# Patient Record
Sex: MALE | Race: OTHER | NOT HISPANIC OR LATINO | Employment: OTHER | URBAN - METROPOLITAN AREA
[De-identification: names, ages, dates, MRNs, and addresses within clinical notes are randomized per-mention and may not be internally consistent; named-entity substitution may affect disease eponyms.]

---

## 2017-07-10 ENCOUNTER — ALLSCRIPTS OFFICE VISIT (OUTPATIENT)
Dept: OTHER | Facility: OTHER | Age: 77
End: 2017-07-10

## 2017-07-10 DIAGNOSIS — Z12.5 ENCOUNTER FOR SCREENING FOR MALIGNANT NEOPLASM OF PROSTATE: ICD-10-CM

## 2017-07-10 DIAGNOSIS — I25.10 ATHEROSCLEROTIC HEART DISEASE OF NATIVE CORONARY ARTERY WITHOUT ANGINA PECTORIS: ICD-10-CM

## 2017-11-06 ENCOUNTER — APPOINTMENT (OUTPATIENT)
Dept: LAB | Facility: CLINIC | Age: 77
End: 2017-11-06
Payer: COMMERCIAL

## 2017-11-06 ENCOUNTER — TRANSCRIBE ORDERS (OUTPATIENT)
Dept: LAB | Facility: CLINIC | Age: 77
End: 2017-11-06

## 2017-11-06 ENCOUNTER — ALLSCRIPTS OFFICE VISIT (OUTPATIENT)
Dept: OTHER | Facility: OTHER | Age: 77
End: 2017-11-06

## 2017-11-06 DIAGNOSIS — I25.10 ATHEROSCLEROTIC HEART DISEASE OF NATIVE CORONARY ARTERY WITHOUT ANGINA PECTORIS: ICD-10-CM

## 2017-11-06 DIAGNOSIS — Z12.5 ENCOUNTER FOR SCREENING FOR MALIGNANT NEOPLASM OF PROSTATE: ICD-10-CM

## 2017-11-06 LAB
BASOPHILS # BLD AUTO: 0.03 THOUSANDS/ΜL (ref 0–0.1)
BASOPHILS NFR BLD AUTO: 0 % (ref 0–1)
EOSINOPHIL # BLD AUTO: 0.13 THOUSAND/ΜL (ref 0–0.61)
EOSINOPHIL NFR BLD AUTO: 1 % (ref 0–6)
ERYTHROCYTE [DISTWIDTH] IN BLOOD BY AUTOMATED COUNT: 13.4 % (ref 11.6–15.1)
HCT VFR BLD AUTO: 42.8 % (ref 36.5–49.3)
HGB BLD-MCNC: 14.8 G/DL (ref 12–17)
LYMPHOCYTES # BLD AUTO: 1.79 THOUSANDS/ΜL (ref 0.6–4.47)
LYMPHOCYTES NFR BLD AUTO: 20 % (ref 14–44)
MCH RBC QN AUTO: 33 PG (ref 26.8–34.3)
MCHC RBC AUTO-ENTMCNC: 34.6 G/DL (ref 31.4–37.4)
MCV RBC AUTO: 96 FL (ref 82–98)
MONOCYTES # BLD AUTO: 0.88 THOUSAND/ΜL (ref 0.17–1.22)
MONOCYTES NFR BLD AUTO: 10 % (ref 4–12)
NEUTROPHILS # BLD AUTO: 6.19 THOUSANDS/ΜL (ref 1.85–7.62)
NEUTS SEG NFR BLD AUTO: 69 % (ref 43–75)
NRBC BLD AUTO-RTO: 0 /100 WBCS
PLATELET # BLD AUTO: 187 THOUSANDS/UL (ref 149–390)
PMV BLD AUTO: 11.3 FL (ref 8.9–12.7)
PSA SERPL-MCNC: 1.3 NG/ML (ref 0–4)
RBC # BLD AUTO: 4.48 MILLION/UL (ref 3.88–5.62)
WBC # BLD AUTO: 9.07 THOUSAND/UL (ref 4.31–10.16)

## 2017-11-06 PROCEDURE — 80053 COMPREHEN METABOLIC PANEL: CPT

## 2017-11-06 PROCEDURE — 85025 COMPLETE CBC W/AUTO DIFF WBC: CPT

## 2017-11-06 PROCEDURE — 80061 LIPID PANEL: CPT

## 2017-11-06 PROCEDURE — G0103 PSA SCREENING: HCPCS

## 2017-11-06 PROCEDURE — 84443 ASSAY THYROID STIM HORMONE: CPT

## 2017-11-06 PROCEDURE — 36415 COLL VENOUS BLD VENIPUNCTURE: CPT

## 2017-11-06 PROCEDURE — 82570 ASSAY OF URINE CREATININE: CPT

## 2017-11-06 PROCEDURE — 82043 UR ALBUMIN QUANTITATIVE: CPT

## 2017-11-07 ENCOUNTER — GENERIC CONVERSION - ENCOUNTER (OUTPATIENT)
Dept: OTHER | Facility: OTHER | Age: 77
End: 2017-11-07

## 2017-11-07 LAB
ALBUMIN SERPL BCP-MCNC: 3.7 G/DL (ref 3.5–5)
ALP SERPL-CCNC: 65 U/L (ref 46–116)
ALT SERPL W P-5'-P-CCNC: 16 U/L (ref 12–78)
ANION GAP SERPL CALCULATED.3IONS-SCNC: 6 MMOL/L (ref 4–13)
AST SERPL W P-5'-P-CCNC: 11 U/L (ref 5–45)
BILIRUB SERPL-MCNC: 0.84 MG/DL (ref 0.2–1)
BUN SERPL-MCNC: 13 MG/DL (ref 5–25)
CALCIUM SERPL-MCNC: 8.5 MG/DL (ref 8.3–10.1)
CHLORIDE SERPL-SCNC: 106 MMOL/L (ref 100–108)
CHOLEST SERPL-MCNC: 139 MG/DL (ref 50–200)
CO2 SERPL-SCNC: 28 MMOL/L (ref 21–32)
CREAT SERPL-MCNC: 0.9 MG/DL (ref 0.6–1.3)
CREAT UR-MCNC: 195 MG/DL
GFR SERPL CREATININE-BSD FRML MDRD: 83 ML/MIN/1.73SQ M
GLUCOSE P FAST SERPL-MCNC: 93 MG/DL (ref 65–99)
HDLC SERPL-MCNC: 48 MG/DL (ref 40–60)
LDLC SERPL CALC-MCNC: 66 MG/DL (ref 0–100)
MICROALBUMIN UR-MCNC: 1400 MG/L (ref 0–20)
MICROALBUMIN/CREAT 24H UR: 718 MG/G CREATININE (ref 0–30)
POTASSIUM SERPL-SCNC: 3.6 MMOL/L (ref 3.5–5.3)
PROT SERPL-MCNC: 7.2 G/DL (ref 6.4–8.2)
SODIUM SERPL-SCNC: 140 MMOL/L (ref 136–145)
TRIGL SERPL-MCNC: 124 MG/DL
TSH SERPL DL<=0.05 MIU/L-ACNC: 0.76 UIU/ML (ref 0.36–3.74)

## 2017-11-07 NOTE — PROGRESS NOTES
Assessment  1  Hypertension (401 9) (I10)   2  Coronary artery disease (414 00) (I25 10)   3  Hyperlipidemia (272 4) (E78 5)   4  Hypothyroidism (244 9) (E03 9)   5  Tobacco use disorder (305 1) (F17 200)   6  Palpitations (785 1) (R00 2)    Plan  Colon cancer screening    · (1) OCCULT BLOOD, FECAL IMMUNOCHEMICAL TEST; Status:Active; Requested IRX:[id number];   Palpitations    · EKG/ECG- POC; Status:Complete;   Done: 44ZDQ3073 12:20PM    Discussion/Summary  Discussion Summary:   EKG with PVC's  Discussed the findings and told pt we need to get his labs back to see if any other issues, eg thyroid is causing an issues  Recommended a cardioevent monitor, but refuses  Feel increasing his beta blocker or adding a CCB may increase his fatigue and has borderline HR at times  Pt doesn't want further w/u  Refusing vaccines and colonoscopy  Will give him FIT  Continue current treatment  Wean tobacco    Smoking Cessation: Impression: tobacco smoking  Currently, the patient is not interested in quitting  There are no changes in medication management  Patient discussion: discussed with the patient, 3 minute visit, greater than half of the time was spent on counseling  Medication SE Review and Pt Understands Tx: Possible side effects of new medications were reviewed with the patient/guardian today  The treatment plan was reviewed with the patient/guardian  The patient/guardian understands and agrees with the treatment plan      Chief Complaint  Chief Complaint Free Text Note Form: Patient is being seen today for a rtn visit  Patient has no complaints or questions  Chief Complaint Chronic Condition [facility]: Patient is here today for follow up of chronic conditions described in HPI  History of Present Illness  HPI: WM RTC for f/u CAD, Htn, etc  Doing ok and no c/o's  Did not get his labs yet  ROS positive for what sounds like intermittent palpitations  Last several minutes  No CP or SOB   H/o CAD, but no Afib in his old records and pt a poor historian  Refusing colonoscopy and vaccines  Still smoking  Coronary Artery Disease (Follow-Up): The patient states he has been stable with his coronary artery disease symptoms since the last visit  Comorbid Illnesses: hypertension  Complications: MI  He has no significant interval events  Symptoms: The patient is currently asymptomatic  Associated symptoms include palpitations, but-- no syncope,-- no PND,-- no orthopnea-- and-- no edema  His symptoms do not limit his activities  He denies nitroglycerin use since the last visit  Medications: the patient is adherent with his medication regimen  -- He denies medication side effects  Medication(s): aspirin,-- a beta blocker,-- a statin-- and-- an ACE inhibitor  Hypothyroidism (Follow-Up): The patient is being seen for follow-up of hypothyroidism of undetermined etiology  The patient reports doing well  He has had no significant interval events  The patient is currently asymptomatic  Associated symptoms: palpitations  Medications include levothyroxine  Medications:  the patient is adherent to his medication regimen, but-- he denies medication side effects  Disease management:  the patient is doing well with his goals  Due for: thyroid stimulating hormone and lipid profile  Hyperlipidemia (Follow-Up): The patient states his hyperlipidemia has been under good control since the last visit  Comorbid Illnesses: coronary artery disease,-- tobacco use-- and-- hypertension  He has no significant interval events  Symptoms: The patient is currently asymptomatic  Associated symptoms include no focal neurologic deficits-- and-- no memory loss  Medications: the patient is adherent with his medication regimen  -- He denies medication side effects  Medication(s): a statin  The patient is doing well with his hyperlipidemia goals  The patient is due for a lipid panel-- and-- liver function tests  Hypertension (Follow-Up):  The patient presents for follow-up of essential hypertension  The patient states he has been doing well with his blood pressure control since the last visit  Comorbid Illnesses: coronary artery disease  He has no significant interval events  Symptoms: The patient is currently asymptomatic  Associated symptoms include no headache-- and-- no focal neurologic deficits  Home monitoring: The patient is not checking blood pressure at home  Blood pressure control has been good  Medications: the patient is adherent with his medication regimen  -- He denies medication side effects  Medication(s): a beta blocking agent,-- a calcium channel blocker-- and-- an ACE inhibitor  Disease Management: the patient is doing well with his blood pressure goals  The patient is due for a lipid panel,-- a serum creatinine-- and-- a urine microalbumin  Review of Systems  Complete-Male:   Constitutional: no fever,-- not feeling poorly,-- no chills-- and-- not feeling tired  Cardiovascular: palpitations, but-- as noted in HPI,-- no chest pain,-- no intermittent leg claudication-- and-- no extremity edema  Respiratory: no shortness of breath,-- no cough,-- no orthopnea,-- no wheezing,-- no shortness of breath during exertion-- and-- no PND  Gastrointestinal: no abdominal pain,-- no nausea,-- no constipation-- and-- no diarrhea  Genitourinary: no dysuria  Musculoskeletal: No complaints of arthralgia, no myalgias, no joint swelling or stiffness, no limb pain or swelling  Integumentary: No complaints of skin rash or skin lesions, no itching, no skin wound, no dry skin  Neurological: no headache,-- no confusion-- and-- no convulsions  Psychiatric: no anxiety-- and-- no depression  Endocrine: No complaints of proptosis, no hot flashes, no muscle weakness, no erectile dysfunction, no deepening of the voice, no feelings of weakness     Hematologic/Lymphatic: No complaints of swollen glands, no swollen glands in the neck, does not bleed easily, no easy bruising  Active Problems  1  Allergic rhinitis (477 9) (J30 9)   2  Coronary artery disease (414 00) (I25 10)   3  Depression screening (V79 0) (Z13 89)   4  Hard of hearing (389 9) (H91 90)   5  Hyperlipidemia (272 4) (E78 5)   6  Hypertension (401 9) (I10)   7  Hypothyroidism (244 9) (E03 9)   8  Need for pneumococcal vaccine (V03 82) (Z23)   9  Screening for genitourinary condition (V81 6) (Z13 89)   10  Screening for neurological condition (V80 09) (Z13 89)   11  Special screening, prostate cancer (V76 44) (Z12 5)   12  Tobacco use disorder (305 1) (F17 200)    Past Medical History  1  History of hemorrhoids (V13 89) (Z87 19)  Active Problems And Past Medical History Reviewed: The active problems and past medical history were reviewed and updated today  Surgical History  1  History of CABG   2  History of Complete Colonoscopy   3  History of Dental Surgery   4  History of Knee Surgery Left   5  History of Tonsillectomy With Adenoidectomy  Surgical History Reviewed: The surgical history was reviewed and updated today  Family History  Mother    1  Family history of Alzheimer's disease (V17 2) (Z82 0)   2  Denied: Family history of mental disorder   3  Denied: Family history of substance abuse  Father    4  Family history of cardiac disorder (V17 49) (Z82 49)   5  Denied: Family history of mental disorder   6  Denied: Family history of substance abuse  Brother    7  Family history of cardiac disorder (V17 49) (Z82 49)  Family History Reviewed: The family history was reviewed and updated today  Social History   · Current every day smoker (305 1) (F17 200)   · Denies alcohol consumption (V49 89) (Z78 9)   · Has no children   · Lives alone without help available (V60 4) (Z60 2)   · Previous  service   · Retired  Social History Reviewed: The social history was reviewed and updated today  The social history was reviewed and is unchanged  Current Meds   1  Levothyroxine Sodium 50 MCG Oral Tablet; TAKE 1 TABLET DAILY AS DIRECTED  Requested for:   01Kvb5824; Last Rx:22Fyj4678 Ordered   2  Lisinopril 20 MG Oral Tablet; TAKE 1 TABLET DAILY  Requested for: 91PQY5834; Last Rx:35Itn8880   Ordered   3  Metoprolol Succinate ER 50 MG Oral Tablet Extended Release 24 Hour; TAKE 1 TABLET DAILY    Requested for: 00WQD3137; Last Rx:56Nkw1479 Ordered   4  NIFEdipine ER 60 MG Oral Tablet Extended Release 24 Hour; TAKE 1 TABLET DAILY  Requested for:   11YII8864; Last Rx:60Hqk6582 Ordered   5  Simvastatin 20 MG Oral Tablet; TAKE 1 TABLET DAILY AS DIRECTED  Requested for: 51JUJ0107; Last   Rx:07Uim5768 Ordered  Medication List Reviewed: The medication list was reviewed and updated today  Allergies  1  No Known Drug Allergies    Vitals  Vital Signs    Recorded: 56XKP2141 11:50AM   Temperature 97 9 F, Tympanic   Heart Rate 58   Respiration 18   Systolic 298, Sitting   Diastolic 82, Sitting   Height 5 ft 10 in   Weight 158 lb 8 0 oz   BMI Calculated 22 74   BSA Calculated 1 89   O2 Saturation 98     Physical Exam    Constitutional   General appearance: No acute distress, well appearing and well nourished  Eyes   Conjunctiva and lids: No swelling, erythema, or discharge  Pupils and irises: Equal, round and reactive to light  Ears, Nose, Mouth, and Throat   Oropharynx: Normal with no erythema, edema, exudate or lesions  Pulmonary   Respiratory effort: No increased work of breathing or signs of respiratory distress  Auscultation of lungs: Clear to auscultation, equal breath sounds bilaterally, no wheezes, no rales, no rhonci  Cardiovascular   Auscultation of heart: Abnormal  -- Regular with ectopy vs  Irregular irregular with SVR  Examination of extremities for edema and/or varicosities: Normal     Carotid pulses: Normal     Abdomen   Abdomen: Non-tender, no masses  Lymphatic   Palpation of lymph nodes in neck: No lymphadenopathy      Musculoskeletal   Gait and station: Normal     Psychiatric   Orientation to person, place and time: Normal     Mood and affect: Normal          Results/Data  EKG/ECG- POC 00SWW6429 12:20PM Jelani Aldrich     Test Name Result Flag Reference   EKG/ECG      See printout for my interpetation  Future Appointments    Date/Time Provider Specialty Site   03/06/2018 01:15 PM SOILA Camilo   [id number]     Signatures   Electronically signed by : SOILA Rai ; Nov 6 2017 12:44PM EST                       (Author)

## 2017-11-10 ENCOUNTER — TRANSCRIBE ORDERS (OUTPATIENT)
Dept: LAB | Facility: CLINIC | Age: 77
End: 2017-11-10

## 2017-11-10 ENCOUNTER — APPOINTMENT (OUTPATIENT)
Dept: LAB | Facility: CLINIC | Age: 77
End: 2017-11-10
Payer: COMMERCIAL

## 2017-11-10 DIAGNOSIS — Z12.11 SPECIAL SCREENING FOR MALIGNANT NEOPLASMS, COLON: ICD-10-CM

## 2017-11-10 DIAGNOSIS — Z12.11 SPECIAL SCREENING FOR MALIGNANT NEOPLASMS, COLON: Primary | ICD-10-CM

## 2017-11-10 LAB — HEMOCCULT STL QL IA: POSITIVE

## 2017-11-10 PROCEDURE — G0328 FECAL BLOOD SCRN IMMUNOASSAY: HCPCS

## 2017-11-11 ENCOUNTER — GENERIC CONVERSION - ENCOUNTER (OUTPATIENT)
Dept: OTHER | Facility: OTHER | Age: 77
End: 2017-11-11

## 2018-01-13 VITALS
RESPIRATION RATE: 18 BRPM | OXYGEN SATURATION: 98 % | HEART RATE: 58 BPM | WEIGHT: 158.5 LBS | BODY MASS INDEX: 22.69 KG/M2 | DIASTOLIC BLOOD PRESSURE: 82 MMHG | HEIGHT: 70 IN | SYSTOLIC BLOOD PRESSURE: 132 MMHG | TEMPERATURE: 97.9 F

## 2018-01-14 VITALS
HEART RATE: 60 BPM | TEMPERATURE: 99.3 F | OXYGEN SATURATION: 98 % | WEIGHT: 158 LBS | DIASTOLIC BLOOD PRESSURE: 66 MMHG | BODY MASS INDEX: 22.62 KG/M2 | RESPIRATION RATE: 18 BRPM | HEIGHT: 70 IN | SYSTOLIC BLOOD PRESSURE: 112 MMHG

## 2018-01-14 NOTE — PROGRESS NOTES
Assessment    1  Medicare annual wellness visit, initial (V70 0) (Z00 00)   2  Hypertension (401 9) (I10)   3  Coronary artery disease (414 00) (I25 10)   4  Hyperlipidemia (272 4) (E78 5)   5  Hypothyroidism (244 9) (E03 9)   6  Tobacco use disorder (305 1) (F17 200)   7  Palpitations (785 1) (R00 2)    Plan  Colon cancer screening    · (1) OCCULT BLOOD, FECAL IMMUNOCHEMICAL TEST; Status:Active; Requested  BIP:[id number];   Palpitations    · EKG/ECG- POC; Status:Complete;   Done: 62CMT5250 12:20PM    Discussion/Summary  Impression: Initial Annual Wellness Visit  Cardiovascular screening and counseling: due for a lipid panel  Diabetes screening and counseling: due for blood glucose  Colorectal cancer screening and counseling: screening not indicated  Prostate cancer screening and counseling: due for PSA  Osteoporosis screening and counseling: screening not indicated  Abdominal aortic aneurysm screening and counseling: screening not indicated  Glaucoma screening and counseling: screening not indicated  HIV screening and counseling: screening not indicated  Immunizations: the patient declines the influenza vaccination, pneumococcal vaccination status is unknown and hepatitis B vaccination status is unknown  Advance Directive Planning: complete and up to date, Need copy for our charts  Advice and education were given regarding fall risk reduction, nutrition (non-diabetic) and seat belt use  He was referred to podiatry and Dr Catie Bucio  Medical Equipment/Suppliers: none  Patient Discussion: plan discussed with the patient, follow-up visit needed in one year, follow-up as needed  History of Present Illness  WM presents for Medicare Wellness  The patient is being seen for the initial annual wellness visit  Medicare Screening and Risk Factors   Hospitalizations: no previous hospitalizations    Medicare Screening Tests Risk Questions   Abdominal aortic aneurysm risk assessment: tobacco use and over 72years of age  Osteoporosis risk assessment: tobacco use  HIV risk assessment: none indicated  Drug and Alcohol Use: The patient is a current cigarette smoker  He is not ready to quit using tobacco  The patient reports occasional alcohol use  Alcohol concern:   The patient has no concerns about alcohol abuse  He has never used illicit drugs  Diet and Physical Activity: Current diet includes well balanced meals, 2 servings of fruit per day, 2 servings of vegetables per day and 2 cups of coffee per day  The patient does not exercise  Mood Disorder and Cognitive Impairment Screening: He denies feeling down, depressed, or hopeless over the past two weeks  He denies feeling little interest or pleasure in doing things over the past two weeks  Cognitive impairment screening: difficulty learning/retaining new information, difficulty handling complex tasks, denies difficulty with reasoning, denies difficulty with spatial ability and orientation, denies difficulty with language and denies difficulty with behavior  Functional Ability/Level of Safety: Hearing is slightly decreased  He reports hearing difficulties  He uses a hearing aid  The patient is currently able to do activities of daily living with limitations  Activities of daily living details: needs help shopping, needs help doing housework and needs help doing laundry, but does not need help using the phone, no transportation help needed, no meal preparation help needed, does not need help managing medications and does not need help managing money  Home safety risk factors:  no unfamiliar surroundings, no loose rugs, no poor household lighting, no uneven floors, no household clutter, grab bars in the bathroom and handrails on the stairs  Advance Directives: Advance directives: no living will and no durable power of  for health care directives     Co-Managers and Medical Equipment/Suppliers: See Patient Care Team   Falls Risk: The patient fell 1 times in the past 12 months  The patient currently has no urinary incontinence symptoms  Patient Care Team    Care Team Member Role Specialty Office Number   Akshat Mistry [place]        Active Problems    1  Allergic rhinitis (477 9) (J30 9)   2  Coronary artery disease (414 00) (I25 10)   3  Depression screening (V79 0) (Z13 89)   4  Hard of hearing (389 9) (H91 90)   5  Hyperlipidemia (272 4) (E78 5)   6  Hypertension (401 9) (I10)   7  Hypothyroidism (244 9) (E03 9)   8  Need for pneumococcal vaccine (V03 82) (Z23)   9  Screening for genitourinary condition (V81 6) (Z13 89)   10  Screening for neurological condition (V80 09) (Z13 89)   11  Special screening, prostate cancer (V76 44) (Z12 5)   12  Tobacco use disorder (305 1) (F17 200)    Past Medical History    1  History of hemorrhoids (V13 89) (Z87 19)    Surgical History    1  History of CABG   2  History of Complete Colonoscopy   3  History of Dental Surgery   4  History of Knee Surgery Left   5  History of Tonsillectomy With Adenoidectomy    Family History  Mother    1  Family history of Alzheimer's disease (V17 2) (Z82 0)   2  Denied: Family history of mental disorder   3  Denied: Family history of substance abuse  Father    4  Family history of cardiac disorder (V17 49) (Z82 49)   5  Denied: Family history of mental disorder   6  Denied: Family history of substance abuse  Brother    7  Family history of cardiac disorder (V17 49) (Z82 49)    Social History    · Current every day smoker (305 1) (F17 200)   · Denies alcohol consumption (V49 89) (Z78 9)   · Has no children   · Lives alone without help available (V60 4) (Z60 2)   · Previous  service   · Retired    Current Meds   1  Levothyroxine Sodium 50 MCG Oral Tablet; TAKE 1 TABLET DAILY AS DIRECTED    Requested for: 02Blh5317; Last Rx:69Tig7944 Ordered   2  Lisinopril 20 MG Oral Tablet; TAKE 1 TABLET DAILY  Requested for: 17RMV0178; Last   Rx:09Zys0673 Ordered   3  Metoprolol Succinate ER 50 MG Oral Tablet Extended Release 24 Hour; TAKE 1 TABLET   DAILY  Requested for: 28HFG0484; Last Rx:27Xdz2261 Ordered   4  NIFEdipine ER 60 MG Oral Tablet Extended Release 24 Hour; TAKE 1 TABLET DAILY    Requested for: 62LLG8869; Last Rx:88Bnb8822 Ordered   5  Simvastatin 20 MG Oral Tablet; TAKE 1 TABLET DAILY AS DIRECTED  Requested for:   70KLI3462; Last Rx:13Hpd7084 Ordered    Allergies    1  No Known Drug Allergies    Vitals  Signs   Recorded: 06XDP2815 11:50AM   Temperature: 97 9 F, Tympanic  Heart Rate: 58  Respiration: 18  Systolic: 474, Sitting  Diastolic: 82, Sitting  Height: 5 ft 10 in  Weight: 158 lb 8 0 oz  BMI Calculated: 22 74  BSA Calculated: 1 89  O2 Saturation: 98    Results/Data  EKG/ECG- POC 55DJU6426 12:20PM Balaji Sitter     Test Name Result Flag Reference   EKG/ECG      See printout for my interpetation         Signatures   Electronically signed by : SOILA Austin ; Nov 6 2017 12:40PM EST                       (Author)

## 2018-01-15 NOTE — RESULT NOTES
Verified Results  (1) CBC/PLT/DIFF 65HZT2917 11:22AM Iram Fuller Order Number: [id number]     Test Name Result Flag Reference   WBC COUNT 9 07 Thousand/uL  4 31-10 16   RBC COUNT 4 48 Million/uL  3 88-5 62   HEMOGLOBIN 14 8 g/dL  12 0-17 0   HEMATOCRIT 42 8 %  36 5-49 3   MCV 96 fL  82-98   MCH 33 0 pg  26 8-34 3   MCHC 34 6 g/dL  31 4-37 4   RDW 13 4 %  11 6-15 1   MPV 11 3 fL  8 9-12 7   PLATELET COUNT 199 Thousands/uL  149-390   nRBC AUTOMATED 0 /100 WBCs     NEUTROPHILS RELATIVE PERCENT 69 %  43-75   LYMPHOCYTES RELATIVE PERCENT 20 %  14-44   MONOCYTES RELATIVE PERCENT 10 %  4-12   EOSINOPHILS RELATIVE PERCENT 1 %  0-6   BASOPHILS RELATIVE PERCENT 0 %  0-1   NEUTROPHILS ABSOLUTE COUNT 6 19 Thousands/? ??L  1 85-7 62   LYMPHOCYTES ABSOLUTE COUNT 1 79 Thousands/? ??L  0 60-4 47   MONOCYTES ABSOLUTE COUNT 0 88 Thousand/? ??L  0 17-1 22   EOSINOPHILS ABSOLUTE COUNT 0 13 Thousand/? ??L  0 00-0 61   BASOPHILS ABSOLUTE COUNT 0 03 Thousands/? ??L  0 00-0 10     (1) COMPREHENSIVE METABOLIC PANEL 01NBC7980 96:08IR Iram Fuller Order Number: [id number]     Test Name Result Flag Reference   SODIUM 140 mmol/L  136-145   POTASSIUM 3 6 mmol/L  3 5-5 3   CHLORIDE 106 mmol/L  100-108   CARBON DIOXIDE 28 mmol/L  21-32   ANION GAP (CALC) 6 mmol/L  4-13   BLOOD UREA NITROGEN 13 mg/dL  5-25   CREATININE 0 90 mg/dL  0 60-1 30   Standardized to IDMS reference method   CALCIUM 8 5 mg/dL  8 3-10 1   BILI, TOTAL 0 84 mg/dL  0 20-1 00   ALK PHOSPHATAS 65 U/L     ALT (SGPT) 16 U/L  12-78   Specimen collection should occur prior to Sulfasalazine and/or Sulfapyridine administration due to the potential for falsely depressed results  AST(SGOT) 11 U/L  5-45   Specimen collection should occur prior to Sulfasalazine administration due to the potential for falsely depressed results     ALBUMIN 3 7 g/dL  3 5-5 0   TOTAL PROTEIN 7 2 g/dL  6 4-8 2   eGFR 83 ml/min/1 73sq m     National Kidney Disease Education Program recommendations are as follows:  GFR calculation is accurate only with a steady state creatinine  Chronic Kidney disease less than 60 ml/min/1 73 sq  meters  Kidney failure less than 15 ml/min/1 73 sq  meters  GLUCOSE FASTING 93 mg/dL  65-99   Specimen collection should occur prior to Sulfasalazine administration due to the potential for falsely depressed results  Specimen collection should occur prior to Sulfapyridine administration due to the potential for falsely elevated results  (1) LIPID PANEL FASTING W DIRECT LDL REFLEX [id number] 11:22AM [facility] Order Number: [id number]     Test Name Result Flag Reference   CHOLESTEROL 139 mg/dL     LDL CHOLESTEROL CALCULATED 66 mg/dL  0-100   Triglyceride:        Normal <150 mg/dl   Borderline High 150-199 mg/dl   High 200-499 mg/dl   Very High >499 mg/dl      Cholesterol:       Desirable <200 mg/dl    Borderline High 200-239 mg/dl    High >239 mg/dl      HDL Cholesterol:       High>59 mg/dL    Low <41 mg/dL      HDL Cholesterol:       High>59 mg/dL    Low <41 mg/dL      This screening LDL is a calculated result  It does not have the accuracy of the Direct Measured LDL in the monitoring of patients with hyperlipidemia and/or statin therapy  Direct Measure LDL (YSR630) must be ordered separately in these patients  TRIGLYCERIDES 124 mg/dL  <=150   Specimen collection should occur prior to N-Acetylcysteine or Metamizole administration due to the potential for falsely depressed results  HDL,DIRECT 48 mg/dL  40-60   Specimen collection should occur prior to Metamizole administration due to the potential for falsley depressed results       (1) MICROALBUMIN CREATININE RATIO, RANDOM URINE [id number] 11:22AM [facility] Order Number: [id number]     Test Name Result Flag Reference   MICROALBUMIN/ CREAT R 718 mg/g creatinine H 0-30   MICROALBUMIN,URINE 1400 0 mg/L H 0 0-20 0   CREATININE URINE 195 0 mg/dL       (1) TSH 56GBR7381 11:22AM Chirag Olvera Order Number: [id number]     Test Name Result Flag Reference   TSH 0 760 uIU/mL  0 358-3 740   Patients undergoing fluorescein dye angiography may retain small amounts of fluorescein in the body for 48-72 hours post procedure  Samples containing fluorescein can produce falsely depressed TSH values  If the patient had this procedure,a specimen should be resubmitted post fluorescein clearance  (1) PSA (SCREEN) (Dx V76 44 Screen for Prostate Cancer) 74MTR1542 11:22AM Chirag Olvera Order Number: [id number]     Test Name Result Flag Reference   PROSTATE SPECIFIC ANTIGEN 1 3 ng/mL  0 0-4 0   American Urological Association Guidelines define biochemical recurrence of prostate cancer as a detectable or rising PSA value post-radical prostatectomy that is greater than or equal to 0 2 ng/mL with a second confirmatory level of greater than or equal to 0 2 ng/mL

## 2018-01-16 NOTE — RESULT NOTES
Verified Results  (1) OCCULT BLOOD, FECAL IMMUNOCHEMICAL TEST [id number] 12:53PM Nneka Pablo     Test Name Result Flag Reference   OCCULT BLD, FECAL IMMUNOLOGICAL Positive A Negative   Performed by Fecal Immunochemical Test

## 2018-02-08 DIAGNOSIS — E03.9 HYPOTHYROIDISM, UNSPECIFIED TYPE: Primary | ICD-10-CM

## 2018-02-08 RX ORDER — LEVOTHYROXINE SODIUM 0.05 MG/1
1 TABLET ORAL DAILY
COMMUNITY
End: 2018-02-08 | Stop reason: SDUPTHER

## 2018-02-08 RX ORDER — LEVOTHYROXINE SODIUM 0.05 MG/1
50 TABLET ORAL DAILY
Qty: 30 TABLET | Refills: 5 | Status: SHIPPED | OUTPATIENT
Start: 2018-02-08 | End: 2018-09-07 | Stop reason: SDUPTHER

## 2018-02-12 DIAGNOSIS — I10 ESSENTIAL HYPERTENSION: Primary | ICD-10-CM

## 2018-02-12 RX ORDER — NIFEDIPINE 60 MG/1
TABLET, EXTENDED RELEASE ORAL
Qty: 30 TABLET | Refills: 0 | Status: SHIPPED | OUTPATIENT
Start: 2018-02-12 | End: 2018-09-24 | Stop reason: SDUPTHER

## 2018-03-05 PROBLEM — R00.2 PALPITATIONS: Status: ACTIVE | Noted: 2017-11-06

## 2018-03-05 PROBLEM — F17.200 TOBACCO USE DISORDER: Status: ACTIVE | Noted: 2017-07-10

## 2018-03-05 PROBLEM — I10 HYPERTENSION: Status: ACTIVE | Noted: 2017-07-10

## 2018-03-05 PROBLEM — I25.10 CORONARY ARTERY DISEASE: Status: ACTIVE | Noted: 2017-07-10

## 2018-03-05 PROBLEM — J30.9 ALLERGIC RHINITIS: Status: ACTIVE | Noted: 2017-07-10

## 2018-03-05 PROBLEM — H91.90 HARD OF HEARING: Status: ACTIVE | Noted: 2017-07-10

## 2018-03-05 PROBLEM — E03.9 HYPOTHYROIDISM: Status: ACTIVE | Noted: 2017-07-10

## 2018-03-05 PROBLEM — E78.5 HYPERLIPIDEMIA: Status: ACTIVE | Noted: 2017-07-10

## 2018-03-05 RX ORDER — SIMVASTATIN 20 MG
1 TABLET ORAL DAILY
COMMUNITY
End: 2019-01-11 | Stop reason: SDUPTHER

## 2018-03-05 RX ORDER — LISINOPRIL 40 MG/1
1 TABLET ORAL DAILY
COMMUNITY
End: 2018-03-22 | Stop reason: SDUPTHER

## 2018-03-05 RX ORDER — METOPROLOL SUCCINATE 50 MG/1
1 TABLET, EXTENDED RELEASE ORAL DAILY
COMMUNITY
End: 2018-11-09

## 2018-03-06 ENCOUNTER — OFFICE VISIT (OUTPATIENT)
Dept: INTERNAL MEDICINE CLINIC | Facility: CLINIC | Age: 78
End: 2018-03-06
Payer: COMMERCIAL

## 2018-03-06 VITALS
BODY MASS INDEX: 23.39 KG/M2 | SYSTOLIC BLOOD PRESSURE: 120 MMHG | OXYGEN SATURATION: 97 % | TEMPERATURE: 98.3 F | DIASTOLIC BLOOD PRESSURE: 62 MMHG | RESPIRATION RATE: 18 BRPM | HEIGHT: 70 IN | HEART RATE: 67 BPM | WEIGHT: 163.4 LBS

## 2018-03-06 DIAGNOSIS — I10 ESSENTIAL HYPERTENSION: Primary | ICD-10-CM

## 2018-03-06 DIAGNOSIS — E03.8 HYPOTHYROIDISM DUE TO HASHIMOTO'S THYROIDITIS: ICD-10-CM

## 2018-03-06 DIAGNOSIS — F17.200 TOBACCO USE DISORDER: ICD-10-CM

## 2018-03-06 DIAGNOSIS — E06.3 HYPOTHYROIDISM DUE TO HASHIMOTO'S THYROIDITIS: ICD-10-CM

## 2018-03-06 DIAGNOSIS — I25.10 CORONARY ARTERY DISEASE INVOLVING NATIVE HEART WITHOUT ANGINA PECTORIS, UNSPECIFIED VESSEL OR LESION TYPE: ICD-10-CM

## 2018-03-06 DIAGNOSIS — E78.2 MIXED HYPERLIPIDEMIA: ICD-10-CM

## 2018-03-06 PROCEDURE — 99214 OFFICE O/P EST MOD 30 MIN: CPT | Performed by: INTERNAL MEDICINE

## 2018-03-06 NOTE — PROGRESS NOTES
Assessment/Plan:    No problem-specific Assessment & Plan notes found for this encounter  Diagnoses and all orders for this visit:    Essential hypertension    Hypothyroidism due to Hashimoto's thyroiditis    Coronary artery disease involving native heart without angina pectoris, unspecified vessel or lesion type    Mixed hyperlipidemia    Tobacco use disorder    Other orders  -     simvastatin (ZOCOR) 20 mg tablet; Take 1 tablet by mouth daily  -     metoprolol succinate (TOPROL-XL) 50 mg 24 hr tablet; Take 1 tablet by mouth daily  -     lisinopril (ZESTRIL) 40 mg tablet; Take 1 tablet by mouth daily      A/P: Doing well and labs are up to date  Will continue current treatment  Wean tobacco  RTC four months for routine with labs  Subjective:      Patient ID: Zilphia Krabbe is a 69 y o  male  WM RTC for f/u htn, CAD, etc  Doing well and no new c/o's  Remains active and reports going to the gym several times a week  No falls  Labs up to date  Refusing flu vaccine  Sinus Problem   Pertinent negatives include no chills, coughing, diaphoresis, headaches or shortness of breath  The following portions of the patient's history were reviewed and updated as appropriate:   He  has a past medical history of Coronary artery disease; Disease of thyroid gland; Hemorrhoids; and Hypertension  He   Patient Active Problem List    Diagnosis Date Noted    Palpitations 11/06/2017    Allergic rhinitis 07/10/2017    Coronary artery disease 07/10/2017    Hyperlipidemia 07/10/2017    Hard of hearing 07/10/2017    Hypertension 07/10/2017    Hypothyroidism 07/10/2017    Tobacco use disorder 07/10/2017     He  has a past surgical history that includes Coronary artery bypass graft; Colonoscopy; Dental surgery; Knee surgery (Left); Tonsillectomy and adenoidectomy; and Tooth extraction  His family history includes Alzheimer's disease in his mother; Heart disease in his brother and father    He  reports that he has been smoking  He has been smoking about 0 50 packs per day  He has never used smokeless tobacco  He reports that he drinks about 0 6 oz of alcohol per week   He reports that he does not use drugs  Current Outpatient Prescriptions   Medication Sig Dispense Refill    levothyroxine 50 mcg tablet Take 1 tablet (50 mcg total) by mouth daily 30 tablet 5    lisinopril (ZESTRIL) 40 mg tablet Take 1 tablet by mouth daily      metoprolol succinate (TOPROL-XL) 50 mg 24 hr tablet Take 1 tablet by mouth daily      NIFEdipine (PROCARDIA XL) 60 mg 24 hr tablet TAKE ONE TABLET DAILY 30 tablet 0    simvastatin (ZOCOR) 20 mg tablet Take 1 tablet by mouth daily       No current facility-administered medications for this visit  Current Outpatient Prescriptions on File Prior to Visit   Medication Sig    levothyroxine 50 mcg tablet Take 1 tablet (50 mcg total) by mouth daily    NIFEdipine (PROCARDIA XL) 60 mg 24 hr tablet TAKE ONE TABLET DAILY     No current facility-administered medications on file prior to visit  He has No Known Allergies       Review of Systems   Constitutional: Negative for activity change, chills, diaphoresis, fatigue and fever  Respiratory: Negative for cough, chest tightness, shortness of breath and wheezing  Cardiovascular: Negative for chest pain, palpitations and leg swelling  Gastrointestinal: Negative for abdominal pain, constipation, diarrhea, nausea and vomiting  Genitourinary: Negative for difficulty urinating, dysuria and frequency  Musculoskeletal: Negative for arthralgias, gait problem and myalgias  Neurological: Negative for light-headedness and headaches  Psychiatric/Behavioral: Negative for confusion, dysphoric mood and sleep disturbance  The patient is not nervous/anxious            Objective:      /62 (BP Location: Left arm, Patient Position: Sitting, Cuff Size: Adult)   Pulse 67   Temp 98 3 °F (36 8 °C) (Tympanic)   Resp 18   Ht 5' 10" (1 778 m)   Wt 74 1 kg (163 lb 6 4 oz)   SpO2 97%   BMI 23 45 kg/m²          Physical Exam   Constitutional: He is oriented to person, place, and time  He appears well-developed and well-nourished  No distress  HENT:   Head: Normocephalic and atraumatic  Mouth/Throat: Oropharynx is clear and moist    Eyes: Conjunctivae and EOM are normal  Pupils are equal, round, and reactive to light  Neck: No JVD present  Cardiovascular: Normal rate, regular rhythm and normal heart sounds  Pulmonary/Chest: Effort normal and breath sounds normal  No respiratory distress  He has no wheezes  Abdominal: Soft  Bowel sounds are normal  There is no tenderness  Musculoskeletal: He exhibits no edema  Neurological: He is alert and oriented to person, place, and time  Psychiatric: He has a normal mood and affect  His behavior is normal  Judgment and thought content normal    Nursing note and vitals reviewed

## 2018-03-06 NOTE — PATIENT INSTRUCTIONS

## 2018-03-22 DIAGNOSIS — I10 HYPERTENSION, UNSPECIFIED TYPE: Primary | ICD-10-CM

## 2018-03-22 RX ORDER — LISINOPRIL 40 MG/1
40 TABLET ORAL DAILY
Qty: 90 TABLET | Refills: 1 | Status: SHIPPED | OUTPATIENT
Start: 2018-03-22 | End: 2018-11-27 | Stop reason: SDUPTHER

## 2018-07-10 ENCOUNTER — OFFICE VISIT (OUTPATIENT)
Dept: INTERNAL MEDICINE CLINIC | Facility: CLINIC | Age: 78
End: 2018-07-10
Payer: COMMERCIAL

## 2018-07-10 VITALS
WEIGHT: 165.5 LBS | BODY MASS INDEX: 23.69 KG/M2 | SYSTOLIC BLOOD PRESSURE: 130 MMHG | DIASTOLIC BLOOD PRESSURE: 60 MMHG | OXYGEN SATURATION: 97 % | HEART RATE: 63 BPM | TEMPERATURE: 97.9 F | HEIGHT: 70 IN

## 2018-07-10 DIAGNOSIS — E03.8 HYPOTHYROIDISM DUE TO HASHIMOTO'S THYROIDITIS: ICD-10-CM

## 2018-07-10 DIAGNOSIS — I25.10 CORONARY ARTERY DISEASE INVOLVING NATIVE HEART WITHOUT ANGINA PECTORIS, UNSPECIFIED VESSEL OR LESION TYPE: ICD-10-CM

## 2018-07-10 DIAGNOSIS — F17.200 TOBACCO USE DISORDER: ICD-10-CM

## 2018-07-10 DIAGNOSIS — I10 ESSENTIAL HYPERTENSION: Primary | ICD-10-CM

## 2018-07-10 DIAGNOSIS — E78.2 MIXED HYPERLIPIDEMIA: ICD-10-CM

## 2018-07-10 DIAGNOSIS — E06.3 HYPOTHYROIDISM DUE TO HASHIMOTO'S THYROIDITIS: ICD-10-CM

## 2018-07-10 PROCEDURE — 3008F BODY MASS INDEX DOCD: CPT | Performed by: INTERNAL MEDICINE

## 2018-07-10 PROCEDURE — 3075F SYST BP GE 130 - 139MM HG: CPT | Performed by: INTERNAL MEDICINE

## 2018-07-10 PROCEDURE — 3078F DIAST BP <80 MM HG: CPT | Performed by: INTERNAL MEDICINE

## 2018-07-10 PROCEDURE — 3725F SCREEN DEPRESSION PERFORMED: CPT | Performed by: INTERNAL MEDICINE

## 2018-07-10 PROCEDURE — 99214 OFFICE O/P EST MOD 30 MIN: CPT | Performed by: INTERNAL MEDICINE

## 2018-07-10 NOTE — PATIENT INSTRUCTIONS

## 2018-07-10 NOTE — PROGRESS NOTES
Assessment/Plan:    No problem-specific Assessment & Plan notes found for this encounter  Diagnoses and all orders for this visit:    Essential hypertension  -     Comprehensive metabolic panel; Future    Hypothyroidism due to Hashimoto's thyroiditis    Mixed hyperlipidemia  -     LDL cholesterol, direct; Future  -     Triglycerides; Future  -     TSH, 3rd generation; Future    Tobacco use disorder    Coronary artery disease involving native heart without angina pectoris, unspecified vessel or lesion type     A/P: Doing well and will check labs  Declines all colon cancer screenings and vaccines  Wean tobacco  Continue current treatment and keep f/u with cards  RTC four months for routine  Subjective:      Patient ID: Kenisha Gunderson is a 69 y o  male   RTC for f/u htn, CAD, etc  Doing well and no c/o's  Remains active w/o difficulty and no falls  Still smoking  No CP, SOB, palpitations, orthopnea, or PND  Still hard of hearing  Due for labs and vaccines  Hypertension   Pertinent negatives include no chest pain, headaches, palpitations or shortness of breath  Coronary Artery Disease   Pertinent negatives include no chest pain, chest tightness, dizziness, leg swelling, palpitations or shortness of breath  Nicotine Dependence   Symptoms are negative for fatigue  His urge triggers include company of smokers  The following portions of the patient's history were reviewed and updated as appropriate:   He  has a past medical history of Coronary artery disease; Disease of thyroid gland; Hemorrhoids; and Hypertension    He   Patient Active Problem List    Diagnosis Date Noted    Palpitations 11/06/2017    Allergic rhinitis 07/10/2017    Coronary artery disease 07/10/2017    Hyperlipidemia 07/10/2017    Hard of hearing 07/10/2017    Hypertension 07/10/2017    Hypothyroidism 07/10/2017    Tobacco use disorder 07/10/2017     He  has a past surgical history that includes Coronary artery bypass graft; Colonoscopy; Dental surgery; Knee surgery (Left); Tonsillectomy and adenoidectomy; and Tooth extraction  His family history includes Alzheimer's disease in his mother; Heart disease in his brother and father  He  reports that he has been smoking  He has been smoking about 0 50 packs per day  He has never used smokeless tobacco  He reports that he drinks about 0 6 oz of alcohol per week   He reports that he does not use drugs  Current Outpatient Prescriptions   Medication Sig Dispense Refill    levothyroxine 50 mcg tablet Take 1 tablet (50 mcg total) by mouth daily 30 tablet 5    lisinopril (ZESTRIL) 40 mg tablet Take 1 tablet (40 mg total) by mouth daily 90 tablet 1    metoprolol succinate (TOPROL-XL) 50 mg 24 hr tablet Take 1 tablet by mouth daily      NIFEdipine (PROCARDIA XL) 60 mg 24 hr tablet TAKE ONE TABLET DAILY 30 tablet 0    simvastatin (ZOCOR) 20 mg tablet Take 1 tablet by mouth daily       No current facility-administered medications for this visit  Current Outpatient Prescriptions on File Prior to Visit   Medication Sig    levothyroxine 50 mcg tablet Take 1 tablet (50 mcg total) by mouth daily    lisinopril (ZESTRIL) 40 mg tablet Take 1 tablet (40 mg total) by mouth daily    metoprolol succinate (TOPROL-XL) 50 mg 24 hr tablet Take 1 tablet by mouth daily    NIFEdipine (PROCARDIA XL) 60 mg 24 hr tablet TAKE ONE TABLET DAILY    simvastatin (ZOCOR) 20 mg tablet Take 1 tablet by mouth daily     No current facility-administered medications on file prior to visit  He has No Known Allergies       Review of Systems   Constitutional: Negative for activity change, chills, diaphoresis, fatigue and fever  HENT: Positive for hearing loss  Eyes: Negative for visual disturbance  Respiratory: Negative for cough, chest tightness, shortness of breath and wheezing  Cardiovascular: Negative for chest pain, palpitations and leg swelling     Gastrointestinal: Negative for abdominal pain, constipation, diarrhea, nausea and vomiting  Endocrine: Negative for cold intolerance and heat intolerance  Genitourinary: Negative for difficulty urinating, dysuria and frequency  Musculoskeletal: Negative for arthralgias, gait problem and myalgias  Neurological: Negative for dizziness, tremors, syncope, weakness, light-headedness, numbness and headaches  Psychiatric/Behavioral: Negative for confusion, dysphoric mood and sleep disturbance  The patient is not nervous/anxious  Objective:      /60   Pulse 63   Temp 97 9 °F (36 6 °C)   Ht 5' 10" (1 778 m)   Wt 75 1 kg (165 lb 8 oz)   SpO2 97%   BMI 23 75 kg/m²          Physical Exam   Constitutional: He is oriented to person, place, and time  He appears well-developed and well-nourished  No distress  HENT:   Head: Normocephalic and atraumatic  Mouth/Throat: Oropharynx is clear and moist    Eyes: Conjunctivae and EOM are normal  Pupils are equal, round, and reactive to light  Neck: Normal range of motion  Neck supple  No JVD present  Cardiovascular: Normal rate, regular rhythm and normal heart sounds  No murmur heard  Pulmonary/Chest: Effort normal  No respiratory distress  He has no wheezes  Abdominal: Soft  Bowel sounds are normal  He exhibits no distension  There is no tenderness  Musculoskeletal: He exhibits no edema  Neurological: He is alert and oriented to person, place, and time  Psychiatric: He has a normal mood and affect  His behavior is normal  Judgment and thought content normal    Nursing note and vitals reviewed

## 2018-07-11 ENCOUNTER — APPOINTMENT (OUTPATIENT)
Dept: LAB | Facility: CLINIC | Age: 78
End: 2018-07-11
Payer: COMMERCIAL

## 2018-07-11 DIAGNOSIS — I10 ESSENTIAL HYPERTENSION: ICD-10-CM

## 2018-07-11 DIAGNOSIS — E78.2 MIXED HYPERLIPIDEMIA: ICD-10-CM

## 2018-07-11 LAB
ALBUMIN SERPL BCP-MCNC: 3.8 G/DL (ref 3.5–5)
ALP SERPL-CCNC: 59 U/L (ref 46–116)
ALT SERPL W P-5'-P-CCNC: 20 U/L (ref 12–78)
ANION GAP SERPL CALCULATED.3IONS-SCNC: 8 MMOL/L (ref 4–13)
AST SERPL W P-5'-P-CCNC: 12 U/L (ref 5–45)
BILIRUB SERPL-MCNC: 0.89 MG/DL (ref 0.2–1)
BUN SERPL-MCNC: 15 MG/DL (ref 5–25)
CALCIUM SERPL-MCNC: 8.7 MG/DL (ref 8.3–10.1)
CHLORIDE SERPL-SCNC: 107 MMOL/L (ref 100–108)
CO2 SERPL-SCNC: 27 MMOL/L (ref 21–32)
CREAT SERPL-MCNC: 1.15 MG/DL (ref 0.6–1.3)
GFR SERPL CREATININE-BSD FRML MDRD: 61 ML/MIN/1.73SQ M
GLUCOSE P FAST SERPL-MCNC: 86 MG/DL (ref 65–99)
LDLC SERPL DIRECT ASSAY-MCNC: 66 MG/DL (ref 0–100)
POTASSIUM SERPL-SCNC: 3.9 MMOL/L (ref 3.5–5.3)
PROT SERPL-MCNC: 7.1 G/DL (ref 6.4–8.2)
SODIUM SERPL-SCNC: 142 MMOL/L (ref 136–145)
TRIGL SERPL-MCNC: 120 MG/DL
TSH SERPL DL<=0.05 MIU/L-ACNC: 1.35 UIU/ML (ref 0.36–3.74)

## 2018-07-11 PROCEDURE — 84478 ASSAY OF TRIGLYCERIDES: CPT

## 2018-07-11 PROCEDURE — 84443 ASSAY THYROID STIM HORMONE: CPT

## 2018-07-11 PROCEDURE — 80053 COMPREHEN METABOLIC PANEL: CPT

## 2018-07-11 PROCEDURE — 36415 COLL VENOUS BLD VENIPUNCTURE: CPT

## 2018-07-11 PROCEDURE — 83721 ASSAY OF BLOOD LIPOPROTEIN: CPT

## 2018-09-07 ENCOUNTER — TELEPHONE (OUTPATIENT)
Dept: INTERNAL MEDICINE CLINIC | Facility: CLINIC | Age: 78
End: 2018-09-07

## 2018-09-07 DIAGNOSIS — E03.9 HYPOTHYROIDISM, UNSPECIFIED TYPE: ICD-10-CM

## 2018-09-08 RX ORDER — LEVOTHYROXINE SODIUM 0.05 MG/1
50 TABLET ORAL DAILY
Qty: 30 TABLET | Refills: 5 | Status: SHIPPED | OUTPATIENT
Start: 2018-09-08 | End: 2019-03-04 | Stop reason: SDUPTHER

## 2018-09-24 DIAGNOSIS — I10 ESSENTIAL HYPERTENSION: ICD-10-CM

## 2018-09-25 RX ORDER — NIFEDIPINE 60 MG/1
TABLET, EXTENDED RELEASE ORAL
Qty: 30 TABLET | Refills: 5 | Status: SHIPPED | OUTPATIENT
Start: 2018-09-25 | End: 2019-03-18 | Stop reason: SDUPTHER

## 2018-11-09 DIAGNOSIS — I25.10 CORONARY ARTERY DISEASE INVOLVING NATIVE CORONARY ARTERY OF NATIVE HEART WITHOUT ANGINA PECTORIS: Primary | ICD-10-CM

## 2018-11-09 RX ORDER — METOPROLOL TARTRATE 50 MG/1
50 TABLET, FILM COATED ORAL EVERY 12 HOURS SCHEDULED
Qty: 60 TABLET | Refills: 5 | Status: SHIPPED | OUTPATIENT
Start: 2018-11-09 | End: 2019-03-18 | Stop reason: SDUPTHER

## 2018-11-14 ENCOUNTER — OFFICE VISIT (OUTPATIENT)
Dept: INTERNAL MEDICINE CLINIC | Facility: CLINIC | Age: 78
End: 2018-11-14
Payer: COMMERCIAL

## 2018-11-14 VITALS
SYSTOLIC BLOOD PRESSURE: 142 MMHG | RESPIRATION RATE: 18 BRPM | DIASTOLIC BLOOD PRESSURE: 78 MMHG | WEIGHT: 160 LBS | OXYGEN SATURATION: 97 % | TEMPERATURE: 98.8 F | HEIGHT: 70 IN | BODY MASS INDEX: 22.9 KG/M2 | HEART RATE: 62 BPM

## 2018-11-14 DIAGNOSIS — E03.8 HYPOTHYROIDISM DUE TO HASHIMOTO'S THYROIDITIS: ICD-10-CM

## 2018-11-14 DIAGNOSIS — I25.10 CORONARY ARTERY DISEASE INVOLVING NATIVE HEART WITHOUT ANGINA PECTORIS, UNSPECIFIED VESSEL OR LESION TYPE: ICD-10-CM

## 2018-11-14 DIAGNOSIS — Z12.5 SCREENING FOR PROSTATE CANCER: ICD-10-CM

## 2018-11-14 DIAGNOSIS — Z13.1 SCREENING FOR DIABETES MELLITUS (DM): ICD-10-CM

## 2018-11-14 DIAGNOSIS — E06.3 HYPOTHYROIDISM DUE TO HASHIMOTO'S THYROIDITIS: ICD-10-CM

## 2018-11-14 DIAGNOSIS — E78.2 MIXED HYPERLIPIDEMIA: ICD-10-CM

## 2018-11-14 DIAGNOSIS — F17.200 TOBACCO USE DISORDER: ICD-10-CM

## 2018-11-14 DIAGNOSIS — I10 ESSENTIAL HYPERTENSION: Primary | ICD-10-CM

## 2018-11-14 PROCEDURE — 1160F RVW MEDS BY RX/DR IN RCRD: CPT | Performed by: INTERNAL MEDICINE

## 2018-11-14 PROCEDURE — 1101F PT FALLS ASSESS-DOCD LE1/YR: CPT | Performed by: INTERNAL MEDICINE

## 2018-11-14 PROCEDURE — 99214 OFFICE O/P EST MOD 30 MIN: CPT | Performed by: INTERNAL MEDICINE

## 2018-11-14 PROCEDURE — 3008F BODY MASS INDEX DOCD: CPT | Performed by: INTERNAL MEDICINE

## 2018-11-14 NOTE — PATIENT INSTRUCTIONS

## 2018-11-14 NOTE — PROGRESS NOTES
Assessment/Plan:    No problem-specific Assessment & Plan notes found for this encounter  Diagnoses and all orders for this visit:    Essential hypertension  -     CBC and differential; Future  -     Comprehensive metabolic panel; Future  -     Microalbumin / creatinine urine ratio    Hypothyroidism due to Hashimoto's thyroiditis  -     TSH, 3rd generation with Free T4 reflex; Future    Coronary artery disease involving native heart without angina pectoris, unspecified vessel or lesion type    Mixed hyperlipidemia  -     Lipid Panel with Direct LDL reflex; Future    Tobacco use disorder    Screening for prostate cancer  -     PSA, Total Screen; Future    Screening for diabetes mellitus (DM)  -     Hemoglobin A1C; Future      A/P: Doing well and will check labs  Wean tobacco  Refusing all vaccines  Continue current treatment and RTC four months for routine  Subjective:      Patient ID: Natacha Arguelles is a 69 y o  male  WM RTC for f/u htn, hyperlipidemia, etc  Doing well and no new issues  Remains active w/o difficulty and no falls  Denies angina, edema, SOB, palpitations, orthopnea, or PND  Still smoking  Hearing no worse  Due for labs and vaccines  The following portions of the patient's history were reviewed and updated as appropriate:   He  has a past medical history of Coronary artery disease; Disease of thyroid gland; Hemorrhoids; and Hypertension  He   Patient Active Problem List    Diagnosis Date Noted    Palpitations 11/06/2017    Allergic rhinitis 07/10/2017    Coronary artery disease 07/10/2017    Hyperlipidemia 07/10/2017    Hard of hearing 07/10/2017    Hypertension 07/10/2017    Hypothyroidism 07/10/2017    Tobacco use disorder 07/10/2017     He  has a past surgical history that includes Coronary artery bypass graft; Colonoscopy; Dental surgery; Knee surgery (Left); Tonsillectomy and adenoidectomy; and Tooth extraction    His family history includes Alzheimer's disease in his mother; Heart disease in his brother and father  He  reports that he has been smoking  He has been smoking about 0 50 packs per day  He has never used smokeless tobacco  He reports that he drinks about 0 6 oz of alcohol per week   He reports that he does not use drugs  Current Outpatient Prescriptions   Medication Sig Dispense Refill    levothyroxine 50 mcg tablet Take 1 tablet (50 mcg total) by mouth daily 30 tablet 5    lisinopril (ZESTRIL) 40 mg tablet Take 1 tablet (40 mg total) by mouth daily 90 tablet 1    metoprolol tartrate (LOPRESSOR) 50 mg tablet Take 1 tablet (50 mg total) by mouth every 12 (twelve) hours 60 tablet 5    NIFEdipine (PROCARDIA XL) 60 mg 24 hr tablet TAKE ONE (1) TABLET BY MOUTH ONCE DAILY 30 tablet 5    simvastatin (ZOCOR) 20 mg tablet Take 1 tablet by mouth daily       No current facility-administered medications for this visit  Current Outpatient Prescriptions on File Prior to Visit   Medication Sig    levothyroxine 50 mcg tablet Take 1 tablet (50 mcg total) by mouth daily    lisinopril (ZESTRIL) 40 mg tablet Take 1 tablet (40 mg total) by mouth daily    metoprolol tartrate (LOPRESSOR) 50 mg tablet Take 1 tablet (50 mg total) by mouth every 12 (twelve) hours    NIFEdipine (PROCARDIA XL) 60 mg 24 hr tablet TAKE ONE (1) TABLET BY MOUTH ONCE DAILY    simvastatin (ZOCOR) 20 mg tablet Take 1 tablet by mouth daily     No current facility-administered medications on file prior to visit  He has No Known Allergies       Review of Systems   Constitutional: Negative for activity change, chills, diaphoresis, fatigue and fever  HENT: Negative  Eyes: Negative for visual disturbance  Respiratory: Negative for cough, chest tightness, shortness of breath and wheezing  Cardiovascular: Negative for chest pain, palpitations and leg swelling  Gastrointestinal: Negative for abdominal pain, constipation, diarrhea, nausea and vomiting     Endocrine: Negative for cold intolerance and heat intolerance  Genitourinary: Negative for difficulty urinating, dysuria and frequency  Musculoskeletal: Negative for arthralgias, gait problem and myalgias  Neurological: Negative for dizziness, tremors, seizures, syncope, weakness, light-headedness and headaches  Psychiatric/Behavioral: Negative for confusion and dysphoric mood  The patient is not nervous/anxious  Objective:      /78 (BP Location: Left arm, Patient Position: Sitting, Cuff Size: Adult)   Pulse 62   Temp 98 8 °F (37 1 °C) (Tympanic)   Resp 18   Ht 5' 10" (1 778 m)   Wt 72 6 kg (160 lb)   SpO2 97%   BMI 22 96 kg/m²          Physical Exam   Constitutional: He is oriented to person, place, and time  He appears well-developed and well-nourished  No distress  HENT:   Head: Normocephalic and atraumatic  Mouth/Throat: Oropharynx is clear and moist    Eyes: Pupils are equal, round, and reactive to light  Conjunctivae and EOM are normal    Neck: Neck supple  No JVD present  Cardiovascular: Normal rate, regular rhythm and normal heart sounds  No murmur heard  Pulmonary/Chest: Effort normal and breath sounds normal  No respiratory distress  He has no wheezes  Abdominal: Soft  Bowel sounds are normal  He exhibits no distension  There is no tenderness  Musculoskeletal: He exhibits no edema  Neurological: He is alert and oriented to person, place, and time  Psychiatric: He has a normal mood and affect  His behavior is normal  Judgment and thought content normal    Nursing note and vitals reviewed

## 2018-11-27 ENCOUNTER — TELEPHONE (OUTPATIENT)
Dept: INTERNAL MEDICINE CLINIC | Facility: CLINIC | Age: 78
End: 2018-11-27

## 2018-11-27 DIAGNOSIS — I10 HYPERTENSION, UNSPECIFIED TYPE: ICD-10-CM

## 2018-11-27 RX ORDER — LISINOPRIL 40 MG/1
40 TABLET ORAL DAILY
Qty: 90 TABLET | Refills: 1 | Status: SHIPPED | OUTPATIENT
Start: 2018-11-27 | End: 2019-03-18 | Stop reason: SDUPTHER

## 2019-01-11 DIAGNOSIS — E78.5 DYSLIPIDEMIA: Primary | ICD-10-CM

## 2019-01-11 DIAGNOSIS — E78.2 MIXED HYPERLIPIDEMIA: Primary | ICD-10-CM

## 2019-01-11 RX ORDER — SIMVASTATIN 20 MG
20 TABLET ORAL DAILY
Qty: 30 TABLET | Refills: 5 | Status: SHIPPED | OUTPATIENT
Start: 2019-01-11 | End: 2019-03-18 | Stop reason: SDUPTHER

## 2019-01-11 RX ORDER — SIMVASTATIN 20 MG
TABLET ORAL
Qty: 30 TABLET | Refills: 5 | Status: SHIPPED | OUTPATIENT
Start: 2019-01-11 | End: 2019-01-11

## 2019-02-13 ENCOUNTER — APPOINTMENT (OUTPATIENT)
Dept: LAB | Facility: CLINIC | Age: 79
End: 2019-02-13
Payer: COMMERCIAL

## 2019-02-13 ENCOUNTER — CONSULT (OUTPATIENT)
Dept: INTERNAL MEDICINE CLINIC | Facility: CLINIC | Age: 79
End: 2019-02-13
Payer: COMMERCIAL

## 2019-02-13 VITALS
DIASTOLIC BLOOD PRESSURE: 78 MMHG | SYSTOLIC BLOOD PRESSURE: 128 MMHG | OXYGEN SATURATION: 98 % | BODY MASS INDEX: 22.22 KG/M2 | HEART RATE: 68 BPM | HEIGHT: 70 IN | RESPIRATION RATE: 18 BRPM | TEMPERATURE: 98.4 F | WEIGHT: 155.2 LBS

## 2019-02-13 DIAGNOSIS — E03.8 HYPOTHYROIDISM DUE TO HASHIMOTO'S THYROIDITIS: ICD-10-CM

## 2019-02-13 DIAGNOSIS — I10 ESSENTIAL HYPERTENSION: ICD-10-CM

## 2019-02-13 DIAGNOSIS — Z13.1 SCREENING FOR DIABETES MELLITUS (DM): ICD-10-CM

## 2019-02-13 DIAGNOSIS — Z01.818 VISIT FOR PRE-OPERATIVE EXAMINATION: Primary | ICD-10-CM

## 2019-02-13 DIAGNOSIS — I25.10 CORONARY ARTERY DISEASE INVOLVING NATIVE HEART WITHOUT ANGINA PECTORIS, UNSPECIFIED VESSEL OR LESION TYPE: ICD-10-CM

## 2019-02-13 DIAGNOSIS — Z12.5 SCREENING FOR PROSTATE CANCER: ICD-10-CM

## 2019-02-13 DIAGNOSIS — E78.2 MIXED HYPERLIPIDEMIA: ICD-10-CM

## 2019-02-13 DIAGNOSIS — E06.3 HYPOTHYROIDISM DUE TO HASHIMOTO'S THYROIDITIS: ICD-10-CM

## 2019-02-13 LAB
ALBUMIN SERPL BCP-MCNC: 3.8 G/DL (ref 3.5–5)
ALP SERPL-CCNC: 59 U/L (ref 46–116)
ALT SERPL W P-5'-P-CCNC: 16 U/L (ref 12–78)
ANION GAP SERPL CALCULATED.3IONS-SCNC: 6 MMOL/L (ref 4–13)
AST SERPL W P-5'-P-CCNC: 14 U/L (ref 5–45)
BASOPHILS # BLD AUTO: 0.05 THOUSANDS/ΜL (ref 0–0.1)
BASOPHILS NFR BLD AUTO: 1 % (ref 0–1)
BILIRUB SERPL-MCNC: 0.74 MG/DL (ref 0.2–1)
BUN SERPL-MCNC: 13 MG/DL (ref 5–25)
CALCIUM SERPL-MCNC: 8.7 MG/DL (ref 8.3–10.1)
CHLORIDE SERPL-SCNC: 105 MMOL/L (ref 100–108)
CHOLEST SERPL-MCNC: 136 MG/DL (ref 50–200)
CO2 SERPL-SCNC: 28 MMOL/L (ref 21–32)
CREAT SERPL-MCNC: 1 MG/DL (ref 0.6–1.3)
CREAT UR-MCNC: 139 MG/DL
EOSINOPHIL # BLD AUTO: 0.08 THOUSAND/ΜL (ref 0–0.61)
EOSINOPHIL NFR BLD AUTO: 1 % (ref 0–6)
ERYTHROCYTE [DISTWIDTH] IN BLOOD BY AUTOMATED COUNT: 13.6 % (ref 11.6–15.1)
EST. AVERAGE GLUCOSE BLD GHB EST-MCNC: 111 MG/DL
GFR SERPL CREATININE-BSD FRML MDRD: 72 ML/MIN/1.73SQ M
GLUCOSE P FAST SERPL-MCNC: 95 MG/DL (ref 65–99)
HBA1C MFR BLD: 5.5 % (ref 4.2–6.3)
HCT VFR BLD AUTO: 41.9 % (ref 36.5–49.3)
HDLC SERPL-MCNC: 50 MG/DL (ref 40–60)
HGB BLD-MCNC: 14 G/DL (ref 12–17)
IMM GRANULOCYTES # BLD AUTO: 0.04 THOUSAND/UL (ref 0–0.2)
IMM GRANULOCYTES NFR BLD AUTO: 1 % (ref 0–2)
LDLC SERPL CALC-MCNC: 63 MG/DL (ref 0–100)
LYMPHOCYTES # BLD AUTO: 1.56 THOUSANDS/ΜL (ref 0.6–4.47)
LYMPHOCYTES NFR BLD AUTO: 19 % (ref 14–44)
MCH RBC QN AUTO: 33.1 PG (ref 26.8–34.3)
MCHC RBC AUTO-ENTMCNC: 33.4 G/DL (ref 31.4–37.4)
MCV RBC AUTO: 99 FL (ref 82–98)
MICROALBUMIN UR-MCNC: 2800 MG/L (ref 0–20)
MICROALBUMIN/CREAT 24H UR: 2014 MG/G CREATININE (ref 0–30)
MONOCYTES # BLD AUTO: 0.7 THOUSAND/ΜL (ref 0.17–1.22)
MONOCYTES NFR BLD AUTO: 9 % (ref 4–12)
NEUTROPHILS # BLD AUTO: 5.65 THOUSANDS/ΜL (ref 1.85–7.62)
NEUTS SEG NFR BLD AUTO: 69 % (ref 43–75)
NRBC BLD AUTO-RTO: 0 /100 WBCS
PLATELET # BLD AUTO: 159 THOUSANDS/UL (ref 149–390)
PMV BLD AUTO: 11.3 FL (ref 8.9–12.7)
POTASSIUM SERPL-SCNC: 3.7 MMOL/L (ref 3.5–5.3)
PROT SERPL-MCNC: 7 G/DL (ref 6.4–8.2)
PSA SERPL-MCNC: 1.4 NG/ML (ref 0–4)
RBC # BLD AUTO: 4.23 MILLION/UL (ref 3.88–5.62)
SODIUM SERPL-SCNC: 139 MMOL/L (ref 136–145)
TRIGL SERPL-MCNC: 116 MG/DL
TSH SERPL DL<=0.05 MIU/L-ACNC: 1.32 UIU/ML (ref 0.36–3.74)
WBC # BLD AUTO: 8.08 THOUSAND/UL (ref 4.31–10.16)

## 2019-02-13 PROCEDURE — 83036 HEMOGLOBIN GLYCOSYLATED A1C: CPT

## 2019-02-13 PROCEDURE — 84443 ASSAY THYROID STIM HORMONE: CPT

## 2019-02-13 PROCEDURE — 80053 COMPREHEN METABOLIC PANEL: CPT

## 2019-02-13 PROCEDURE — 80061 LIPID PANEL: CPT

## 2019-02-13 PROCEDURE — G0103 PSA SCREENING: HCPCS

## 2019-02-13 PROCEDURE — 82570 ASSAY OF URINE CREATININE: CPT | Performed by: INTERNAL MEDICINE

## 2019-02-13 PROCEDURE — 85025 COMPLETE CBC W/AUTO DIFF WBC: CPT

## 2019-02-13 PROCEDURE — 82043 UR ALBUMIN QUANTITATIVE: CPT | Performed by: INTERNAL MEDICINE

## 2019-02-13 PROCEDURE — 99214 OFFICE O/P EST MOD 30 MIN: CPT | Performed by: INTERNAL MEDICINE

## 2019-02-13 PROCEDURE — 36415 COLL VENOUS BLD VENIPUNCTURE: CPT

## 2019-02-13 NOTE — PROGRESS NOTES
Assessment/Plan:    No problem-specific Assessment & Plan notes found for this encounter  Diagnoses and all orders for this visit:    Visit for pre-operative examination    Coronary artery disease involving native heart without angina pectoris, unspecified vessel or lesion type    Essential hypertension      A/P: Pt and co-morbidities are stable  No PAT tests  Pt is Grover's CLass II, mainly due to age and has a cardiac risk of 1-7%, but given the type of sx and anesthesia, more likely 1%  Recommend he stop his ASA, and any NSAID, omega 3 and MVT one week prior to the sx  On call to the OR, recommend pt take his lisinopril, metoprolol, norvasc, and levothyroxine with a sip of water  May take the rest of his meds after sx if eating  No other recs at this time  Thanks and good luck  Subjective:      Patient ID: Rolland Mcburney is a 69 y o  male  WM presents at the request of Dr Meng Gleason for bilat cataract extraction  Appears pt was to have OS sx TODAY and OD on 3/7/19, but just showed up here today  Since last visit, doing well and no illnesses  No c/o's  Remains active w/o difficulty and no falls  No fever, chills, or sweats  No unexplained wt loss  No travel  NO depression  Denies CP, SOB, or palpitations  No sz or syncope  No orthopnea or PND  No changes in bowel or bladder habits  PMH of htn, CAD, hypothyroidism, and [place]  Multiple PSH including dental sx, T&A, CABG, colonoscopy, and knee sx  No problems with the procedures or anesthesia  NKDA  Denies ETOH or drug use, but still smokes  Takes an ASA Q day, but no NSAID or other blood thinners  No h/o DVT or PE  No clotting or bleeding issues  Permenant dental work and no plates  No C spine issues  No objections to receiving blood products if deemed necessary  No PAT testing done         The following portions of the patient's history were reviewed and updated as appropriate:   He  has a past medical history of Coronary artery disease, Disease of thyroid gland, Hemorrhoids, HL (hearing loss), and Hypertension  He   Patient Active Problem List    Diagnosis Date Noted    Palpitations 11/06/2017    Allergic rhinitis 07/10/2017    Coronary artery disease 07/10/2017    Hyperlipidemia 07/10/2017    Hard of hearing 07/10/2017    Hypertension 07/10/2017    Hypothyroidism 07/10/2017    Tobacco use disorder 07/10/2017     He  has a past surgical history that includes Coronary artery bypass graft; Colonoscopy; Dental surgery; Knee surgery (Left); Tonsillectomy and adenoidectomy; and Tooth extraction  His family history includes Alzheimer's disease in his mother; Heart disease in his brother and father  He  reports that he has been smoking  He has been smoking about 0 50 packs per day  He has never used smokeless tobacco  He reports that he drinks about 0 6 oz of alcohol per week  He reports that he does not use drugs  Current Outpatient Medications   Medication Sig Dispense Refill    levothyroxine 50 mcg tablet Take 1 tablet (50 mcg total) by mouth daily 30 tablet 5    lisinopril (ZESTRIL) 40 mg tablet Take 1 tablet (40 mg total) by mouth daily 90 tablet 1    metoprolol tartrate (LOPRESSOR) 50 mg tablet Take 1 tablet (50 mg total) by mouth every 12 (twelve) hours 60 tablet 5    NIFEdipine (PROCARDIA XL) 60 mg 24 hr tablet TAKE ONE (1) TABLET BY MOUTH ONCE DAILY 30 tablet 5    simvastatin (ZOCOR) 20 mg tablet Take 1 tablet (20 mg total) by mouth daily 30 tablet 5     No current facility-administered medications for this visit        Current Outpatient Medications on File Prior to Visit   Medication Sig    levothyroxine 50 mcg tablet Take 1 tablet (50 mcg total) by mouth daily    lisinopril (ZESTRIL) 40 mg tablet Take 1 tablet (40 mg total) by mouth daily    metoprolol tartrate (LOPRESSOR) 50 mg tablet Take 1 tablet (50 mg total) by mouth every 12 (twelve) hours    NIFEdipine (PROCARDIA XL) 60 mg 24 hr tablet TAKE ONE (1) TABLET BY MOUTH ONCE DAILY  simvastatin (ZOCOR) 20 mg tablet Take 1 tablet (20 mg total) by mouth daily     No current facility-administered medications on file prior to visit  He has No Known Allergies       Review of Systems   Constitutional: Negative for activity change, chills, diaphoresis, fatigue and fever  HENT: Negative  Eyes: Positive for visual disturbance  Respiratory: Negative for cough, chest tightness, shortness of breath and wheezing  Cardiovascular: Negative for chest pain, palpitations and leg swelling  Gastrointestinal: Negative for abdominal pain, constipation, diarrhea, nausea and vomiting  Endocrine: Negative for cold intolerance and heat intolerance  Genitourinary: Negative for difficulty urinating, dysuria and frequency  Musculoskeletal: Negative for arthralgias, gait problem and myalgias  Allergic/Immunologic: Negative for immunocompromised state  Neurological: Negative for dizziness, tremors, seizures, syncope, weakness, light-headedness and headaches  Psychiatric/Behavioral: Negative for confusion, dysphoric mood and sleep disturbance  The patient is not nervous/anxious  Objective:      /78 (BP Location: Left arm, Patient Position: Sitting, Cuff Size: Adult)   Pulse 68   Temp 98 4 °F (36 9 °C) (Tympanic)   Resp 18   Ht 5' 10" (1 778 m)   Wt 70 4 kg (155 lb 3 2 oz)   SpO2 98%   BMI 22 27 kg/m²          Physical Exam   Constitutional: He is oriented to person, place, and time  He appears well-developed and well-nourished  No distress  HENT:   Head: Normocephalic and atraumatic  Mouth/Throat: Oropharynx is clear and moist  No oropharyngeal exudate  No oropharyngeal obstructions  Eyes: Pupils are equal, round, and reactive to light  Conjunctivae and EOM are normal    Neck: Normal range of motion  Neck supple  No JVD present  No tracheal deviation present  No thyromegaly present  Cspine ROM wnl      Cardiovascular: Normal rate, regular rhythm and normal heart sounds  Pulmonary/Chest: Effort normal and breath sounds normal  No respiratory distress  He has no wheezes  He has no rales  Abdominal: Soft  Bowel sounds are normal  He exhibits no distension  There is no tenderness  Musculoskeletal: Normal range of motion  He exhibits no edema, tenderness or deformity  Lymphadenopathy:     He has no cervical adenopathy  Neurological: He is alert and oriented to person, place, and time  He displays normal reflexes  No cranial nerve deficit  He exhibits normal muscle tone  Coordination normal    Psychiatric: He has a normal mood and affect  His behavior is normal  Judgment and thought content normal    Nursing note and vitals reviewed

## 2019-02-14 DIAGNOSIS — R79.9 ABNORMAL BLOOD CHEMISTRY: Primary | ICD-10-CM

## 2019-02-19 ENCOUNTER — APPOINTMENT (OUTPATIENT)
Dept: LAB | Facility: CLINIC | Age: 79
End: 2019-02-19
Payer: COMMERCIAL

## 2019-02-19 DIAGNOSIS — R79.9 ABNORMAL BLOOD CHEMISTRY: ICD-10-CM

## 2019-02-19 LAB
FOLATE SERPL-MCNC: 16.8 NG/ML (ref 3.1–17.5)
VIT B12 SERPL-MCNC: 331 PG/ML (ref 100–900)

## 2019-02-19 PROCEDURE — 82607 VITAMIN B-12: CPT

## 2019-02-19 PROCEDURE — 82746 ASSAY OF FOLIC ACID SERUM: CPT

## 2019-02-19 PROCEDURE — 36415 COLL VENOUS BLD VENIPUNCTURE: CPT

## 2019-03-04 DIAGNOSIS — E03.9 HYPOTHYROIDISM, UNSPECIFIED TYPE: ICD-10-CM

## 2019-03-04 RX ORDER — LEVOTHYROXINE SODIUM 0.05 MG/1
50 TABLET ORAL DAILY
Qty: 30 TABLET | Refills: 5 | Status: SHIPPED | OUTPATIENT
Start: 2019-03-04 | End: 2019-10-04 | Stop reason: SDUPTHER

## 2019-03-18 DIAGNOSIS — I25.10 CORONARY ARTERY DISEASE INVOLVING NATIVE CORONARY ARTERY OF NATIVE HEART WITHOUT ANGINA PECTORIS: ICD-10-CM

## 2019-03-18 DIAGNOSIS — I10 ESSENTIAL HYPERTENSION: ICD-10-CM

## 2019-03-18 DIAGNOSIS — I10 HYPERTENSION, UNSPECIFIED TYPE: ICD-10-CM

## 2019-03-18 DIAGNOSIS — E78.2 MIXED HYPERLIPIDEMIA: ICD-10-CM

## 2019-03-18 RX ORDER — METOPROLOL TARTRATE 50 MG/1
50 TABLET, FILM COATED ORAL EVERY 12 HOURS SCHEDULED
Qty: 180 TABLET | Refills: 3 | Status: SHIPPED | OUTPATIENT
Start: 2019-03-18 | End: 2019-07-09 | Stop reason: SDUPTHER

## 2019-03-18 RX ORDER — LISINOPRIL 40 MG/1
40 TABLET ORAL DAILY
Qty: 90 TABLET | Refills: 3 | Status: SHIPPED | OUTPATIENT
Start: 2019-03-18 | End: 2019-06-06 | Stop reason: SDUPTHER

## 2019-03-18 RX ORDER — NIFEDIPINE 60 MG/1
60 TABLET, EXTENDED RELEASE ORAL DAILY
Qty: 90 TABLET | Refills: 3 | Status: SHIPPED | OUTPATIENT
Start: 2019-03-18 | End: 2019-05-17 | Stop reason: SDUPTHER

## 2019-03-18 RX ORDER — SIMVASTATIN 20 MG
20 TABLET ORAL DAILY
Qty: 90 TABLET | Refills: 3 | Status: SHIPPED | OUTPATIENT
Start: 2019-03-18 | End: 2019-06-06 | Stop reason: DRUGHIGH

## 2019-03-26 ENCOUNTER — OFFICE VISIT (OUTPATIENT)
Dept: INTERNAL MEDICINE CLINIC | Facility: CLINIC | Age: 79
End: 2019-03-26
Payer: COMMERCIAL

## 2019-03-26 VITALS
SYSTOLIC BLOOD PRESSURE: 124 MMHG | BODY MASS INDEX: 21.76 KG/M2 | HEIGHT: 70 IN | DIASTOLIC BLOOD PRESSURE: 76 MMHG | OXYGEN SATURATION: 97 % | HEART RATE: 60 BPM | WEIGHT: 152 LBS | TEMPERATURE: 98.7 F

## 2019-03-26 DIAGNOSIS — I25.10 CORONARY ARTERY DISEASE INVOLVING NATIVE HEART WITHOUT ANGINA PECTORIS, UNSPECIFIED VESSEL OR LESION TYPE: ICD-10-CM

## 2019-03-26 DIAGNOSIS — I10 ESSENTIAL HYPERTENSION: ICD-10-CM

## 2019-03-26 DIAGNOSIS — Z01.818 VISIT FOR PRE-OPERATIVE EXAMINATION: Primary | ICD-10-CM

## 2019-03-26 DIAGNOSIS — H25.9 AGE-RELATED CATARACT OF BOTH EYES, UNSPECIFIED AGE-RELATED CATARACT TYPE: ICD-10-CM

## 2019-03-26 PROCEDURE — 99213 OFFICE O/P EST LOW 20 MIN: CPT | Performed by: INTERNAL MEDICINE

## 2019-03-26 PROCEDURE — 1160F RVW MEDS BY RX/DR IN RCRD: CPT | Performed by: INTERNAL MEDICINE

## 2019-03-26 PROCEDURE — 3078F DIAST BP <80 MM HG: CPT | Performed by: INTERNAL MEDICINE

## 2019-03-26 PROCEDURE — 3074F SYST BP LT 130 MM HG: CPT | Performed by: INTERNAL MEDICINE

## 2019-03-26 NOTE — PROGRESS NOTES
Assessment/Plan:    No problem-specific Assessment & Plan notes found for this encounter  Diagnoses and all orders for this visit:    Visit for pre-operative examination    Age-related cataract of both eyes, unspecified age-related cataract type    Coronary artery disease involving native heart without angina pectoris, unspecified vessel or lesion type    Essential hypertension        A/P: Pt and co-morbidities are stable  No PAT tests  Pt is Grover's CLass II, mainly due to age and has a cardiac risk of 1-7%, but given the type of sx and anesthesia, more likely 1%  Recommend he stop his ASA, and any NSAID, omega 3 and MVT one week prior to the sx  On call to the OR, recommend pt take his lisinopril, metoprolol, norvasc, and levothyroxine with a sip of water  May take the rest of his meds after sx if eating  No other recs at this time  Thanks and good luck             Subjective:      Patient ID: Kiara Sanders is a 69 y o  male  WM presents at the request of Dr Riki Valdovinos for left  cataract extraction scheduled for 3/27 per pt  Had successful sx on the OD several weeks ago and appears to have tolerated the procedure and perioperative period well    Since last visit, doing well and no illnesses  No c/o's  Remains active w/o difficulty and no falls  No fever, chills, or sweats  No unexplained wt loss  No travel  NO depression  Denies CP, SOB, or palpitations  No sz or syncope  No orthopnea or PND  No changes in bowel or bladder habits  PMH of htn, CAD, hypothyroidism, and [place]  Multiple PSH including dental sx, T&A, CABG, colonoscopy, and knee sx  No problems with the procedures or anesthesia  NKDA  Denies ETOH or drug use, but still smokes  Takes an ASA Q day, but no NSAID or other blood thinners  No h/o DVT or PE  No clotting or bleeding issues  Permenant dental work and no plates  No C spine issues  No objections to receiving blood products if deemed necessary   No PAT testing done               The following portions of the patient's history were reviewed and updated as appropriate:   He  has a past medical history of Coronary artery disease, Disease of thyroid gland, Hemorrhoids, HL (hearing loss), and Hypertension  He   Patient Active Problem List    Diagnosis Date Noted    Palpitations 11/06/2017    Allergic rhinitis 07/10/2017    Coronary artery disease 07/10/2017    Hyperlipidemia 07/10/2017    Hard of hearing 07/10/2017    Hypertension 07/10/2017    Hypothyroidism 07/10/2017    Tobacco use disorder 07/10/2017     He  has a past surgical history that includes Coronary artery bypass graft; Colonoscopy; Dental surgery; Knee surgery (Left); Tonsillectomy and adenoidectomy; and Tooth extraction  His family history includes Alzheimer's disease in his mother; Heart disease in his brother and father  He  reports that he has been smoking  He has been smoking about 0 50 packs per day  He has never used smokeless tobacco  He reports that he drinks about 0 6 oz of alcohol per week  He reports that he does not use drugs  Current Outpatient Medications   Medication Sig Dispense Refill    levothyroxine 50 mcg tablet TAKE 1 TABLET (50 MCG TOTAL) BY MOUTH DAILY 30 tablet 5    lisinopril (ZESTRIL) 40 mg tablet Take 1 tablet (40 mg total) by mouth daily 90 tablet 3    metoprolol tartrate (LOPRESSOR) 50 mg tablet Take 1 tablet (50 mg total) by mouth every 12 (twelve) hours 180 tablet 3    NIFEdipine (PROCARDIA XL) 60 mg 24 hr tablet Take 1 tablet (60 mg total) by mouth daily 90 tablet 3    simvastatin (ZOCOR) 20 mg tablet Take 1 tablet (20 mg total) by mouth daily 90 tablet 3     No current facility-administered medications for this visit        Current Outpatient Medications on File Prior to Visit   Medication Sig    levothyroxine 50 mcg tablet TAKE 1 TABLET (50 MCG TOTAL) BY MOUTH DAILY    lisinopril (ZESTRIL) 40 mg tablet Take 1 tablet (40 mg total) by mouth daily    metoprolol tartrate (LOPRESSOR) 50 mg tablet Take 1 tablet (50 mg total) by mouth every 12 (twelve) hours    NIFEdipine (PROCARDIA XL) 60 mg 24 hr tablet Take 1 tablet (60 mg total) by mouth daily    simvastatin (ZOCOR) 20 mg tablet Take 1 tablet (20 mg total) by mouth daily     No current facility-administered medications on file prior to visit  He has No Known Allergies       Review of Systems   Constitutional: Negative for activity change, chills, diaphoresis, fatigue and fever  HENT: Negative  Eyes: Negative for visual disturbance  Respiratory: Negative for cough, chest tightness, shortness of breath and wheezing  Cardiovascular: Negative for chest pain, palpitations and leg swelling  Gastrointestinal: Negative for abdominal pain, constipation, diarrhea, nausea and vomiting  Endocrine: Negative for cold intolerance and heat intolerance  Genitourinary: Negative for difficulty urinating, dysuria and frequency  Musculoskeletal: Negative for arthralgias, gait problem and myalgias  Allergic/Immunologic: Negative for immunocompromised state  Neurological: Negative for dizziness, seizures, syncope, weakness, light-headedness and headaches  Psychiatric/Behavioral: Negative for confusion, dysphoric mood and sleep disturbance  The patient is not nervous/anxious  Objective:      /76   Pulse 60   Temp 98 7 °F (37 1 °C)   Ht 5' 10" (1 778 m)   Wt 68 9 kg (152 lb)   SpO2 97%   BMI 21 81 kg/m²          Physical Exam   Constitutional: He is oriented to person, place, and time  He appears well-developed and well-nourished  No distress  HENT:   Head: Normocephalic and atraumatic  Mouth/Throat: Oropharynx is clear and moist    No oropharyngeal obstruction  Eyes: Pupils are equal, round, and reactive to light  Conjunctivae and EOM are normal    Neck: Normal range of motion  Neck supple  No JVD present  No tracheal deviation present  No thyromegaly present  C spine with normal ROM      Cardiovascular: Normal rate, regular rhythm and normal heart sounds  Pulmonary/Chest: Effort normal and breath sounds normal  No respiratory distress  He has no wheezes  He has no rales  Abdominal: Soft  Bowel sounds are normal  He exhibits no distension  There is no tenderness  Musculoskeletal: He exhibits no edema  Lymphadenopathy:     He has no cervical adenopathy  Neurological: He is alert and oriented to person, place, and time  He displays normal reflexes  No cranial nerve deficit  He exhibits normal muscle tone  Coordination normal    Psychiatric: He has a normal mood and affect  His behavior is normal  Judgment and thought content normal    Nursing note and vitals reviewed

## 2019-04-05 DIAGNOSIS — I10 HYPERTENSION, UNSPECIFIED TYPE: ICD-10-CM

## 2019-04-05 RX ORDER — LISINOPRIL 40 MG/1
40 TABLET ORAL DAILY
Qty: 90 TABLET | Refills: 1 | Status: SHIPPED | OUTPATIENT
Start: 2019-04-05 | End: 2019-10-01 | Stop reason: SDUPTHER

## 2019-05-01 DIAGNOSIS — I73.9 PVD (PERIPHERAL VASCULAR DISEASE) (HCC): Primary | ICD-10-CM

## 2019-05-17 DIAGNOSIS — I10 ESSENTIAL HYPERTENSION: ICD-10-CM

## 2019-05-17 RX ORDER — NIFEDIPINE 60 MG/1
60 TABLET, EXTENDED RELEASE ORAL DAILY
Qty: 30 TABLET | Refills: 5 | Status: ON HOLD | OUTPATIENT
Start: 2019-05-17 | End: 2019-12-10 | Stop reason: CLARIF

## 2019-06-04 ENCOUNTER — HOSPITAL ENCOUNTER (OUTPATIENT)
Dept: NON INVASIVE DIAGNOSTICS | Facility: CLINIC | Age: 79
Discharge: HOME | End: 2019-06-04
Payer: COMMERCIAL

## 2019-06-04 DIAGNOSIS — I73.9 PVD (PERIPHERAL VASCULAR DISEASE) (HCC): ICD-10-CM

## 2019-06-04 PROCEDURE — 93925 LOWER EXTREMITY STUDY: CPT

## 2019-06-04 PROCEDURE — 93923 UPR/LXTR ART STDY 3+ LVLS: CPT

## 2019-06-04 PROCEDURE — 93922 UPR/L XTREMITY ART 2 LEVELS: CPT | Performed by: SURGERY

## 2019-06-04 PROCEDURE — 93925 LOWER EXTREMITY STUDY: CPT | Performed by: SURGERY

## 2019-06-06 ENCOUNTER — OFFICE VISIT (OUTPATIENT)
Dept: CARDIOLOGY CLINIC | Facility: CLINIC | Age: 79
End: 2019-06-06
Payer: COMMERCIAL

## 2019-06-06 VITALS
SYSTOLIC BLOOD PRESSURE: 124 MMHG | HEIGHT: 70 IN | WEIGHT: 150 LBS | BODY MASS INDEX: 21.47 KG/M2 | DIASTOLIC BLOOD PRESSURE: 78 MMHG | HEART RATE: 60 BPM

## 2019-06-06 DIAGNOSIS — I25.10 CORONARY ARTERY DISEASE INVOLVING NATIVE HEART WITHOUT ANGINA PECTORIS, UNSPECIFIED VESSEL OR LESION TYPE: Primary | ICD-10-CM

## 2019-06-06 DIAGNOSIS — E78.2 MIXED HYPERLIPIDEMIA: ICD-10-CM

## 2019-06-06 DIAGNOSIS — F17.200 TOBACCO USE DISORDER: ICD-10-CM

## 2019-06-06 PROBLEM — I73.9 PERIPHERAL VASCULAR DISEASE (HCC): Status: ACTIVE | Noted: 2019-06-06

## 2019-06-06 PROCEDURE — 99214 OFFICE O/P EST MOD 30 MIN: CPT | Performed by: INTERNAL MEDICINE

## 2019-06-06 RX ORDER — ATORVASTATIN CALCIUM 40 MG/1
40 TABLET, FILM COATED ORAL DAILY
Qty: 90 TABLET | Refills: 5 | Status: ON HOLD | OUTPATIENT
Start: 2019-06-06 | End: 2019-12-10 | Stop reason: CLARIF

## 2019-06-06 RX ORDER — ASPIRIN 81 MG/1
81 TABLET ORAL DAILY
COMMUNITY
End: 2019-11-23 | Stop reason: HOSPADM

## 2019-07-09 DIAGNOSIS — I25.10 CORONARY ARTERY DISEASE INVOLVING NATIVE CORONARY ARTERY OF NATIVE HEART WITHOUT ANGINA PECTORIS: ICD-10-CM

## 2019-07-09 DIAGNOSIS — I10 ESSENTIAL HYPERTENSION: ICD-10-CM

## 2019-07-09 RX ORDER — METOPROLOL TARTRATE 50 MG/1
50 TABLET, FILM COATED ORAL EVERY 12 HOURS SCHEDULED
Qty: 60 TABLET | Refills: 5 | Status: ON HOLD | OUTPATIENT
Start: 2019-07-09 | End: 2019-12-10 | Stop reason: CLARIF

## 2019-08-10 ENCOUNTER — HOSPITAL ENCOUNTER (EMERGENCY)
Facility: HOSPITAL | Age: 79
Discharge: HOME | End: 2019-08-10
Attending: EMERGENCY MEDICINE | Admitting: EMERGENCY MEDICINE
Payer: COMMERCIAL

## 2019-08-10 VITALS
HEART RATE: 82 BPM | SYSTOLIC BLOOD PRESSURE: 172 MMHG | RESPIRATION RATE: 20 BRPM | WEIGHT: 149.91 LBS | TEMPERATURE: 98.4 F | BODY MASS INDEX: 21.46 KG/M2 | DIASTOLIC BLOOD PRESSURE: 70 MMHG | HEIGHT: 70 IN | OXYGEN SATURATION: 98 %

## 2019-08-10 DIAGNOSIS — R00.0 TACHYCARDIA: Primary | ICD-10-CM

## 2019-08-10 LAB
ANION GAP SERPL CALCULATED.3IONS-SCNC: 9 MMOL/L (ref 4–13)
BASOPHILS # BLD AUTO: 0 THOUSANDS/ΜL (ref 0–0.1)
BASOPHILS NFR BLD AUTO: 0 % (ref 0–2)
BUN SERPL-MCNC: 16 MG/DL (ref 7–25)
CALCIUM SERPL-MCNC: 9.1 MG/DL (ref 8.6–10.5)
CHLORIDE SERPL-SCNC: 106 MMOL/L (ref 98–107)
CO2 SERPL-SCNC: 29 MMOL/L (ref 21–31)
CREAT SERPL-MCNC: 1.27 MG/DL (ref 0.7–1.3)
EOSINOPHIL # BLD AUTO: 0 THOUSAND/ΜL (ref 0–0.61)
EOSINOPHIL NFR BLD AUTO: 0 % (ref 0–5)
ERYTHROCYTE [DISTWIDTH] IN BLOOD BY AUTOMATED COUNT: 13.9 % (ref 11.5–14.5)
GFR SERPL CREATININE-BSD FRML MDRD: 54 ML/MIN/1.73SQ M
GLUCOSE SERPL-MCNC: 141 MG/DL (ref 65–99)
HCT VFR BLD AUTO: 40.7 % (ref 42–47)
HGB BLD-MCNC: 14.2 G/DL (ref 14–18)
LYMPHOCYTES # BLD AUTO: 0.9 THOUSANDS/ΜL (ref 0.6–4.47)
LYMPHOCYTES NFR BLD AUTO: 8 % (ref 21–51)
MCH RBC QN AUTO: 34 PG (ref 26–34)
MCHC RBC AUTO-ENTMCNC: 34.8 G/DL (ref 31–37)
MCV RBC AUTO: 98 FL (ref 81–99)
MONOCYTES # BLD AUTO: 0.6 THOUSAND/ΜL (ref 0.17–1.22)
MONOCYTES NFR BLD AUTO: 6 % (ref 2–12)
NEUTROPHILS # BLD AUTO: 9 THOUSANDS/ΜL (ref 1.4–6.5)
NEUTS SEG NFR BLD AUTO: 86 % (ref 42–75)
PLATELET # BLD AUTO: 144 THOUSANDS/UL (ref 149–390)
PMV BLD AUTO: 9.2 FL (ref 8.6–11.7)
POTASSIUM SERPL-SCNC: 4.1 MMOL/L (ref 3.5–5.5)
RBC # BLD AUTO: 4.17 MILLION/UL (ref 4.3–5.9)
SODIUM SERPL-SCNC: 144 MMOL/L (ref 134–143)
TROPONIN I SERPL-MCNC: 0.03 NG/ML
WBC # BLD AUTO: 10.6 THOUSAND/UL (ref 4.8–10.8)

## 2019-08-10 PROCEDURE — 85025 COMPLETE CBC W/AUTO DIFF WBC: CPT | Performed by: EMERGENCY MEDICINE

## 2019-08-10 PROCEDURE — 99285 EMERGENCY DEPT VISIT HI MDM: CPT

## 2019-08-10 PROCEDURE — 80048 BASIC METABOLIC PNL TOTAL CA: CPT | Performed by: EMERGENCY MEDICINE

## 2019-08-10 PROCEDURE — 84484 ASSAY OF TROPONIN QUANT: CPT | Performed by: EMERGENCY MEDICINE

## 2019-08-10 PROCEDURE — 93005 ELECTROCARDIOGRAM TRACING: CPT

## 2019-08-10 PROCEDURE — 36415 COLL VENOUS BLD VENIPUNCTURE: CPT | Performed by: EMERGENCY MEDICINE

## 2019-08-10 NOTE — ED PROVIDER NOTES
History  Chief Complaint   Patient presents with    Rapid Heart Rate     lasted a few minutes     45-year-old male with a history of coronary artery disease presents to the emergency department with an episode of rapid heartbeat that occurred while the patient was walking on the period he reported no chest pain or shortness of breath with the episode  He did report some generalized weakness with seemed to have improved over time  The rapid heart be resolved spontaneously when the patient laid on his right side  He reported no nausea vomiting or diarrhea  He reported no sense of sweating  Subsequent to the episode of rapid heartbeat he did have a bowel movement in response to mild laxative that he had taken his he had felt constipated  He subsequently was without complaint  Prior to Admission Medications   Prescriptions Last Dose Informant Patient Reported? Taking? NIFEdipine (PROCARDIA XL) 60 mg 24 hr tablet   No Yes   Sig: Take 1 tablet (60 mg total) by mouth daily   aspirin (ECOTRIN LOW STRENGTH) 81 mg EC tablet   Yes Yes   Sig: Take 81 mg by mouth daily   atorvastatin (LIPITOR) 40 mg tablet   No Yes   Sig: Take 1 tablet (40 mg total) by mouth daily   levothyroxine 50 mcg tablet   No Yes   Sig: TAKE 1 TABLET (50 MCG TOTAL) BY MOUTH DAILY   lisinopril (ZESTRIL) 40 mg tablet   No Yes   Sig: TAKE 1 TABLET (40 MG TOTAL) BY MOUTH DAILY   metoprolol tartrate (LOPRESSOR) 50 mg tablet   No Yes   Sig: Take 1 tablet (50 mg total) by mouth every 12 (twelve) hours      Facility-Administered Medications: None       Past Medical History:   Diagnosis Date    Coronary artery disease     history of CABG    Disease of thyroid gland     Hemorrhoids     last assessed 7/10/17    History of arterial duplex of LE 01/10/2017    Bilateral occlusion of the superficial femoral arteries, severe diminution of the ankle brachial index bilaterally, disease appears worse on left than right      History of echocardiogram 07/20/2011    hypokinesia of the inferior wall  EF 50%   HL (hearing loss)     Hyperlipidemia     Hypertension     PVD (peripheral vascular disease)        Past Surgical History:   Procedure Laterality Date    COLONOSCOPY      Last assessed 7/10/17    CORONARY ARTERY BYPASS GRAFT  08/23/2010    3V CABG:  LIMA to LAD, VG to RI, VG to OM1   DENTAL SURGERY      Last assessed  7/10/17    KNEE SURGERY Left     Last assessed 7/10/17    TONSILLECTOMY AND ADENOIDECTOMY      Last assessed 7/10/17    TOOTH EXTRACTION         Family History   Problem Relation Age of Onset    Alzheimer's disease Mother     Heart disease Father         cardiac disorder    Heart disease Brother         cardiac disorder     I have reviewed and agree with the history as documented  Social History     Tobacco Use    Smoking status: Current Every Day Smoker     Packs/day: 0 50    Smokeless tobacco: Never Used   Substance Use Topics    Alcohol use: Yes     Alcohol/week: 1 0 standard drinks     Types: 1 Glasses of wine per week    Drug use: No        Review of Systems   Constitutional: Negative for chills and fever  HENT: Negative for ear pain, rhinorrhea and sore throat  Eyes: Negative for pain, redness and visual disturbance  Respiratory: Positive for chest tightness  Negative for cough and shortness of breath  Cardiovascular: Positive for palpitations  Negative for chest pain and leg swelling  Gastrointestinal: Negative for abdominal pain, diarrhea, nausea and vomiting  Genitourinary: Negative for dysuria, flank pain, frequency and urgency  Musculoskeletal: Negative for back pain, myalgias and neck pain  Skin: Negative for rash  Neurological: Negative for dizziness, weakness, light-headedness and headaches  Hematological: Negative  Psychiatric/Behavioral: Negative for agitation, confusion and suicidal ideas  The patient is not nervous/anxious      All other systems reviewed and are negative  Physical Exam  Physical Exam   Constitutional: He is oriented to person, place, and time  Slender male in no acute distress   HENT:   Nose: Nose normal    Mouth/Throat: Oropharynx is clear and moist  No oropharyngeal exudate  Eyes: Pupils are equal, round, and reactive to light  Conjunctivae and EOM are normal  No scleral icterus  Neck: Normal range of motion  Neck supple  No JVD present  No tracheal deviation present  Cardiovascular: Normal rate and normal heart sounds  No murmur heard  The heart is of regular irregular rhythm regular rate   Pulmonary/Chest: Effort normal and breath sounds normal  No respiratory distress  He has no wheezes  He has no rales  Abdominal: Soft  Bowel sounds are normal  There is no tenderness  There is no guarding  Musculoskeletal: Normal range of motion  He exhibits no edema or tenderness  Neurological: He is alert and oriented to person, place, and time  No cranial nerve deficit or sensory deficit  He exhibits normal muscle tone  5/5 motor, nl sens   Skin: Skin is warm and dry  Psychiatric: He has a normal mood and affect  His behavior is normal    Nursing note and vitals reviewed        Vital Signs  ED Triage Vitals [08/10/19 1531]   Temperature Pulse Respirations Blood Pressure SpO2   98 4 °F (36 9 °C) 83 16 (!) 185/74 100 %      Temp src Heart Rate Source Patient Position - Orthostatic VS BP Location FiO2 (%)   -- -- -- Left arm --      Pain Score       No Pain           Vitals:    08/10/19 1531   BP: (!) 185/74   Pulse: 83         Visual Acuity      ED Medications  Medications - No data to display    Diagnostic Studies  Results Reviewed     None                 No orders to display              Procedures  Procedures       ED Course                               MDM    Disposition  Final diagnoses:   None     ED Disposition     None      Follow-up Information    None         Patient's Medications   Discharge Prescriptions    No medications on file     No discharge procedures on file      ED Provider  Electronically Signed by           Elsie Michael MD  08/10/19 7833

## 2019-08-10 NOTE — ED PROCEDURE NOTE
PROCEDURE  ECG 12 Lead Documentation Only  Date/Time: 8/10/2019 3:28 PM  Performed by: Salvador López MD  Authorized by: Salvador López MD     ECG reviewed by me, the ED Provider: yes    Patient location:  ED  Previous ECG:     Previous ECG:  Unavailable    Comparison to cardiac monitor: No    Interpretation:     Interpretation: normal    Rate:     ECG rate:  75    ECG rate assessment: normal    Rhythm:     Rhythm: sinus rhythm    Ectopy:     Ectopy: PAC    QRS:     QRS axis:  Normal    QRS intervals:  Normal  Conduction:     Conduction: normal    ST segments:     ST segments:  Normal  T waves:     T waves: peaked           Salvador López MD  08/10/19 1559

## 2019-08-11 LAB
ATRIAL RATE: 77 BPM
P AXIS: 74 DEGREES
PR INTERVAL: 180 MS
QRS AXIS: 17 DEGREES
QRSD INTERVAL: 106 MS
QT INTERVAL: 386 MS
QTC INTERVAL: 436 MS
T WAVE AXIS: 71 DEGREES
VENTRICULAR RATE: 77 BPM

## 2019-08-11 PROCEDURE — 93010 ELECTROCARDIOGRAM REPORT: CPT | Performed by: INTERNAL MEDICINE

## 2019-10-01 DIAGNOSIS — I10 HYPERTENSION, UNSPECIFIED TYPE: ICD-10-CM

## 2019-10-01 RX ORDER — LISINOPRIL 40 MG/1
40 TABLET ORAL DAILY
Qty: 90 TABLET | Refills: 1 | Status: ON HOLD | OUTPATIENT
Start: 2019-10-01 | End: 2019-12-10 | Stop reason: CLARIF

## 2019-10-04 DIAGNOSIS — E03.9 HYPOTHYROIDISM, UNSPECIFIED TYPE: ICD-10-CM

## 2019-10-04 RX ORDER — LEVOTHYROXINE SODIUM 0.05 MG/1
50 TABLET ORAL DAILY
Qty: 30 TABLET | Refills: 5 | Status: ON HOLD | OUTPATIENT
Start: 2019-10-04 | End: 2019-12-10 | Stop reason: CLARIF

## 2019-11-14 ENCOUNTER — APPOINTMENT (EMERGENCY)
Dept: CT IMAGING | Facility: HOSPITAL | Age: 79
End: 2019-11-14
Payer: COMMERCIAL

## 2019-11-14 ENCOUNTER — APPOINTMENT (EMERGENCY)
Dept: RADIOLOGY | Facility: HOSPITAL | Age: 79
End: 2019-11-14
Payer: COMMERCIAL

## 2019-11-14 ENCOUNTER — HOSPITAL ENCOUNTER (OUTPATIENT)
Facility: HOSPITAL | Age: 79
Setting detail: OBSERVATION
End: 2019-11-15
Attending: EMERGENCY MEDICINE | Admitting: HOSPITALIST
Payer: COMMERCIAL

## 2019-11-14 DIAGNOSIS — R77.8 ELEVATED TROPONIN: ICD-10-CM

## 2019-11-14 DIAGNOSIS — R26.2 AMBULATORY DYSFUNCTION: Primary | ICD-10-CM

## 2019-11-14 DIAGNOSIS — I16.0 HYPERTENSIVE URGENCY: ICD-10-CM

## 2019-11-14 DIAGNOSIS — S89.90XA KNEE INJURY, INITIAL ENCOUNTER: ICD-10-CM

## 2019-11-14 DIAGNOSIS — R41.82 ALTERED MENTAL STATUS: ICD-10-CM
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
## 2019-11-14 PROBLEM — Z91.19 H/O NONCOMPLIANCE WITH MEDICAL TREATMENT, PRESENTING HAZARDS TO HEALTH: Status: ACTIVE | Noted: 2019-11-14

## 2019-11-14 LAB
ALBUMIN SERPL BCP-MCNC: 3.9 G/DL (ref 3.5–5.7)
ALP SERPL-CCNC: 56 U/L (ref 55–165)
ALT SERPL W P-5'-P-CCNC: 20 U/L (ref 7–52)
ANION GAP SERPL CALCULATED.3IONS-SCNC: 10 MMOL/L (ref 4–13)
AST SERPL W P-5'-P-CCNC: 14 U/L (ref 13–39)
ATRIAL RATE: 66 BPM
BACTERIA UR QL AUTO: ABNORMAL /HPF
BASOPHILS # BLD AUTO: 0.1 THOUSANDS/ΜL (ref 0–0.1)
BASOPHILS NFR BLD AUTO: 1 % (ref 0–2)
BILIRUB SERPL-MCNC: 1 MG/DL (ref 0.2–1)
BILIRUB UR QL STRIP: NEGATIVE
BNP SERPL-MCNC: 857 PG/ML (ref 1–100)
BUN SERPL-MCNC: 15 MG/DL (ref 7–25)
CALCIUM SERPL-MCNC: 8.8 MG/DL (ref 8.6–10.5)
CHLORIDE SERPL-SCNC: 104 MMOL/L (ref 98–107)
CLARITY UR: CLEAR
CO2 SERPL-SCNC: 27 MMOL/L (ref 21–31)
COLOR UR: YELLOW
CREAT SERPL-MCNC: 1.07 MG/DL (ref 0.7–1.3)
EOSINOPHIL # BLD AUTO: 0 THOUSAND/ΜL (ref 0–0.61)
EOSINOPHIL NFR BLD AUTO: 0 % (ref 0–5)
ERYTHROCYTE [DISTWIDTH] IN BLOOD BY AUTOMATED COUNT: 13.7 % (ref 11.5–14.5)
FINE GRAN CASTS URNS QL MICRO: ABNORMAL /LPF
GFR SERPL CREATININE-BSD FRML MDRD: 66 ML/MIN/1.73SQ M
GLUCOSE SERPL-MCNC: 106 MG/DL (ref 65–99)
GLUCOSE UR STRIP-MCNC: NEGATIVE MG/DL
HCT VFR BLD AUTO: 40.8 % (ref 42–47)
HGB BLD-MCNC: 13.8 G/DL (ref 14–18)
HGB UR QL STRIP.AUTO: ABNORMAL
HYALINE CASTS #/AREA URNS LPF: ABNORMAL /LPF
KETONES UR STRIP-MCNC: NEGATIVE MG/DL
LEUKOCYTE ESTERASE UR QL STRIP: NEGATIVE
LYMPHOCYTES # BLD AUTO: 1.1 THOUSANDS/ΜL (ref 0.6–4.47)
LYMPHOCYTES NFR BLD AUTO: 10 % (ref 21–51)
MCH RBC QN AUTO: 33.5 PG (ref 26–34)
MCHC RBC AUTO-ENTMCNC: 33.9 G/DL (ref 31–37)
MCV RBC AUTO: 99 FL (ref 81–99)
MONOCYTES # BLD AUTO: 1.4 THOUSAND/ΜL (ref 0.17–1.22)
MONOCYTES NFR BLD AUTO: 13 % (ref 2–12)
NEUTROPHILS # BLD AUTO: 8 THOUSANDS/ΜL (ref 1.4–6.5)
NEUTS SEG NFR BLD AUTO: 76 % (ref 42–75)
NITRITE UR QL STRIP: NEGATIVE
NON-SQ EPI CELLS URNS QL MICRO: ABNORMAL /HPF
P AXIS: 67 DEGREES
PH UR STRIP.AUTO: 6.5 [PH]
PLATELET # BLD AUTO: 152 THOUSANDS/UL (ref 149–390)
PMV BLD AUTO: 8.6 FL (ref 8.6–11.7)
POTASSIUM SERPL-SCNC: 3.6 MMOL/L (ref 3.5–5.5)
PR INTERVAL: 176 MS
PROT SERPL-MCNC: 6.3 G/DL (ref 6.4–8.9)
PROT UR STRIP-MCNC: ABNORMAL MG/DL
QRS AXIS: 57 DEGREES
QRSD INTERVAL: 102 MS
QT INTERVAL: 426 MS
QTC INTERVAL: 446 MS
RBC # BLD AUTO: 4.13 MILLION/UL (ref 4.3–5.9)
RBC #/AREA URNS AUTO: ABNORMAL /HPF
SODIUM SERPL-SCNC: 141 MMOL/L (ref 134–143)
SP GR UR STRIP.AUTO: 1.02 (ref 1–1.03)
T WAVE AXIS: 88 DEGREES
TROPONIN I SERPL-MCNC: 0.04 NG/ML
TROPONIN I SERPL-MCNC: 0.05 NG/ML
TSH SERPL DL<=0.05 MIU/L-ACNC: 3.94 UIU/ML (ref 0.45–5.33)
UROBILINOGEN UR QL STRIP.AUTO: 0.2 E.U./DL
VENTRICULAR RATE: 66 BPM
WBC # BLD AUTO: 10.6 THOUSAND/UL (ref 4.8–10.8)
WBC #/AREA URNS AUTO: ABNORMAL /HPF

## 2019-11-14 PROCEDURE — 99285 EMERGENCY DEPT VISIT HI MDM: CPT

## 2019-11-14 PROCEDURE — 71045 X-RAY EXAM CHEST 1 VIEW: CPT

## 2019-11-14 PROCEDURE — 81001 URINALYSIS AUTO W/SCOPE: CPT | Performed by: EMERGENCY MEDICINE

## 2019-11-14 PROCEDURE — 73564 X-RAY EXAM KNEE 4 OR MORE: CPT

## 2019-11-14 PROCEDURE — 83880 ASSAY OF NATRIURETIC PEPTIDE: CPT | Performed by: EMERGENCY MEDICINE

## 2019-11-14 PROCEDURE — 84484 ASSAY OF TROPONIN QUANT: CPT | Performed by: HOSPITALIST

## 2019-11-14 PROCEDURE — 81003 URINALYSIS AUTO W/O SCOPE: CPT | Performed by: EMERGENCY MEDICINE

## 2019-11-14 PROCEDURE — 93010 ELECTROCARDIOGRAM REPORT: CPT | Performed by: INTERNAL MEDICINE

## 2019-11-14 PROCEDURE — 84484 ASSAY OF TROPONIN QUANT: CPT | Performed by: EMERGENCY MEDICINE

## 2019-11-14 PROCEDURE — 93005 ELECTROCARDIOGRAM TRACING: CPT

## 2019-11-14 PROCEDURE — 80053 COMPREHEN METABOLIC PANEL: CPT | Performed by: EMERGENCY MEDICINE

## 2019-11-14 PROCEDURE — 99285 EMERGENCY DEPT VISIT HI MDM: CPT | Performed by: EMERGENCY MEDICINE

## 2019-11-14 PROCEDURE — 36415 COLL VENOUS BLD VENIPUNCTURE: CPT | Performed by: EMERGENCY MEDICINE

## 2019-11-14 PROCEDURE — 84443 ASSAY THYROID STIM HORMONE: CPT | Performed by: EMERGENCY MEDICINE

## 2019-11-14 PROCEDURE — 99220 PR INITIAL OBSERVATION CARE/DAY 70 MINUTES: CPT | Performed by: HOSPITALIST

## 2019-11-14 PROCEDURE — 73700 CT LOWER EXTREMITY W/O DYE: CPT

## 2019-11-14 PROCEDURE — 85025 COMPLETE CBC W/AUTO DIFF WBC: CPT | Performed by: EMERGENCY MEDICINE

## 2019-11-14 RX ORDER — HYDRALAZINE HYDROCHLORIDE 20 MG/ML
20 INJECTION INTRAMUSCULAR; INTRAVENOUS ONCE
Status: DISCONTINUED | OUTPATIENT
Start: 2019-11-14 | End: 2019-11-14

## 2019-11-14 RX ORDER — ONDANSETRON 2 MG/ML
4 INJECTION INTRAMUSCULAR; INTRAVENOUS EVERY 6 HOURS PRN
Status: DISCONTINUED | OUTPATIENT
Start: 2019-11-14 | End: 2019-11-15 | Stop reason: HOSPADM

## 2019-11-14 RX ORDER — LISINOPRIL 20 MG/1
40 TABLET ORAL ONCE
Status: COMPLETED | OUTPATIENT
Start: 2019-11-14 | End: 2019-11-14

## 2019-11-14 RX ORDER — NIFEDIPINE 60 MG/1
60 TABLET, EXTENDED RELEASE ORAL DAILY
Status: DISCONTINUED | OUTPATIENT
Start: 2019-11-15 | End: 2019-11-14

## 2019-11-14 RX ORDER — HYDRALAZINE HYDROCHLORIDE 20 MG/ML
20 INJECTION INTRAMUSCULAR; INTRAVENOUS EVERY 6 HOURS PRN
Status: DISCONTINUED | OUTPATIENT
Start: 2019-11-14 | End: 2019-11-15 | Stop reason: HOSPADM

## 2019-11-14 RX ORDER — HYDROCODONE BITARTRATE AND ACETAMINOPHEN 5; 325 MG/1; MG/1
1 TABLET ORAL EVERY 6 HOURS PRN
Status: DISCONTINUED | OUTPATIENT
Start: 2019-11-14 | End: 2019-11-15 | Stop reason: HOSPADM

## 2019-11-14 RX ORDER — METOPROLOL TARTRATE 50 MG/1
50 TABLET, FILM COATED ORAL EVERY 12 HOURS SCHEDULED
Status: DISCONTINUED | OUTPATIENT
Start: 2019-11-14 | End: 2019-11-15 | Stop reason: HOSPADM

## 2019-11-14 RX ORDER — NIFEDIPINE 30 MG/1
60 TABLET, EXTENDED RELEASE ORAL DAILY
Status: DISCONTINUED | OUTPATIENT
Start: 2019-11-15 | End: 2019-11-15 | Stop reason: HOSPADM

## 2019-11-14 RX ORDER — METOPROLOL TARTRATE 50 MG/1
50 TABLET, FILM COATED ORAL ONCE
Status: COMPLETED | OUTPATIENT
Start: 2019-11-14 | End: 2019-11-14

## 2019-11-14 RX ORDER — NICOTINE 21 MG/24HR
1 PATCH, TRANSDERMAL 24 HOURS TRANSDERMAL DAILY
Status: DISCONTINUED | OUTPATIENT
Start: 2019-11-15 | End: 2019-11-15 | Stop reason: HOSPADM

## 2019-11-14 RX ORDER — NIFEDIPINE 30 MG/1
60 TABLET, EXTENDED RELEASE ORAL ONCE
Status: COMPLETED | OUTPATIENT
Start: 2019-11-14 | End: 2019-11-14

## 2019-11-14 RX ORDER — ASPIRIN 81 MG/1
81 TABLET ORAL DAILY
Status: DISCONTINUED | OUTPATIENT
Start: 2019-11-15 | End: 2019-11-15

## 2019-11-14 RX ORDER — DOCUSATE SODIUM 100 MG/1
100 CAPSULE, LIQUID FILLED ORAL 2 TIMES DAILY
Status: DISCONTINUED | OUTPATIENT
Start: 2019-11-14 | End: 2019-11-15 | Stop reason: HOSPADM

## 2019-11-14 RX ORDER — LEVOTHYROXINE SODIUM 0.05 MG/1
50 TABLET ORAL
Status: DISCONTINUED | OUTPATIENT
Start: 2019-11-15 | End: 2019-11-15 | Stop reason: HOSPADM

## 2019-11-14 RX ORDER — LISINOPRIL 20 MG/1
40 TABLET ORAL DAILY
Status: DISCONTINUED | OUTPATIENT
Start: 2019-11-15 | End: 2019-11-15 | Stop reason: HOSPADM

## 2019-11-14 RX ORDER — ACETAMINOPHEN 325 MG/1
650 TABLET ORAL EVERY 6 HOURS PRN
Status: DISCONTINUED | OUTPATIENT
Start: 2019-11-14 | End: 2019-11-15 | Stop reason: HOSPADM

## 2019-11-14 RX ORDER — ATORVASTATIN CALCIUM 40 MG/1
40 TABLET, FILM COATED ORAL DAILY
Status: DISCONTINUED | OUTPATIENT
Start: 2019-11-15 | End: 2019-11-15 | Stop reason: HOSPADM

## 2019-11-14 RX ORDER — ACETAMINOPHEN 325 MG/1
650 TABLET ORAL ONCE
Status: COMPLETED | OUTPATIENT
Start: 2019-11-14 | End: 2019-11-14

## 2019-11-14 RX ADMIN — METOPROLOL TARTRATE 50 MG: 50 TABLET, FILM COATED ORAL at 18:34

## 2019-11-14 RX ADMIN — ACETAMINOPHEN 650 MG: 325 TABLET ORAL at 15:37

## 2019-11-14 RX ADMIN — LISINOPRIL 40 MG: 20 TABLET ORAL at 18:35

## 2019-11-14 RX ADMIN — NIFEDIPINE 60 MG: 30 TABLET, FILM COATED, EXTENDED RELEASE ORAL at 18:35

## 2019-11-14 RX ADMIN — DOCUSATE SODIUM 100 MG: 100 CAPSULE, LIQUID FILLED ORAL at 21:07

## 2019-11-14 RX ADMIN — HYDRALAZINE HYDROCHLORIDE 20 MG: 20 INJECTION INTRAMUSCULAR; INTRAVENOUS at 19:58

## 2019-11-14 NOTE — ED PROVIDER NOTES
History  Chief Complaint   Patient presents with    Knee Injury     Pt fell yesterday carrying groceries, landed on right knee; denies hitting head or any other injuries     Patient is a 81-year-old male  He lives alone  He was carrying groceries down go couple steps when he tripped and fell landing on his right knee  This occurred yesterday  Today the knee is swollen and painful  Today is unable to ambulate  He denies any other injuries  He did not strike his head  No neck pain  He denies other injuries  He has otherwise been generally well  He denies fever  He does report some generalized weakness  There is some underlying baseline confusion as per EMS  EMS also reports that is living situation is generally poor  Pain is knee is moderately severe  Worse with movement and attempts at ambulation  Better with remaining still  Prior to Admission Medications   Prescriptions Last Dose Informant Patient Reported? Taking?    NIFEdipine (PROCARDIA XL) 60 mg 24 hr tablet Not Taking at Unknown time  No No   Sig: Take 1 tablet (60 mg total) by mouth daily   Patient not taking: Reported on 11/14/2019   aspirin (ECOTRIN LOW STRENGTH) 81 mg EC tablet Not Taking at Unknown time  Yes No   Sig: Take 81 mg by mouth daily   atorvastatin (LIPITOR) 40 mg tablet Not Taking at Unknown time  No No   Sig: Take 1 tablet (40 mg total) by mouth daily   Patient not taking: Reported on 11/14/2019   levothyroxine 50 mcg tablet Not Taking at Unknown time  No No   Sig: Take 1 tablet (50 mcg total) by mouth daily   Patient not taking: Reported on 11/14/2019   lisinopril (ZESTRIL) 40 mg tablet Not Taking at Unknown time  No No   Sig: TAKE 1 TABLET (40 MG TOTAL) BY MOUTH DAILY   Patient not taking: Reported on 11/14/2019   metoprolol tartrate (LOPRESSOR) 50 mg tablet Not Taking at Unknown time  No No   Sig: Take 1 tablet (50 mg total) by mouth every 12 (twelve) hours   Patient not taking: Reported on 11/14/2019 Facility-Administered Medications: None       Past Medical History:   Diagnosis Date    Coronary artery disease     history of CABG    Disease of thyroid gland     Hemorrhoids     last assessed 7/10/17    History of arterial duplex of LE 01/10/2017    Bilateral occlusion of the superficial femoral arteries, severe diminution of the ankle brachial index bilaterally, disease appears worse on left than right   History of echocardiogram 07/20/2011    hypokinesia of the inferior wall  EF 50%   HL (hearing loss)     Hyperlipidemia     Hypertension     PVD (peripheral vascular disease)        Past Surgical History:   Procedure Laterality Date    COLONOSCOPY      Last assessed 7/10/17    CORONARY ARTERY BYPASS GRAFT  08/23/2010    3V CABG:  LIMA to LAD, VG to RI, VG to OM1   DENTAL SURGERY      Last assessed  7/10/17    KNEE SURGERY Left     Last assessed 7/10/17    TONSILLECTOMY AND ADENOIDECTOMY      Last assessed 7/10/17    TOOTH EXTRACTION         Family History   Problem Relation Age of Onset    Alzheimer's disease Mother     Heart disease Father         cardiac disorder    Heart disease Brother         cardiac disorder     I have reviewed and agree with the history as documented  Social History     Tobacco Use    Smoking status: Current Every Day Smoker     Packs/day: 0 50     Types: Cigarettes    Smokeless tobacco: Never Used   Substance Use Topics    Alcohol use: Not Currently     Alcohol/week: 1 0 standard drinks     Types: 1 Glasses of wine per week    Drug use: No        Review of Systems   Constitutional: Negative for chills and fever  HENT: Negative for rhinorrhea and sore throat  Eyes: Negative for pain, redness and visual disturbance  Respiratory: Negative for cough and shortness of breath  Cardiovascular: Negative for chest pain and leg swelling  Gastrointestinal: Negative for abdominal pain, diarrhea and vomiting     Endocrine: Negative for polydipsia and polyuria  Genitourinary: Negative for dysuria, frequency and hematuria  Musculoskeletal: Negative for back pain and neck pain  Skin: Negative for rash and wound  Allergic/Immunologic: Negative for immunocompromised state  Neurological: Positive for weakness  Negative for numbness and headaches  Psychiatric/Behavioral: Negative for hallucinations and suicidal ideas  All other systems reviewed and are negative  Physical Exam  Physical Exam   Constitutional: He is oriented to person, place, and time  He appears well-developed and well-nourished  No distress  HENT:   Head: Normocephalic and atraumatic  Mouth/Throat: Oropharynx is clear and moist    There is no palpable scalp step off or swelling  No lacerations  There is no facial bone tenderness  No swelling or step-offs  No crepitus  There is no LeFort fracture  No otorrhea  No rhinorrhea  No nasal deformity or epistaxis  There is no raccoon's or Fontanez's sign  Eyes: Pupils are equal, round, and reactive to light  EOM are normal    Globes are intact  No hyphema  Orbits are atraumatic  Neck:   There is no midline C-spine tenderness  Trachea is midline  There is no step-offs or swelling  No crepitus  No JVD  Cardiovascular: Normal rate, regular rhythm, normal heart sounds and intact distal pulses  Exam reveals no gallop and no friction rub  No murmur heard  Pulmonary/Chest: Effort normal and breath sounds normal  No stridor  No respiratory distress  He exhibits no tenderness  There is no bruising  No crepitus  Abdominal: Soft  Bowel sounds are normal  He exhibits no distension  There is no tenderness  There is no rebound and no guarding  Musculoskeletal: He exhibits edema and tenderness  He exhibits no deformity  No thoracic or lumbar spine tenderness  Pelvis is stable  No palpable step-offs or swelling  No crepitus  No CVA tenderness  There is swelling and tenderness to the right suprapatellar area    There is pain with attempts at range of motion  Lachman appears to be negative  Neurovascular exam is normal   Other extremities are atraumatic  There is no calf pain  Neurological: He is alert and oriented to person, place, and time  He has normal strength  No sensory deficit  GCS eye subscore is 4  GCS verbal subscore is 5  GCS motor subscore is 6  Skin: Skin is warm and dry  He is not diaphoretic  No pallor  Psychiatric: He has a normal mood and affect  His behavior is normal    Vitals reviewed  Vital Signs  ED Triage Vitals [11/14/19 1418]   Temperature Pulse Respirations Blood Pressure SpO2   (!) 97 3 °F (36 3 °C) 69 18 (!) 251/83 95 %      Temp Source Heart Rate Source Patient Position - Orthostatic VS BP Location FiO2 (%)   Temporal Monitor Lying Left arm --      Pain Score       3           Vitals:    11/14/19 1418 11/14/19 1700   BP: (!) 251/83 (!) 237/102   Pulse: 69 64   Patient Position - Orthostatic VS: Lying Lying         Visual Acuity      ED Medications  Medications   hydrALAZINE (APRESOLINE) injection 20 mg (has no administration in time range)   metoprolol tartrate (LOPRESSOR) tablet 50 mg (has no administration in time range)   lisinopril (ZESTRIL) tablet 40 mg (has no administration in time range)   NIFEdipine (PROCARDIA XL) 24 hr tablet 60 mg (has no administration in time range)   acetaminophen (TYLENOL) tablet 650 mg (650 mg Oral Given 11/14/19 1537)       Diagnostic Studies  Results Reviewed     Procedure Component Value Units Date/Time    B-Type Natriuretic Peptide ([facility]) [[id number]] Collected:  11/14/19 1711    Lab Status:   In process Specimen:  Blood from Arm, Right Updated:  11/14/19 1712    TSH [[id number]]  (Normal) Collected:  11/14/19 1539    Lab Status:  Final result Specimen:  Blood from Arm, Left Updated:  11/14/19 1626     TSH 3RD GENERATON 3 940 uIU/mL     Narrative:       Patients undergoing fluorescein dye angiography may retain small amounts of fluorescein in the body for 48-72 hours post procedure  Samples containing fluorescein can produce falsely depressed TSH values  If the patient had this procedure,a specimen should be resubmitted post fluorescein clearance        Troponin I [[id number]]  (Abnormal) Collected:  11/14/19 1539    Lab Status:  Final result Specimen:  Blood from Arm, Left Updated:  11/14/19 1612     Troponin I 0 04 ng/mL     Urine Microscopic [[id number]]  (Abnormal) Collected:  11/14/19 1537    Lab Status:  Final result Specimen:  Urine, Clean Catch Updated:  11/14/19 1609     RBC, UA 2-4 /hpf      WBC, UA 0-1 /hpf      Epithelial Cells Occasional /hpf      Bacteria, UA Occasional /hpf      Hyaline Casts, UA 0-1 /lpf      Fine granular casts 1-2 /lpf     Comprehensive metabolic panel [[id number]]  (Abnormal) Collected:  11/14/19 1539    Lab Status:  Final result Specimen:  Blood from Arm, Left Updated:  11/14/19 1601     Sodium 141 mmol/L      Potassium 3 6 mmol/L      Chloride 104 mmol/L      CO2 27 mmol/L      ANION GAP 10 mmol/L      BUN 15 mg/dL      Creatinine 1 07 mg/dL      Glucose 106 mg/dL      Calcium 8 8 mg/dL      AST 14 U/L      ALT 20 U/L      Alkaline Phosphatase 56 U/L      Total Protein 6 3 g/dL      Albumin 3 9 g/dL      Total Bilirubin 1 00 mg/dL      eGFR 66 ml/min/1 73sq m     Narrative:       [place] guidelines for Chronic Kidney Disease (CKD):     Stage 1 with normal or high GFR (GFR > 90 mL/min/1 73 square meters)    Stage 2 Mild CKD (GFR = 60-89 mL/min/1 73 square meters)    Stage 3A Moderate CKD (GFR = 45-59 mL/min/1 73 square meters)    Stage 3B Moderate CKD (GFR = 30-44 mL/min/1 73 square meters)    Stage 4 Severe CKD (GFR = 15-29 mL/min/1 73 square meters)    Stage 5 End Stage CKD (GFR <15 mL/min/1 73 square meters)  Note: GFR calculation is accurate only with a steady state creatinine    UA w Reflex to Microscopic w Reflex to Culture [[id number]]  (Abnormal) Collected:  11/14/19 1537    Lab Status:  Final result Specimen:  Urine, Clean Catch Updated:  11/14/19 1553     Color, UA Yellow     Clarity, UA Clear     Specific Gravity, UA 1 025     pH, UA 6 5     Leukocytes, UA Negative     Nitrite, UA Negative     Protein, UA 3+ mg/dl      Glucose, UA Negative mg/dl      Ketones, UA Negative mg/dl      Urobilinogen, UA 0 2 E U /dl      Bilirubin, UA Negative     Blood, UA 1+    CBC and differential [[id number]]  (Abnormal) Collected:  11/14/19 1539    Lab Status:  Final result Specimen:  Blood from Arm, Left Updated:  11/14/19 1546     WBC 10 60 Thousand/uL      RBC 4 13 Million/uL      Hemoglobin 13 8 g/dL      Hematocrit 40 8 %      MCV 99 fL      MCH 33 5 pg      MCHC 33 9 g/dL      RDW 13 7 %      MPV 8 6 fL      Platelets 797 Thousands/uL      Neutrophils Relative 76 %      Lymphocytes Relative 10 %      Monocytes Relative 13 %      Eosinophils Relative 0 %      Basophils Relative 1 %      Neutrophils Absolute 8 00 Thousands/µL      Lymphocytes Absolute 1 10 Thousands/µL      Monocytes Absolute 1 40 Thousand/µL      Eosinophils Absolute 0 00 Thousand/µL      Basophils Absolute 0 10 Thousands/µL                  XR knee 4+ views Right injury   ED Interpretation by Clemente Bates MD (11/14 1702)   Large effusion  Cannot rule out fracture  No dislocation  Final Result by Shannon Kamara MD (11/14 2738)      Large lipohemarthrosis suggests intra-articular fracture, though fracture planes are poorly demonstrated  Statistically, tibial plateau is the most likely site and there may be some subtle cortical discontinuity posteriorly at the plateau on lateral    view  Recommend CT for better characterization  Note: The study was marked in EPIC for immediate notification  Imaging follow-up reminder notification was scheduled in the electronic medical record  Workstation performed: [id number]         XR chest 1 view portable   ED Interpretation by Clemente Bates MD (11/14 1702)   Increased vascular congestion  No infiltrates  Final Result by Hermelindo Segura MD (11/14 5683)      Findings of mild volume overload with pulmonary vascular congestion and trace interstitial edema  Workstation performed: [id number]         CT knee right wo contrast    (Results Pending)              Procedures  ECG 12 Lead Documentation Only  Date/Time: 11/14/2019 3:35 PM  Performed by: Sherin Wharton MD  Authorized by: Sherin Wharton MD     ECG reviewed by me, the ED Provider: yes    Patient location:  ED  Comments:      Normal sinus rhythm  Nonspecific ST and T-wave abnormality  No ectopy  ED Course                               MDM  Number of Diagnoses or Management Options  Diagnosis management comments: Patient has a large knee effusion on x-ray  Radiology suspects an occult fracture and recommended CT scan  He immobilizer and CT scan were ordered  Analgesia was ordered which patient declined  Patient unable to ambulate  He lives alone  His living situation is poor  He will require admission  CT scan is pending  Orthopedics will be consulted inpatient  In addition, patient is markedly hypertensive  He has a history of hypertension  He has been noncompliant with his medications  He has a mildly elevated troponin  There is no chest pain  No acute ischemic EKG changes  Chest x-ray is consistent with mild increased vascular congestion  BNP was ordered  Renal function is normal   IV hydralazine ordered for blood pressure  Patient's regular p o  Medications also ordered  Consulted with hospitalist for admission         Amount and/or Complexity of Data Reviewed  Clinical lab tests: ordered and reviewed  Tests in the radiology section of CPT®: ordered and reviewed  Discuss the patient with other providers: yes  Independent visualization of images, tracings, or specimens: yes        Disposition  Final diagnoses:   Ambulatory dysfunction   Knee injury, initial encounter   Hypertensive urgency Elevated troponin     Time reflects when diagnosis was documented in both MDM as applicable and the Disposition within this note     Time User Action Codes Description Comment    11/14/2019  5:19 PM Chetan Hug Add [R26 2] Ambulatory dysfunction     11/14/2019  5:19 PM Chetan Hug Add [S89 90XA] Knee injury, initial encounter     11/14/2019  5:20 PM Chetan Hug Add [I16 0] Hypertensive urgency     11/14/2019  5:20 PM Chetan Hug Add [R79 89] Elevated troponin       ED Disposition     ED Disposition Condition Date/Time Comment    Admit Stable Thu Nov 14, 2019  5:17 PM       Follow-up Information    None         Patient's Medications   Discharge Prescriptions    No medications on file     No discharge procedures on file      ED Provider  Electronically Signed by           Annemarie Curiel MD  11/14/19 [address] Roger Garg MD  11/14/19 5205

## 2019-11-14 NOTE — ED NOTES
Went to change pt into a hospital gown and he stated that he has not had his shirt or pants off of him in months       Faraz East RN  11/14/19 2645

## 2019-11-15 ENCOUNTER — APPOINTMENT (OUTPATIENT)
Dept: CT IMAGING | Facility: HOSPITAL | Age: 79
End: 2019-11-15
Payer: COMMERCIAL

## 2019-11-15 ENCOUNTER — APPOINTMENT (INPATIENT)
Dept: RADIOLOGY | Facility: HOSPITAL | Age: 79
DRG: 085 | End: 2019-11-15
Payer: COMMERCIAL

## 2019-11-15 ENCOUNTER — APPOINTMENT (OUTPATIENT)
Dept: MRI IMAGING | Facility: HOSPITAL | Age: 79
End: 2019-11-15
Payer: COMMERCIAL

## 2019-11-15 ENCOUNTER — APPOINTMENT (INPATIENT)
Dept: NEUROLOGY | Facility: CLINIC | Age: 79
DRG: 085 | End: 2019-11-15
Payer: COMMERCIAL

## 2019-11-15 ENCOUNTER — HOSPITAL ENCOUNTER (INPATIENT)
Facility: HOSPITAL | Age: 79
LOS: 8 days | DRG: 085 | End: 2019-11-23
Attending: SURGERY | Admitting: SURGERY
Payer: COMMERCIAL

## 2019-11-15 ENCOUNTER — APPOINTMENT (OUTPATIENT)
Dept: NON INVASIVE DIAGNOSTICS | Facility: HOSPITAL | Age: 79
End: 2019-11-15
Payer: COMMERCIAL

## 2019-11-15 ENCOUNTER — APPOINTMENT (OUTPATIENT)
Dept: RADIOLOGY | Facility: HOSPITAL | Age: 79
End: 2019-11-15
Payer: COMMERCIAL

## 2019-11-15 VITALS
RESPIRATION RATE: 18 BRPM | DIASTOLIC BLOOD PRESSURE: 72 MMHG | SYSTOLIC BLOOD PRESSURE: 183 MMHG | HEART RATE: 69 BPM | OXYGEN SATURATION: 95 % | TEMPERATURE: 99.5 F | BODY MASS INDEX: 20.37 KG/M2 | WEIGHT: 145.5 LBS | HEIGHT: 71 IN

## 2019-11-15 DIAGNOSIS — I63.412 CEREBROVASCULAR ACCIDENT (CVA) DUE TO EMBOLISM OF LEFT MIDDLE CEREBRAL ARTERY (HCC): ICD-10-CM

## 2019-11-15 DIAGNOSIS — I10 HYPERTENSION: ICD-10-CM

## 2019-11-15 DIAGNOSIS — I60.9 SUBARACHNOID HEMORRHAGE (HCC): Primary | ICD-10-CM

## 2019-11-15 DIAGNOSIS — R29.898 RIGHT ARM WEAKNESS: ICD-10-CM

## 2019-11-15 DIAGNOSIS — S82.143A TIBIAL PLATEAU FRACTURE: ICD-10-CM
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
## 2019-11-15 PROBLEM — R46.4: Status: ACTIVE | Noted: 2019-11-15

## 2019-11-15 PROBLEM — S82.141D CLOSED FRACTURE OF RIGHT TIBIAL PLATEAU WITH ROUTINE HEALING: Status: ACTIVE | Noted: 2019-11-15

## 2019-11-15 PROBLEM — G93.40 ENCEPHALOPATHY: Status: ACTIVE | Noted: 2019-11-15

## 2019-11-15 LAB
ALBUMIN SERPL BCP-MCNC: 3.8 G/DL (ref 3.5–5.7)
ALP SERPL-CCNC: 60 U/L (ref 55–165)
ALT SERPL W P-5'-P-CCNC: 18 U/L (ref 7–52)
AMMONIA PLAS-SCNC: 14 UMOL/L (ref 11–35)
ANION GAP SERPL CALCULATED.3IONS-SCNC: 11 MMOL/L (ref 4–13)
ANION GAP SERPL CALCULATED.3IONS-SCNC: 9 MMOL/L (ref 4–13)
AST SERPL W P-5'-P-CCNC: 15 U/L (ref 13–39)
ATRIAL RATE: 88 BPM
ATRIAL RATE: 90 BPM
BASOPHILS # BLD AUTO: 0.1 THOUSANDS/ΜL (ref 0–0.1)
BASOPHILS NFR BLD AUTO: 0 % (ref 0–2)
BILIRUB SERPL-MCNC: 1.4 MG/DL (ref 0.2–1)
BUN SERPL-MCNC: 13 MG/DL (ref 7–25)
BUN SERPL-MCNC: 14 MG/DL (ref 5–25)
CALCIUM SERPL-MCNC: 8.7 MG/DL (ref 8.3–10.1)
CALCIUM SERPL-MCNC: 8.9 MG/DL (ref 8.6–10.5)
CHLORIDE SERPL-SCNC: 103 MMOL/L (ref 98–107)
CHLORIDE SERPL-SCNC: 106 MMOL/L (ref 100–108)
CHOLEST SERPL-MCNC: 222 MG/DL (ref 0–200)
CO2 SERPL-SCNC: 24 MMOL/L (ref 21–31)
CO2 SERPL-SCNC: 26 MMOL/L (ref 21–32)
CREAT SERPL-MCNC: 0.91 MG/DL (ref 0.7–1.3)
CREAT SERPL-MCNC: 1.06 MG/DL (ref 0.6–1.3)
EOSINOPHIL # BLD AUTO: 0.1 THOUSAND/ΜL (ref 0–0.61)
EOSINOPHIL NFR BLD AUTO: 1 % (ref 0–5)
ERYTHROCYTE [DISTWIDTH] IN BLOOD BY AUTOMATED COUNT: 13.5 % (ref 11.5–14.5)
EST. AVERAGE GLUCOSE BLD GHB EST-MCNC: 100 MG/DL
GFR SERPL CREATININE-BSD FRML MDRD: 67 ML/MIN/1.73SQ M
GFR SERPL CREATININE-BSD FRML MDRD: 80 ML/MIN/1.73SQ M
GLUCOSE SERPL-MCNC: 111 MG/DL (ref 65–140)
GLUCOSE SERPL-MCNC: 146 MG/DL (ref 65–140)
GLUCOSE SERPL-MCNC: 87 MG/DL (ref 65–99)
HBA1C MFR BLD: 5.1 % (ref 4.2–6.3)
HCT VFR BLD AUTO: 41.2 % (ref 42–47)
HDLC SERPL-MCNC: 52 MG/DL
HGB BLD-MCNC: 14.2 G/DL (ref 14–18)
LACTATE SERPL-SCNC: 1.6 MMOL/L (ref 0.5–2)
LDLC SERPL CALC-MCNC: 149 MG/DL (ref 0–100)
LYMPHOCYTES # BLD AUTO: 1.4 THOUSANDS/ΜL (ref 0.6–4.47)
LYMPHOCYTES NFR BLD AUTO: 10 % (ref 21–51)
MAGNESIUM SERPL-MCNC: 1.8 MG/DL (ref 1.9–2.7)
MCH RBC QN AUTO: 33.6 PG (ref 26–34)
MCHC RBC AUTO-ENTMCNC: 34.3 G/DL (ref 31–37)
MCV RBC AUTO: 98 FL (ref 81–99)
MONOCYTES # BLD AUTO: 1.8 THOUSAND/ΜL (ref 0.17–1.22)
MONOCYTES NFR BLD AUTO: 13 % (ref 2–12)
NEUTROPHILS # BLD AUTO: 11.1 THOUSANDS/ΜL (ref 1.4–6.5)
NEUTS SEG NFR BLD AUTO: 77 % (ref 42–75)
P AXIS: 60 DEGREES
P AXIS: 66 DEGREES
PHOSPHATE SERPL-MCNC: 2.6 MG/DL (ref 3–5.5)
PLATELET # BLD AUTO: 159 THOUSANDS/UL (ref 149–390)
PMV BLD AUTO: 9.6 FL (ref 8.6–11.7)
POTASSIUM SERPL-SCNC: 3.4 MMOL/L (ref 3.5–5.3)
POTASSIUM SERPL-SCNC: 3.4 MMOL/L (ref 3.5–5.5)
PR INTERVAL: 154 MS
PR INTERVAL: 167 MS
PROT SERPL-MCNC: 6.3 G/DL (ref 6.4–8.9)
QRS AXIS: -44 DEGREES
QRS AXIS: 5 DEGREES
QRSD INTERVAL: 100 MS
QRSD INTERVAL: 104 MS
QT INTERVAL: 383 MS
QT INTERVAL: 388 MS
QTC INTERVAL: 469 MS
QTC INTERVAL: 470 MS
RBC # BLD AUTO: 4.21 MILLION/UL (ref 4.3–5.9)
SODIUM SERPL-SCNC: 138 MMOL/L (ref 134–143)
SODIUM SERPL-SCNC: 141 MMOL/L (ref 136–145)
T WAVE AXIS: 81 DEGREES
T WAVE AXIS: 84 DEGREES
TRIGL SERPL-MCNC: 103 MG/DL (ref 44–166)
TROPONIN I SERPL-MCNC: 0.05 NG/ML
TROPONIN I SERPL-MCNC: 0.06 NG/ML
TSH SERPL DL<=0.05 MIU/L-ACNC: 6.15 UIU/ML (ref 0.36–3.74)
VENTRICULAR RATE: 88 BPM
VENTRICULAR RATE: 90 BPM
VIT B12 SERPL-MCNC: 487 PG/ML (ref 100–900)
WBC # BLD AUTO: 14.4 THOUSAND/UL (ref 4.8–10.8)

## 2019-11-15 PROCEDURE — 70498 CT ANGIOGRAPHY NECK: CPT

## 2019-11-15 PROCEDURE — 99255 IP/OBS CONSLTJ NEW/EST HI 80: CPT | Performed by: PHYSICIAN ASSISTANT

## 2019-11-15 PROCEDURE — 93010 ELECTROCARDIOGRAM REPORT: CPT | Performed by: INTERNAL MEDICINE

## 2019-11-15 PROCEDURE — 99217 PR OBSERVATION CARE DISCHARGE MANAGEMENT: CPT | Performed by: NURSE PRACTITIONER

## 2019-11-15 PROCEDURE — 82948 REAGENT STRIP/BLOOD GLUCOSE: CPT

## 2019-11-15 PROCEDURE — 83036 HEMOGLOBIN GLYCOSYLATED A1C: CPT | Performed by: NURSE PRACTITIONER

## 2019-11-15 PROCEDURE — 93005 ELECTROCARDIOGRAM TRACING: CPT

## 2019-11-15 PROCEDURE — 84443 ASSAY THYROID STIM HORMONE: CPT | Performed by: PHYSICIAN ASSISTANT

## 2019-11-15 PROCEDURE — 99222 1ST HOSP IP/OBS MODERATE 55: CPT | Performed by: NURSE PRACTITIONER

## 2019-11-15 PROCEDURE — 85025 COMPLETE CBC W/AUTO DIFF WBC: CPT | Performed by: HOSPITALIST

## 2019-11-15 PROCEDURE — 73060 X-RAY EXAM OF HUMERUS: CPT

## 2019-11-15 PROCEDURE — 80061 LIPID PANEL: CPT | Performed by: NURSE PRACTITIONER

## 2019-11-15 PROCEDURE — 99285 EMERGENCY DEPT VISIT HI MDM: CPT

## 2019-11-15 PROCEDURE — 84484 ASSAY OF TROPONIN QUANT: CPT | Performed by: HOSPITALIST

## 2019-11-15 PROCEDURE — 97167 OT EVAL HIGH COMPLEX 60 MIN: CPT

## 2019-11-15 PROCEDURE — 80048 BASIC METABOLIC PNL TOTAL CA: CPT | Performed by: PHYSICIAN ASSISTANT

## 2019-11-15 PROCEDURE — 83605 ASSAY OF LACTIC ACID: CPT | Performed by: PHYSICIAN ASSISTANT

## 2019-11-15 PROCEDURE — 97163 PT EVAL HIGH COMPLEX 45 MIN: CPT

## 2019-11-15 PROCEDURE — G8978 MOBILITY CURRENT STATUS: HCPCS

## 2019-11-15 PROCEDURE — G8979 MOBILITY GOAL STATUS: HCPCS

## 2019-11-15 PROCEDURE — 99223 1ST HOSP IP/OBS HIGH 75: CPT | Performed by: PSYCHIATRY & NEUROLOGY

## 2019-11-15 PROCEDURE — 36415 COLL VENOUS BLD VENIPUNCTURE: CPT | Performed by: PHYSICIAN ASSISTANT

## 2019-11-15 PROCEDURE — G8988 SELF CARE GOAL STATUS: HCPCS

## 2019-11-15 PROCEDURE — 95951 HB EEG MONITORING/VIDEORECORD: CPT

## 2019-11-15 PROCEDURE — 80053 COMPREHEN METABOLIC PANEL: CPT | Performed by: HOSPITALIST

## 2019-11-15 PROCEDURE — 99223 1ST HOSP IP/OBS HIGH 75: CPT | Performed by: SURGERY

## 2019-11-15 PROCEDURE — 70496 CT ANGIOGRAPHY HEAD: CPT

## 2019-11-15 PROCEDURE — G8987 SELF CARE CURRENT STATUS: HCPCS

## 2019-11-15 PROCEDURE — 70450 CT HEAD/BRAIN W/O DYE: CPT

## 2019-11-15 PROCEDURE — 83735 ASSAY OF MAGNESIUM: CPT | Performed by: HOSPITALIST

## 2019-11-15 PROCEDURE — 84484 ASSAY OF TROPONIN QUANT: CPT | Performed by: PHYSICIAN ASSISTANT

## 2019-11-15 PROCEDURE — 82140 ASSAY OF AMMONIA: CPT | Performed by: PHYSICIAN ASSISTANT

## 2019-11-15 PROCEDURE — 84100 ASSAY OF PHOSPHORUS: CPT | Performed by: HOSPITALIST

## 2019-11-15 PROCEDURE — 82607 VITAMIN B-12: CPT | Performed by: PHYSICIAN ASSISTANT

## 2019-11-15 PROCEDURE — 99222 1ST HOSP IP/OBS MODERATE 55: CPT | Performed by: INTERNAL MEDICINE

## 2019-11-15 RX ORDER — AMOXICILLIN 250 MG
1 CAPSULE ORAL
Status: DISCONTINUED | OUTPATIENT
Start: 2019-11-15 | End: 2019-11-23 | Stop reason: HOSPADM

## 2019-11-15 RX ORDER — HYDRALAZINE HYDROCHLORIDE 20 MG/ML
10 INJECTION INTRAMUSCULAR; INTRAVENOUS EVERY 6 HOURS PRN
Status: DISCONTINUED | OUTPATIENT
Start: 2019-11-15 | End: 2019-11-16

## 2019-11-15 RX ORDER — NICOTINE 21 MG/24HR
1 PATCH, TRANSDERMAL 24 HOURS TRANSDERMAL DAILY
Status: DISCONTINUED | OUTPATIENT
Start: 2019-11-15 | End: 2019-11-15

## 2019-11-15 RX ORDER — LISINOPRIL 20 MG/1
40 TABLET ORAL DAILY
Status: CANCELLED | OUTPATIENT
Start: 2019-11-16

## 2019-11-15 RX ORDER — ONDANSETRON 2 MG/ML
4 INJECTION INTRAMUSCULAR; INTRAVENOUS EVERY 6 HOURS PRN
Status: CANCELLED | OUTPATIENT
Start: 2019-11-15

## 2019-11-15 RX ORDER — DOCUSATE SODIUM 100 MG/1
100 CAPSULE, LIQUID FILLED ORAL 2 TIMES DAILY
Status: CANCELLED | OUTPATIENT
Start: 2019-11-15

## 2019-11-15 RX ORDER — LEVOTHYROXINE SODIUM 0.05 MG/1
50 TABLET ORAL
Status: CANCELLED | OUTPATIENT
Start: 2019-11-16

## 2019-11-15 RX ORDER — MAGNESIUM SULFATE HEPTAHYDRATE 40 MG/ML
2 INJECTION, SOLUTION INTRAVENOUS ONCE
Status: COMPLETED | OUTPATIENT
Start: 2019-11-15 | End: 2019-11-15

## 2019-11-15 RX ORDER — ATORVASTATIN CALCIUM 40 MG/1
40 TABLET, FILM COATED ORAL
Status: DISCONTINUED | OUTPATIENT
Start: 2019-11-15 | End: 2019-11-23 | Stop reason: HOSPADM

## 2019-11-15 RX ORDER — LEVETIRACETAM 500 MG/1
500 TABLET ORAL EVERY 12 HOURS SCHEDULED
Status: DISCONTINUED | OUTPATIENT
Start: 2019-11-15 | End: 2019-11-15

## 2019-11-15 RX ORDER — NIFEDIPINE 30 MG/1
60 TABLET, EXTENDED RELEASE ORAL DAILY
Status: CANCELLED | OUTPATIENT
Start: 2019-11-16

## 2019-11-15 RX ORDER — NICOTINE 21 MG/24HR
1 PATCH, TRANSDERMAL 24 HOURS TRANSDERMAL DAILY
Status: CANCELLED | OUTPATIENT
Start: 2019-11-16

## 2019-11-15 RX ORDER — NICOTINE 21 MG/24HR
1 PATCH, TRANSDERMAL 24 HOURS TRANSDERMAL DAILY
Status: DISCONTINUED | OUTPATIENT
Start: 2019-11-15 | End: 2019-11-23 | Stop reason: HOSPADM

## 2019-11-15 RX ORDER — ATORVASTATIN CALCIUM 40 MG/1
40 TABLET, FILM COATED ORAL DAILY
Status: CANCELLED | OUTPATIENT
Start: 2019-11-16

## 2019-11-15 RX ORDER — NIFEDIPINE 60 MG/1
60 TABLET, EXTENDED RELEASE ORAL DAILY
Status: DISCONTINUED | OUTPATIENT
Start: 2019-11-16 | End: 2019-11-15

## 2019-11-15 RX ORDER — LEVOTHYROXINE SODIUM 0.05 MG/1
50 TABLET ORAL
Status: DISCONTINUED | OUTPATIENT
Start: 2019-11-16 | End: 2019-11-23 | Stop reason: HOSPADM

## 2019-11-15 RX ORDER — METOPROLOL TARTRATE 50 MG/1
50 TABLET, FILM COATED ORAL EVERY 12 HOURS SCHEDULED
Status: DISCONTINUED | OUTPATIENT
Start: 2019-11-15 | End: 2019-11-18

## 2019-11-15 RX ORDER — METOPROLOL TARTRATE 50 MG/1
50 TABLET, FILM COATED ORAL EVERY 12 HOURS SCHEDULED
Status: CANCELLED | OUTPATIENT
Start: 2019-11-15

## 2019-11-15 RX ORDER — ACETAMINOPHEN 325 MG/1
650 TABLET ORAL EVERY 6 HOURS PRN
Status: CANCELLED | OUTPATIENT
Start: 2019-11-15

## 2019-11-15 RX ORDER — DOCUSATE SODIUM 100 MG/1
100 CAPSULE, LIQUID FILLED ORAL 2 TIMES DAILY
Status: DISCONTINUED | OUTPATIENT
Start: 2019-11-15 | End: 2019-11-23 | Stop reason: HOSPADM

## 2019-11-15 RX ORDER — POTASSIUM CHLORIDE 14.9 MG/ML
20 INJECTION INTRAVENOUS ONCE
Status: COMPLETED | OUTPATIENT
Start: 2019-11-15 | End: 2019-11-15

## 2019-11-15 RX ORDER — LISINOPRIL 20 MG/1
40 TABLET ORAL DAILY
Status: DISCONTINUED | OUTPATIENT
Start: 2019-11-16 | End: 2019-11-15

## 2019-11-15 RX ORDER — LABETALOL 20 MG/4 ML (5 MG/ML) INTRAVENOUS SYRINGE
10 EVERY 6 HOURS PRN
Status: DISCONTINUED | OUTPATIENT
Start: 2019-11-15 | End: 2019-11-16

## 2019-11-15 RX ADMIN — SODIUM CHLORIDE 15 MG/HR: 0.9 INJECTION, SOLUTION INTRAVENOUS at 21:26

## 2019-11-15 RX ADMIN — SODIUM CHLORIDE 5 MG/HR: 0.9 INJECTION, SOLUTION INTRAVENOUS at 16:26

## 2019-11-15 RX ADMIN — HYDRALAZINE HYDROCHLORIDE 20 MG: 20 INJECTION INTRAMUSCULAR; INTRAVENOUS at 04:12

## 2019-11-15 RX ADMIN — LEVOTHYROXINE SODIUM 50 MCG: 50 TABLET ORAL at 05:36

## 2019-11-15 RX ADMIN — METOPROLOL TARTRATE 50 MG: 50 TABLET, FILM COATED ORAL at 10:03

## 2019-11-15 RX ADMIN — POTASSIUM CHLORIDE 20 MEQ: 14.9 INJECTION, SOLUTION INTRAVENOUS at 16:27

## 2019-11-15 RX ADMIN — LEVETIRACETAM 500 MG: 100 INJECTION, SOLUTION INTRAVENOUS at 21:45

## 2019-11-15 RX ADMIN — MAGNESIUM SULFATE HEPTAHYDRATE 2 G: 40 INJECTION, SOLUTION INTRAVENOUS at 16:28

## 2019-11-15 RX ADMIN — SODIUM CHLORIDE 10 MG/HR: 0.9 INJECTION, SOLUTION INTRAVENOUS at 19:01

## 2019-11-15 RX ADMIN — DESMOPRESSIN ACETATE 20 MCG: 4 SOLUTION INTRAVENOUS at 11:58

## 2019-11-15 RX ADMIN — LISINOPRIL 40 MG: 20 TABLET ORAL at 10:04

## 2019-11-15 RX ADMIN — LABETALOL 20 MG/4 ML (5 MG/ML) INTRAVENOUS SYRINGE 10 MG: at 18:38

## 2019-11-15 RX ADMIN — NIFEDIPINE 60 MG: 30 TABLET, FILM COATED, EXTENDED RELEASE ORAL at 10:04

## 2019-11-15 RX ADMIN — HYDRALAZINE HYDROCHLORIDE 10 MG: 20 INJECTION INTRAMUSCULAR; INTRAVENOUS at 17:13

## 2019-11-15 RX ADMIN — NICOTINE 1 PATCH: 14 PATCH TRANSDERMAL at 16:25

## 2019-11-15 RX ADMIN — IOHEXOL 85 ML: 350 INJECTION, SOLUTION INTRAVENOUS at 09:38

## 2019-11-15 NOTE — PLAN OF CARE
Problem: PHYSICAL THERAPY ADULT  Goal: Performs mobility at highest level of function for planned discharge setting  See evaluation for individualized goals  Description  Treatment/Interventions: Functional transfer training, Therapeutic exercise, Endurance training, Cognitive reorientation, Patient/family training, Equipment eval/education, Bed mobility, Spoke to nursing, Spoke to advanced practitioner, OT  Equipment Recommended: (TBD pending progress)       See flowsheet documentation for full assessment, interventions and recommendations  Note:   Prognosis: Guarded  Problem List: Decreased strength, Decreased endurance, Decreased mobility, Decreased cognition, Decreased safety awareness, Impaired judgement  Assessment: Pt is 67 y o  male seen for PT evaluation on 11/15/2019 s/p admit to [facility] on 11/14/2019 w/ Fall (on) (from) other stairs and steps, initial encounter  PT consulted to assess pt's functional mobility and d/c needs  Order placed for PT eval and tx, w/ up and OOB as tolerated order  Performed at least 2 patient identifiers during session: Name and wristband  Comorbidities affecting pt's physical performance at time of assessment include: CAD, HLD, HTN, hypothyroidism, tobacco use disorder, h/o noncompliance c medical treatment  PTA, pt was independent w/ all functional mobility w/ ? AD use, ambulates community distances and elevations and lives alone in 2nd level apartment  Personal factors affecting pt at time of IE include: decreased cognition, positive fall history, decreased initiation and engagement, unable to perform physical activity, limited insight into impairments, inability to perform IADLs and inability to perform ADLs   Please find objective findings from PT assessment regarding body systems outlined above with impairments and limitations including weakness, decreased endurance, decreased activity tolerance, decreased functional mobility tolerance, decreased safety awareness, impaired judgement and fall risk, as well as mobility assessment (need for cueing for mobility technique)  The following objective measures performed on IE also reveal limitations: Barthel Index: 5/100  Pt's clinical presentation is currently unstable/unpredictable seen in pt's presentation of need for input for task focus and mobility technique, on telemetry monitoring and ongoing medical assessment  Pt to benefit from continued PT tx to address deficits as defined above and maximize level of functional independent mobility and consistency  From PT/mobility standpoint, recommendation at time of d/c would be STR pending progress in order to facilitate return to PLOF  Barriers to Discharge: Other (Comment)(unknown)     Recommendation: Short-term skilled PT(at this time, however pending medical workup)     PT - OK to Discharge: No    See flowsheet documentation for full assessment

## 2019-11-15 NOTE — ASSESSMENT & PLAN NOTE
· Patient has a minimally elevated 1st troponin  · Patient denies any chest pain  · Suspect non MI related elevated troponin secondary to the accelerated hypertension  · Will trend troponins  · Of note, patient is medically noncompliant with medications at home  · At this point will continue aspirin, metoprolol, lisinopril, and atorvastatin  · Will check a 2D echocardiogram to rule out regional wall motion abnormalities  · Additionally patient reports a history of CABG in 2003  · Will consult Cardiology

## 2019-11-15 NOTE — ASSESSMENT & PLAN NOTE
· Patient has a minimally elevated 1st troponin  · Patient denies any chest pain  · Suspect non MI related elevated troponin secondary to the accelerated hypertension  · Troponin levels- 0 04, 0 05, 0 06   · Of note, patient is medically noncompliant with medications at home  · At this point will continue aspirin, metoprolol, lisinopril, and atorvastatin  · Pending final result 2D echocardiogram to rule out regional wall motion abnormalities  · Additionally patient reports a history of CABG in 2003  · Will be transferred to B  Please see comment below

## 2019-11-15 NOTE — TRAUMA DOCUMENTATION
Second attempt to call report to ICU  Per ICU RN taking patient just got back to floor, please call back in 5-10 mins

## 2019-11-15 NOTE — CONSULTS
Consultation - Geriatrics   Nina Nava 67 y o  male MRN: [id number]  Unit/Bed#: ED 11 Encounter: [id number]      Assessment/Plan  1  Ambulatory dysfunction  PT/OT  Fall precautions  Recommend vitamin D3 1000 International Units p o  Daily    2  Acute pain due to trauma  Geriatric pain protocol  Scheduled acetaminophen 975 p o  Daily  Oxycodone 2 5 mg p o  Q 4 hours p r n  Moderate  Oxycodone 5 mg q 4 hours p r n  Severe    3  Deconditioning  Multifactorial  PT/OT  Rehab post hospitalization    4  Delirium precautions  Alert and oriented x3  He states he is having memory issues, unable to clarify if this is new or old  Provide frequent redirection, reorientation, distraction techniques  Avoid deliriogenic medications such as tramadol, benzodiazepines, anticholinergics,  Benadryl  Treat pain, See geriatric pain protocol  Monitor for constipation and urinary retention  Encourage early and frequent moblization, OOB  Encourage Hydration/ Nutrition  Implement sleep hygiene, limit night time interuptions, group activities  Avoid physical restraints  Use chemical restraint only when other efforts have failed, recommend zyprexa 2 5mg IM q8h prn  Monitor Qtc, if greater than 500 do not use antipsychotic medication  If QTc greater than 460, monitor and replete and deficiency of  K and Mg, recheck EKG    5  Hearing impairment  Speak loudly clearly  Enunciate words    6  Subarachnoid hemorrhage  Trauma following  Neurology consult    7  Right knee injury  Ortho consult      History of Present Illness   Physician Requesting Consult: Alejandro Stallings MD  Reason for Consult / Principal Problem:   Hx and PE limited by:   HPI: Nina Nava is a 67y o  year old male who presents with fall  Patient originally presented to [facility] [place] [facility] on 11/14/2019 with complaint of any pain  On arrival to the emergency room he was found to have a blood pressure of 251/83  He received IV hydralazine    On 11/15/2019 patient developed neurological symptoms  A stroke alert was called  CT of the head demonstrated left frontal hemorrhage versus contusion  He was transferred to [facility] for  evaluation of Neurology and Trauma  He has a past medical history of CABG, hypertension, hearing loss, hyperlipidemia, hypothyroidism, hyperlipidemia, PVD, tobacco abuse, medication noncompliance  Prior to arrival patient resides at home  He states he is independent with IADLs and ADLs  He is non compliant with medications  He wears glasses, he is hard of hearing  He is missing teeth  Upon exam pt is alert and oriented x3  His speech is slow, he states he has difficulty remembering  He is unable to state if this is new or old  Inpatient consult to Gerontology  Consult performed by: DESIREE Gould  Consult ordered by: Katie Bolanos PA-C          Review of Systems   Constitutional: Negative for unexpected weight change  HENT: Positive for dental problem  Eyes: Positive for visual disturbance  Respiratory: Negative for choking  Cardiovascular: Negative for chest pain  Gastrointestinal: Negative for constipation  Genitourinary: Negative for difficulty urinating  Musculoskeletal: Negative for gait problem  Neurological: Negative for dizziness  Psychiatric/Behavioral: Positive for decreased concentration  Historical Information   Past Medical History:   Diagnosis Date    Coronary artery disease     history of CABG    Disease of thyroid gland     Hemorrhoids     last assessed 7/10/17    History of arterial duplex of LE 01/10/2017    Bilateral occlusion of the superficial femoral arteries, severe diminution of the ankle brachial index bilaterally, disease appears worse on left than right   History of echocardiogram 07/20/2011    hypokinesia of the inferior wall  EF 50%      HL (hearing loss)     Hyperlipidemia     Hypertension     PVD (peripheral vascular disease)      Past Surgical History:   Procedure Laterality Date    COLONOSCOPY      Last assessed 7/10/17    CORONARY ARTERY BYPASS GRAFT  08/23/2010    3V CABG:  LIMA to LAD, VG to RI, VG to OM1   DENTAL SURGERY      Last assessed  7/10/17    KNEE SURGERY Left     Last assessed 7/10/17    TONSILLECTOMY AND ADENOIDECTOMY      Last assessed 7/10/17    TOOTH EXTRACTION       Social History   Social History     Substance and Sexual Activity   Alcohol Use Not Currently    Alcohol/week: 1 0 standard drinks    Types: 1 Glasses of wine per week     Social History     Substance and Sexual Activity   Drug Use No     Social History     Tobacco Use   Smoking Status Current Every Day Smoker    Packs/day: 0 50    Types: Cigarettes   Smokeless Tobacco Never Used         Family History: non-contributory    Meds/Allergies   Current meds:   Current Facility-Administered Medications   Medication Dose Route Frequency    atorvastatin (LIPITOR) tablet 40 mg  40 mg Oral Daily With Dinner    hydrALAZINE (APRESOLINE) injection 10 mg  10 mg Intravenous Q6H PRN    Labetalol HCl (NORMODYNE) injection 10 mg  10 mg Intravenous Q6H PRN    levETIRAcetam (KEPPRA) tablet 500 mg  500 mg Oral Q12H [address]    [START ON 11/16/2019] levothyroxine tablet 50 mcg  50 mcg Oral Early Morning    [START ON 11/16/2019] lisinopril (ZESTRIL) tablet 40 mg  40 mg Oral Daily    metoprolol tartrate (LOPRESSOR) tablet 50 mg  50 mg Oral Q12H [address]    nicotine (NICODERM CQ) 14 mg/24hr TD 24 hr patch 1 patch  1 patch Transdermal Daily    [START ON 11/16/2019] NIFEdipine (PROCARDIA XL) 24 hr tablet 60 mg  60 mg Oral Daily      Current PTA meds:  (Not in a hospital admission)     No Known Allergies    Objective   Vitals: Blood pressure (!) 188/82, pulse 71, temperature 98 1 °F (36 7 °C), temperature source Oral, resp  rate 18, weight 65 8 kg (145 lb), SpO2 95 %  ,Body mass index is 20 51 kg/m²  Physical Exam   Constitutional: He is oriented to person, place, and time   He appears well-developed and well-nourished  No distress  HENT:   Head: Normocephalic  Eyes: Right eye exhibits no discharge  Left eye exhibits no discharge  Neck: Normal range of motion  Cardiovascular: Normal rate, regular rhythm and normal heart sounds  Exam reveals no friction rub  No murmur heard  Pulmonary/Chest: Effort normal and breath sounds normal  No stridor  No respiratory distress  He has no wheezes  Abdominal: Soft  Bowel sounds are normal  He exhibits no distension  There is no tenderness  There is no guarding  Musculoskeletal: He exhibits no edema or deformity  Limited right knee   Neurological: He is alert and oriented to person, place, and time  Skin: Skin is warm and dry  He is not diaphoretic  Psychiatric: He has a normal mood and affect  Lab Results: Results from last 7 days   Lab Units 11/15/19  0457   WBC Thousand/uL 14 40*   HEMOGLOBIN g/dL 14 2   HEMATOCRIT % 41 2*   PLATELETS Thousands/uL 159      Results from last 7 days   Lab Units 11/15/19  1503 11/15/19  0457   POTASSIUM mmol/L 3 4* 3 4*   CHLORIDE mmol/L 106 103   CO2 mmol/L 26 24   BUN mg/dL 14 13   CREATININE mg/dL 1 06 0 91   CALCIUM mg/dL 8 7 8 9   ALK PHOS U/L  --  60   ALT U/L  --  18   AST U/L  --  15       Imaging Studies: I have personally reviewed pertinent reports  EKG, Pathology, and Other Studies: I have personally reviewed pertinent reports      VTE Prophylaxis: Sequential compression device (Venodyne)     Code Status: Level 3 - DNAR and DNI

## 2019-11-15 NOTE — ASSESSMENT & PLAN NOTE
· With severe ambulatory dysfunction  · Initial x-ray right knee yielded the following results:  Large lipohemarthrosis suggests intra-articular fracture, though fracture planes are poorly demonstrated   Statistically, tibial plateau is the most likely site and there may be some subtle cortical discontinuity posteriorly at the plateau on lateral view   Recommend CT for better characterization  · CT right knee yielded the results: Slight cortical depression deformity including cortical offset at the anterolateral tibial plateau concerning for fracture with corresponding prominent lipohemarthrosis  · Orthopedics evaluation is pending   · Will be transferred to [facility]  Please see comment below

## 2019-11-15 NOTE — UTILIZATION REVIEW
Initial Clinical Review    Admission: Date/Time/Statement: Observation  11/14/19 @1728  Orders Placed This Encounter   Procedures    Place in Observation (expected length of stay for this patient is less than two midnights)     Standing Status:   Standing     Number of Occurrences:   1     Order Specific Question:   Admitting Physician     Answer:   Bhavesh Padilla [[id number]]     Order Specific Question:   Level of Care     Answer:   Med Surg [[id number]]     ED Arrival Information     Expected Arrival Acuity Means of Arrival Escorted By Service Admission Type    - 11/14/2019 14:15 Urgent Ambulance [facility] General Medicine Urgent    Arrival Complaint    KNEE INJURY        Chief Complaint   Patient presents with    Knee Injury     Pt fell yesterday carrying groceries, landed on right knee; denies hitting head or any other injuries     Assessment/Plan: 67year old male to the ED via EMS with complaints of fall a day prior, injuring right knee, unable to ambulate  Admitted under observation for fall, CAD, HTN, noncompliance with medical treatment  As per EMS, he has baseline confusion, lives alone and in very poor conditions  He has lower extremity edema  He tripped and fell the day prior  Knee is swollen and painful  CT of right knee showing Slight cortical depression deformity including cortical offset at the anterolateral tibial plateau concerning for fracture with corresponding prominent lipohemarthrosis  Ortho consult  IV morphine for severe pain  Also found to have minimally elevated troponin  Suspect non MI related secondary to accelerated hypertension due to noncompliance with medications  IV hydralazine for bp  Given metoprolol, lisinopril, nifedipine  Trend troponins, consult cards, check ECHO  UPdate 11/15:  Noted slower speech today, with mild right sided facial droop and right arm weakness  Unknown last normal   NIHSS 6  TPA not given due to unknown onset of symptoms    Stat CTA head/neck  Stroke alert called  Neuro at the bedside  CHeck MRI  ED Triage Vitals [11/14/19 1418]   Temperature Pulse Respirations Blood Pressure SpO2   (!) 97 3 °F (36 3 °C) 69 18 (!) 251/83 95 %      Temp Source Heart Rate Source Patient Position - Orthostatic VS BP Location FiO2 (%)   Temporal Monitor Lying Left arm --      Pain Score       3        Wt Readings from Last 1 Encounters:   11/15/19 66 kg (145 lb 8 1 oz)     Additional Vital Signs:   Date/Time  Temp  Pulse  Resp  BP  SpO2  O2 Device  Patient Position - Orthostatic VS   11/15/19 0719  100 2 °F (37 9 °C)  74  18  169/73  93 %  None (Room air)  Lying   11/15/19 0500        168/84      Lying   11/15/19 0403  99 3 °F (37 4 °C)  75  18  193/85Abnormal   95 %  None (Room air)  Lying   11/15/19 0122    70    146/67         11/14/19 2333  99 3 °F (37 4 °C)  69  18  99/63  94 %  None (Room air)  Lying   11/14/19 2049    63    185/82Abnormal          11/14/19 1949  98 3 °F (36 8 °C)  53Abnormal   18  225/95Abnormal   98 %  None (Room air)  Sitting   11/14/19 1834    70    200/102Abnormal          11/14/19 1811  98 2 °F (36 8 °C)  71  18  254/102Abnormal            Pertinent Labs/Diagnostic Test Results:   EKG:  Normal sinus rhythm  Nonspecific ST and T wave abnormality  Abnormal ECG  When compared with ECG of 10-AUG-2019 15:28,  No significant change was found  CT knee right wo contrast   Final Result by Kirsten Loera MD (11/14 9517)   Slight cortical depression deformity including cortical offset at the anterolateral tibial plateau concerning for fracture with corresponding prominent lipohemarthrosis  XR knee 4+ views Right injury   Large effusion  Cannot rule out fracture  No dislocation        Final Result by Dionisio Santo MD (11/14 5518)       Large lipohemarthrosis suggests intra-articular fracture, though fracture planes are poorly demonstrated    Statistically, tibial plateau is the most likely site and there may be some subtle cortical discontinuity posteriorly at the plateau on lateral    view  Recommend CT for better characterization  XR chest 1 view portable   Increased vascular congestion    No infiltrates        Final Result by Dionisio Santo MD (11/14 1643)       Findings of mild volume overload with pulmonary vascular congestion and trace interstitial edema            Results from last 7 days   Lab Units 11/15/19  0457 11/14/19  1539   WBC Thousand/uL 14 40* 10 60   HEMOGLOBIN g/dL 14 2 13 8*   HEMATOCRIT % 41 2* 40 8*   PLATELETS Thousands/uL 159 152   NEUTROS ABS Thousands/µL 11 10* 8 00*         Results from last 7 days   Lab Units 11/15/19  0457 11/14/19  1539   SODIUM mmol/L 138 141   POTASSIUM mmol/L 3 4* 3 6   CHLORIDE mmol/L 103 104   CO2 mmol/L 24 27   ANION GAP mmol/L 11 10   BUN mg/dL 13 15   CREATININE mg/dL 0 91 1 07   EGFR ml/min/1 73sq m 80 66   CALCIUM mg/dL 8 9 8 8   MAGNESIUM mg/dL 1 8*  --    PHOSPHORUS mg/dL 2 6*  --      Results from last 7 days   Lab Units 11/15/19  0457 11/14/19  1539   AST U/L 15 14   ALT U/L 18 20   ALK PHOS U/L 60 56   TOTAL PROTEIN g/dL 6 3* 6 3*   ALBUMIN g/dL 3 8 3 9   TOTAL BILIRUBIN mg/dL 1 40* 1 00         Results from last 7 days   Lab Units 11/15/19  0457 11/14/19  1539   GLUCOSE RANDOM mg/dL 87 106*       Results from last 7 days   Lab Units 11/15/19  0126 11/14/19 2015 11/14/19  1539   TROPONIN I ng/mL 0 06* 0 05* 0 04*       Results from last 7 days   Lab Units 11/14/19  1539   TSH 3RD GENERATON uIU/mL 3 940     Results from last 7 days   Lab Units 11/14/19  1711   BNP pg/mL 857*     Results from last 7 days   Lab Units 11/14/19  1537   CLARITY UA  Clear   COLOR UA  Yellow   SPEC GRAV UA  1 025   PH UA  6 5   GLUCOSE UA mg/dl Negative   KETONES UA mg/dl Negative   BLOOD UA  1+*   PROTEIN UA mg/dl 3+*   NITRITE UA  Negative   BILIRUBIN UA  Negative   UROBILINOGEN UA E U /dl 0 2   LEUKOCYTES UA  Negative   WBC UA /hpf 0-1*   RBC UA /hpf 2-4*   BACTERIA UA /hpf Occasional   EPITHELIAL CELLS WET PREP /hpf Occasional            Past Medical History:   Diagnosis Date    Coronary artery disease     history of CABG    Disease of thyroid gland     Hemorrhoids     last assessed 7/10/17    History of arterial duplex of LE 01/10/2017    Bilateral occlusion of the superficial femoral arteries, severe diminution of the ankle brachial index bilaterally, disease appears worse on left than right   History of echocardiogram 07/20/2011    hypokinesia of the inferior wall  EF 50%   HL (hearing loss)     Hyperlipidemia     Hypertension     PVD (peripheral vascular disease)        Admitting Diagnosis: Knee injury [S89 90XA]  Elevated troponin [R79 89]  Hypertensive urgency [I16 0]  Ambulatory dysfunction [R26 2]  Knee injury, initial encounter [S89 90XA]  Age/Sex: 67 y o  male  Admission Orders:  Knee immobilizer  CTA head/neck stroke aler  XRay humerus  ECHO  Scheduled Medications:    Medications:  aspirin 81 mg Oral Daily   atorvastatin 40 mg Oral Daily   docusate sodium 100 mg Oral BID   enoxaparin 40 mg Subcutaneous Daily   levothyroxine 50 mcg Oral Early Morning   lisinopril 40 mg Oral Daily   metoprolol tartrate 50 mg Oral Q12H [address]   nicotine 1 patch Transdermal Daily   NIFEdipine 60 mg Oral Daily     Continuous IV Infusions:     PRN Meds:    acetaminophen 650 mg Oral Q6H PRN   hydrALAZINE 20 mg Intravenous Q6H PRN   HYDROcodone-acetaminophen 1 tablet Oral Q6H PRN   morphine injection 2 mg Intravenous Q6H PRN   ondansetron 4 mg Intravenous Q6H PRN       IP CONSULT TO ORTHOPEDIC SURGERY  IP CONSULT TO CASE MANAGEMENT    Network Utilization Review Department  [email or web address]  ATTENTION: Please call with any questions or concerns to [phone] and carefully listen to the prompts so that you are directed to the right person   All voicemails are confidential   Valdo Stone all requests for admission clinical reviews, approved or denied determinations and any other requests to dedicated fax number below belonging to the campus where the patient is receiving treatment    FACILITY NAME UR FAX NUMBER   ADMISSION DENIALS (Administrative/Medical Necessity) [address] [facility] (Maternity/NICU/Pediatrics) [phone]   [facility] [address] [facility] [address] [facility] [phone]   [address] [facility] [address] [place] [address] [phone]   [facility] [address] [place] [address] [phone]

## 2019-11-15 NOTE — ASSESSMENT & PLAN NOTE
· Able to answer all Qs appropriately however it does take the patient quite a bit of time to respond  Patient was able to tell me his name, date of birth, state but unable to tell the year or the current president  He also have a mild right-sided facial droop, right upper extremity weakness  Last well is unknown  Suspected that right arm weakness occurred after admission  · Stroke alert was called  Discussed with neurology  Recommended to transfer patient to [facility] for further monitoring  I spoke with trauma attending and patient will be transferred to their service  · CT head shows an ill-defined high density within the high left frontal sulcus and is consistent with acute posttraumatic subarachnoid hemorrhage  · CTA shows 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

## 2019-11-15 NOTE — H&P
H&P- Arnulfo Block 1940, 67 y o  male MRN: [id number]    Unit/Bed#: -01 Encounter: [id number]    Primary Care Provider: Graham Coker DO   Date and time admitted to hospital: 11/14/2019  2:15 PM        * Fall (on) (from) other stairs and steps, initial encounter  Assessment & Plan  · With severe ambulatory dysfunction  · Admit to med surge  · Initial x-ray right knee yielded the following results:  Large lipohemarthrosis suggests intra-articular fracture, though fracture planes are poorly demonstrated   Statistically, tibial plateau is the most likely site and there may be some subtle cortical discontinuity posteriorly at the plateau on lateral view   Recommend CT for better characterization  · CT right knee yielded the results: Slight cortical depression deformity including cortical offset at the anterolateral tibial plateau concerning for fracture with corresponding prominent lipohemarthrosis    · Will consult Orthopedics  · Will use Tylenol mild pain, Norco for moderate pain, and IV morphine for severe pain  · Add a PT OT evaluation    Coronary artery disease  Assessment & Plan  · Patient has a minimally elevated 1st troponin  · Patient denies any chest pain  · Suspect non MI related elevated troponin secondary to the accelerated hypertension  · Will trend troponins  · Of note, patient is medically noncompliant with medications at home  · At this point will continue aspirin, metoprolol, lisinopril, and atorvastatin  · Will check a 2D echocardiogram to rule out regional wall motion abnormalities  · Additionally patient reports a history of CABG in 2003  · Will consult Cardiology    Hyperlipidemia  Assessment & Plan  · Check a fasting lipid panel  · Continue atorvastatin    Hypertension  Assessment & Plan  · Patient has hypertensive emergency as manifested by the 1st mildly elevated troponin  · Patient is medically noncompliant with his medications  · Patient given metoprolol, lisinopril and nifedipine  · Will repeat blood pressure shortly  · Will add 20 mg of IV hydralazine on a p r n  Basis to be given for systolic blood pressure greater than 160    Hypothyroidism  Assessment & Plan  · TSH within normal limits  · Resume home dose of Synthroid    Tobacco use disorder  Assessment & Plan  · Cessation counseling provided, patient quite not yet ready to quit  · Nicotine patch has been ordered    H/O noncompliance with medical treatment, presenting hazards to health  Assessment & Plan  · Patient lives home alone  · The emergency room physician was concerned about his home health living conditions  · Will consult case management to further investigate      VTE Prophylaxis: Enoxaparin (Lovenox)  Code Status:  DNR DNI level 3  POLST: POLST is not applicable to this patient  Discussion with family:  No family members were present at the time of my H&P evaluation    Anticipated Length of Stay:  Patient will be admitted on an Observation basis with an anticipated length of stay of  > 2 midnights  Justification for Hospital Stay:  Orthopedic evaluation, blood pressure management, cardiac workup  Chief Complaint:   I fell yesterday on my right knee while carrying groceries up to my apartment"    History of Present Illness:    Mynor Talamantes is a 67 y o  male who presents with the aforementioned chief complaint  Patient states that he was in his usual state of health up until yesterday  He went out to buy groceries  He lives on the 2nd floor of an apartment building  While caring his groceries up the flight of stairs he fell and hurt his right knee  He denies any syncope  He denies any chest pain, shortness of breath, palpitations, numbness, tingling, weakness, blurred vision, and or seizure-like activity at the time of the fall  He denies biting his tongue, he denies any fecal or urinary incontinence    He simply missed a step and felt that the gross trees were too heavy and unfortunately suffered a mechanical fall  He managed to get up and crawl back to his apartment  He remained on a couch and then in bed all day and all night  This morning he woke up and was hoping that the pain would be gone however at the pain was excruciating  Location is the right knee, intensity is a 10/10, quality is described as sharp, onset was acute after the fall  Patient denies any radiation, aggravating factors include putting weight in attempting to move his right leg, alleviating factors include rest   He denies any associated symptoms  Once the pain became unbearable, he called his neighbor, who subsequently called 911 and the ambulance brought him to the hospital   In the emergency room he was found to have a possible fracture of his right knee with resultant severe ambulatory dysfunction, and was additionally found to be in a state of hypertensive urgency  He was found to have minimally elevated troponins  [facility]  Review of Systems:  Review of Systems   Constitutional: Negative for appetite change, chills, diaphoresis, fatigue and fever  Respiratory: Negative for cough, chest tightness and shortness of breath  Cardiovascular: Negative for chest pain, palpitations and leg swelling  Gastrointestinal: Negative for abdominal pain, blood in stool, constipation, diarrhea, nausea and vomiting  Genitourinary: Negative for difficulty urinating, dysuria, hematuria and urgency  Skin: Negative for color change and rash  Allergic/Immunologic: Negative for food allergies  Neurological: Negative for dizziness, weakness, numbness and headaches  All other systems reviewed and are negative        Past Medical and Surgical History:   Past Medical History:   Diagnosis Date    Coronary artery disease     history of CABG    Disease of thyroid gland     Hemorrhoids     last assessed 7/10/17    History of arterial duplex of LE 01/10/2017    Bilateral occlusion of the superficial femoral arteries, severe diminution of the ankle brachial index bilaterally, disease appears worse on left than right   History of echocardiogram 07/20/2011    hypokinesia of the inferior wall  EF 50%   HL (hearing loss)     Hyperlipidemia     Hypertension     PVD (peripheral vascular disease)        Past Surgical History:   Procedure Laterality Date    COLONOSCOPY      Last assessed 7/10/17    CORONARY ARTERY BYPASS GRAFT  08/23/2010    3V CABG:  LIMA to LAD, VG to RI, VG to OM1  [facility]inner DENTAL SURGERY      Last assessed  7/10/17    KNEE SURGERY Left     Last assessed 7/10/17    TONSILLECTOMY AND ADENOIDECTOMY      Last assessed 7/10/17    TOOTH EXTRACTION         Meds/Allergies:  Prior to Admission medications    Medication Sig Start Date End Date Taking? Authorizing Provider   aspirin (ECOTRIN LOW STRENGTH) 81 mg EC tablet Take 81 mg by mouth daily    Historical Provider, MD   atorvastatin (LIPITOR) 40 mg tablet Take 1 tablet (40 mg total) by mouth daily  Patient not taking: Reported on 11/14/2019 6/6/19   Yolande Sky MD   levothyroxine 50 mcg tablet Take 1 tablet (50 mcg total) by mouth daily  Patient not taking: Reported on 11/14/2019 10/4/19   Yolette Mcelroy DO   lisinopril (ZESTRIL) 40 mg tablet TAKE 1 TABLET (40 MG TOTAL) BY MOUTH DAILY  Patient not taking: Reported on 11/14/2019 10/1/19   Yolette Mcelroy DO   metoprolol tartrate (LOPRESSOR) 50 mg tablet Take 1 tablet (50 mg total) by mouth every 12 (twelve) hours  Patient not taking: Reported on 11/14/2019 7/9/19   Yolande Sky MD   NIFEdipine (PROCARDIA XL) 60 mg 24 hr tablet Take 1 tablet (60 mg total) by mouth daily  Patient not taking: Reported on 11/14/2019 5/17/19   iMke Cordoba PA-C     I have reviewed home medications with patient personally    Patient states that he only takes the medications when he feels like it and/or if he remembers to take them    Allergies: No Known Allergies    Social History:  Marital Status: Single Occupation:  Retired  Patient Pre-hospital Living Situation:  Lives at home alone  Patient Pre-hospital Level of Mobility:  Independent  Patient Pre-hospital Diet Restrictions:  None  Substance Use History:  Denies    Social History     Substance and Sexual Activity   Alcohol Use Not Currently    Alcohol/week: 1 0 standard drinks    Types: 1 Glasses of wine per week     Social History     Tobacco Use   Smoking Status Current Every Day Smoker    Packs/day: 0 50    Types: Cigarettes   Smokeless Tobacco Never Used     Social History     Substance and Sexual Activity   Drug Use No       Family History:  I have reviewed the patients family history - patient does not recall any family members with any type of early-onset disease in their lives  Physical Exam:   Vitals:   Blood Pressure: (!) 200/102 (11/14/19 1834)  Pulse: 70 (11/14/19 1834)  Temperature: 98 2 °F (36 8 °C) (11/14/19 1811)  Temp Source: Temporal (11/14/19 1811)  Respirations: 18 (11/14/19 1811)  Height: 5' 10 25" (178 4 cm) (11/14/19 1811)  Weight - Scale: 64 7 kg (142 lb 10 2 oz)(Weigh bed) (11/14/19 1811)  SpO2: 97 % (11/14/19 1811)    Physical Exam   Constitutional: He is oriented to person, place, and time  He appears well-developed and well-nourished  HENT:   Head: Normocephalic and atraumatic  Nose: Nose normal    Mouth/Throat: Oropharynx is clear and moist    Eyes: Pupils are equal, round, and reactive to light  Conjunctivae and EOM are normal    Neck: Normal range of motion  Neck supple  No JVD present  No thyromegaly present  Cardiovascular: Normal rate, regular rhythm and intact distal pulses  Exam reveals no gallop and no friction rub  No murmur heard  Pulmonary/Chest: Effort normal and breath sounds normal  No respiratory distress  Abdominal: Soft  Bowel sounds are normal  He exhibits no distension and no mass  There is no tenderness  There is no guarding  Musculoskeletal: Normal range of motion  He exhibits no edema  Right knee is swollen, painful to touch  Skin excoriation noted   Lymphadenopathy:     He has no cervical adenopathy  Neurological: He is alert and oriented to person, place, and time  No cranial nerve deficit  Skin: Skin is warm  No rash noted  No erythema  Psychiatric: He has a normal mood and affect  His behavior is normal    Vitals reviewed  Additional Data:   Lab Results: I have personally reviewed pertinent reports  Results from last 7 days   Lab Units 11/14/19  1539   WBC Thousand/uL 10 60   HEMOGLOBIN g/dL 13 8*   HEMATOCRIT % 40 8*   PLATELETS Thousands/uL 152   NEUTROS PCT % 76*   LYMPHS PCT % 10*   MONOS PCT % 13*   EOS PCT % 0     Results from last 7 days   Lab Units 11/14/19  1539   POTASSIUM mmol/L 3 6   CHLORIDE mmol/L 104   CO2 mmol/L 27   BUN mg/dL 15   CREATININE mg/dL 1 07   CALCIUM mg/dL 8 8   ALK PHOS U/L 56   ALT U/L 20   AST U/L 14                   Imaging: I have personally reviewed pertinent reports  CT knee right wo contrast   Final Result by Melida Reyes MD (11/14 2052)   Slight cortical depression deformity including cortical offset at the anterolateral tibial plateau concerning for fracture with corresponding prominent lipohemarthrosis  The study was marked in [facility] for immediate notification  Workstation performed: [id number]         XR knee 4+ views Right injury   ED Interpretation by Kelly Earl MD (11/14 1447)   Large effusion  Cannot rule out fracture  No dislocation  Final Result by Ivory Cleaning MD (11/14 5868)      Large lipohemarthrosis suggests intra-articular fracture, though fracture planes are poorly demonstrated  Statistically, tibial plateau is the most likely site and there may be some subtle cortical discontinuity posteriorly at the plateau on lateral    view  Recommend CT for better characterization  Note: The study was marked in [employer] for immediate notification   Imaging follow-up reminder notification was scheduled in the electronic medical record  Workstation performed: [id number]         XR chest 1 view portable   ED Interpretation by Lacy Hughes MD (11/14 1702)   Increased vascular congestion  No infiltrates  Final Result by Doris Snyder MD (11/14 1643)      Findings of mild volume overload with pulmonary vascular congestion and trace interstitial edema  Workstation performed: [id number]             EKG, Pathology, and Other Studies Reviewed on Admission:   · EKG:   normal sinus rhythm, no changes any leads suggestive of active ischemia    NetAccess/Epic Records Reviewed: Yes     ** Please Note: This note has been constructed using a voice recognition system   **

## 2019-11-15 NOTE — QUICK NOTE
68 yo with a fall, right knee injury, with some slower speech today, mild right facial droop and right arm weakness  Right arm weakness apparently happened over night, no clear cut last known normal      Alert called: 9:26 AM  Phone Response: 9:26 AM  NIHSS per attending description -6  tPA - last normal unclear  Asked for stat CT head non contrast, CTA h/n  -180  Stroke pathway  MRI brain  Continue atorvastatin  Euglycemia/normothermia

## 2019-11-15 NOTE — CONSULTS
Consultation - Neurology   Rebecca Philip 67 y o  male MRN: [id number]  Unit/Bed#: ED 6 Encounter: [id number]    Assessment/Plan   67-year-old male with past medical history listed below, the patient presented to [address] [place] [address] 1/14/2019 after a fall with right knee injury, x-ray was concerning for anterior lateral tibial plateau fracture, today it was noted that the patient was slurring his words as well as right sided symptoms (weakness), stroke alert was activated (please see stroke alert note by Dr Siomara Cohn)  CT of the head showed left frontal hemorrhage versus contusion, CTA of the head and neck - showed 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, reconstitution of the supraclinoid ICA on the left, flow restrictive disease at the origin and termination of the left vertebral, minor post traumatic SAH in the left frontal sulcus  On examination the patient is encephalopathic as well as has a right sided weakness and sensory loss, see below  · Subarachnoid hemorrhage - CT of the head on 11/15/2019, with ill-defined hypodensity within the high left frontal sulcus, consistent with acute posttraumatic subarachnoid hemorrhage  With his significant weakness, rule out underlying stroke or mass, no evidence of seizure with José Miguel's Paralysis, although cannot exclude with waxing and wane mentation    · CTA with 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 with reconstitution of the supraclinoid ICA on the left, as well as flow restrictive disease in the origin and termination of the left vertebral artery      · Encephalopathy    · Closed fracture of right tibial plateau    · HTN    -  No anticoagulation or antiplatelet medications at this time, status post DDAVP  -  MRI of brain, when able (to look for underlying stroke or mass lesion)  -  Repeat CT of head to document stability, if unable to get MRI of brain  -  CTA of head and neck - completed as below  -  Blood pressure goal less than 713 systolically - as outlined by Dr Marge Monson  -  Reshma Fitzgerald / Ema Rae / Areli Hatch  -  Dysphagia evaluation  -  Neurosurgery following, neurosurgery consultation appreciated  -  Frequent neurological check notify neurology with any change in neurological condition  -  STAT CT of head with change in neurological examination   -  Keep euvolemic, normal thermic  -  Hold on EEG at this time, if seizure like activity can consider    -  Monitor for infectious and metabolic derangements and treat as arises  -  Attending to see and advise    ---reviewed with attending, secondary waxing waning mental status, would get a stat CT of the head, as well as placed on video EEG, check B12 and TSH and ammonia  Reviewed with ICU team whom will place these orders  --- ICU team will set up video EEG monitoring after CT of head   History of Present Illness     Reason for Consult / Principal Problem:  SAH    HPI: Sherly Ramos is a 67 y o   male with past medical history listed below, patient has a history of CAD, CABG, hypertension, hyperlipidemia, PVD, hypothyroidism, tobacco abuse - who presented to [address] [place] [address]  The patient presented on 11/14/2019, one day prior to arrival the patient went out to buy groceries he lives on the 2nd floor of the apartment building, he was carrying his groceries up a flight of stairs when he fell and hurt his right knee, it was reported he simply missed a step and suffered a mechanical fall, he was able to crawl back to his apartment  The next morning he woke up with pain in his knee, the pain was excruciating, right knee  He called his neighbor who subsequently called 911 and the ambulance brought him to the emergency room  In the emergency room he was found to be hypertensive, with an elevated troponin and a possible fracture of his right knee, it was recommended CT for better characterization    It was noted on 11/15/2019, the patient developed slower speech, mild right facial droop and arm weakness  This happened overnight, last known normal unclear  Neurology was asked to evaluate the patient secondary to this symptoms, stroke alert was activated  Please see note for details  CT of the head showed ill-defined hypodensity within the high left frontal sulcus consistent with acute posttraumatic subarachnoid hemorrhage  CTA of the head and neck - 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, reconstitution of the supraclinoid ICA on the left with the patent ophthalmic, anterior and posterior communicating arteries      Flow restrictive disease at the origin and termination of the left vertebral artery      No flow restrictive disease elsewhere      Minor Post traumatic subarachnoid hemorrhage in a left frontal sulcus  Neurology saw patient -  It was recommended patient's blood pressure remain under 140, the patient be given DDAVP, and transfer to [facility] for trauma and neurosurgical evaluation  Orthopedics, neurosurgery, cardiology, as well as trauma are also on the case  Neurology is following as well  Patient reports that he is weaker on the right side, he also has sensory loss on the right side  He denies any other complaints  He is more confused and cannot entirely tell me the hx, hx from chart as above  Inpatient consult to Neurology  Consult performed by: Miles Irene PA-C  Consult ordered by: Katie Bolanos PA-C        Review of Systems   Constitutional: Negative  HENT: Negative  Eyes: Negative  Respiratory: Negative  Cardiovascular: Negative  Musculoskeletal: Positive for arthralgias, gait problem and joint swelling  Neurological: Positive for facial asymmetry, speech difficulty, weakness and numbness  Negative for dizziness, tremors, seizures, syncope, light-headedness and headaches  Hematological: Negative  Psychiatric/Behavioral: Positive for confusion  Otherwise 12 point ROS are negative       Historical Information   Past Medical History:   Diagnosis Date    Coronary artery disease     history of CABG    Disease of thyroid gland     Hemorrhoids     last assessed 7/10/17    History of arterial duplex of LE 01/10/2017    Bilateral occlusion of the superficial femoral arteries, severe diminution of the ankle brachial index bilaterally, disease appears worse on left than right   History of echocardiogram 07/20/2011    hypokinesia of the inferior wall  EF 50%   HL (hearing loss)     Hyperlipidemia     Hypertension     PVD (peripheral vascular disease)      Past Surgical History:   Procedure Laterality Date    COLONOSCOPY      Last assessed 7/10/17    CORONARY ARTERY BYPASS GRAFT  08/23/2010    3V CABG:  LIMA to LAD, VG to RI, VG to OM1   DENTAL SURGERY      Last assessed  7/10/17    KNEE SURGERY Left     Last assessed 7/10/17    TONSILLECTOMY AND ADENOIDECTOMY      Last assessed 7/10/17    TOOTH EXTRACTION       Social History   Social History     Substance and Sexual Activity   Alcohol Use Not Currently    Alcohol/week: 1 0 standard drinks    Types: 1 Glasses of wine per week     Social History     Substance and Sexual Activity   Drug Use No     Social History     Tobacco Use   Smoking Status Current Every Day Smoker    Packs/day: 0 50    Types: Cigarettes   Smokeless Tobacco Never Used     Family History:   Family History   Problem Relation Age of Onset    Alzheimer's disease Mother     Heart disease Father         cardiac disorder    Heart disease Brother         cardiac disorder     Review of previous medical records was please see HPI       Meds/Allergies   all current active meds have been reviewed, current meds:   Current Facility-Administered Medications   Medication Dose Route Frequency    atorvastatin (LIPITOR) tablet 40 mg  40 mg Oral Daily With Dinner    hydrALAZINE (APRESOLINE) injection 10 mg  10 mg Intravenous Q6H PRN    Labetalol HCl (NORMODYNE) injection 10 mg  10 mg Intravenous Q6H PRN    levETIRAcetam (KEPPRA) tablet 500 mg  500 mg Oral Q12H [address]    [START ON 11/16/2019] levothyroxine tablet 50 mcg  50 mcg Oral Early Morning    [START ON 11/16/2019] lisinopril (ZESTRIL) tablet 40 mg  40 mg Oral Daily    metoprolol tartrate (LOPRESSOR) tablet 50 mg  50 mg Oral Q12H [address]    nicotine (NICODERM CQ) 14 mg/24hr TD 24 hr patch 1 patch  1 patch Transdermal Daily    [START ON 11/16/2019] NIFEdipine (PROCARDIA XL) 24 hr tablet 60 mg  60 mg Oral Daily    and PTA meds:   Prior to Admission Medications   Prescriptions Last Dose Informant Patient Reported? Taking? NIFEdipine (PROCARDIA XL) 60 mg 24 hr tablet   No No   Sig: Take 1 tablet (60 mg total) by mouth daily   Patient not taking: Reported on 11/14/2019   aspirin (ECOTRIN LOW STRENGTH) 81 mg EC tablet   Yes No   Sig: Take 81 mg by mouth daily   atorvastatin (LIPITOR) 40 mg tablet   No No   Sig: Take 1 tablet (40 mg total) by mouth daily   Patient not taking: Reported on 11/14/2019   levothyroxine 50 mcg tablet   No No   Sig: Take 1 tablet (50 mcg total) by mouth daily   Patient not taking: Reported on 11/14/2019   lisinopril (ZESTRIL) 40 mg tablet   No No   Sig: TAKE 1 TABLET (40 MG TOTAL) BY MOUTH DAILY   Patient not taking: Reported on 11/14/2019   metoprolol tartrate (LOPRESSOR) 50 mg tablet   No No   Sig: Take 1 tablet (50 mg total) by mouth every 12 (twelve) hours   Patient not taking: Reported on 11/14/2019      Facility-Administered Medications: None     No Known Allergies    Objective   Vitals:Blood pressure (!) 188/82, pulse 71, temperature 98 1 °F (36 7 °C), temperature source Oral, resp  rate 18, weight 65 8 kg (145 lb), SpO2 95 %  ,Body mass index is 20 51 kg/m²  No intake or output data in the 24 hours ending 11/15/19 4655    Invasive Devices:    Invasive Devices     Peripheral Intravenous Line            Peripheral IV 11/14/19 Right Arm less than 1 day    Peripheral IV 11/15/19 Left Arm less than 1 day              Physical Exam  Neurologic Exam    Vital signs reviewed  Constitutional - in NAD, lying in bed, not ill-appearing  HEENT - NC/AT, no icterus noted, conjuctiva clear, oral mucosa free of exudate, no obvious trauma to the head  Cardiac - No murmur noted, rate regular  Lungs - Clear to auscultation bilaterally, no wheezing noted  Abdomen - Soft and non tender, non distended  Extremities - No edema noted, right knee is in a straight leg brace  Skin - no rashes noted    Neurological    Mental status - patient is awake and alert, he is hard of hearing, he is oriented to himself, he reports he is at [address] [place] [address] cannot tell me that he is in the hospital or [facility], he is unable to tell the year, the month  He cannot tell me the events of why he was transferred down to [facility], he was able to confirm that he fell carrying groceries, however he has poor insight into current medical condition, he is a poor historian  He is slow to respond, there may be a component of aphasia, he was able to recognize objects and read, however some objects he had difficult time recognizing  There was evidence of dysarthria  He was able to do simple calculations however more difficulty with complex calculations, he was able unable to spell word forwards or backwards  Higher MS examination was not completed    Cranial nerves 2 through 12 are intact, except he does have a right facial droop, tongue is midline, visual fields are intact, extra movements are intact, V1 through V3 is intact to touch, positive dysarthria  Motor - 5/5 on the left upper and lower, with no drift bed  Left upper extremity was unable to lift off the bed, biceps and triceps was 2/5,  was 2-3/5  Lowers is unable to lift leg off the bed minimal wiggling of his toes, no spontaneous movement seen  No tremor noted      Sensation - nonfocal to touch decreased on the left upper and lower to temperature and pp, he has normal vibratory sense in the uppers however decreased in the bilateral lower extremities  There appears to be sensory loss on the right upper and lower extremities compared to left    Coordination -  No ataxia noted on the left upper to FTN, right unable to perform    Reflexes are equal - 1 in the uppers, 1-2 in the left knee  Right knee not tested  Toes are equivical    No evidence of seizure activity - observed  Lab Results:   I have personally reviewed pertinent reports  , CBC:   Results from last 7 days   Lab Units 11/15/19  0457 11/14/19  1539   WBC Thousand/uL 14 40* 10 60   RBC Million/uL 4 21* 4 13*   HEMOGLOBIN g/dL 14 2 13 8*   HEMATOCRIT % 41 2* 40 8*   MCV fL 98 99   PLATELETS Thousands/uL 159 152   , BMP/CMP:   Results from last 7 days   Lab Units 11/15/19  0457 11/14/19  1539   SODIUM mmol/L 138 141   POTASSIUM mmol/L 3 4* 3 6   CHLORIDE mmol/L 103 104   CO2 mmol/L 24 27   BUN mg/dL 13 15   CREATININE mg/dL 0 91 1 07   CALCIUM mg/dL 8 9 8 8   AST U/L 15 14   ALT U/L 18 20   ALK PHOS U/L 60 56   EGFR ml/min/1 73sq m 80 66   , Vitamin B12:   , HgBA1C:   , TSH:   Results from last 7 days   Lab Units 11/14/19  1539   TSH 3RD GENERATON uIU/mL 3 940   , Coagulation:   , Lipid Profile:   Results from last 7 days   Lab Units 11/15/19  0126   HDL mg/dL 52   LDL CALC mg/dL 149*   TRIGLYCERIDES mg/dL 103   , Ammonia:   , Urinalysis:   Results from last 7 days   Lab Units 11/14/19  1537   COLOR UA  Yellow   CLARITY UA  Clear   SPEC GRAV UA  1 025   PH UA  6 5   LEUKOCYTES UA  Negative   NITRITE UA  Negative   GLUCOSE UA mg/dl Negative   KETONES UA mg/dl Negative   BILIRUBIN UA  Negative   BLOOD UA  1+*   , Drug Screen:   , Medication Drug Levels:       Invalid input(s): CARBAMAZEPINE,  PHENOBARB, LACOSAMIDE, OXCARBAZEPINE     Procedure: Xr Chest 1 View Portable    Result Date: 11/14/2019  Narrative: CHEST INDICATION:   weakness  COMPARISON:  None EXAM PERFORMED/VIEWS:  XR CHEST PORTABLE FINDINGS:  Prior CABG    Fracture of the superior-most sternotomy wire without evidence for sternal dehiscence  Heart is enlarged  Mitral valvular prosthesis  Aortic tortuosity  Pulmonary vasculature appears slightly cephalized  Mild generalized increase in peripheral reticular lung markings (Kerley B lines)  No acute consolidation  No pneumothorax or effusion  Bones are unremarkable  Impression: Findings of mild volume overload with pulmonary vascular congestion and trace interstitial edema  Workstation performed: [id number]     Procedure: Xr Humerus Right    Result Date: 11/15/2019  Narrative: RIGHT HUMERUS INDICATION:   right arm weakness  COMPARISON:  None VIEWS:  XR HUMERUS RIGHT FINDINGS: There is no acute fracture or dislocation  Mild acromial clavicular osteoarthritis No lytic or blastic lesions are seen  Soft tissues are unremarkable  Impression: No acute osseous abnormality  Workstation performed: [id number]     Procedure: Xr Knee 4+ Views Right Injury    Result Date: 11/14/2019  Narrative: RIGHT KNEE INDICATION:   trauma  COMPARISON:  None VIEWS:  XR KNEE 4+ VW RIGHT INJURY FINDINGS: Large lipohemarthrosis is present  The finding is highly specific for fracture, though site of fracture is not well demonstrated  Statistically tibial plateaus most likely area of injury  Questionable fracture lucency posteriorly on lateral, though not definitive  No other areas concerning for fracture  Alignment is normal without subluxation  Mild conventional osteoarthritis involving the medial and patellofemoral compartments  No lytic or blastic lesions are seen  Soft tissues are unremarkable  Impression: Large lipohemarthrosis suggests intra-articular fracture, though fracture planes are poorly demonstrated  Statistically, tibial plateau is the most likely site and there may be some subtle cortical discontinuity posteriorly at the plateau on lateral view  Recommend CT for better characterization   Note: The study was marked in EPIC for immediate notification  Imaging follow-up reminder notification was scheduled in the electronic medical record  Workstation performed: [id number]     Procedure: Ct Knee Right Wo Contrast    Result Date: 11/14/2019  Narrative: CT RIGHT KNEE INDICATION:   Trauma  COMPARISON: Plain film dated 11/14/2019 TECHNIQUE: Serial Axial CT images were acquired through the right knee  Coronal and sagittal reformation images were also obtained  This examination, like all CT scans performed in the [facility], was performed utilizing techniques to minimize radiation dose exposure, including the use of iterative reconstruction and automated exposure control  Rad dose 523 7 mGy-cm Contrast material was not utilized  FINDINGS: JOINT EFFUSION: Prominent layering lipohemarthrosis  ALIGNMENT: Anatomic  OSSEOUS STRUCTURES: Slight cortical depression noted at the anterolateral tibial plateau with cortical offset best seen on series 2 image 1:30  Findings are concerning for minimally displaced fracture  MENISCI: Menisci are not well evaluated by CT technique  CRUCIATE LIGAMENTS: Limited assessment by CT technique without gross evidence of tear  EXTENSOR APPARATUS: Limited assessment by CT technique without gross evidence of tear  COLLATERAL LIGAMENTS: Limited assessment by CT technique without gross evidence of tear  MUSCULATURE: Intact  REMAINING SOFT TISSUES: Unremarkable  Impression: Slight cortical depression deformity including cortical offset at the anterolateral tibial plateau concerning for fracture with corresponding prominent lipohemarthrosis  The study was marked in [facility] for immediate notification  Workstation performed: [id number]     Procedure: Ct Stroke Alert Brain    Addendum Date: 11/15/2019 Addendum:   ADDENDUM: In retrospect, there is ill-defined high density within a high left frontal sulcus, series 2 image 32, consistent with acute posttraumatic subarachnoid hemorrhage   * I personally telephoned this result to [facility] on 11/15/2019 10:19 AM, and Kyler Rice at 10:04 AM     Result Date: 11/15/2019  Narrative: CT BRAIN - STROKE ALERT PROTOCOL INDICATION:   Altered mental status  COMPARISON:  None  TECHNIQUE:  CT examination of the brain was performed  In addition to axial images, coronal reformatted images were created and submitted for interpretation  Radiation dose length product (DLP) for this visit:  972 1 mGy-cm   This examination, like all CT scans performed in the [facility], was performed utilizing techniques to minimize radiation dose exposure, including the use of iterative reconstruction and automated exposure control  IMAGE QUALITY:  Diagnostic  FINDINGS:  PARENCHYMA:  No intracranial mass, mass effect or midline shift  No acute intracranial hemorrhage  No CT signs of acute infarction  Diffuse generalized volume loss, consistent with age  Encephalomalacia in the posterior temporal parietal region on the right likely reflecting sequela of remote ischemia  Scattered vascular calcifications  VENTRICLES AND EXTRA-AXIAL SPACES:  Normal for patient's age  VISUALIZED ORBITS AND PARANASAL SINUSES:  Unremarkable  CALVARIUM AND EXTRACRANIAL SOFT TISSUES:   Normal      Impression: No acute disease  Footprint of remote chronic large and small vessel ischemia  Findings were directly discussed with Dr Ruperto Cohen, ICU attending on 11/15/2019 9:44 AM  Workstation performed: [id number]     Procedure: Cta Stroke Alert (head/neck)    Result Date: 11/15/2019  Narrative: CTA NECK AND BRAIN WITH CONTRAST INDICATION: altered mental status rule out stroke COMPARISON:   Contemporary CT TECHNIQUE:   Post contrast imaging was performed after administration of iodinated contrast through the neck and brain  Post contrast axial 0 625 mm images timed to opacify the arterial system  3D rendering was performed on an independent workstation  MIP reconstructions performed   Coronal reconstructions were performed of the noncontrast portion of the brain  Radiation dose length product (DLP) for this visit:  177 4 mGy-cm   This examination, like all CT scans performed in the [facility], was performed utilizing techniques to minimize radiation dose exposure, including the use of iterative reconstruction and automated exposure control  IV Contrast:  85 mL of iohexol (OMNIPAQUE)  IMAGE QUALITY:   Diagnostic FINDINGS: CERVICAL VASCULATURE AORTIC ARCH AND GREAT VESSELS:  Moderate ahteroschlerotic disease of the arch and great vessels  Left common carotid artery occluded just beyond its origin  RIGHT VERTEBRAL ARTERY CERVICAL SEGMENT:  Minor origin stenosis The vessel is normal in caliber throughout the neck  LEFT VERTEBRAL ARTERY CERVICAL SEGMENT:  Moderately severe origin stenosis The vessel is normal in caliber throughout the neck  RIGHT EXTRACRANIAL CAROTID SEGMENT:  Normal origin  Atherosclerotic change at the bifurcation where discontiguous plaque ascends into the proximal ICA, no hemodynamically significant stenosis  LEFT EXTRACRANIAL CAROTID SEGMENT:  Left common carotid artery is occluded  This appears chronic  The ECA collaterals without significant reconstitution of the ICA  NASCET criteria was used to determine the degree of internal carotid artery diameter stenosis  INTRACRANIAL VASCULATURE INTERNAL CAROTID ARTERIES:  Right ICA is patent without hemodynamically significant stenosis  Scattered cavernous and supraclinoid ICA calcification  Ophthalmic artery origin normal   ICA terminus normal, posterior communicating artery is prominent  On the left, the ICA 1st appears at the level of the ophthalmic artery  Reflux through the posterior communicating artery with relatively normal ICA terminus  ANTERIOR CIRCULATION:  Right A1 is dominant, both are patent, anterior communicating artery patent    ELADIA branches within the interhemispheric fissure are normal  MIDDLE CEREBRAL ARTERY CIRCULATION:  M1 segment and middle cerebral artery branches demonstrate normal enhancement bilaterally  Minor stenosis of a right M2 branch origin  DISTAL VERTEBRAL ARTERIES:  Right vertebral artery dominant, PICA origins normal   Segmental occlusion of the distal left V4 segment, with reflux down the terminal segment of the vessel  BASILAR ARTERY:  Atherosclerotic changes within the basilar artery without critical stenosis  Superior cerebellar artery origins are normal  POSTERIOR CEREBRAL ARTERIES: Both posterior cerebral arteries  demonstrate normal enhancement  Persistent fetal circulation on the right  Left posterior communicating artery prominent  DURAL VENOUS SINUSES:  Normal  NON VASCULAR ANATOMY BONY STRUCTURES:  No acute osseous abnormality  SOFT TISSUES OF THE NECK:  Unremarkable  THORACIC INLET:  Unremarkable  Median sternotomy     Impression: 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, reconstitution of the supraclinoid ICA on the left with the patent ophthalmic, anterior and posterior communicating arteries  Flow restrictive disease at the origin and termination of the left vertebral artery  No flow restrictive disease elsewhere  Minor Post traumatic subarachnoid hemorrhage in a left frontal sulcus  Findings were directly discussed with Wilbert Lau on 11/15/2019 10:19 AM  and Floyd Smith at 10:04AM  Workstation performed: [id number]     Imaging Studies: I have personally reviewed pertinent reports  Code Status: Level 3 - DNAR and DNI    Counseling / Coordination of Care  Reviewed with attending physician in detail, plan of care as per attending recommendations

## 2019-11-15 NOTE — ASSESSMENT & PLAN NOTE
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, reconstitution of the supraclinoid ICA on the left with the patent ophthalmic, anterior and posterior communicating arteries  Flow restrictive disease at the origin and termination of the left vertebral artery  No flow restrictive disease elsewhere  Minor post traumatic subarachnoid hemorrhage int he left frontal sulcus  · MRI brain ordered - patient unable to answer check with questions  · STAT CT head without contrast if decline in GCS >2pts/1h or if pharmacologic DVT ppx is warranted    Medical management:  · History of aspirin use, s/p DDAVP   · Patient has a history of non-compliance, uncertain if actually taking   · SBP goal 140-160  · Cardiology evaluated patient, likely requires IV to control blood pressure  · Consider keppra BID x 7 days for seizure ppx  · DVT ppx: SCDS, hold pharmacologic DVT ppx until stable CT head obtained    No neurosurgical intervention  Based off patient's CT imaging, does not correlate with patient's symptoms

## 2019-11-15 NOTE — ASSESSMENT & PLAN NOTE
· Patient has hypertensive emergency as manifested by the 1st mildly elevated troponin  · Patient is medically noncompliant with his medications  · Patient given metoprolol, lisinopril and nifedipine  · Will repeat blood pressure shortly  · Will add 20 mg of IV hydralazine on a p r n   Basis to be given for systolic blood pressure greater than 160

## 2019-11-15 NOTE — NURSING NOTE
Pt received from the er to room 117 1, oriented to the room and the callbell, pt made comfortable, bed alarm turned on, dr Jaswant Bone made aware of pts elevated bp 150/100 upon admission to the floor, orders received and meds given, pt placed on the monitor, nsr noted, dr Jaswant Bone was in to see the pt, callbell in reach , report to the next shift

## 2019-11-15 NOTE — PLAN OF CARE
Problem: OCCUPATIONAL THERAPY ADULT  Goal: Performs self-care activities at highest level of function for planned discharge setting  See evaluation for individualized goals  Description  Treatment Interventions: ADL retraining, Functional transfer training, UE strengthening/ROM, Endurance training, Cognitive reorientation, Patient/family training, Equipment evaluation/education, Neuromuscular reeducation, Compensatory technique education, Fine motor coordination activities, Continued evaluation, Activityengagement          See flowsheet documentation for full assessment, interventions and recommendations  Note:   Limitation: Decreased ADL status, Decreased UE ROM, Decreased UE strength, Decreased Safe judgement during ADL, Decreased cognition, Decreased endurance, Decreased self-care trans, Decreased high-level ADLs  Prognosis: Fair  Assessment: Pt is a 67 y o  male who was admitted to [address] [place] [address] on 11/14/2019 with Fall (on) (from) other stairs and steps, initial encounter  Xray of R knee indicated  intra-articular fracture most likely at the tibial plateau  At this time, patient is also affected by the comorbidities of: CAD, hyperlipidemia, HTN, hypothyroidism, tobacco use disorder, and h/o noncompliance with medical treatment  Additionally, patient is affected by the following personal factors:steps to enter environment, limited home support, difficulty performing ADLS and difficulty performing IADLS   Orders placed for OT evaluation/treatment with up and OOB as tolerated orders  Prior to admission, Patient reporting living alone but does not ambulate at baseline  Pt is a poor historian at this time  Per chart review, pt was in normal state of health until 11/13/19  Pt was carrying groceries up his FF of steps to enter apt on 2nd floor and fell, hurting his R knee  Pt managed to get up and crawl to his apartment and remained on couch and then bed all day/night   Upon OT evaluation, patient requires moderate assist and maximum assist for UB ADLs and maximum assist and total assist for LB ADLs  Occupational performance is affected by the following deficits: decreased ROM, decreased strength, decreased balance, decreased tolerance, impaired attention, impaired initiation, impaired memory, impaired sequencing and impaired problem solving  Elena Olson immediately made aware of session findings & pt's inconsistency to answer questions appropriately and A&O x 0  Based on the following information, patient would benefit from continued skilled OT treatment while in the hospital to address deficits and maximize level of functional independence with ADL's and functional mobility  Occupational Performance areas to address include: grooming, bathing/shower, toilet hygiene, dressing, medication management, functional mobility, clothing management, meal prep and household maintenance  From OT standpoint, recommendation at time of d/c would be short term rehab       OT Discharge Recommendation: Short Term Rehab(Pending medical work-up )  OT - OK to Discharge: Yes(Once medically cleared)     Mckayla Reynaga, OT

## 2019-11-15 NOTE — CONSULTS
Cardiology   Spencer White 67 y o  male MRN: [id number]  Unit/Bed#: ED 11 Encounter: [id number]      Reason for Consult / Principal Problem:  Accelerated hypertension    Physician Requesting Consult:  Sherry Higuera MD    Cardiologist: Dr Lesli Johnson    PCP: Dr Addie Olmos    Assessment/Plan:  Accelerated hypertension  -BP on admission 251/83; he was restarted on his home oral antihypertensive medications  -BP last recorded at 188/82, prior to that was 212/88  -Current BP medication regimen:  Metoprolol tartrate 50 mg q 12 hours, lisinopril 40 mg daily, and Procardia 60 mg daily    Subarachnoid hemorrhage  -Neurology closely following along this admission  CTA of head and neck 11/15:  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, reconstitution of the supraclinoid ICA on the left with  patent ophthalmic, anterior and posterior communicating artery, minor posttraumatic subarachnoid hemorrhage in a left frontal sulcus  -Pt received DDAVP in regards to chronic aspirin use  -MRI ordered/pending    CAD status post prior CABG x3  -No complaints of angina  -12 Lead ECG 11/14:  NSR, nonspecific ST T-wave abnormalities, no acute ischemic changes  -Mild troponin elevation 0 05--0 06  -Aspirin on hold 2/2 SAH, on statin and BB    Dyslipidemia  -Lipid profile 11/15/2019:  Cholesterol 222, triglycerides 103, HDL 52, LDL direct 149  -On atorvastatin 40 mg daily    Hypothyroidism  -TSH 3 9  -On levothyroxine    Ongoing tobacco abuse  -Smoking cessation  -Nicotine patch    Plan  -Follow-up 2D echocardiogram  -BP likely significantly elevated in the setting of medication non-compliance & acute pain repsonse--complicated by acute SAH---rule ischemic/embolic CVA  -Start IV nicardipine infusion with goal -[id number]--[id number] ischemic/embolic CVA ruled out  -DC procardia & lisinopril, continue metoprolol 50 mg q12  -Continue to closely monitor volume status; strict I&Os  -Continue to closely monitor renal function and electrolytes  -Continue to closely monitor on telemetry    HPI: Kain Singer 67y o  year old male with a past medical history of CAD status post CABG x 3 approximately 10 years ago, hypertension, dyslipidemia, hypothyroidism, severe lower extremity PAD, PVD, and ongoing tobacco abuse  Pt also reports he medication noncompliance  He routinely follows with Dr Gracie Norwood in with [facility] last having been seen as an outpatient back in June of 2019  He was without major cardiac complaints during this time  His current outpatient cardiac medications include aspirin 81 mg daily, atorvastatin 40 mg daily, lisinopril 40 mg daily, metoprolol tartrate 50 mg q 12 hours and Procardia 60 mg daily  He initially presented to the [address] [place] [address] on 11/14/2019 after sustaining a mechanical fall while at his home residence  He had developed significant/worsening right knee pain and called EMS to bring him into the ED for further evaluation  On arrival to the ED, he was found to be significantly hypertensive with a recorded BP of 251/83  He received a dose of IV hydralazine in regards to his elevated BP  Imaging of his right knee demonstrated a possible acute fracture  Orthopedic surgery was consulted  Further laboratory data demonstrated a mild troponin elevation of 0 05--0 06  His 12 lead ECG did not demonstrate any acute ischemic changes  Chest x-ray did demonstrate signs of mild volume overload/pulmonary vascular congestion  On 11/15/2019, the patient had developed new neurological symptoms including delayed speech, mild right facial droop, and right arm weakness  A stroke alert was called  The patient went for a stat CT of his head noncontrast which demonstrated a left frontal hemorrhage versus contusion  He received DDAVP per the anticoagulation reversal protocol in regards to chronic aspirin use    It was ultimately decided for the patient to be transferred to the [facility] for Neurology and Orthopedic surgery evaluation/further treatment recommendations  Cardiology was consulted in regards to the patient's persistently elevated blood pressure  Family History:   Family History   Problem Relation Age of Onset    Alzheimer's disease Mother     Heart disease Father         cardiac disorder    Heart disease Brother         cardiac disorder     Historical Information   Past Medical History:   Diagnosis Date    Coronary artery disease     history of CABG    Disease of thyroid gland     Hemorrhoids     last assessed 7/10/17    History of arterial duplex of LE 01/10/2017    Bilateral occlusion of the superficial femoral arteries, severe diminution of the ankle brachial index bilaterally, disease appears worse on left than right   History of echocardiogram 07/20/2011    hypokinesia of the inferior wall  EF 50%   HL (hearing loss)     Hyperlipidemia     Hypertension     PVD (peripheral vascular disease)      Past Surgical History:   Procedure Laterality Date    COLONOSCOPY      Last assessed 7/10/17    CORONARY ARTERY BYPASS GRAFT  08/23/2010    3V CABG:  LIMA to LAD, VG to RI, VG to OM1      DENTAL SURGERY      Last assessed  7/10/17    KNEE SURGERY Left     Last assessed 7/10/17    TONSILLECTOMY AND ADENOIDECTOMY      Last assessed 7/10/17    TOOTH EXTRACTION       Social History   Social History     Substance and Sexual Activity   Alcohol Use Not Currently    Alcohol/week: 1 0 standard drinks    Types: 1 Glasses of wine per week     Social History     Substance and Sexual Activity   Drug Use No     Social History     Tobacco Use   Smoking Status Current Every Day Smoker    Packs/day: 0 50    Types: Cigarettes   Smokeless Tobacco Never Used     Family History:   Family History   Problem Relation Age of Onset    Alzheimer's disease Mother     Heart disease Father         cardiac disorder    Heart disease Brother         cardiac disorder       Review of Systems:  Review of Systems   Constitutional: Negative for fatigue  Respiratory: Negative for cough, chest tightness and shortness of breath  Cardiovascular: Negative for chest pain, palpitations and leg swelling  Neurological: Positive for weakness  Negative for headaches  Right arm and right leg weakness   All other systems reviewed and are negative            Scheduled Meds:  Current Facility-Administered Medications:  atorvastatin 40 mg Oral Daily With [employer]   hydrALAZINE 10 mg Intravenous Q6H PRN [employer], DESIREE   Labetalol HCl 10 mg Intravenous Q6H PRN [employer], DESIREE   levETIRAcetam 500 mg Oral Q12H [address] Katie Bolanos PA-C   [START ON 11/16/2019] levothyroxine 50 mcg Oral Early Morning [employer] DESIREE Dumont   [START ON 11/16/2019] lisinopril 40 mg Oral Daily DESIREE Camacho   metoprolol tartrate 50 mg Oral Q12H [address] [employer], DESIREE   nicotine 1 patch Transdermal Daily Katie Bolanos PA-C   [START ON 11/16/2019] NIFEdipine 60 mg Oral Daily DESIREE Camacho     Continuous Infusions:   PRN Meds: hydrALAZINE    Labetalol HCl  all current active meds have been reviewed, current meds:   Current Facility-Administered Medications   Medication Dose Route Frequency    atorvastatin (LIPITOR) tablet 40 mg  40 mg Oral Daily With Dinner    hydrALAZINE (APRESOLINE) injection 10 mg  10 mg Intravenous Q6H PRN    Labetalol HCl (NORMODYNE) injection 10 mg  10 mg Intravenous Q6H PRN    levETIRAcetam (KEPPRA) tablet 500 mg  500 mg Oral Q12H [address]    [START ON 11/16/2019] levothyroxine tablet 50 mcg  50 mcg Oral Early Morning    [START ON 11/16/2019] lisinopril (ZESTRIL) tablet 40 mg  40 mg Oral Daily    metoprolol tartrate (LOPRESSOR) tablet 50 mg  50 mg Oral Q12H [address]    nicotine (NICODERM CQ) 14 mg/24hr TD 24 hr patch 1 patch  1 patch Transdermal Daily    [START ON 11/16/2019] NIFEdipine (PROCARDIA XL) 24 hr tablet 60 mg  60 mg Oral Daily    and PTA meds:   Prior to Admission Medications   Prescriptions Last Dose Informant Patient Reported? Taking? NIFEdipine (PROCARDIA XL) 60 mg 24 hr tablet   No No   Sig: Take 1 tablet (60 mg total) by mouth daily   Patient not taking: Reported on 11/14/2019   aspirin (ECOTRIN LOW STRENGTH) 81 mg EC tablet   Yes No   Sig: Take 81 mg by mouth daily   atorvastatin (LIPITOR) 40 mg tablet   No No   Sig: Take 1 tablet (40 mg total) by mouth daily   Patient not taking: Reported on 11/14/2019   levothyroxine 50 mcg tablet   No No   Sig: Take 1 tablet (50 mcg total) by mouth daily   Patient not taking: Reported on 11/14/2019   lisinopril (ZESTRIL) 40 mg tablet   No No   Sig: TAKE 1 TABLET (40 MG TOTAL) BY MOUTH DAILY   Patient not taking: Reported on 11/14/2019   metoprolol tartrate (LOPRESSOR) 50 mg tablet   No No   Sig: Take 1 tablet (50 mg total) by mouth every 12 (twelve) hours   Patient not taking: Reported on 11/14/2019      Facility-Administered Medications: None       No Known Allergies    Objective   Vitals: Blood pressure (!) 212/88, pulse 77, temperature 98 1 °F (36 7 °C), temperature source Oral, resp  rate 18, weight 65 8 kg (145 lb), SpO2 95 %  , Body mass index is 20 51 kg/m² , Orthostatic Blood Pressures      Most Recent Value   Blood Pressure  (!) 212/88 filed at 11/15/2019 1400   Patient Position - Orthostatic VS  Lying filed at 11/15/2019 1400          No intake or output data in the 24 hours ending 11/15/19 1459    Invasive Devices     Peripheral Intravenous Line            Peripheral IV 11/14/19 Right Arm less than 1 day              Physical Exam:  Physical Exam   Constitutional: No distress  Patient is awake and alert  He has very delayed speech, with some periods of confusion  He has low right upper and right lower extremity weakness he is only able to squeeze with the right hand   Eyes: No scleral icterus  Neck: No JVD present  Cardiovascular: Normal rate, regular rhythm and intact distal pulses     Murmur heard   Pulmonary/Chest: Effort normal and breath sounds normal  He has no wheezes  He has no rales  Abdominal: Soft  Bowel sounds are normal  There is no tenderness  Musculoskeletal: He exhibits edema  Neurological: He is alert  Alert and oriented x 2 (person, and time--able to state the month, however did not know the year); disoriented to place and situation  Delayed speech  Right upper extremity weakness, only able to grasp with his right hand  Right lower extremity flaccid   Skin: Skin is warm and dry  Capillary refill takes less than 2 seconds  He is not diaphoretic  Psychiatric: He has a normal mood and affect  Nursing note and vitals reviewed        Lab Results:   Recent Results (from the past 24 hour(s))   ECG 12 lead    Collection Time: 11/14/19  3:12 PM   Result Value Ref Range    Ventricular Rate 66 BPM    Atrial Rate 66 BPM    CA Interval 176 ms    QRSD Interval 102 ms    QT Interval 426 ms    QTC Interval 446 ms    P Axis 67 degrees    QRS Axis 57 degrees    T Wave Axis 88 degrees   UA w Reflex to Microscopic w Reflex to Culture    Collection Time: 11/14/19  3:37 PM   Result Value Ref Range    Color, UA Yellow Yellow, Straw    Clarity, UA Clear Hazy, Clear    Specific Gravity, UA 1 025 >1 005-<1 030    pH, UA 6 5 5 0, 5 5, 6 0, 6 5, 7 0, 7 5    Leukocytes, UA Negative Negative    Nitrite, UA Negative Negative    Protein, UA 3+ (A) Negative, Interference- unable to analyze mg/dl    Glucose, UA Negative Negative mg/dl    Ketones, UA Negative Negative mg/dl    Urobilinogen, UA 0 2 0 2, 1 0 E U /dl E U /dl    Bilirubin, UA Negative Negative    Blood, UA 1+ (A) Negative   Urine Microscopic    Collection Time: 11/14/19  3:37 PM   Result Value Ref Range    RBC, UA 2-4 (A) None Seen, 0-5 /hpf    WBC, UA 0-1 (A) None Seen, 0-5, 5-55, 5-65 /hpf    Epithelial Cells Occasional None Seen, Occasional /hpf    Bacteria, UA Occasional None Seen, Occasional /hpf    Hyaline Casts, UA 0-1 (A) (none) /lpf Fine granular casts 1-2 /lpf   CBC and differential    Collection Time: 11/14/19  3:39 PM   Result Value Ref Range    WBC 10 60 4 80 - 10 80 Thousand/uL    RBC 4 13 (L) 4 30 - 5 90 Million/uL    Hemoglobin 13 8 (L) 14 0 - 18 0 g/dL    Hematocrit 40 8 (L) 42 0 - 47 0 %    MCV 99 81 - 99 fL    MCH 33 5 26 0 - 34 0 pg    MCHC 33 9 31 0 - 37 0 g/dL    RDW 13 7 11 5 - 14 5 %    MPV 8 6 8 6 - 11 7 fL    Platelets 586 128 - 331 Thousands/uL    Neutrophils Relative 76 (H) 42 - 75 %    Lymphocytes Relative 10 (L) 21 - 51 %    Monocytes Relative 13 (H) 2 - 12 %    Eosinophils Relative 0 0 - 5 %    Basophils Relative 1 0 - 2 %    Neutrophils Absolute 8 00 (H) 1 40 - 6 50 Thousands/µL    Lymphocytes Absolute 1 10 0 60 - 4 47 Thousands/µL    Monocytes Absolute 1 40 (H) 0 17 - 1 22 Thousand/µL    Eosinophils Absolute 0 00 0 00 - 0 61 Thousand/µL    Basophils Absolute 0 10 0 00 - 0 10 Thousands/µL   Comprehensive metabolic panel    Collection Time: 11/14/19  3:39 PM   Result Value Ref Range    Sodium 141 134 - 143 mmol/L    Potassium 3 6 3 5 - 5 5 mmol/L    Chloride 104 98 - 107 mmol/L    CO2 27 21 - 31 mmol/L    ANION GAP 10 4 - 13 mmol/L    BUN 15 7 - 25 mg/dL    Creatinine 1 07 0 70 - 1 30 mg/dL    Glucose 106 (H) 65 - 99 mg/dL    Calcium 8 8 8 6 - 10 5 mg/dL    AST 14 13 - 39 U/L    ALT 20 7 - 52 U/L    Alkaline Phosphatase 56 55 - 165 U/L    Total Protein 6 3 (L) 6 4 - 8 9 g/dL    Albumin 3 9 3 5 - 5 7 g/dL    Total Bilirubin 1 00 0 20 - 1 00 mg/dL    eGFR 66 ml/min/1 73sq m   Troponin I    Collection Time: 11/14/19  3:39 PM   Result Value Ref Range    Troponin I 0 04 (H) <=0 03 ng/mL   TSH    Collection Time: 11/14/19  3:39 PM   Result Value Ref Range    TSH 3RD GENERATON 3 940 0 450 - 5 330 uIU/mL   B-Type Natriuretic Peptide ([facility])    Collection Time: 11/14/19  5:11 PM   Result Value Ref Range     (H) 1 - 100 pg/mL   Troponin I    Collection Time: 11/14/19  8:15 PM   Result Value Ref Range    Troponin I 0 05 (H) <=0 03 ng/mL   Troponin I    Collection Time: 11/15/19  1:26 AM   Result Value Ref Range    Troponin I 0 06 (H) <=0 03 ng/mL   Lipid Panel with Direct LDL reflex    Collection Time: 11/15/19  1:26 AM   Result Value Ref Range    Cholesterol 222 (H) 0 - 200 mg/dL    Triglycerides 103 44 - 166 mg/dL    HDL, Direct 52 >=40 mg/dL    LDL Calculated 149 (H) 0 - 100 mg/dL   Comprehensive metabolic panel    Collection Time: 11/15/19  4:57 AM   Result Value Ref Range    Sodium 138 134 - 143 mmol/L    Potassium 3 4 (L) 3 5 - 5 5 mmol/L    Chloride 103 98 - 107 mmol/L    CO2 24 21 - 31 mmol/L    ANION GAP 11 4 - 13 mmol/L    BUN 13 7 - 25 mg/dL    Creatinine 0 91 0 70 - 1 30 mg/dL    Glucose 87 65 - 99 mg/dL    Calcium 8 9 8 6 - 10 5 mg/dL    AST 15 13 - 39 U/L    ALT 18 7 - 52 U/L    Alkaline Phosphatase 60 55 - 165 U/L    Total Protein 6 3 (L) 6 4 - 8 9 g/dL    Albumin 3 8 3 5 - 5 7 g/dL    Total Bilirubin 1 40 (H) 0 20 - 1 00 mg/dL    eGFR 80 ml/min/1 73sq m   Magnesium    Collection Time: 11/15/19  4:57 AM   Result Value Ref Range    Magnesium 1 8 (L) 1 9 - 2 7 mg/dL   Phosphorus    Collection Time: 11/15/19  4:57 AM   Result Value Ref Range    Phosphorus 2 6 (L) 3 0 - 5 5 mg/dL   CBC and differential    Collection Time: 11/15/19  4:57 AM   Result Value Ref Range    WBC 14 40 (H) 4 80 - 10 80 Thousand/uL    RBC 4 21 (L) 4 30 - 5 90 Million/uL    Hemoglobin 14 2 14 0 - 18 0 g/dL    Hematocrit 41 2 (L) 42 0 - 47 0 %    MCV 98 81 - 99 fL    MCH 33 6 26 0 - 34 0 pg    MCHC 34 3 31 0 - 37 0 g/dL    RDW 13 5 11 5 - 14 5 %    MPV 9 6 8 6 - 11 7 fL    Platelets 655 324 - 781 Thousands/uL    Neutrophils Relative 77 (H) 42 - 75 %    Lymphocytes Relative 10 (L) 21 - 51 %    Monocytes Relative 13 (H) 2 - 12 %    Eosinophils Relative 1 0 - 5 %    Basophils Relative 0 0 - 2 %    Neutrophils Absolute 11 10 (H) 1 40 - 6 50 Thousands/µL    Lymphocytes Absolute 1 40 0 60 - 4 47 Thousands/µL    Monocytes Absolute 1 80 (H) 0 17 - 1 22 Thousand/µL    Eosinophils Absolute 0 10 0 00 - 0 61 Thousand/µL    Basophils Absolute 0 10 0 00 - 0 10 Thousands/µL   Fingerstick Glucose (POCT)    Collection Time: 11/15/19  9:46 AM   Result Value Ref Range    POC Glucose 146 (H) 65 - 140 mg/dl     Imaging: I have personally reviewed pertinent reports  and I have personally reviewed pertinent films in PACS  Code Status: LVL 3 DNR/DNI  Epic/ Allscripts/Care Everywhere records reviewed: Yes  * Please Note: Fluency DirectDictation voice to text software may have been used in the creation of this document   **

## 2019-11-15 NOTE — ASSESSMENT & PLAN NOTE
· X-rays CT concerning for anterior lateral tibial plateau fracture  · Orthopedics consult  · Knee immobilizer in place  · PT OT

## 2019-11-15 NOTE — SPEECH THERAPY NOTE
Speech-Language Pathology Bedside Swallow Evaluation    Patient Name: Christian Ramirez    [facility] Date: 11/15/2019     ST evaluation initiated this morning (chart review), however stroke alert was called prior to bedside assessment  Pt in X-ray much of today  Spoke with RN, pt is transferring to B today  Discharging current orders, recommend evaluation upon arrival to [facility]  Current Medical Status per Dr Nguyễn Troy H&P 11/14/2019  Christian Ramirez is a 67 y o  male who presents with the aforementioned chief complaint  Patient states that he was in his usual state of health up until yesterday  He went out to buy groceries  He lives on the 2nd floor of an apartment building  While caring his groceries up the flight of stairs he fell and hurt his right knee  He denies any syncope  He denies any chest pain, shortness of breath, palpitations, numbness, tingling, weakness, blurred vision, and or seizure-like activity at the time of the fall  He denies biting his tongue, he denies any fecal or urinary incontinence  He simply missed a step and felt that the gross trees were too heavy and unfortunately suffered a mechanical fall  He managed to get up and crawl back to his apartment  He remained on a couch and then in bed all day and all night  This morning he woke up and was hoping that the pain would be gone however at the pain was excruciating  Location is the right knee, intensity is a 10/10, quality is described as sharp, onset was acute after the fall  Patient denies any radiation, aggravating factors include putting weight in attempting to move his right leg, alleviating factors include rest   He denies any associated symptoms    Once the pain became unbearable, he called his neighbor, who subsequently called 911 and the ambulance brought him to the hospital   In the emergency room he was found to have a possible fracture of his right knee with resultant severe ambulatory dysfunction, and was additionally found to be in a state of hypertensive urgency  He was found to have minimally elevated troponins  He has been admitted to [facility] since  Current Precautions:  none    Past medical history:  Please see H&P for details    Special Studies:  CXR 11/14/2019 IMPRESSION:  Findings of mild volume overload with pulmonary vascular congestion and trace interstitial edema      Social/Education/Vocational Hx:  Pt lives alone

## 2019-11-15 NOTE — DISCHARGE SUMMARY
Discharge- Dayla Exon 1940, 67 y o  male MRN: [id number]    Unit/Bed#: -01 Encounter: [id number]    Primary Care Provider: Josué Oropeza DO   Date and time admitted to hospital: 11/14/2019  2:15 PM        * Fall (on) (from) other stairs and steps, initial encounter  Assessment & Plan  · With severe ambulatory dysfunction  · Initial x-ray right knee yielded the following results:  Large lipohemarthrosis suggests intra-articular fracture, though fracture planes are poorly demonstrated   Statistically, tibial plateau is the most likely site and there may be some subtle cortical discontinuity posteriorly at the plateau on lateral view   Recommend CT for better characterization  · CT right knee yielded the results: Slight cortical depression deformity including cortical offset at the anterolateral tibial plateau concerning for fracture with corresponding prominent lipohemarthrosis  · Orthopedics evaluation is pending   · Will be transferred to B  Please see comment below  Subarachnoid hemorrhage ([address] )  Assessment & Plan  · Able to answer all Qs appropriately however it does take the patient quite a bit of time to respond  Patient was able to tell me his name, date of birth, state but unable to tell the year or the current president  He also have a mild right-sided facial droop, right upper extremity weakness  Last well is unknown  Suspected that right arm weakness occurred after admission  · Stroke alert was called  Discussed with neurology  Recommended to transfer patient to B for further monitoring  I spoke with trauma attending and patient will be transferred to their service  · CT head shows an ill-defined high density within the high left frontal sulcus and is consistent with acute posttraumatic subarachnoid hemorrhage     · CTA shows 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  H/O noncompliance with medical treatment, presenting hazards to [address]  · Patient lives home alone  · The emergency room physician was concerned about his home health living conditions  · I called PCP's office, his brother was listed as emergency contact without phone number  When asked the patient refused for me to call any of his family member  I asked if I could call his neighbor who apparently he called after fall but the patient does not remember the phone number  Tobacco use disorder  Assessment & Plan  · Cessation counseling provided, patient quite not yet ready to quit  · Nicotine patch has been ordered      Hypothyroidism  Assessment & Plan  · TSH within normal limits  · Resume home dose of Synthroid      Hypertension  Assessment & Plan  · Patient has hypertensive emergency as manifested by mildly elevated troponin  · Patient is medically noncompliant with his medications  · Patient given metoprolol, lisinopril and nifedipine  · BP improved  Per neurology, keep SBP less than 140       Hyperlipidemia  Assessment & Plan  · Continue atorvastatin    Coronary artery disease  Assessment & Plan  · Patient has a minimally elevated 1st troponin  · Patient denies any chest pain  · Suspect non MI related elevated troponin secondary to the accelerated hypertension  · Troponin levels- 0 04, 0 05, 0 06   · Of note, patient is medically noncompliant with medications at home  · At this point will continue aspirin, metoprolol, lisinopril, and atorvastatin  · Pending final result 2D echocardiogram to rule out regional wall motion abnormalities  · Additionally patient reports a history of CABG in 2003  · Will be transferred to [facility]  Please see comment below  Discharging Physician / Practitioner: DESIREE Branch  PCP: Tio Robles DO  Admission Date:   Admission Orders (From admission, onward)     Ordered        11/14/19 1728  Place in Observation (expected length of stay for this patient is less than two midnights)  Once         11/14/19 1715  Place in Observation (expected length of stay for this patient is less than two midnights)  Once,   Status:  Canceled                   Discharge Date: 11/15/19    Disposition:      Inpatient [address] at [facility] to [address] SN[address]:   · Not Applicable to this Patient - Not Applicable to this Patient    Reason for Admission: fall     Discharge Diagnoses:     Please see assessment and plan section above for further details regarding discharge diagnoses  Resolved Problems  Date Reviewed: 11/15/2019    None          Consultations During Hospital Stay:  · Orthopedic surgery   · Neurology- stroke alert     Procedures Performed:   · None      Medication Adjustments and Discharge Medications:  · Summary of Medication Adjustments made as a result of this hospitalization: none   · Medication Dosing Tapers - Please refer to Discharge Medication List for details on any medication dosing tapers (if applicable to patient)  · Medications being temporarily held (include recommended restart time): ASA   · Discharge Medication List: See after visit summary for reconciled discharge medications  Wound Care Recommendations:  When applicable, please see wound care section of After Visit Summary      Diet Recommendations at Discharge:  Diet -        Diet Orders   (From admission, onward)             Start     Ordered    11/14/19 1836  Diet Regular; Regular House  Diet effective now     Question Answer Comment   Diet Type Regular    Regular Regular House RD to adjust diet per protocol? 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, reconstitution of the supraclinoid ICA on the left with the patent ophthalmic, anterior and posterior communicating arteries  Flow restrictive disease at the origin and termination of the left vertebral artery  No flow restrictive disease elsewhere  Minor Post traumatic subarachnoid hemorrhage in a left frontal sulcus  Findings were directly discussed with [facility] on 11/15/2019 10:19 AM  and Aye Wang at 10:04AM                Workstation performed: [id number]         CT stroke alert brain   Final Result by  (11/15 1220)   Addendum 1 of 1 by Stephan Dow MD (11/15 1022)   ADDENDUM:      In retrospect, there is ill-defined high density within a high left    frontal sulcus, series 2 image 32, consistent with acute posttraumatic    subarachnoid hemorrhage  * I personally telephoned this result to [facility] on 11/15/2019    10:19 AM, and Aye aWng at 10:04 AM       Final      CT knee right wo contrast   Final Result by Eboni Mendez MD (11/14 4547)   Slight cortical depression deformity including cortical offset at the anterolateral tibial plateau concerning for fracture with corresponding prominent lipohemarthrosis  The study was marked in [facility] for immediate notification  Workstation performed: [id number]         XR knee 4+ views Right injury   ED Interpretation by Farrukh Fernandes MD (11/14 5869)   Large effusion  Cannot rule out fracture  No dislocation        Final Result by Oswaldo Arana MD (11/14 8441)      Large lipohemarthrosis suggests intra-articular fracture, though fracture planes are poorly demonstrated  Statistically, tibial plateau is the most likely site and there may be some subtle cortical discontinuity posteriorly at the plateau on lateral    view  Recommend CT for better characterization  Note: The study was marked in [employer] for immediate notification  Imaging follow-up reminder notification was scheduled in the electronic medical record  Workstation performed: [id number]         XR chest 1 view portable   ED Interpretation by Kelly Earl MD (11/14 1702)   Increased vascular congestion  No infiltrates  Final Result by Ivory Cleaning MD (11/14 5293)      Findings of mild volume overload with pulmonary vascular congestion and trace interstitial edema  Workstation performed: [id number]         XR humerus right    (Results Pending)   MRI brain wo contrast    (Results Pending)       Incidental Findings:   · None      Test Results Pending at Discharge (will require follow up): · None      Outpatient Tests Requested:  · Follow up with PCP     Complications:    · None     Hospital Course: Heidi Dobbs is a 67 y o  male patient who originally presented to the hospital on 11/14/2019 due to a fall while carrying groceries to his apartment  Please refer to H&P for initial presenting complaint  In brief the patient with past medical history of CAD, peripheral vascular disease, essential hypertension who was brought in by the ambulance after he fell at home yesterday  The patient denies any loss of consciousness, trauma to the head or any other injury  The patient was subsequently was admitted for possible fracture of his right knee and hypertensive urgency  Orthopedic evaluation is pending  In the a m  The patient was found to have slow responsiveness to questions and right arm weakness  Subsequently stroke alert was called  Please refer to CT head and CTA head and neck above    I discussed the case with Neurology and the recommendation is to transfer the patient to [facility] for further monitor and evaluation  The patient will be admitted under trauma service  Condition at Discharge: fair     Discharge Day Visit / Exam:     Subjective:  Denies any pain  He is unsure why he is here in the hospital    Vitals: Blood Pressure: 165/80 (11/15/19 1130)  Pulse: 60 (11/15/19 1130)  Temperature: 97 5 °F (36 4 °C) (11/15/19 1130)  Temp Source: Temporal (11/15/19 1149)  Respirations: 18 (11/15/19 1130)  Height: 5' 10 5" (179 1 cm) (11/15/19 1128)  Weight - Scale: 66 kg (145 lb 8 1 oz) (11/15/19 1128)  SpO2: 97 % (11/15/19 1130)  Exam:   Physical Exam   Constitutional: He is oriented to person, place, and time  He appears well-developed  No distress  Frail and chronically ill appearing    HENT:   Head: Normocephalic and atraumatic  Mouth/Throat: Oropharynx is clear and moist    Eyes: Pupils are equal, round, and reactive to light  Conjunctivae and EOM are normal    Neck: Normal range of motion  Neck supple  No thyromegaly present  Cardiovascular: Normal rate, regular rhythm, normal heart sounds and intact distal pulses  Pulmonary/Chest: Effort normal and breath sounds normal  No respiratory distress  He has no wheezes  Abdominal: Soft  Bowel sounds are normal  He exhibits no distension  There is no tenderness  Musculoskeletal: Normal range of motion  He exhibits no edema or deformity  Neurological: He is alert and oriented to person, place, and time  He has normal reflexes  No cranial nerve deficit  Skin: Skin is warm and dry  No erythema  Psychiatric: He has a normal mood and affect  His behavior is normal  Thought content normal    Vitals reviewed  Discussion with Family: unable to reach any family as there is no phone number listed  Once asked about his neighbor, the patient does not remember the neighbor's phone number       Goals of Care Discussions:  · Code Status at Discharge: Level 3 - DNAR and DNI  · Were there any Goals of Care Discussions during Hospitalization?: Yes  · Results of any General Goals of Care Discussions: same   · POLST Completed: No   · If POLST Completed, Summary of POLST Agreement Provided Here: n/a   · OK to Rehospitalize if Needed? Yes    Discharge instructions/Information to patient and family:   See after visit summary section titled Discharge Instructions for information provided to patient and family  Planned Readmission: no       Discharge Statement:  I spent 65 minutes discharging the patient  This time was spent on the day of discharge  I had direct contact with the patient on the day of discharge  Greater than 50% of the total time was spent examining patient, answering all patient questions, arranging and discussing plan of care with patient as well as directly providing post-discharge instructions  Additional time then spent on discharge activities      ** Please Note: This note has been constructed using a voice recognition system **

## 2019-11-15 NOTE — CONSULTS
Orthopedics   Oscar Contreras 67 y o  male MRN: [id number]  Unit/Bed#: ED 11      Chief Complaint:   right knee pain    HPI:   67 y o  male status post fall complaining of right knee pain  Pain is made worse with significant motion or contact to the area and improves with rest   Patient also struck his head during the fall and was found to have a SAH with residual weakness to the right side  Patient with likely underlying dementia but also couls have come acute deficits in the setting of head trauma, he denies any further orthopaedic complaints at this time  Review Of Systems:   · Skin: Normal  · Neuro: See HPI  · Musculoskeletal: See HPI  · 14 point review of systems negative except as stated above     Past Medical History:   Past Medical History:   Diagnosis Date    Coronary artery disease     history of CABG    Disease of thyroid gland     Hemorrhoids     last assessed 7/10/17    History of arterial duplex of LE 01/10/2017    Bilateral occlusion of the superficial femoral arteries, severe diminution of the ankle brachial index bilaterally, disease appears worse on left than right   History of echocardiogram 07/20/2011    hypokinesia of the inferior wall  EF 50%   HL (hearing loss)     Hyperlipidemia     Hypertension     PVD (peripheral vascular disease)        Past Surgical History:   Past Surgical History:   Procedure Laterality Date    COLONOSCOPY      Last assessed 7/10/17    CORONARY ARTERY BYPASS GRAFT  08/23/2010    3V CABG:  LIMA to LAD, VG to RI, VG to OM1     Gonzalez DENTAL SURGERY      Last assessed  7/10/17    KNEE SURGERY Left     Last assessed 7/10/17    TONSILLECTOMY AND ADENOIDECTOMY      Last assessed 7/10/17    TOOTH EXTRACTION         Family History:  Family history reviewed and non-contributory  Family History   Problem Relation Age of Onset    Alzheimer's disease Mother     Heart disease Father         cardiac disorder    Heart disease Brother         cardiac disorder Social History:  Social History     Socioeconomic History    Marital status: Single     Spouse name: None    Number of children: 0    Years of education: None    Highest education level: None   Occupational History    Occupation: retired- factory maintenance   Social Needs    Financial resource strain: None    Food insecurity:     Worry: None     Inability: None    Transportation needs:     Medical: None     Non-medical: None   Tobacco Use    Smoking status: Current Every Day Smoker     Packs/day: 0 50     Types: Cigarettes    Smokeless tobacco: Never Used   Substance and Sexual Activity    Alcohol use: Not Currently     Alcohol/week: 1 0 standard drinks     Types: 1 Glasses of wine per week    Drug use: No    Sexual activity: None   Lifestyle    Physical activity:     Days per week: None     Minutes per session: None    Stress: None   Relationships    Social connections:     Talks on phone: None     Gets together: None     Attends Mu-ism service: None     Active member of club or organization: None     Attends meetings of clubs or organizations: None     Relationship status: None    Intimate partner violence:     Fear of current or ex partner: None     Emotionally abused: None     Physically abused: None     Forced sexual activity: None   Other Topics Concern    None   Social History Narrative    Has no children    Lives a lone without help available    Previous  service: [place] deployed to [facility] 20       Allergies:   No Known Allergies        Labs:  0   Lab Value Date/Time    HCT 41 2 (L) 11/15/2019 0457    HCT 40 8 (L) 11/14/2019 1539    HCT 40 7 (L) 08/10/2019 1604    HCT 40 8 03/11/2015 1413    HGB 14 2 11/15/2019 0457    HGB 13 8 (L) 11/14/2019 1539    HGB 14 2 08/10/2019 1604    HGB 13 7 03/11/2015 1413    WBC 14 40 (H) 11/15/2019 0457    WBC 10 60 11/14/2019 1539    WBC 10 60 08/10/2019 1604    WBC 7 63 03/11/2015 1413       Meds:    Current Facility-Administered Medications:    atorvastatin (LIPITOR) tablet 40 mg, 40 mg, Oral, Daily With Dinner, DESIREE Camacho    hydrALAZINE (APRESOLINE) injection 10 mg, 10 mg, Intravenous, Q6H PRN, DESIREE Rowan    [START ON 11/16/2019] levothyroxine tablet 50 mcg, 50 mcg, Oral, Early Morning, DESIREE Camacho    [START ON 11/16/2019] lisinopril (ZESTRIL) tablet 40 mg, 40 mg, Oral, Daily, DESIREE Camacho    metoprolol tartrate (LOPRESSOR) tablet 50 mg, 50 mg, Oral, Q12H [facility], DESIREE Camacho    [START ON 11/16/2019] NIFEdipine (PROCARDIA XL) 24 hr tablet 60 mg, 60 mg, Oral, Daily, DESIREE Camacho    Current Outpatient Medications:     aspirin (ECOTRIN LOW STRENGTH) 81 mg EC tablet, Take 81 mg by mouth daily, Disp: , Rfl:     atorvastatin (LIPITOR) 40 mg tablet, Take 1 tablet (40 mg total) by mouth daily (Patient not taking: Reported on 11/14/2019), Disp: 90 tablet, Rfl: 5    levothyroxine 50 mcg tablet, Take 1 tablet (50 mcg total) by mouth daily (Patient not taking: Reported on 11/14/2019), Disp: 30 tablet, Rfl: 5    lisinopril (ZESTRIL) 40 mg tablet, TAKE 1 TABLET (40 MG TOTAL) BY MOUTH DAILY (Patient not taking: Reported on 11/14/2019), Disp: 90 tablet, Rfl: 1    metoprolol tartrate (LOPRESSOR) 50 mg tablet, Take 1 tablet (50 mg total) by mouth every 12 (twelve) hours (Patient not taking: Reported on 11/14/2019), Disp: 60 tablet, Rfl: 5    NIFEdipine (PROCARDIA XL) 60 mg 24 hr tablet, Take 1 tablet (60 mg total) by mouth daily (Patient not taking: Reported on 11/14/2019), Disp: 30 tablet, Rfl: 5    Blood Culture:   No results found for: BLOODCX    Wound Culture:   No results found for: WOUNDCULT    Ins and Outs:  No intake/output data recorded            Physical Exam:   BP (!) 212/88   Pulse 77   Temp 98 1 °F (36 7 °C) (Oral)   Resp 18   Wt 65 8 kg (145 lb)   SpO2 95%   BMI 20 51 kg/m²   Gen: Alert and oriented to person  HEENT: EOMI, eyes clear, moist mucus membranes, hearing intact  Respiratory: Bilateral chest rise  No audible wheezing found  Cardiovascular: Regular Rate and intact distal pulses   Abdomen: soft nontender/nondistended  Musculoskeletal: right lower extremity  · Skin intact, moderate effusion about the knee  · Tender to palpation over anterolateral aspect of the knee and proximal tibia  · Painful pain with passive flexion of the knee past 40 degrees   · Stable to varus and valgus stress  · Patient unable to comply with detailed sensory exam but states he can feel touch to the right lower extremity but cannot communicate if this is consistent with the sensation on the contralateral side  · Patient will spontaneously move the ankle and digits with stimulus to the dorsum of the foot but will not actively follow commands to move the right foot  · Unable to comply with command to attempt straight leg raise, however no palpable defects noted to the quad tendon   · Leg soft and compressible, no pain with passive stretch  · Brisk capillary refill to the foot and toes     Radiology:   I personally reviewed the films    X-rays right knee is difficult to appreciate an acute fracture, CT shows small fracture to the anterolateral aspect of the tibial plateau     _*_*_*_*_*_*_*_*_*_*_*_*_*_*_*_*_*_*_*_*_*_*_*_*_*_*_*_*_*_*_*_*_*_*_*_*_*_*_*_*_*    Assessment:  67 y o  male status post fall with right minimally tibial plateau fracture that will not require operative intervention     Plan:    · Non weight bearing right lower extremity in knee immobilizer  · Plan for non operative management   · ICE and Analgesics for pain  · PT/OT evaluation   · Dispo: Ortho will follow      Marion Hector MD

## 2019-11-15 NOTE — ASSESSMENT & PLAN NOTE
· Patient was at [facility] after a fall with a right knee injury  Developed right upper extremity weakness over 98  Had a stroke alert called  · Neurology consult  · MRI ordered, unable to answer screening questions    · Continue medical management per primary team

## 2019-11-15 NOTE — PLAN OF CARE
Problem: Potential for Falls  Goal: Patient will remain free of falls  Description  INTERVENTIONS:  - Assess patient frequently for physical needs  -  Identify cognitive and physical deficits and behaviors that affect risk of falls    -  [place] fall precautions as indicated by assessment   - Educate patient/family on patient safety including physical limitations  - Instruct patient to call for assistance with activity based on assessment  - Modify environment to reduce risk of injury  - Consider OT/PT consult to assist with strengthening/mobility  Outcome: Progressing     Problem: PAIN - ADULT  Goal: Verbalizes/displays adequate comfort level or baseline comfort level  Description  Interventions:  - Encourage patient to monitor pain and request assistance  - Assess pain using appropriate pain scale  - Administer analgesics based on type and severity of pain and evaluate response  - Implement non-pharmacological measures as appropriate and evaluate response  - Consider cultural and social influences on pain and pain management  - Notify physician/advanced practitioner if interventions unsuccessful or patient reports new pain  Outcome: Progressing     Problem: INFECTION - ADULT  Goal: Absence or prevention of progression during hospitalization  Description  INTERVENTIONS:  - Assess and monitor for signs and symptoms of infection  - Monitor lab/diagnostic results  - Monitor all insertion sites, i e  indwelling lines, tubes, and drains  - Monitor endotracheal if appropriate and nasal secretions for changes in amount and color  - [place] appropriate cooling/warming therapies per order  - Administer medications as ordered  - Instruct and encourage patient and family to use good hand hygiene technique  - Identify and instruct in appropriate isolation precautions for identified infection/condition  Outcome: Progressing  Goal: Absence of fever/infection during neutropenic period  Description  INTERVENTIONS:  - Monitor WBC    Outcome: Progressing     Problem: SAFETY ADULT  Goal: Maintain or return to baseline ADL function  Description  INTERVENTIONS:  -  Assess patient's ability to carry out ADLs; assess patient's baseline for ADL function and identify physical deficits which impact ability to perform ADLs (bathing, care of mouth/teeth, toileting, grooming, dressing, etc )  - Assess/evaluate cause of self-care deficits   - Assess range of motion  - Assess patient's mobility; develop plan if impaired  - Assess patient's need for assistive devices and provide as appropriate  - Encourage maximum independence but intervene and supervise when necessary  - Involve family in performance of ADLs  - Assess for home care needs following discharge   - Consider OT consult to assist with ADL evaluation and planning for discharge  - Provide patient education as appropriate  Outcome: Progressing  Goal: Maintain or return mobility status to optimal level  Description  INTERVENTIONS:  - Assess patient's baseline mobility status (ambulation, transfers, stairs, etc )    - Identify cognitive and physical deficits and behaviors that affect mobility  - Identify mobility aids required to assist with transfers and/or ambulation (gait belt, sit-to-stand, lift, walker, cane, etc )  - [place] fall precautions as indicated by assessment  - Record patient progress and toleration of activity level on Mobility SBAR; progress patient to next Phase/Stage  - Instruct patient to call for assistance with activity based on assessment  - Consider rehabilitation consult to assist with strengthening/weightbearing, etc   Outcome: Progressing     Problem: DISCHARGE PLANNING  Goal: Discharge to home or other facility with appropriate resources  Description  INTERVENTIONS:  - Identify barriers to discharge w/patient and caregiver  - Arrange for needed discharge resources and transportation as appropriate  - Identify discharge learning needs (meds, wound care, etc )  - Arrange for interpretive services to assist at discharge as needed  - Refer to Case Management Department for coordinating discharge planning if the patient needs post-hospital services based on physician/advanced practitioner order or complex needs related to functional status, cognitive ability, or social support system  Outcome: Progressing     Problem: Knowledge Deficit  Goal: Patient/family/caregiver demonstrates understanding of disease process, treatment plan, medications, and discharge instructions  Description  Complete learning assessment and assess knowledge base    Interventions:  - Provide teaching at level of understanding  - Provide teaching via preferred learning methods  Outcome: Progressing

## 2019-11-15 NOTE — CONSULTS
Consult- Roque Lucas 1940, 67 y o  male MRN: [id number]    Unit/Bed#: ED 11 Encounter: [id number]    Primary Care Provider: Salomón Simon DO   Date and time admitted to hospital: 11/15/2019  1:51 PM      Inpatient consult to Neurosurgery  Consult performed by: Alison Burdick PA-C  Consult ordered by: Ashu Snow PA-C          Subarachnoid hemorrhage [facility])  Assessment & Plan  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, reconstitution of the supraclinoid ICA on the left with the patent ophthalmic, anterior and posterior communicating arteries  Flow restrictive disease at the origin and termination of the left vertebral artery  No flow restrictive disease elsewhere  Minor post traumatic subarachnoid hemorrhage int he left frontal sulcus  · MRI brain ordered - patient unable to answer check with questions  · STAT CT head without contrast if decline in GCS >2pts/1h or if pharmacologic DVT ppx is warranted    Medical management:  · History of aspirin use, s/p DDAVP   · Patient has a history of non-compliance, uncertain if actually taking   · SBP goal 140-160  · Cardiology evaluated patient, likely requires IV to control blood pressure  · Consider keppra BID x 7 days for seizure ppx  · DVT ppx: SCDS, hold pharmacologic DVT ppx until stable CT head obtained    No neurosurgical intervention  Based off patient's CT imaging, does not correlate with patient's symptoms  Encephalopathy  Assessment & Plan  · Patient [facility] at [facility] a [facility] with a right knee injury  Developed right upper extremity weakness over 98  Had a stroke alert called  · Neurology consult  · MRI ordered, unable to answer screening questions    · Continue medical management per primary team      Closed fracture of right tibial plateau with routine healing  Assessment & Plan  · X-rays CT concerning for anterior lateral tibial plateau fracture  · Orthopedics consult  · Knee immobilizer in place  · PT OT    Hypertension  Assessment & Plan  · -160          History of Present Illness   HPI: Sherly Ramos is a 67y o  year old male with PMH including peripheral vascular disease, hypertension, hyperlipidemia, hearing loss, coronary artery disease who presents as a transfer from [address] [place] [address]  He initially presented yesterday 11/14/2019 after a fall  This morning he was found to have altered mental status and decreased movement in right upper extremity  A stroke alert was called where he underwent a CT head and CTA head and neck which demonstrated a small left frontal hyperdensity, concern for posttraumatic subarachnoid  Patient also had right facial droop  He was transferred to [facility] for further management  Review of Systems   Unable to perform ROS: Mental status change       Historical Information   Past Medical History:   Diagnosis Date    Coronary artery disease     history of CABG    Disease of thyroid gland     Hemorrhoids     last assessed 7/10/17    History of arterial duplex of LE 01/10/2017    Bilateral occlusion of the superficial femoral arteries, severe diminution of the ankle brachial index bilaterally, disease appears worse on left than right   History of echocardiogram 07/20/2011    hypokinesia of the inferior wall  EF 50%   HL (hearing loss)     Hyperlipidemia     Hypertension     PVD (peripheral vascular disease)      Past Surgical History:   Procedure Laterality Date    COLONOSCOPY      Last assessed 7/10/17    CORONARY ARTERY BYPASS GRAFT  08/23/2010    3V CABG:  LIMA to LAD, VG to RI, VG to OM1     Ignacio Zaidi DENTAL SURGERY      Last assessed  7/10/17    KNEE SURGERY Left     Last assessed 7/10/17    TONSILLECTOMY AND ADENOIDECTOMY      Last assessed 7/10/17    TOOTH EXTRACTION       Social History     Substance and Sexual Activity   Alcohol Use Not Currently    Alcohol/week: 1 0 standard drinks    Types: 1 Glasses of wine per week     Social History     Substance and Sexual Activity   Drug Use No     Social History     Tobacco Use   Smoking Status Current Every Day Smoker    Packs/day: 0 50    Types: Cigarettes   Smokeless Tobacco Never Used     Family History   Problem Relation Age of Onset    Alzheimer's disease Mother     Heart disease Father         cardiac disorder    Heart disease Brother         cardiac disorder       Meds/Allergies   all current active meds have been reviewed and current meds:   Current Facility-Administered Medications   Medication Dose Route Frequency    atorvastatin (LIPITOR) tablet 40 mg  40 mg Oral Daily With Dinner    hydrALAZINE (APRESOLINE) injection 10 mg  10 mg Intravenous Q6H PRN    Labetalol HCl (NORMODYNE) injection 10 mg  10 mg Intravenous Q6H PRN    levETIRAcetam (KEPPRA) tablet 500 mg  500 mg Oral Q12H [address]    [START ON 11/16/2019] levothyroxine tablet 50 mcg  50 mcg Oral Early Morning    magnesium sulfate 2 g/50 mL IVPB (premix) 2 g  2 g Intravenous Once    metoprolol tartrate (LOPRESSOR) tablet 50 mg  50 mg Oral Q12H [address]    niCARdipine (CARDENE) 25 mg (STANDARD CONCENTRATION) in sodium chloride 0 9% 250 mL  1-15 mg/hr Intravenous Titrated    nicotine (NICODERM CQ) 14 mg/24hr TD 24 hr patch 1 patch  1 patch Transdermal Daily    potassium chloride 20 mEq IVPB (premix)  20 mEq Intravenous Once     No Known Allergies    Objective   I/O     None          Physical Exam   Constitutional: He appears cachectic  HENT:   Head: Normocephalic and atraumatic  Eyes: Pupils are equal, round, and reactive to light  EOM are normal    Cardiovascular: Normal rate  Pulmonary/Chest: No respiratory distress  Abdominal: There is no tenderness  Neurological: He is alert   He has an abnormal Finger-Nose-Finger Test (left finger to nose intact, does not perform right finger to nose  )  Reflex Scores:       Bicep reflexes are 1+ on the right side and 1+ on the left side  Brachioradialis reflexes are 1+ on the right side and 1+ on the left side  Patellar reflexes are 0 on the right side and 0 on the left side  Achilles reflexes are 1+ on the right side and 1+ on the left side  Psychiatric: His speech is normal      Neurologic Exam     Mental Status   Oriented to person  Disoriented to place  Oriented to time  Oriented to year and month  Registration: recalls 3 of 3 objects  Follows 1 step commands  Attention: decreased  Concentration: decreased  Speech: speech is normal   Level of consciousness: alert  Knowledge: poor  Unable to perform simple calculations  Able to name object  Able to repeat  Abnormal comprehension  Cranial Nerves     CN III, IV, VI   Pupils are equal, round, and reactive to light  Extraocular motions are normal    Right pupil: Size: 4 mm  Shape: regular  Reactivity: brisk  Left pupil: Size: 4 mm  Shape: regular  Reactivity: brisk  CN III: no CN III palsy  CN VI: no CN VI palsy  Nystagmus: none   Diplopia: none  Ophthalmoparesis: none  Upgaze: normal  Downgaze: normal  Conjugate gaze: present    CN V   Facial sensation intact  CN VII   Right facial weakness: central  Left facial weakness: none    CN VIII   Hearing: impaired    CN XII   Tongue deviation: none    Motor Exam   Muscle bulk: decreased  Right arm pronator drift: present  Left arm pronator drift: absent    Strength   Strength 5/5 except as noted  RUE  5/5, minimal resistance with biceps 2-3/5    RLE 0/5, reflective flickering of toes when rubbing dorsal right foot     Sensory Exam   Unable to fully assess secondary to patient understanding    +right sided neglect/inattention      Gait, Coordination, and Reflexes     Coordination   Finger to nose coordination: abnormal (left finger to nose intact, does not perform right finger to nose )    Reflexes   Right brachioradialis: 1+  Left brachioradialis: 1+  Right biceps: 1+  Left biceps: 1+  Right patellar: 0  Left patellar: 0  Right achilles: 1+  Left achilles: 1+  Right plantar: equivocal  Left plantar: equivocal  Right Sandhu: absent  Left Sandhu: absent  Right ankle clonus: absent  Left ankle clonus: absent        Vitals:Blood pressure (!) 188/82, pulse 71, temperature 98 1 °F (36 7 °C), temperature source Oral, resp  rate 18, weight 65 8 kg (145 lb), SpO2 95 %  ,Body mass index is 20 51 kg/m²  Lab Results:   Results from last 7 days   Lab Units 11/15/19  0457 11/14/19  1539   WBC Thousand/uL 14 40* 10 60   HEMOGLOBIN g/dL 14 2 13 8*   HEMATOCRIT % 41 2* 40 8*   PLATELETS Thousands/uL 159 152   NEUTROS PCT % 77* 76*   MONOS PCT % 13* 13*     Results from last 7 days   Lab Units 11/15/19  1503 11/15/19  0457 11/14/19  1539   POTASSIUM mmol/L 3 4* 3 4* 3 6   CHLORIDE mmol/L 106 103 104   CO2 mmol/L 26 24 27   BUN mg/dL 14 13 15   CREATININE mg/dL 1 06 0 91 1 07   CALCIUM mg/dL 8 7 8 9 8 8   ALK PHOS U/L  --  60 56   ALT U/L  --  18 20   AST U/L  --  15 14     Results from last 7 days   Lab Units 11/15/19  0457   MAGNESIUM mg/dL 1 8*     Results from last 7 days   Lab Units 11/15/19  0457   PHOSPHORUS mg/dL 2 6*         No results found for: TROPONINT  ABG:No results found for: PHART, WTD7KBN, PO2ART, WTP2OUJ, Z0FWUEXV, BEART, SOURCE    Imaging Studies: I have personally reviewed pertinent reports  and I have personally reviewed pertinent films in PACS    Xr Chest 1 View Portable    Result Date: 11/14/2019  Narrative: CHEST INDICATION:   weakness  COMPARISON:  None EXAM PERFORMED/VIEWS:  XR CHEST PORTABLE FINDINGS:  Prior CABG  Fracture of the superior-most sternotomy wire without evidence for sternal dehiscence  Heart is enlarged  Mitral valvular prosthesis  Aortic tortuosity  Pulmonary vasculature appears slightly cephalized   Mild generalized increase in peripheral reticular lung markings (Kerley B lines)  No acute consolidation  No pneumothorax or effusion  Bones are unremarkable  Impression: Findings of mild volume overload with pulmonary vascular congestion and trace interstitial edema  Workstation performed: [id number]     Xr Humerus Right    Result Date: 11/15/2019  Narrative: RIGHT HUMERUS INDICATION:   right arm weakness  COMPARISON:  None VIEWS:  XR HUMERUS RIGHT FINDINGS: There is no acute fracture or dislocation  Mild acromial clavicular osteoarthritis No lytic or blastic lesions are seen  Soft tissues are unremarkable  Impression: No acute osseous abnormality  Workstation performed: [id number]     Xr Knee 4+ Views Right Injury    Result Date: 11/14/2019  Narrative: RIGHT KNEE INDICATION:   trauma  COMPARISON:  None VIEWS:  XR KNEE 4+ VW RIGHT INJURY FINDINGS: Large lipohemarthrosis is present  The finding is highly specific for fracture, though site of fracture is not well demonstrated  Statistically tibial plateaus most likely area of injury  Questionable fracture lucency posteriorly on lateral, though not definitive  No other areas concerning for fracture  Alignment is normal without subluxation  Mild conventional osteoarthritis involving the medial and patellofemoral compartments  No lytic or blastic lesions are seen  Soft tissues are unremarkable  Impression: Large lipohemarthrosis suggests intra-articular fracture, though fracture planes are poorly demonstrated  Statistically, tibial plateau is the most likely site and there may be some subtle cortical discontinuity posteriorly at the plateau on lateral view  Recommend CT for better characterization  Note: The study was marked in EPIC for immediate notification  Imaging follow-up reminder notification was scheduled in the electronic medical record  Workstation performed: [id number]     Ct Knee Right Wo Contrast    Result Date: 11/14/2019  Narrative: CT RIGHT KNEE INDICATION:   Trauma  COMPARISON: Plain film dated 11/14/2019 TECHNIQUE: Serial Axial CT images were acquired through the right knee  Coronal and sagittal reformation images were also obtained  This examination, like all CT scans performed in the [facility], was performed utilizing techniques to minimize radiation dose exposure, including the use of iterative reconstruction and automated exposure control  Rad dose 523 7 mGy-cm Contrast material was not utilized  FINDINGS: JOINT EFFUSION: Prominent layering lipohemarthrosis  ALIGNMENT: Anatomic  OSSEOUS STRUCTURES: Slight cortical depression noted at the anterolateral tibial plateau with cortical offset best seen on series 2 image 1:30  Findings are concerning for minimally displaced fracture  MENISCI: Menisci are not well evaluated by CT technique  CRUCIATE LIGAMENTS: Limited assessment by CT technique without gross evidence of tear  EXTENSOR APPARATUS: Limited assessment by CT technique without gross evidence of tear  COLLATERAL LIGAMENTS: Limited assessment by CT technique without gross evidence of tear  MUSCULATURE: Intact  REMAINING SOFT TISSUES: Unremarkable  Impression: Slight cortical depression deformity including cortical offset at the anterolateral tibial plateau concerning for fracture with corresponding prominent lipohemarthrosis  The study was marked in [facility] for immediate notification  Workstation performed: [id number]     Ct Stroke Alert Brain    Addendum Date: 11/15/2019 Addendum:   ADDENDUM: In retrospect, there is ill-defined high density within a high left frontal sulcus, series 2 image 32, consistent with acute posttraumatic subarachnoid hemorrhage  * I personally telephoned this result to [facility] on 11/15/2019 10:19 AM, and Geoffrey Dougherty at 10:04 AM     Result Date: 11/15/2019  Narrative: CT BRAIN - STROKE ALERT PROTOCOL INDICATION:   Altered mental status  COMPARISON:  None  TECHNIQUE:  CT examination of the brain was performed    In addition to axial images, coronal reformatted images were created and submitted for interpretation  Radiation dose length product (DLP) for this visit:  972 1 mGy-cm   This examination, like all CT scans performed in the [facility], was performed utilizing techniques to minimize radiation dose exposure, including the use of iterative reconstruction and automated exposure control  IMAGE QUALITY:  Diagnostic  FINDINGS:  PARENCHYMA:  No intracranial mass, mass effect or midline shift  No acute intracranial hemorrhage  No CT signs of acute infarction  Diffuse generalized volume loss, consistent with age  Encephalomalacia in the posterior temporal parietal region on the right likely reflecting sequela of remote ischemia  Scattered vascular calcifications  VENTRICLES AND EXTRA-AXIAL SPACES:  Normal for patient's age  VISUALIZED ORBITS AND PARANASAL SINUSES:  Unremarkable  CALVARIUM AND EXTRACRANIAL SOFT TISSUES:   Normal      Impression: No acute disease  Footprint of remote chronic large and small vessel ischemia  Findings were directly discussed with Dr Kimmy Abel, ICU attending on 11/15/2019 9:44 AM  Workstation performed: [id number]     Cta Stroke Alert (head/neck)    Result Date: 11/15/2019  Narrative: CTA NECK AND BRAIN WITH CONTRAST INDICATION: altered mental status rule out stroke COMPARISON:   Contemporary CT TECHNIQUE:   Post contrast imaging was performed after administration of iodinated contrast through the neck and brain  Post contrast axial 0 625 mm images timed to opacify the arterial system  3D rendering was performed on an independent workstation  MIP reconstructions performed  Coronal reconstructions were performed of the noncontrast portion of the brain  Radiation dose length product (DLP) for this visit:  177 4 mGy-cm     This examination, like all CT scans performed in the [facility], was performed utilizing techniques to minimize radiation dose exposure, including the use of iterative reconstruction and automated exposure control  IV Contrast:  85 mL of iohexol (OMNIPAQUE)  IMAGE QUALITY:   Diagnostic FINDINGS: CERVICAL VASCULATURE AORTIC ARCH AND GREAT VESSELS:  Moderate ahteroschlerotic disease of the arch and great vessels  Left common carotid artery occluded just beyond its origin  RIGHT VERTEBRAL ARTERY CERVICAL SEGMENT:  Minor origin stenosis The vessel is normal in caliber throughout the neck  LEFT VERTEBRAL ARTERY CERVICAL SEGMENT:  Moderately severe origin stenosis The vessel is normal in caliber throughout the neck  RIGHT EXTRACRANIAL CAROTID SEGMENT:  Normal origin  Atherosclerotic change at the bifurcation where discontiguous plaque ascends into the proximal ICA, no hemodynamically significant stenosis  LEFT EXTRACRANIAL CAROTID SEGMENT:  Left common carotid artery is occluded  This appears chronic  The ECA collaterals without significant reconstitution of the ICA  NASCET criteria was used to determine the degree of internal carotid artery diameter stenosis  INTRACRANIAL VASCULATURE INTERNAL CAROTID ARTERIES:  Right ICA is patent without hemodynamically significant stenosis  Scattered cavernous and supraclinoid ICA calcification  Ophthalmic artery origin normal   ICA terminus normal, posterior communicating artery is prominent  On the left, the ICA 1st appears at the level of the ophthalmic artery  Reflux through the posterior communicating artery with relatively normal ICA terminus  ANTERIOR CIRCULATION:  Right A1 is dominant, both are patent, anterior communicating artery patent  ELADIA branches within the interhemispheric fissure are normal  MIDDLE CEREBRAL ARTERY CIRCULATION:  M1 segment and middle cerebral artery branches demonstrate normal enhancement bilaterally  Minor stenosis of a right M2 branch origin   DISTAL VERTEBRAL ARTERIES:  Right vertebral artery dominant, PICA origins normal   Segmental occlusion of the distal left V4 segment, with reflux down the terminal segment of the vessel  BASILAR ARTERY:  Atherosclerotic changes within the basilar artery without critical stenosis  Superior cerebellar artery origins are normal  POSTERIOR CEREBRAL ARTERIES: Both posterior cerebral arteries  demonstrate normal enhancement  Persistent fetal circulation on the right  Left posterior communicating artery prominent  DURAL VENOUS SINUSES:  Normal  NON VASCULAR ANATOMY BONY STRUCTURES:  No acute osseous abnormality  SOFT TISSUES OF THE NECK:  Unremarkable  THORACIC INLET:  Unremarkable  Median sternotomy     Impression: 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, reconstitution of the supraclinoid ICA on the left with the patent ophthalmic, anterior and posterior communicating arteries  Flow restrictive disease at the origin and termination of the left vertebral artery  No flow restrictive disease elsewhere  Minor Post traumatic subarachnoid hemorrhage in a left frontal sulcus  Findings were directly discussed with Marek Fry on 11/15/2019 10:19 AM  and Sangeetha Zamorano at 10:04AM  Workstation performed: [id number]     EKG, Pathology, and Other Studies: I have personally reviewed pertinent reports     and I have personally reviewed pertinent films in PACS    VTE Prophylaxis: Sequential compression device (Venodyne)  and Reason for no pharmacologic prophylaxis tSA    Code Status: Level 3 - DNAR and DNI  Advance Directive and Living Will:      Power of :    POLST:

## 2019-11-15 NOTE — EMTALA/ACUTE CARE TRANSFER
[facility] SURGICAL UNIT  [address] [facility] [address] [phone]  Dept: [phone]      ACUTE CARE TRANSFER CONSENT    NAME Olinda KELLY 1940                              MRN [id number]    I have been informed of my rights regarding examination, treatment, and transfer   by Dr Ceci Blackwell MD    Benefits: Specialized equipment and/or services available at the receiving facility (Include comment)________________________    Risks: Increased discomfort during transfer, Potential for delay in receiving treatment      Transfer Request:  I acknowledge that my medical condition has been evaluated and explained to me by the treating physician or other qualified medical person and/or my attending physician who has recommended and offered to me further medical examination and treatment  I understand the Hospital's obligation with respect to the treatment and stabilization of my medical condition  I nevertheless request to be transferred  I release the Hospital, the doctor, and any other persons caring for me from all responsibility or liability for any injury or ill effects that may result from my transfer and agree to accept all responsibility for the consequences of my choice to transfer, rather than receive stabilizing treatment at the Hospital  I understand that because the transfer is my request, my insurance may not provide reimbursement for the services  The Hospital will assist and direct me and my family in how to make arrangements for transfer, but the hospital is not liable for any fees charged by the transport service  In spite of this understanding, I refuse to consent to further medical examination and treatment which has been offered to me, and request transfer to     I authorize the performance of emergency medical procedures and treatments upon me in both transit and upon arrival at the receiving facility  Additionally, I authorize the release of any and all medical records to the receiving facility and request they be transported with me, if possible  I authorize the performance of emergency medical procedures and treatments upon me in both transit and upon arrival at the receiving facility  Additionally, I authorize the release of any and all medical records to the receiving facility and request they be transported with me, if possible  I understand that the safest mode of transportation during a medical emergency is an ambulance and that the Hospital advocates the use of this mode of transport  Risks of traveling to the receiving facility by car, including absence of medical control, life sustaining equipment, such as oxygen, and medical personnel has been explained to me and I fully understand them  (LATRELL CORRECT BOX BELOW)  [  ]  I consent to the stated transfer and to be transported by ambulance/helicopter  [  ]  I consent to the stated transfer, but refuse transportation by ambulance and accept full responsibility for my transportation by car  I understand the risks of non-ambulance transfers and I exonerate the Hospital and its staff from any deterioration in my condition that results from this refusal     X___________________________________________    DATE  11/15/19  TIME________  Signature of patient or legally responsible individual signing on patient behalf           RELATIONSHIP TO PATIENT_________________________          Provider Certification    NAME [facility] 1940                              MRN [id number]    A medical screening exam was performed on the above named patient    Based on the examination:    Condition Necessitating Transfer subarachnoid hemorrhage     Patient Condition: The patient has been stabilized such that within reasonable medical probability, no material deterioration of the patient condition or the condition of the unborn child(vincenzo) is likely to result from the transfer    Reason for Transfer: Level of Care needed not available at this facility    Transfer Requirements: Facility     · Space available and qualified personnel available for treatment as acknowledged by    · Agreed to accept transfer and to provide appropriate medical treatment as acknowledged by          · Appropriate medical records of the examination and treatment of the patient are provided at the time of transfer   [address] [facility], [address] _______  · Transfer will be performed by qualified personnel from    and appropriate transfer equipment as required, including the use of necessary and appropriate life support measures  Provider Certification: I have examined the patient and explained the following risks and benefits of being transferred/refusing transfer to the patient/family:  General risk, such as traffic hazards, adverse weather conditions, rough terrain or turbulence, possible failure of equipment (including vehicle or aircraft), or consequences of actions of persons outside the control of the transport personnel      Based on these reasonable risks and benefits to the patient and/or the unborn child(vincenzo), and based upon the information available at the time of the patients examination, I certify that the medical benefits reasonably to be expected from the provision of appropriate medical treatments at another medical facility outweigh the increasing risks, if any, to the individuals medical condition, and in the case of labor to the unborn child, from effecting the transfer      X____________________________________________ DATE 11/15/19        TIME_______      ORIGINAL - SEND TO MEDICAL RECORDS   COPY - SEND WITH PATIENT DURING TRANSFER

## 2019-11-15 NOTE — ASSESSMENT & PLAN NOTE
· Patient has hypertensive emergency as manifested by mildly elevated troponin  · Patient is medically noncompliant with his medications  · Patient given metoprolol, lisinopril and nifedipine  · BP improved  Per neurology, keep SBP less than 140

## 2019-11-15 NOTE — ASSESSMENT & PLAN NOTE
· Patient lives home alone  · The emergency room physician was concerned about his home health living conditions  · I called PCP's office, his brother was listed as emergency contact without phone number  When asked the patient refused for me to call any of his family member  I asked if I could call his neighbor who apparently he called after fall but the patient does not remember the phone number

## 2019-11-15 NOTE — PHYSICAL THERAPY NOTE
Physical Therapy Evaluation     Patient's Name: Mynor Talamantes    Admitting Diagnosis  Knee injury [S89 90XA]  Elevated troponin [R79 89]  Hypertensive urgency [I16 0]  Ambulatory dysfunction [R26 2]  Knee injury, initial encounter [S89 90XA]    Problem List  Patient Active Problem List   Diagnosis    Allergic rhinitis    Coronary artery disease    Hyperlipidemia    Hard of hearing    Hypertension    Hypothyroidism    Palpitations    Tobacco use disorder    Peripheral vascular disease ([address] )    Fall (on) (from) other stairs and steps, initial encounter    H/O noncompliance with medical treatment, presenting hazards to health       Past Medical History  Past Medical History:   Diagnosis Date    Coronary artery disease     history of CABG    Disease of thyroid gland     Hemorrhoids     last assessed 7/10/17    History of arterial duplex of LE 01/10/2017    Bilateral occlusion of the superficial femoral arteries, severe diminution of the ankle brachial index bilaterally, disease appears worse on left than right   History of echocardiogram 07/20/2011    hypokinesia of the inferior wall  EF 50%   HL (hearing loss)     Hyperlipidemia     Hypertension     PVD (peripheral vascular disease)        Past Surgical History  Past Surgical History:   Procedure Laterality Date    COLONOSCOPY      Last assessed 7/10/17    CORONARY ARTERY BYPASS GRAFT  08/23/2010    3V CABG:  LIMA to LAD, VG to RI, VG to OM1      DENTAL SURGERY      Last assessed  7/10/17    KNEE SURGERY Left     Last assessed 7/10/17    TONSILLECTOMY AND ADENOIDECTOMY      Last assessed 7/10/17    TOOTH EXTRACTION            11/15/19 [id number]   Note Type   Note type Eval/Treat   Pain Assessment   Pain Assessment FLACC   Pain Rating: FLACC (Rest) - Face 0   Pain Rating: FLACC (Rest) - Legs 0   Pain Rating: FLACC (Rest) - Activity 0   Pain Rating: FLACC (Rest) - Cry 0   Pain Rating: FLACC (Rest) - Consolability 0   Score: FLACC (Rest) 0 Pain Rating: FLACC (Activity) - Face 1   Pain Rating: FLACC (Activity) - Legs 0   Pain Rating: FLACC (Activity) - Activity 0   Pain Rating: FLACC (Activity) - Cry 1   Pain Rating: FLACC (Activity) - Consolability 0   Score: FLACC (Activity) 2   Home Living   Type of Home Apartment  (2nd floor per chart review c FF to enter)   Home Layout Stairs to enter with rails  (2nd floor apt)   Home Equipment   (unknown - pt unable to report)   Prior Function   Level of [place] Independent with ADLs and functional mobility   Lives With   (pt states he lives alone - ? reliability)   ADL Assistance Independent   Falls in the last 6 months   (1 fall PTA)   Comments Per chart review, pt was in normal state of health until 11/13/19  Pt was carrying groceries up his FF of steps to enter apt on 2nd floor and fell, hurting his R knee  Pt managed to get up and crawl to his apartment and remained on couch and then bed all day/night  Restrictions/Precautions   Weight Bearing Precautions Per Order No  (none per chart review, maintained NWB )   Braces or Orthoses LE Immobilizer  (LLE )   Other Precautions Cognitive; Bed Alarm; Fall Risk;Telemetry;Hard of hearing   General   Additional Pertinent History CHEST X-RAY 11/14/19: Findings of mild volume overload with pulmonary vascular congestion and trace interstitial edema; X-RAY R KNEE 11/14/19: Large lipohemarthrosis suggests intra-articular fracture, though fracture planes are poorly demonstrated    Statistically, tibial plateau is the most likely site and there may be some subtle cortical discontinuity posteriorly at the plateau on lateral    Family/Caregiver Present No   Cognition   Arousal/Participation Alert   Orientation Level Disoriented X4  (pt inconsistently answers questions, smiles throughout)   Memory Decreased recall of biographical information;Decreased short term memory;Decreased recall of recent events;Decreased recall of precautions   Following Commands Follows one step commands inconsistently   Comments pt agreeable to PT session   RLE Assessment   RLE Assessment X  (immobilizer donned, unable to assess)   LLE Assessment   LLE Assessment X   Strength LLE   L Hip Flexion 3-/5   L Knee Extension 3-/5   Coordination   Movements are Fluid and Coordinated 0   Sensation   (unable to formally assess 2/2 cog status)   Bed Mobility   Rolling R 3  Moderate assistance   Additional items Assist x 2;Bedrails; Increased time required;Verbal cues   Rolling L 3  Moderate assistance   Additional items Assist x 2;Bedrails; Increased time required;Verbal cues   Supine to Sit Unable to assess  (PT deferred 2/2 ortho eval pending)   Additional Comments Pt is poor historian, unable to answer PLOF and social history information  During evaluation, pt repeats questions asked and reports "I don't know my name or birthday"  Pt primarily smiles during assessment, however grimaced in pain during rolling task 2/2 saturated bed sheet/linens  Pt questioned if he knew what a napkin was and what it was for, pt unable to report such  Pt unable to initiate AROM of R UE over head, remains in IR posturing of shoulder & clenched fist during assessment  Brittnee RAMÍREZ and NATHAN Zavaleta immediately made aware of assessment findings  Transfers   Sit to Stand Unable to assess  (PT deferred 2/2 ortho eval pending)   Balance   Static Sitting   (DNT)   Endurance Deficit   Endurance Deficit Yes   Activity Tolerance   Activity Tolerance Treatment limited secondary to medical complications (Comment)   Medical Staff Made Aware Piedad Low immediately made aware of session findings & pt's inconsistency to answer questions appropriately and A&O x 0   Nurse Made Aware Yes, NATHAN Zavaleta verbalized pt appropriate for bed level assessment, made aware of outcomes/recs   Assessment   Prognosis Guarded   Problem List Decreased strength;Decreased endurance;Decreased mobility; Decreased cognition;Decreased safety awareness; Impaired judgement   Assessment Pt is 67 y o  male seen for PT evaluation on 11/15/2019 s/p admit to [address] on 11/14/2019 w/ Fall (on) (from) other stairs and steps, initial encounter  PT consulted to assess pt's functional mobility and d/c needs  Order placed for PT eval and tx, w/ up and OOB as tolerated order  Performed at least 2 patient identifiers during session: Name and wristband  Comorbidities affecting pt's physical performance at time of assessment include: CAD, HLD, HTN, hypothyroidism, tobacco use disorder, h/o noncompliance c medical treatment  PTA, pt was independent w/ all functional mobility w/ ? AD use, ambulates community distances and elevations and lives alone in 2nd level apartment  Personal factors affecting pt at time of IE include: decreased cognition, positive fall history, decreased initiation and engagement, unable to perform physical activity, limited insight into impairments, inability to perform IADLs and inability to perform ADLs  Please find objective findings from PT assessment regarding body systems outlined above with impairments and limitations including weakness, decreased endurance, decreased activity tolerance, decreased functional mobility tolerance, decreased safety awareness, impaired judgement and fall risk, as well as mobility assessment (need for cueing for mobility technique)  The following objective measures performed on IE also reveal limitations: Barthel Index: 5/100  Pt's clinical presentation is currently unstable/unpredictable seen in pt's presentation of need for input for task focus and mobility technique, on telemetry monitoring and ongoing medical assessment  Pt to benefit from continued PT tx to address deficits as defined above and maximize level of functional independent mobility and consistency  From PT/mobility standpoint, recommendation at time of d/c would be STR pending progress in order to facilitate return to PLOF  Barriers to Discharge Other (Comment)  (unknown)   Goals   Patient Goals none expressed 2/2 cog status   STG Expiration Date 11/25/19   Short Term Goal #1 In 7-10 days: Increase bilateral LE strength 1/2 grade to facilitate independent mobility, Perform all bed mobility tasks with min A of 1 to decrease caregiver burden, Tolerate 4 hr OOB to faciliate upright tolerance, Complete % of the time, PT provider will perform functional balance assessment to determine fall risk and PT to see and establish goals for transfers, ambulation, elevations when appropriate   PT Treatment Day 0   Plan   Treatment/Interventions Functional transfer training; Therapeutic exercise; Endurance training;Cognitive reorientation;Patient/family training;Equipment eval/education; Bed mobility;Spoke to nursing;Spoke to advanced practitioner;OT   PT Frequency 5x/wk   Recommendation   Recommendation Short-term skilled PT  (at this time, however pending medical workup)   Equipment Recommended   (TBD pending progress)   PT - OK to Discharge No   Barthel Index   Feeding 5   Bathing 0   Grooming Score 0   Dressing Score 0   Bladder Score 0   Bowels Score 0   Toilet Use Score 0   Transfers (Bed/Chair) Score 0   Mobility (Level Surface) Score 0   Stairs Score 0   Barthel Index Score 5         Alexsandra Osuna, PT

## 2019-11-15 NOTE — ASSESSMENT & PLAN NOTE
· Cessation counseling provided, patient quite not yet ready to quit  · Nicotine patch has been ordered

## 2019-11-15 NOTE — H&P
H&P Exam - Trauma   Casper Pineda 67 y o  male MRN: [id number]  Unit/Bed#: ED 11 Encounter: [id number]    Assessment/Plan   Trauma Alert: Evaluation  Model of Arrival: Ambulance  Trauma Team: Attending Dr Joan Merritt and AP Hardy Lee  Consultants: Orthopaedics: right tibial plateau fracture  Time Called 14:48, Returned call: Yes 14:50, Neurosurgery: [facility]  Time Called 14:50, Returned call: Yes 14:50 and Other: gerontology and cardiology (hypertensive emergency)   Time Called 14:53    Trauma Active Problems:     Right tibial plateau fracture  Small SAH  Right sided hemiplegia - new, unclear etiology  Speech is clear, facial symmetry present, EOM intact  Cannot follow ataxia commands related to some apparent confusion but follows other commands with left side only  Hypertensive Emergency with mildly elevated troponin - trend   Confusion - unsure of acute delirium vs underlying cognitive impairment   Medical non-compliance      Trauma Plan:    Right leg in knee brace - continue, consult ortho  SAH - admit HOT  DDAVP given GCS 14  Neurosurgery eval    Hemiplegia out of proportion for small SAH - look for other etiology  Neurology consulted  Pt unable to get MRI related to confused, unable to answer MRI questionnaire and no family contacts  Follow-up neurology recommendations  HTN - cardiology consult  ECHO ordered prior to transfer  Recommending cardene for close BP control, goal < 160   Confusion - gerontology consult  Unclear acuity vs underlying cognitive impairment  Medical non-compliance - will need to determine next of kin/family contact given patient's confusion  Unsafe living environment (independently)  CM assisting   Admit SD1 given cardene infusion     Chief Complaint: "I won't move my right arm"    History of Present Illness   HPI:  Casper Pineda is a 67 y o  male who presents with small SAH noticed on CT head during stroke alert at [facility]   He presented to [facility] after sustaining a fall down stairs, reportedly carrying heavy groceries  Pt states he lost his balance, denies any syncope or pre-syncope symptoms  He was being treated for a right tibial plateau fracture and HTN emergency with mildly elevated troponins  He is known to be non-compliant with his medications, s/p remote history of CABG and HTN  He was restarted on his prescribed medications including aspirin  He was noted to have right sided hemiplegia/weakness and slurred speach at some point while hospitalized and a stroke alert was called  Exact start of symptoms is unclear  CT revealed small SAH but this does not explain his symptoms  He was given DDAVP and transferred for further evaluation  Upon arrival, he is GCS 14, noted to be confused and continues to have right sided weakness  He will dorsiflex his right foot with noxious stimuli to the top of his foot and will move his right index finger with noxious stimuli  Otherwise, he does not move the right side  He has no gaze or optic deficits and his speech is clear, though he is confused  He denies having any family and gets agitated with persistent inquiry  He refuses to provide any contact information for his brother whom is noted by his PCP as his emergency contact but has no contact number provided  He adamantly states he would not want CPR or intubated  He reports that he stopped taking his medications "months ago  I decided I know just as well as the doctors  I'm old anyway  What do I need those for anymore " He denies any other injuries or complaints  His Right knee remains in a locked knee brace  Mechanism:Fall    Review of Systems   Constitutional: Negative for chills and fever  HENT: Negative for trouble swallowing  Respiratory: Negative for cough, choking, chest tightness, shortness of breath, wheezing and stridor  Cardiovascular: Negative for chest pain and palpitations  Gastrointestinal: Negative for abdominal distention and abdominal pain     Genitourinary: Negative for difficulty urinating  Musculoskeletal: Positive for joint swelling (right kneer)  Negative for back pain, myalgias, neck pain and neck stiffness  Skin: Negative for wound  Neurological: Positive for weakness (right sided)  Negative for dizziness, facial asymmetry, light-headedness, numbness and headaches  Psychiatric/Behavioral: Negative for confusion (denies memory problems or confusion)  12-point, complete review of systems was reviewed and negative except as stated above  Historical Information   History is unobtainable from the patient due to patient is poor historian/confused  Efforts to obtain history included the following sources: obtained from other records    Past Medical History:   Diagnosis Date    Coronary artery disease     history of CABG    Disease of thyroid gland     Hemorrhoids     last assessed 7/10/17    History of arterial duplex of LE 01/10/2017    Bilateral occlusion of the superficial femoral arteries, severe diminution of the ankle brachial index bilaterally, disease appears worse on left than right   History of echocardiogram 07/20/2011    hypokinesia of the inferior wall  EF 50%   HL (hearing loss)     Hyperlipidemia     Hypertension     PVD (peripheral vascular disease)      Past Surgical History:   Procedure Laterality Date    COLONOSCOPY      Last assessed 7/10/17    CORONARY ARTERY BYPASS GRAFT  08/23/2010    3V CABG:  LIMA to LAD, VG to RI, VG to OM1      DENTAL SURGERY      Last assessed  7/10/17    KNEE SURGERY Left     Last assessed 7/10/17    TONSILLECTOMY AND ADENOIDECTOMY      Last assessed 7/10/17    TOOTH EXTRACTION       Social History   Social History     Substance and Sexual Activity   Alcohol Use Not Currently    Alcohol/week: 1 0 standard drinks    Types: 1 Glasses of wine per week     Social History     Substance and Sexual Activity   Drug Use No     Social History     Tobacco Use   Smoking Status Current Every Day Smoker  Packs/day: 0 50    Types: Cigarettes   Smokeless Tobacco Never Used       There is no immunization history on file for this patient  Last Tetanus: refused  Family History: Non-contributory  Unable to obtain/limited by       Meds/Allergies   all current active meds have been reviewed   Pt reports he was not taking any medications prior to hospitalization  Prescribed regimen including ASA, statin, BB, ACE and procardia was started on admission but have since been stopped given above     No Known Allergies      PHYSICAL EXAM    PE limited by:     Objective   Vitals:   First set: Temperature: 98 1 °F (36 7 °C) (11/15/19 1400)  Pulse: 77 (11/15/19 1400)  Respirations: 18 (11/15/19 1400)  Blood Pressure: (!) 212/88 (11/15/19 1400)    Primary Survey:   (A) Airway: intact  (B) Breathing: equal bilaterally  (C) Circulation: Pulses:   pedal  2/4 and radial  2/4  (D) Disabliity:  GCS Total:  14 E4 V4 M6  (E) Expose:  Completed    Secondary Survey: (Click on Physical Exam tab above)  Physical Exam   Constitutional: No distress  HENT:   Head: Normocephalic and atraumatic  Eyes: Pupils are equal, round, and reactive to light  Conjunctivae and EOM are normal  Right eye exhibits no discharge  Left eye exhibits no discharge  Neck: Normal range of motion  No tracheal deviation present  Cardiovascular: Normal rate, regular rhythm, normal heart sounds and intact distal pulses  Pulmonary/Chest: Effort normal and breath sounds normal  No stridor  No respiratory distress  He has no wheezes  He has no rales  He exhibits no tenderness  Abdominal: Soft  Bowel sounds are normal  He exhibits no distension and no mass  There is no tenderness  There is no rebound and no guarding  Musculoskeletal: He exhibits edema and tenderness (right leg edema, tenderness at knee with passive movement )  Neurological: He is alert     GCS 14 (E4 V4 M6)  Speech clear  Cannot follow commands for ataxia assessment but will follow simple commands with left side  LUE and LLE 5/5 strength and full ROM  RUE does not move or withdrawal, will intermittently move index finger to noxious stimuli only  RLE does not move, will dorsiflex with noxious stimuli to top of foot  Reports sensation in all extremities    Skin: Skin is warm and dry  Capillary refill takes less than 2 seconds  He is not diaphoretic  Invasive Devices     Peripheral Intravenous Line            Peripheral IV 11/14/19 Right Arm less than 1 day                Lab Results:   Results: I have personally reviewed pertinent reports  and I have personally reviewed pertinent films in PACS, BMP/CMP:   Lab Results   Component Value Date    SODIUM 141 11/15/2019    K 3 4 (L) 11/15/2019     11/15/2019    CO2 26 11/15/2019    BUN 14 11/15/2019    CREATININE 1 06 11/15/2019    CALCIUM 8 7 11/15/2019    AST 15 11/15/2019    ALT 18 11/15/2019    ALKPHOS 60 11/15/2019    EGFR 67 11/15/2019    and CBC:   Lab Results   Component Value Date    WBC 14 40 (H) 11/15/2019    HGB 14 2 11/15/2019    HCT 41 2 (L) 11/15/2019    MCV 98 11/15/2019     11/15/2019    MCH 33 6 11/15/2019    MCHC 34 3 11/15/2019    RDW 13 5 11/15/2019    MPV 9 6 11/15/2019     Imaging/EKG Studies: Results: I have personally reviewed pertinent reports      Other Studies:     Code Status: Level 3 - DNAR and DNI  Advance Directive and Living Will:      Power of :    POLST:

## 2019-11-15 NOTE — OCCUPATIONAL THERAPY NOTE
Occupational Therapy Evaluation      Cecilio Navarro    11/15/2019    Patient Active Problem List   Diagnosis    Allergic rhinitis    Coronary artery disease    Hyperlipidemia    Hard of hearing    Hypertension    Hypothyroidism    Palpitations    Tobacco use disorder    Peripheral vascular disease ([address] )    Fall (on) (from) other stairs and steps, initial encounter    H/O noncompliance with medical treatment, presenting hazards to health       Past Medical History:   Diagnosis Date    Coronary artery disease     history of CABG    Disease of thyroid gland     Hemorrhoids     last assessed 7/10/17    History of arterial duplex of LE 01/10/2017    Bilateral occlusion of the superficial femoral arteries, severe diminution of the ankle brachial index bilaterally, disease appears worse on left than right   History of echocardiogram 07/20/2011    hypokinesia of the inferior wall  EF 50%   HL (hearing loss)     Hyperlipidemia     Hypertension     PVD (peripheral vascular disease)        Past Surgical History:   Procedure Laterality Date    COLONOSCOPY      Last assessed 7/10/17    CORONARY ARTERY BYPASS GRAFT  08/23/2010    3V CABG:  LIMA to LAD, VG to RI, VG to OM1  [employer] DENTAL SURGERY      Last assessed  7/10/17    KNEE SURGERY Left     Last assessed 7/10/17    TONSILLECTOMY AND ADENOIDECTOMY      Last assessed 7/10/17    TOOTH EXTRACTION          11/15/19 0818   Note Type   Note type Eval only   Restrictions/Precautions   Weight Bearing Precautions Per Order No  (none per chart review )   Braces or Orthoses LE Immobilizer  (LLE)   Other Precautions Cognitive; Fall Risk;Hard of hearing   Pain Assessment   Pain Assessment FLACC   Pain Score No Pain   Pain Rating: FLACC (Rest) - Face 0   Pain Rating: FLACC (Rest) - Legs 0   Pain Rating: FLACC (Rest) - Activity 0   Pain Rating: FLACC (Rest) - Cry 0   Pain Rating: FLACC (Rest) - Consolability 0   Score: FLACC (Rest) 0   Pain Rating: FLACC (Activity) - Face 1   Pain Rating: FLACC (Activity) - Legs 0   Pain Rating: FLACC (Activity) - Activity 0   Pain Rating: FLACC (Activity) - Cry 1   Pain Rating: FLACC (Activity) - Consolability 0   Score: FLACC (Activity) 2   Home Living   Type of Home Apartment  (2nd floor per chart review c FF to enter)   Home Layout Stairs to enter with rails  (2nd floor apt)   Additional Comments Further home living arrangements unknown 2/2 pt's decreased cognition  Prior Function   Level of [place] Independent with ADLs and functional mobility   Lives With Alone  (per pt report)   ADL Assistance Independent   Falls in the last 6 months 1 to 4  (1 Fall PTA )   Comments Patient reporting living alone but does not ambulate at baseline  Pt is a poor historian at this time  Per chart review, pt was in normal state of health until 11/13/19  Pt was carrying groceries up his FF of steps to enter apt on 2nd floor and fell, hurting his R knee  Pt managed to get up and crawl to his apartment and remained on couch and then bed all day/night  ADL   Eating Assistance 4  Minimal Assistance   Grooming Assistance 4  Minimal Assistance   UB Bathing Assistance 3  Moderate Assistance   LB Bathing Assistance 2  Maximal Assistance   UB Dressing Assistance 2  Maximal [address]  [address]  1  Total Assistance   Bed Mobility   Rolling R 3  Moderate assistance   Additional items Assist x 2;Bedrails; Increased time required;Verbal cues;LE management   Rolling L 3  Moderate assistance   Additional items Assist x 2;Bedrails; Increased time required;Verbal cues   Additional Comments Pt noted to grimaced in pain during rolling task   Functional Mobility   Additional Comments Further functional mobility was deferred due to othro pending and safety concerns      Balance   Static Sitting   (DNT)   Activity Tolerance   Activity Tolerance Treatment limited secondary to medical complications (Comment)  (decreased cognition )   Medical Staff Made Aware Cisco Adams immediately made aware of session findings & pt's inconsistency to answer questions appropriately and A&O x 0   Nurse Made Aware NATHAN Wayne verbalized pt appropriate for bed level assessment, made aware of outcomes/recs   RUE Assessment   RUE Assessment X  (no AROM of RUE, RUE remains in IR posturing c clench fist )   LUE Assessment   LUE Assessment WFL  (full AROM noted, grossly 3+/5 MMT)   Hand Function   Gross Motor Coordination Impaired   Fine Motor Coordination Impaired   Sensation   Light Touch Not tested  (unable to formally assess 2/2 decreased cognition )   Cognition   Overall Cognitive Status Impaired   Arousal/Participation Alert; Responsive   Attention Difficulty attending to directions   Orientation Level Disoriented X4  (unable to report name/birthday, unable to name napkin )   Memory Decreased recall of biographical information;Decreased short term memory;Decreased recall of recent events;Decreased recall of precautions   Following Commands Follows one step commands inconsistently   Comments Pt is poor historian, unable to answer PLOF and social history information  During evaluation, pt repeats questions asked and primarily smiles during interaction  Assessment   Limitation Decreased ADL status; Decreased UE ROM; Decreased UE strength;Decreased Safe judgement during ADL;Decreased cognition;Decreased endurance;Decreased self-care trans;Decreased high-level ADLs   Prognosis Fair   Assessment Pt is a 67 y o  male who was admitted to [address] [place] [address] on 11/14/2019 with Fall (on) (from) other stairs and steps, initial encounter  Xray of R knee indicated  intra-articular fracture most likely at the tibial plateau  At this time, patient is also affected by the comorbidities of: CAD, hyperlipidemia, HTN, hypothyroidism, tobacco use disorder, and h/o noncompliance with medical treatment   Additionally, patient is affected by the following personal factors:steps to enter environment, limited home support, difficulty performing ADLS and difficulty performing IADLS   Orders placed for OT evaluation/treatment with up and OOB as tolerated orders  Prior to admission, Patient reporting living alone but does not ambulate at baseline  Pt is a poor historian at this time  Per chart review, pt was in normal state of health until 11/13/19  Pt was carrying groceries up his FF of steps to enter apt on 2nd floor and fell, hurting his R knee  Pt managed to get up and crawl to his apartment and remained on couch and then bed all day/night  Upon OT evaluation, patient requires moderate assist and maximum assist for UB ADLs and maximum assist and total assist for LB ADLs  Occupational performance is affected by the following deficits: decreased ROM, decreased strength, decreased balance, decreased tolerance, impaired attention, impaired initiation, impaired memory, impaired sequencing and impaired problem solving  Callum Morales immediately made aware of session findings & pt's inconsistency to answer questions appropriately and A&O x 0  Based on the following information, patient would benefit from continued skilled OT treatment while in the hospital to address deficits and maximize level of functional independence with ADL's and functional mobility  Occupational Performance areas to address include: grooming, bathing/shower, toilet hygiene, dressing, medication management, functional mobility, clothing management, meal prep and household maintenance  From OT standpoint, recommendation at time of d/c would be short term rehab  Goals   Patient Goals none expressed 2/2 cog status   Plan   Treatment Interventions ADL retraining;Functional transfer training;UE strengthening/ROM; Endurance training;Cognitive reorientation;Patient/family training;Equipment evaluation/education; Neuromuscular reeducation; Compensatory technique education; Fine motor coordination activities;Continued evaluation; Activityengagement   Goal Expiration Date 11/25/19   OT Treatment Day 0   OT Frequency 3-5x/wk   Recommendation   OT Discharge Recommendation Short Term Rehab  (Pending medical work-up )   OT - OK to Discharge Yes  (Once medically cleared)   Barthel Index   Feeding 5   Bathing 0   Grooming Score 0   Dressing Score 0   Bladder Score 0   Bowels Score 0   Toilet Use Score 0   Transfers (Bed/Chair) Score 0   Mobility (Level Surface) Score 0   Stairs Score 0   Barthel Index Score 5     GOALS:    Pt will achieve the following within specified time frame: STG  Pt will achieve the following goals within 5 days    *ADL transfers with Mod (A) for inc'd independence with ADLs/purposeful tasks    *UB ADL with Min (A) for inc'd independence with self cares    *LB ADL with Mod (A) using AE prn for inc'd independence with self cares    *Toileting with Mod (A) for clothing management and hygiene for return to PLOF with personal care    *Increase static stand balance to fair- and dyn stand balance to poor+ for inc'd safety with standing purposeful tasks    *Increase stand tolerance x3 m for inc'd tolerance with standing purposeful tasks    *Participate in 10m UE therex to increase overall stamina/activity tolerance for purposeful tasks    *Bed mobility- Min (A) for inc'd independence to manage own comfort and initiate EOB & OOB purposeful tasks    *Patient will verbalize 3 safety awareness/ principles to prevent falls in the home setting         Pt will achieve the following within specified time frame: LTG  Pt will achieve the following goals within 10 days    *ADL transfers with Min (A) for inc'd independence with ADLs/purposeful tasks    *Self Feeding- (S) for inc'd independence with providing self nourishment    *UB ADL with CGA for inc'd independence with self cares    *LB ADL with Min (A) using AE prn for inc'd independence with self cares    *Toileting with Min (A) for clothing management and hygiene for return to PLOF with personal care    *Increase static stand balance to fair and dyn stand balance to fair- for inc'd safety with standing purposeful tasks    *Increase stand tolerance x6 m for inc'd tolerance with standing purposeful tasks    *Bed mobility- CGA for inc'd independence to manage own comfort and initiate EOB & OOB purposeful tasks    *Patient will increase OOB/sitting tolerance to 2-4 hours per day to increase participation in self-care and leisure tasks with no s/s of exertion           Yesenia Mesa MS, OTR/L

## 2019-11-15 NOTE — ASSESSMENT & PLAN NOTE
· With severe ambulatory dysfunction  · Admit to med surge  · Initial x-ray right knee yielded the following results:  Large lipohemarthrosis suggests intra-articular fracture, though fracture planes are poorly demonstrated   Statistically, tibial plateau is the most likely site and there may be some subtle cortical discontinuity posteriorly at the plateau on lateral view   Recommend CT for better characterization  · CT right knee yielded the results: Slight cortical depression deformity including cortical offset at the anterolateral tibial plateau concerning for fracture with corresponding prominent lipohemarthrosis    · Will consult Orthopedics  · Will use Tylenol mild pain, Norco for moderate pain, and IV morphine for severe pain  · Add a PT OT evaluation

## 2019-11-15 NOTE — PROGRESS NOTES
Acceptance Note - Critical Care   Arnulfo Block 67 y o  male MRN: [id number]  Unit/Bed#: ED 6 Encounter: [id number]        ----------------------------------------------------------------------------------------  HPI/24hr events: 67year old male with PMH HTN, hypothyroidism, HLD, CAD s/p CABG 7/17 who presents as a transfer from [facility] after a fall with injury to his R knee with R tibial plateau fracture on CT  Received DDAVP for ASA reversal  Was in hypertensive emergency with SBPs in 200s and mildly elevated troponin  Currently on NIcardine gtt at 5mg/hr  While at [facility] developed right sided hemiplegia and dysarthria and stroke alert was called  CT head and CTA head/neck showed small SAH and patient was transferred to [facility] for further evaluation      ---------------------------------------------------------------------------------------  SUBJECTIVE  Patient sitting in bed, states that he is at "some hospital so they tell me "     Review of Systems   Constitutional: Negative  HENT: Negative  Eyes: Negative  Respiratory: Negative  Cardiovascular: Negative  Gastrointestinal: Negative  Endocrine: Negative  Genitourinary: Negative  Musculoskeletal:        Right knee pain   Allergic/Immunologic: Negative  Neurological: Negative  Hematological: Negative  Psychiatric/Behavioral: Negative        Review of systems was reviewed and negative unless stated above in HPI/24-hour events   ---------------------------------------------------------------------------------------  Assessment and Plan:    Neuro:    Diagnosis: SAH  o Plan: CT head/CTA head and neck: minor SAH in L frontal sulcus, chroinc L common carotid occlusion  o R sided hemiplegia  o Neurosurg consulted- Symptoms not correlated with hemiplegia, will obtain MRI if able, stat CT head with decline GCS <2,  hx of ASA use s/p DDAVP, Keppra BID x 7 days for sz prpz, gold DVT ppx until stable CT head, SBP goal 140-160    CV:    Diagnosis: Hypertensive emergency, CAD s/p CABG July 2017  o Plan: /91, Elevated troponin 0 05, on metoprolol 5-mg Q12, Nicardine 5mg/hr, on Atorvastatin, cardiology following, 2D echo ordered SBP goal 140-160      Pulm:   No acute issues, 94% on RA      GI:    No acute issues  Bowel regimen with colace and Senokot  :    No acute issues, Cr  1 06, monitor I/Os      F/E/N:    No maintenance fluids   Electrolytes- replete as necessary   Diet: cardiovascular diet      Heme/Onc:    No acute issues   DVT ppx- SCDs, hold DVT ppx until CTH stable      Endo:    Diagnosis: Hyperlipidemia  o On Atorvastatin 40mg QD   Diagnosis: Hypothyroidism  o Plan: Continue levothyroxine 50mcg      ID:    No acute issues      MSK/Skin:    Diagnosis: R tibial plateau fx  o CT R knee with R tibial plateau fx, ortho following, NWB and non-operative at this time  o       Disposition: Continue Critical Care   Code Status: Level 3 - DNAR and DNI  ---------------------------------------------------------------------------------------  ICU CORE MEASURES    Prophylaxis   VTE Pharmacologic Prophylaxis: Pharmacologic VTE Prophylaxis contraindicated due to [facility]  VTE Mechanical Prophylaxis: sequential compression device  Stress Ulcer Prophylaxis: Prophylaxis Not Indicated     ABCDE Protocol (if indicated)  Plan to perform spontaneous awakening trial today? Not applicable  Plan to perform spontaneous breathing trial today? Not applicable  Obvious barriers to extubation? Not applicable  CAM-ICU: Negative    Invasive Devices Review  Invasive Devices     Peripheral Intravenous Line            Peripheral IV 11/14/19 Right Arm 1 day    Peripheral IV 11/15/19 Left Arm less than 1 day              Can any invasive devices be discontinued today? Not applicable  ---------------------------------------------------------------------------------------  OBJECTIVE    Physical Exam   Constitutional: He appears well-developed and well-nourished   No distress  HENT:   Head: Normocephalic and atraumatic  Eyes: Pupils are equal, round, and reactive to light  EOM are normal    Neck: Normal range of motion  Neck supple  Cardiovascular: Normal rate, regular rhythm and normal heart sounds  Pulmonary/Chest: Effort normal and breath sounds normal    Abdominal: Soft  Bowel sounds are normal  He exhibits no distension  There is no tenderness  Musculoskeletal:   Hemiplegia of RUE and RLE  3/5  strength in right hand  0/5 strength in RUE otherwise  4/5 strength in LUE  4/5 strength in LLE  RLE in knee immobilizer, 1/5 strength in RLE  Neurological: He is alert  GCS 14 slightly confused  Mild right sided facial droop  Skin: Skin is warm and dry  He is not diaphoretic  Psychiatric: He has a normal mood and affect  His behavior is normal        Vitals   Vitals:    11/15/19 1400 11/15/19 1500 11/15/19 1505 11/15/19 1600   BP: (!) 212/88 (!) 188/82 (!) 188/82 (!) 202/95   Pulse: 77 70 71 74   Resp: 18 18 18 18   Temp: 98 1 °F (36 7 °C)      TempSrc: Oral      SpO2: 95% 93% 95% 96%   Weight: 65 8 kg (145 lb)        Temp (24hrs), Av 9 °F (37 2 °C), Min:97 5 °F (36 4 °C), Max:100 2 °F (37 9 °C)  Current: Temperature: 98 1 °F (36 7 °C)      Invasive/non-invasive ventilation settings   Respiratory    Lab Data (Last 4 hours)    None         O2/Vent Data (Last 4 hours)    None                Height and Weights      IBW: -88 kg  Body mass index is 20 51 kg/m²  Weight (last 2 days)     Date/Time   Weight    11/15/19 1400   65 8 (145)                Intake and Output  I/O     None          Nutrition       Diet Orders   (From admission, onward)             Start     Ordered    11/15/19 1548  Diet Cardiovascular; Sodium 2 GM  Diet effective now     Question Answer Comment   Diet Type Cardiovascular    Cardiac Sodium 2 GM    RD to adjust diet per protocol?  Yes        11/15/19 154                  Laboratory and Diagnostics:  Results from last 7 days   Lab Units 11/15/19  0457 19  1539   WBC Thousand/uL 14 40* 10 60   HEMOGLOBIN g/dL 14 2 13 8*   HEMATOCRIT % 41 2* 40 8*   PLATELETS Thousands/uL 159 152   NEUTROS PCT % 77* 76*   MONOS PCT % 13* 13*     Results from last 7 days   Lab Units 11/15/19  1503 11/15/19  0457 19  1539   SODIUM mmol/L 141 138 141   POTASSIUM mmol/L 3 4* 3 4* 3 6   CHLORIDE mmol/L 106 103 104   CO2 mmol/L 26 24 27   ANION GAP mmol/L 9 11 10   BUN mg/dL 14 13 15   CREATININE mg/dL 1 06 0 91 1 07   CALCIUM mg/dL 8 7 8 9 8 8   GLUCOSE RANDOM mg/dL 111 87 106*   ALT U/L  --  18 20   AST U/L  --  15 14   ALK PHOS U/L  --  60 56   ALBUMIN g/dL  --  3 8 3 9   TOTAL BILIRUBIN mg/dL  --  1 40* 1 00     Results from last 7 days   Lab Units 11/15/19  0457   MAGNESIUM mg/dL 1 8*   PHOSPHORUS mg/dL 2 6*           Results from last 7 days   Lab Units 11/15/19  1503 11/15/19  0126 19  1539   TROPONIN I ng/mL 0 05* 0 06* 0 05* 0 04*     Results from last 7 days   Lab Units 11/15/19  1503   LACTIC ACID mmol/L 1 6   ABG:    VBG:          Micro        EK/14- NSR   Imaging: CT R knee- tibial plateau fracture       CT head/CTA head/neck: minor SAH in L frontal sulcus, chronic occlusion L common carotid       XR R humerus- negativeI have personally reviewed pertinent films in PACS    Active Medications  Scheduled Meds:  Current Facility-Administered Medications:  atorvastatin 40 mg Oral Daily With Vero ChemicalDESIREE    hydrALAZINE 10 mg Intravenous Q6H PRN DESIREE Camacho    Labetalol HCl 10 mg Intravenous Q6H PRN DESIREE Zee    levETIRAcetam 500 mg Oral Q12H [address] Cedric Ambriz PA-C    [START ON 2019] levothyroxine 50 mcg Oral Early Morning DESIREE Camacho    magnesium sulfate 2 g Intravenous Once Charlett Blocker SpironelloDESIREE    metoprolol tartrate 50 mg Oral Q12H [address] DESIREE Camacho    niCARdipine 1-15 mg/hr Intravenous Titrated DESIREE Gardner Last Rate: 7 5 mg/hr (11/15/19 7496) nicotine 1 patch Transdermal Daily Dio Barnes PA-C    potassium chloride 20 mEq Intravenous Once Aman Nathalia CRNP Last Rate: 25 mL/hr at 11/15/19 1632     Continuous Infusions:    niCARdipine 1-15 mg/hr Last Rate: 7 5 mg/hr (11/15/19 1658)     PRN Meds:     hydrALAZINE 10 mg Q6H PRN   Labetalol HCl 10 mg Q6H PRN       Allergies   No Known Allergies    Advance Directive and Living Will:      Power of :    POLST:        Counseling / Coordination of Care  Total Critical Care time spent 45 minutes excluding procedures, teaching and family updates  Joel Camacho PA-C        Portions of the record may have been created with voice recognition software  Occasional wrong word or "sound a like" substitutions may have occurred due to the inherent limitations of voice recognition software    Read the chart carefully and recognize, using context, where substitutions have occurred

## 2019-11-15 NOTE — QUICK NOTE
CT reviewed, has left frontal hemorrhage vs contusion, not very large  SBP < 140  Anticoagulant reversal protocol for the aspirin (DDAVP)  Transfer to [facility] level 1 unit in SLB vs ICU  Q1hour neuro checks/vitals  Repeat CT head in 6 hours (non con)    Neurosurgical consult  Call with questions

## 2019-11-15 NOTE — ASSESSMENT & PLAN NOTE
· Patient lives home alone  · The emergency room physician was concerned about his home health living conditions  · Will consult case management to further investigate

## 2019-11-15 NOTE — SOCIAL WORK
Pt for transfer to [facility] [place] for higher level of care  CM obtained auth for transport from [facility] at  [employer] for [employer]  Auth number provided #[id number]  Auth given to Perry at [employer]

## 2019-11-16 ENCOUNTER — APPOINTMENT (INPATIENT)
Dept: RADIOLOGY | Facility: HOSPITAL | Age: 79
DRG: 085 | End: 2019-11-16
Payer: COMMERCIAL

## 2019-11-16 LAB
ANION GAP SERPL CALCULATED.3IONS-SCNC: 13 MMOL/L (ref 4–13)
BUN SERPL-MCNC: 17 MG/DL (ref 5–25)
CALCIUM SERPL-MCNC: 8.4 MG/DL (ref 8.3–10.1)
CHLORIDE SERPL-SCNC: 111 MMOL/L (ref 100–108)
CO2 SERPL-SCNC: 20 MMOL/L (ref 21–32)
CREAT SERPL-MCNC: 0.96 MG/DL (ref 0.6–1.3)
ERYTHROCYTE [DISTWIDTH] IN BLOOD BY AUTOMATED COUNT: 13.6 % (ref 11.6–15.1)
GFR SERPL CREATININE-BSD FRML MDRD: 75 ML/MIN/1.73SQ M
GLUCOSE SERPL-MCNC: 124 MG/DL (ref 65–140)
HCT VFR BLD AUTO: 37.3 % (ref 36.5–49.3)
HGB BLD-MCNC: 12.5 G/DL (ref 12–17)
MAGNESIUM SERPL-MCNC: 2.2 MG/DL (ref 1.6–2.6)
MCH RBC QN AUTO: 33.1 PG (ref 26.8–34.3)
MCHC RBC AUTO-ENTMCNC: 33.5 G/DL (ref 31.4–37.4)
MCV RBC AUTO: 99 FL (ref 82–98)
PHOSPHATE SERPL-MCNC: 2.8 MG/DL (ref 2.3–4.1)
PLATELET # BLD AUTO: 181 THOUSANDS/UL (ref 149–390)
PMV BLD AUTO: 11 FL (ref 8.9–12.7)
POTASSIUM SERPL-SCNC: 3.6 MMOL/L (ref 3.5–5.3)
RBC # BLD AUTO: 3.78 MILLION/UL (ref 3.88–5.62)
SODIUM SERPL-SCNC: 144 MMOL/L (ref 136–145)
T4 FREE SERPL-MCNC: 1.12 NG/DL (ref 0.76–1.46)
WBC # BLD AUTO: 12.73 THOUSAND/UL (ref 4.31–10.16)

## 2019-11-16 PROCEDURE — 99233 SBSQ HOSP IP/OBS HIGH 50: CPT | Performed by: SURGERY

## 2019-11-16 PROCEDURE — 70450 CT HEAD/BRAIN W/O DYE: CPT

## 2019-11-16 PROCEDURE — 80048 BASIC METABOLIC PNL TOTAL CA: CPT | Performed by: PHYSICIAN ASSISTANT

## 2019-11-16 PROCEDURE — 95951 PR EEG MONITORING/VIDEORECORD: CPT | Performed by: PSYCHIATRY & NEUROLOGY

## 2019-11-16 PROCEDURE — 99232 SBSQ HOSP IP/OBS MODERATE 35: CPT | Performed by: INTERNAL MEDICINE

## 2019-11-16 PROCEDURE — 70551 MRI BRAIN STEM W/O DYE: CPT

## 2019-11-16 PROCEDURE — 70030 X-RAY EYE FOR FOREIGN BODY: CPT

## 2019-11-16 PROCEDURE — 84100 ASSAY OF PHOSPHORUS: CPT | Performed by: PHYSICIAN ASSISTANT

## 2019-11-16 PROCEDURE — 83735 ASSAY OF MAGNESIUM: CPT | Performed by: PHYSICIAN ASSISTANT

## 2019-11-16 PROCEDURE — 84439 ASSAY OF FREE THYROXINE: CPT | Performed by: PHYSICIAN ASSISTANT

## 2019-11-16 PROCEDURE — 99233 SBSQ HOSP IP/OBS HIGH 50: CPT | Performed by: PSYCHIATRY & NEUROLOGY

## 2019-11-16 PROCEDURE — 85027 COMPLETE CBC AUTOMATED: CPT | Performed by: PHYSICIAN ASSISTANT

## 2019-11-16 PROCEDURE — 74018 RADEX ABDOMEN 1 VIEW: CPT

## 2019-11-16 PROCEDURE — 71045 X-RAY EXAM CHEST 1 VIEW: CPT

## 2019-11-16 PROCEDURE — 27530 TREAT KNEE FRACTURE: CPT | Performed by: ORTHOPAEDIC SURGERY

## 2019-11-16 PROCEDURE — 99222 1ST HOSP IP/OBS MODERATE 55: CPT | Performed by: ORTHOPAEDIC SURGERY

## 2019-11-16 RX ORDER — ASPIRIN 81 MG/1
81 TABLET, CHEWABLE ORAL DAILY
Status: DISCONTINUED | OUTPATIENT
Start: 2019-11-17 | End: 2019-11-23 | Stop reason: HOSPADM

## 2019-11-16 RX ORDER — HEPARIN SODIUM 5000 [USP'U]/ML
5000 INJECTION, SOLUTION INTRAVENOUS; SUBCUTANEOUS EVERY 8 HOURS SCHEDULED
Status: DISCONTINUED | OUTPATIENT
Start: 2019-11-16 | End: 2019-11-23 | Stop reason: HOSPADM

## 2019-11-16 RX ORDER — ASPIRIN 325 MG
325 TABLET ORAL DAILY
Status: DISCONTINUED | OUTPATIENT
Start: 2019-11-17 | End: 2019-11-16

## 2019-11-16 RX ORDER — LABETALOL 20 MG/4 ML (5 MG/ML) INTRAVENOUS SYRINGE
10 EVERY 6 HOURS PRN
Status: DISCONTINUED | OUTPATIENT
Start: 2019-11-16 | End: 2019-11-16

## 2019-11-16 RX ORDER — POTASSIUM CHLORIDE 14.9 MG/ML
20 INJECTION INTRAVENOUS ONCE
Status: COMPLETED | OUTPATIENT
Start: 2019-11-16 | End: 2019-11-16

## 2019-11-16 RX ORDER — LISINOPRIL 20 MG/1
40 TABLET ORAL DAILY
Status: DISCONTINUED | OUTPATIENT
Start: 2019-11-16 | End: 2019-11-23 | Stop reason: HOSPADM

## 2019-11-16 RX ORDER — HYDRALAZINE HYDROCHLORIDE 20 MG/ML
5 INJECTION INTRAMUSCULAR; INTRAVENOUS EVERY 6 HOURS PRN
Status: DISCONTINUED | OUTPATIENT
Start: 2019-11-16 | End: 2019-11-16

## 2019-11-16 RX ORDER — LABETALOL 20 MG/4 ML (5 MG/ML) INTRAVENOUS SYRINGE
10 EVERY 4 HOURS PRN
Status: DISCONTINUED | OUTPATIENT
Start: 2019-11-16 | End: 2019-11-16

## 2019-11-16 RX ADMIN — METOPROLOL TARTRATE 50 MG: 50 TABLET, FILM COATED ORAL at 20:13

## 2019-11-16 RX ADMIN — HYDRALAZINE HYDROCHLORIDE 5 MG: 20 INJECTION INTRAMUSCULAR; INTRAVENOUS at 11:16

## 2019-11-16 RX ADMIN — SODIUM CHLORIDE 15 MG/HR: 0.9 INJECTION, SOLUTION INTRAVENOUS at 05:16

## 2019-11-16 RX ADMIN — POTASSIUM CHLORIDE 20 MEQ: 14.9 INJECTION, SOLUTION INTRAVENOUS at 09:03

## 2019-11-16 RX ADMIN — SENNOSIDES AND DOCUSATE SODIUM 1 TABLET: 8.6; 5 TABLET ORAL at 22:36

## 2019-11-16 RX ADMIN — ATORVASTATIN CALCIUM 40 MG: 40 TABLET, FILM COATED ORAL at 19:09

## 2019-11-16 RX ADMIN — NICOTINE 1 PATCH: 14 PATCH TRANSDERMAL at 10:12

## 2019-11-16 RX ADMIN — LABETALOL 20 MG/4 ML (5 MG/ML) INTRAVENOUS SYRINGE 10 MG: at 14:15

## 2019-11-16 RX ADMIN — SODIUM CHLORIDE 15 MG/HR: 0.9 INJECTION, SOLUTION INTRAVENOUS at 11:20

## 2019-11-16 RX ADMIN — SODIUM CHLORIDE 15 MG/HR: 0.9 INJECTION, SOLUTION INTRAVENOUS at 09:21

## 2019-11-16 RX ADMIN — HEPARIN SODIUM 5000 UNITS: 5000 INJECTION INTRAVENOUS; SUBCUTANEOUS at 22:36

## 2019-11-16 RX ADMIN — HEPARIN SODIUM 5000 UNITS: 5000 INJECTION INTRAVENOUS; SUBCUTANEOUS at 19:09

## 2019-11-16 RX ADMIN — SODIUM CHLORIDE 10 MG/HR: 0.9 INJECTION, SOLUTION INTRAVENOUS at 19:34

## 2019-11-16 RX ADMIN — LISINOPRIL 40 MG: 20 TABLET ORAL at 12:04

## 2019-11-16 RX ADMIN — LEVETIRACETAM 500 MG: 100 INJECTION, SOLUTION INTRAVENOUS at 20:13

## 2019-11-16 RX ADMIN — SODIUM CHLORIDE 15 MG/HR: 0.9 INJECTION, SOLUTION INTRAVENOUS at 03:22

## 2019-11-16 RX ADMIN — METOPROLOL TARTRATE 50 MG: 50 TABLET, FILM COATED ORAL at 12:04

## 2019-11-16 RX ADMIN — LEVETIRACETAM 500 MG: 100 INJECTION, SOLUTION INTRAVENOUS at 11:21

## 2019-11-16 RX ADMIN — SODIUM CHLORIDE 10 MG/HR: 0.9 INJECTION, SOLUTION INTRAVENOUS at 23:00

## 2019-11-16 RX ADMIN — SODIUM CHLORIDE 12.5 MG/HR: 0.9 INJECTION, SOLUTION INTRAVENOUS at 07:15

## 2019-11-16 NOTE — PROGRESS NOTES
Cardiology Progress Note - Hayden Gil 67 y o  male MRN: [id number]    Unit/Bed#: ICU 09 Encounter: [id number]      Assessment:  Principal Problem:    Encephalopathy  Active Problems:    Hypertension    Subarachnoid hemorrhage ([address] )    Closed fracture of right tibial plateau with routine healing      Plan:  Patient in no distress this morning  He has no chest pain or significant dyspnea  He is in sinus rhythm on telemetry he is on intravenous nicardipine in reference to blood pressure control  Patient is typically followed by Dr Helen Navarro in reference to cardiology care  He is typically on lisinopril 40 mg per day and Procardia XL 60 mg per day  Will restart lisinopril initially and then Procardia XL as necessary to assist in weaning nicardipine IV  Will continue to hold aspirin  Echocardiogram has been ordered and is pending  BMP today with potassium of 3 6 and creatinine of 0 96  Troponins are only minimally elevated  Subjective:   Patient seen and examined  No significant events overnight   negative  Objective:     Vitals: Blood pressure 138/60, pulse 76, temperature 98 7 °F (37 1 °C), temperature source Oral, resp  rate 22, height 5' 10" (1 778 m), weight 65 8 kg (145 lb 1 oz), SpO2 97 %  , Body mass index is 20 81 kg/m² ,   Orthostatic Blood Pressures      Most Recent Value   Blood Pressure  138/60 filed at 11/16/2019 0700   Patient Position - Orthostatic VS  Lying filed at 11/16/2019 0400      ,      Intake/Output Summary (Last 24 hours) at 11/16/2019 0932  Last data filed at 11/16/2019 0515  Gross per 24 hour   Intake 944 17 ml   Output 291 ml   Net 653 17 ml       No significant arrhythmias seen on telemetry review         Physical Exam:    GEN: Hayden Gil    NECK: supple, no carotid bruits, no JVD or HJR  HEART: normal rate, regular rhythm, normal S1 and S2, no murmurs, clicks, gallops or rubs   LUNGS: clear to auscultation bilaterally; no wheezes, rales, or rhonchi   ABDOMEN: normal bowel sounds, soft, no tenderness, no distention  EXTREMITIES: peripheral pulses normal; no clubbing, cyanosis, or edema  SKIN: warm and well perfused, no suspicious lesions on exposed skin    Labs & Results:    Admission on 11/15/2019   Component Date Value    Sodium 11/15/2019 141     Potassium 11/15/2019 3 4*    Chloride 11/15/2019 106     CO2 11/15/2019 26     ANION GAP 11/15/2019 9     BUN 11/15/2019 14     Creatinine 11/15/2019 1 06     Glucose 11/15/2019 111     Calcium 11/15/2019 8 7     eGFR 11/15/2019 67     Troponin I 11/15/2019 0 05*    LACTIC ACID 11/15/2019 1 6     Troponin I 11/15/2019 0 05*    Vitamin B-12 11/15/2019 487     Ammonia 11/15/2019 14     TSH 3RD GENERATON 11/15/2019 6 150*    Troponin I 11/15/2019 0 05*    Ventricular Rate 11/15/2019 90     Atrial Rate 11/15/2019 90     MT Interval 11/15/2019 167     QRSD Interval 11/15/2019 104     QT Interval 11/15/2019 383     QTC Interval 11/15/2019 469     P Axis 11/15/2019 66     QRS Axis 11/15/2019 5     T Wave Axis 11/15/2019 84     Ventricular Rate 11/15/2019 88     Atrial Rate 11/15/2019 88     MT Interval 11/15/2019 154     QRSD Interval 11/15/2019 100     QT Interval 11/15/2019 388     QTC Interval 11/15/2019 470     P Axis 11/15/2019 60     QRS Axis 11/15/2019 -40     T Wave Axis 11/15/2019 81     Sodium 11/16/2019 144     Potassium 11/16/2019 3 6     Chloride 11/16/2019 111*    CO2 11/16/2019 20*    ANION GAP 11/16/2019 13     BUN 11/16/2019 17     Creatinine 11/16/2019 0 96     Glucose 11/16/2019 124     Calcium 11/16/2019 8 4     eGFR 11/16/2019 75     Phosphorus 11/16/2019 2 8     Magnesium 11/16/2019 2 2     WBC 11/16/2019 12 73*    RBC 11/16/2019 3 78*    Hemoglobin 11/16/2019 12 5     Hematocrit 11/16/2019 37 3     MCV 11/16/2019 99*    MCH 11/16/2019 33 1     MCHC 11/16/2019 33 5     RDW 11/16/2019 13 6     Platelets 89/85/2746 181     MPV 11/16/2019 11 0        Xr Chest 1 View Portable    Result Date: 11/14/2019  Narrative: CHEST INDICATION:   weakness  COMPARISON:  None EXAM PERFORMED/VIEWS:  XR CHEST PORTABLE FINDINGS:  Prior CABG  Fracture of the superior-most sternotomy wire without evidence for sternal dehiscence  Heart is enlarged  Mitral valvular prosthesis  Aortic tortuosity  Pulmonary vasculature appears slightly cephalized  Mild generalized increase in peripheral reticular lung markings (Kerley B lines)  No acute consolidation  No pneumothorax or effusion  Bones are unremarkable  Impression: Findings of mild volume overload with pulmonary vascular congestion and trace interstitial edema  Workstation performed: [id number]     Xr Humerus Right    Result Date: 11/15/2019  Narrative: RIGHT HUMERUS INDICATION:   right arm weakness  COMPARISON:  None VIEWS:  XR HUMERUS RIGHT FINDINGS: There is no acute fracture or dislocation  Mild acromial clavicular osteoarthritis No lytic or blastic lesions are seen  Soft tissues are unremarkable  Impression: No acute osseous abnormality  Workstation performed: [id number]     Xr Knee 4+ Views Right Injury    Result Date: 11/14/2019  Narrative: RIGHT KNEE INDICATION:   trauma  COMPARISON:  None VIEWS:  XR KNEE 4+ VW RIGHT INJURY FINDINGS: Large lipohemarthrosis is present  The finding is highly specific for fracture, though site of fracture is not well demonstrated  Statistically tibial plateaus most likely area of injury  Questionable fracture lucency posteriorly on lateral, though not definitive  No other areas concerning for fracture  Alignment is normal without subluxation  Mild conventional osteoarthritis involving the medial and patellofemoral compartments  No lytic or blastic lesions are seen  Soft tissues are unremarkable  Impression: Large lipohemarthrosis suggests intra-articular fracture, though fracture planes are poorly demonstrated    Statistically, tibial plateau is the most likely site and there may be some subtle cortical discontinuity posteriorly at the plateau on lateral view  Recommend CT for better characterization  Note: The study was marked in EPIC for immediate notification  Imaging follow-up reminder notification was scheduled in the electronic medical record  Workstation performed: [id number]     Ct Head Wo Contrast    Result Date: 11/15/2019  Narrative: CT BRAIN - WITHOUT CONTRAST INDICATION:   Altered mental status  COMPARISON:  Prior head CT and CT angiography of the head and neck from earlier the same day  TECHNIQUE:  CT examination of the brain was performed  In addition to axial images, coronal 2D reformatted images were created and submitted for interpretation  Radiation dose length product (DLP) for this visit:  889 mGy-cm   This examination, like all CT scans performed in the [facility], was performed utilizing techniques to minimize radiation dose exposure, including the use of iterative reconstruction and automated exposure control  IMAGE QUALITY:  Diagnostic  FINDINGS: PARENCHYMA: No interval parenchymal change  Decreased attenuation is noted in periventricular and subcortical white matter demonstrating an appearance that is statistically most likely to represent moderate microangiopathic change  Gliosis and encephalomalacia within the right temporal and parietal lobes secondary to remote MCA distribution infarction  Generalized parenchymal volume loss  No CT signs of acute infarction  VENTRICLES AND EXTRA-AXIAL SPACES:  Redemonstrated hyperattenuation within the left frontal sulcus  Stable ventriculomegaly and prominence of the sylvian fissures, slightly disproportionate to the degree of generalized cortical volume loss  VISUALIZED ORBITS AND PARANASAL SINUSES:  Unremarkable   CALVARIUM AND EXTRACRANIAL SOFT TISSUES:  Normal      Impression: Redemonstrated hyperattenuation in the high left frontal sulcus known to represent subarachnoid hemorrhage on the prior examination performed earlier today  No new areas of parenchymal or extra-axial hemorrhage  Generalized parenchymal atrophy, moderate cerebral chronic microangiopathic disease and sequela of remote right posterior MCA distribution infarction  Disproportionate ventriculomegaly to the degree of sulcal prominence  In the appropriate clinical setting, consider NPH  Workstation performed: [id number]     Ct Knee Right Wo Contrast    Result Date: 11/14/2019  Narrative: CT RIGHT KNEE INDICATION:   Trauma  COMPARISON: Plain film dated 11/14/2019 TECHNIQUE: Serial Axial CT images were acquired through the right knee  Coronal and sagittal reformation images were also obtained  This examination, like all CT scans performed in the [facility], was performed utilizing techniques to minimize radiation dose exposure, including the use of iterative reconstruction and automated exposure control  Rad dose 523 7 mGy-cm Contrast material was not utilized  FINDINGS: JOINT EFFUSION: Prominent layering lipohemarthrosis  ALIGNMENT: Anatomic  OSSEOUS STRUCTURES: Slight cortical depression noted at the anterolateral tibial plateau with cortical offset best seen on series 2 image 1:30  Findings are concerning for minimally displaced fracture  MENISCI: Menisci are not well evaluated by CT technique  CRUCIATE LIGAMENTS: Limited assessment by CT technique without gross evidence of tear  EXTENSOR APPARATUS: Limited assessment by CT technique without gross evidence of tear  COLLATERAL LIGAMENTS: Limited assessment by CT technique without gross evidence of tear  MUSCULATURE: Intact  REMAINING SOFT TISSUES: Unremarkable  Impression: Slight cortical depression deformity including cortical offset at the anterolateral tibial plateau concerning for fracture with corresponding prominent lipohemarthrosis  The study was marked in [facility] for immediate notification   Workstation performed: [id number]     Ct Stroke Alert Brain    Addendum Date: 11/15/2019 Addendum:   ADDENDUM: In retrospect, there is ill-defined high density within a high left frontal sulcus, series 2 image 32, consistent with acute posttraumatic subarachnoid hemorrhage  * I personally telephoned this result to [facility] on 11/15/2019 10:19 AM, and Gerhard Barbosa at 10:04 AM     Result Date: 11/15/2019  Narrative: CT BRAIN - STROKE ALERT PROTOCOL INDICATION:   Altered mental status  COMPARISON:  None  TECHNIQUE:  CT examination of the brain was performed  In addition to axial images, coronal reformatted images were created and submitted for interpretation  Radiation dose length product (DLP) for this visit:  972 1 mGy-cm   This examination, like all CT scans performed in the [facility], was performed utilizing techniques to minimize radiation dose exposure, including the use of iterative reconstruction and automated exposure control  IMAGE QUALITY:  Diagnostic  FINDINGS:  PARENCHYMA:  No intracranial mass, mass effect or midline shift  No acute intracranial hemorrhage  No CT signs of acute infarction  Diffuse generalized volume loss, consistent with age  Encephalomalacia in the posterior temporal parietal region on the right likely reflecting sequela of remote ischemia  Scattered vascular calcifications  VENTRICLES AND EXTRA-AXIAL SPACES:  Normal for patient's age  VISUALIZED ORBITS AND PARANASAL SINUSES:  Unremarkable  CALVARIUM AND EXTRACRANIAL SOFT TISSUES:   Normal      Impression: No acute disease  Footprint of remote chronic large and small vessel ischemia   Findings were directly discussed with Dr Otoniel Benitez, ICU attending on 11/15/2019 9:44 AM  Workstation performed: [id number]     Cta Stroke Alert (head/neck)    Result Date: 11/15/2019  Narrative: CTA NECK AND BRAIN WITH CONTRAST INDICATION: altered mental status rule out stroke COMPARISON:   Contemporary CT TECHNIQUE:   Post contrast imaging was performed after administration of iodinated contrast through the neck and brain  Post contrast axial 0 625 mm images timed to opacify the arterial system  3D rendering was performed on an independent workstation  MIP reconstructions performed  Coronal reconstructions were performed of the noncontrast portion of the brain  Radiation dose length product (DLP) for this visit:  177 4 mGy-cm   This examination, like all CT scans performed in the [facility], was performed utilizing techniques to minimize radiation dose exposure, including the use of iterative reconstruction and automated exposure control  IV Contrast:  85 mL of iohexol (OMNIPAQUE)  IMAGE QUALITY:   Diagnostic FINDINGS: CERVICAL VASCULATURE AORTIC ARCH AND GREAT VESSELS:  Moderate ahteroschlerotic disease of the arch and great vessels  Left common carotid artery occluded just beyond its origin  RIGHT VERTEBRAL ARTERY CERVICAL SEGMENT:  Minor origin stenosis The vessel is normal in caliber throughout the neck  LEFT VERTEBRAL ARTERY CERVICAL SEGMENT:  Moderately severe origin stenosis The vessel is normal in caliber throughout the neck  RIGHT EXTRACRANIAL CAROTID SEGMENT:  Normal origin  Atherosclerotic change at the bifurcation where discontiguous plaque ascends into the proximal ICA, no hemodynamically significant stenosis  LEFT EXTRACRANIAL CAROTID SEGMENT:  Left common carotid artery is occluded  This appears chronic  The ECA collaterals without significant reconstitution of the ICA  NASCET criteria was used to determine the degree of internal carotid artery diameter stenosis  INTRACRANIAL VASCULATURE INTERNAL CAROTID ARTERIES:  Right ICA is patent without hemodynamically significant stenosis  Scattered cavernous and supraclinoid ICA calcification  Ophthalmic artery origin normal   ICA terminus normal, posterior communicating artery is prominent  On the left, the ICA 1st appears at the level of the ophthalmic artery    Reflux through the posterior communicating artery with relatively normal ICA terminus  ANTERIOR CIRCULATION:  Right A1 is dominant, both are patent, anterior communicating artery patent  ELADIA branches within the interhemispheric fissure are normal  MIDDLE CEREBRAL ARTERY CIRCULATION:  M1 segment and middle cerebral artery branches demonstrate normal enhancement bilaterally  Minor stenosis of a right M2 branch origin  DISTAL VERTEBRAL ARTERIES:  Right vertebral artery dominant, PICA origins normal   Segmental occlusion of the distal left V4 segment, with reflux down the terminal segment of the vessel  BASILAR ARTERY:  Atherosclerotic changes within the basilar artery without critical stenosis  Superior cerebellar artery origins are normal  POSTERIOR CEREBRAL ARTERIES: Both posterior cerebral arteries  demonstrate normal enhancement  Persistent fetal circulation on the right  Left posterior communicating artery prominent  DURAL VENOUS SINUSES:  Normal  NON VASCULAR ANATOMY BONY STRUCTURES:  No acute osseous abnormality  SOFT TISSUES OF THE NECK:  Unremarkable  THORACIC INLET:  Unremarkable  Median sternotomy     Impression: 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, reconstitution of the supraclinoid ICA on the left with the patent ophthalmic, anterior and posterior communicating arteries  Flow restrictive disease at the origin and termination of the left vertebral artery  No flow restrictive disease elsewhere  Minor Post traumatic subarachnoid hemorrhage in a left frontal sulcus  Findings were directly discussed with Karsten Riddle on 11/15/2019 10:19 AM  and Geoffrey Dougherty at 10:04AM  Workstation performed: [id number]       EKG personally reviewed by Pedrito Morris MD      Counseling / Coordination of Care  Total floor / unit time spent today 30 minutes  Greater than 50% of total time was spent with the patient and / or family counseling and / or coordination of care

## 2019-11-16 NOTE — PROGRESS NOTES
[facility] resident contacted regarding step down to critical care status   Patient is critical care as per resident

## 2019-11-16 NOTE — UTILIZATION REVIEW
Initial Clinical Review    Admission: Date/Time/Statement: Inpatient Admission Orders (From admission, onward)     Ordered        11/15/19 1448  Inpatient Admission  Once                   Orders Placed This Encounter   Procedures    Inpatient Admission     Standing Status:   Standing     Number of Occurrences:   1     Order Specific Question:   Admitting Physician     Answer:   Rosette Conley [[id number]]     Order Specific Question:   Level of Care     Answer:   Level 2 Stepdown / HOT [14]     Order Specific Question:   Bed Type     Answer:   Trauma [7]     Order Specific Question:   Estimated length of stay     Answer:   More than 2 Midnights     Order Specific Question:   Certification     Answer:   I certify that inpatient services are medically necessary for this patient for a duration of greater than two midnights  See H&P and MD Progress Notes for additional information about the patient's course of treatment  ED Arrival Information     Expected Arrival Acuity Means of Arrival Escorted By Service Admission Type    11/15/2019 13:26 11/15/2019 13:51 Immediate Ambulance [facility] Dodie Morgan) Trauma Emergency    Arrival Complaint    head bleed        Chief Complaint   Patient presents with    Trauma     Patient transferred from [facility]       Assessment/Plan:  68 y/o female presents to [facility] ED as a tx from [address] [place] [address]  with small SAH noticed on CT head noted during a stroke alert at [address] [place] [address] , where he originally presented after a fall down stairs reportedly carrying heavy groceries  Reportedly lost his balance, denies any presyncope symptoms  He was being tx'd at [facility] for R tibial plateau fx and HTN emergency when noted to have R-sided weakness and slurred speech  Stroke alert called, CT revealed small SAH  He was given DDAVP and transferred to [facility] for further eval & care  Upon arrival GCS 14, noted to be confused and continue with R-sided weakness     He will dorsiflex his right foot with noxious stimuli to the top of his foot and will move his right index finger with noxious stimuli  Otherwise, he does not move the right side  He has no gaze or optic deficits and his speech is clear, though he is confused  Pt stopped taking his meds months ago  Admit inpatient to ICU with [facility] / R sided hemiplegia / R rib plateau fx / HTN emergency / Confusion / Medical noncompliance  Tele monitoring, neuro checks q1h, cardiology, neurology and gerontology consulted  Ortho recommending NWB with hinged knee brace to RLE, no surgical intervention  Cardiology recommends continuing metoprolol, lisinopril and procardia as restarted during hospitalization  , hold asa    Neuro consult 11/15 -- With his significant weakness, rule out underlying stroke or mass, no evidence of seizure with José Miguel's Paralysis, although cannot exclude with waxing and wane mentation  No anticoagulation or antiplatelet medications at this time, status post DDAVP  -  MRI of brain, when able (to look for underlying stroke or mass lesion)  -  Repeat CT of head to document stability, if unable to get MRI of brain  -  CTA of head and neck - completed as below  -  Blood pressure goal less than 335 systolically - as outlined by Dr Pandey Manual  -  Therapies - PT / Ebb Eriberto / Kirill Tang  -  Dysphagia evaluation  -  Neurosurgery following, neurosurgery consultation appreciated  -  Frequent neurological check notify neurology with any change in neurological condition  -  STAT CT of head with change in neurological examination   -  Keep euvolemic, normal thermic  -  Hold on EEG at this time, if seizure like activity can consider    -  Monitor for infectious and metabolic derangements and treat as arises  ----secondary waxing waning mental status, would get a stat CT of the head, as well as placed on video EEG, check B12 and TSH and ammonia        11/6 ---- physician progress note --   Plan:  Neuro: Aphasic and no longer following commands, which is a change in exam  Will obtain stat CT head this AM  Currently on EEG  Keppra x7d for seizure ppx  No intervention from NSGY standpoint  Q1h neuro checks  Per neurology - defer to neurology re MRI  Maintain sleep/wake cycle  CV: goal SBP <140  Cardene gtt at 5 and toprol  Cardiology following  Echo pending  Resp: pulm toilet; on RA without issues  GI: bowel regimen  FEN: NPO, needs SLP evaluation prior to diet clearance  Will need keofed if fails SLP evaluation  Vitamin D supplementation once cleared for diet  : voiding spontaneously  Heme: s/p DDAVP   No anticoagulation or antiplatelet medications at this time due to [facility], ongoing stroke sx  ID: no signs/sx of infection  Endo: home synthroid  MSK: NWB RLE - awaiting HKB - non-op at this time  Lines: PIV x2      ED Triage Vitals   Temperature Pulse Respirations Blood Pressure SpO2   11/15/19 1400 11/15/19 1400 11/15/19 1400 11/15/19 1400 11/15/19 1400   98 1 °F (36 7 °C) 77 18 (!) 212/88 95 %      Temp Source Heart Rate Source Patient Position - Orthostatic VS BP Location FiO2 (%)   11/15/19 1400 11/15/19 1400 11/15/19 1400 11/15/19 2000 --   Oral Monitor Lying Left arm       Pain Score       11/15/19 1400       No Pain        Wt Readings from Last 1 Encounters:   11/15/19 65 8 kg (145 lb 1 oz)     Additional Vital Signs:   Date/Time  Temp  Pulse  Resp  BP  MAP (mmHg)  SpO2  O2 Device   11/16/19 1300    64  34Abnormal   128/56  85  97 %     11/16/19 1200  97 7 °F (36 5 °C)  94  27Abnormal   143/56  83  96 %  None (Room air)   11/16/19 0900    84  24Abnormal   144/58  93  94 %     11/16/19 0800  97 9 °F (36 6 °C)  84  28Abnormal   143/60  88  96 %     11/16/19 0730    86  28Abnormal   140/59  86  97 %     11/16/19 0000  98 8 °F (37 1 °C)  94  26Abnormal   149/64  92  96 %  None (Room air)   11/15/19 2300    92  31Abnormal   161/67  100  96 %     11/15/19 2000  98 4 °F (36 9 °C)  74  24Abnormal   149/61  96  96 %  None (Room air)   11/15/19 6242       174/68Abnormal   119  95 %  None (Room air)   11/15/19 1800  98 2 °F (36 8 °C)  110Abnormal   24Abnormal   146/98  113  96 %  None (Room air)   11/15/19 1713        208/91Abnormal          11/15/19 1700    88  18  208/91Abnormal     94 %     11/15/19 1600    74  18  202/95Abnormal     96 %     11/15/19 1505    71  18  188/82Abnormal     95 %  None (Room air)   11/15/19 1500    70  18  188/82Abnormal     93 %     11/15/19 1400  98 1 °F (36 7 °C)  77  18  212/88Abnormal     95 %       Pertinent Labs/Diagnostic Test Results:   CT head 11/15 --- Redemonstrated hyperattenuation in the high left frontal sulcus known to represent subarachnoid hemorrhage on the prior examination performed earlier today   No new areas of parenchymal or extra-axial hemorrhage  Generalized parenchymal atrophy, moderate cerebral chronic microangiopathic disease and sequela of remote right posterior MCA distribution infarction  Disproportionate ventriculomegaly to the degree of sulcal prominence   In the appropriate clinical setting, consider NPH        Results from last 7 days   Lab Units 11/16/19  0515 11/15/19  0457 11/14/19  1539   WBC Thousand/uL 12 73* 14 40* 10 60   HEMOGLOBIN g/dL 12 5 14 2 13 8*   HEMATOCRIT % 37 3 41 2* 40 8*   PLATELETS Thousands/uL 181 159 152   NEUTROS ABS Thousands/µL  --  11 10* 8 00*     Results from last 7 days   Lab Units 11/16/19  0515 11/15/19  1503 11/15/19  0457 11/14/19  1539   SODIUM mmol/L 144 141 138 141   POTASSIUM mmol/L 3 6 3 4* 3 4* 3 6   CHLORIDE mmol/L 111* 106 103 104   CO2 mmol/L 20* 26 24 27   ANION GAP mmol/L 13 9 11 10   BUN mg/dL 17 14 13 15   CREATININE mg/dL 0 96 1 06 0 91 1 07   EGFR ml/min/1 73sq m 75 67 80 66   CALCIUM mg/dL 8 4 8 7 8 9 8 8   MAGNESIUM mg/dL 2 2  --  1 8*  --    PHOSPHORUS mg/dL 2 8  --  2 6*  --      Results from last 7 days   Lab Units 11/15/19  1949 11/15/19  0457 11/14/19  1539   AST U/L  --  15 14   ALT U/L  --  18 20   ALK PHOS U/L  --  60 56 TOTAL PROTEIN g/dL  --  6 3* 6 3*   ALBUMIN g/dL  --  3 8 3 9   TOTAL BILIRUBIN mg/dL  --  1 40* 1 00   AMMONIA umol/L 14  --   --      Results from last 7 days   Lab Units 11/15/19  0946   POC GLUCOSE mg/dl 146*     Results from last 7 days   Lab Units 11/16/19  0515 11/15/19  1503 11/15/19  0457 11/14/19  1539   GLUCOSE RANDOM mg/dL 124 111 87 106*     Results from last 7 days   Lab Units 11/15/19  0457   HEMOGLOBIN A1C % 5 1   EAG mg/dl 100     Results from last 7 days   Lab Units 11/15/19  1949 11/15/19  1733 11/15/19  1503 11/15/19  0126 11/14/19  2015 11/14/19  1539   TROPONIN I ng/mL 0 05* 0 05* 0 05* 0 06* 0 05* 0 04*     Results from last 7 days   Lab Units 11/15/19  1503 11/14/19  1539   TSH 3RD GENERATON uIU/mL 6 150* 3 940     Results from last 7 days   Lab Units 11/15/19  1503   LACTIC ACID mmol/L 1 6     Results from last 7 days   Lab Units 11/14/19  1711   BNP pg/mL 857*     Results from last 7 days   Lab Units 11/14/19  1537   CLARITY UA  Clear   COLOR UA  Yellow   SPEC GRAV UA  1 025   PH UA  6 5   GLUCOSE UA mg/dl Negative   KETONES UA mg/dl Negative   BLOOD UA  1+*   PROTEIN UA mg/dl 3+*   NITRITE UA  Negative   BILIRUBIN UA  Negative   UROBILINOGEN UA E U /dl 0 2   LEUKOCYTES UA  Negative   WBC UA /hpf 0-1*   RBC UA /hpf 2-4*   BACTERIA UA /hpf Occasional   EPITHELIAL CELLS WET PREP /hpf Occasional     ED Treatment:   Medication Administration from 11/15/2019 1326 to 11/15/2019 1746       Date/Time Order Dose Route Action     11/15/2019 1713 hydrALAZINE (APRESOLINE) injection 10 mg 10 mg Intravenous Given     11/15/2019 1625 nicotine (NICODERM CQ) 14 mg/24hr TD 24 hr patch 1 patch 1 patch Transdermal Medication Applied     11/15/2019 1710 niCARdipine (CARDENE) 25 mg (STANDARD CONCENTRATION) in sodium chloride 0 9% 250 mL 10 mg/hr Intravenous Rate/Dose Change     11/15/2019 1658 niCARdipine (CARDENE) 25 mg (STANDARD CONCENTRATION) in sodium chloride 0 9% 250 mL 7 5 mg/hr Intravenous Rate/Dose Change     11/15/2019 1626 niCARdipine (CARDENE) 25 mg (STANDARD CONCENTRATION) in sodium chloride 0 9% 250 mL 5 mg/hr Intravenous New Bag     11/15/2019 1628 magnesium sulfate 2 g/50 mL IVPB (premix) 2 g 2 g Intravenous New Bag     11/15/2019 1632 potassium chloride 20 mEq IVPB (premix)   Intravenous Rate/Dose Change     11/15/2019 1627 potassium chloride 20 mEq IVPB (premix) 20 mEq Intravenous New Bag        Past Medical History:   Diagnosis Date    Coronary artery disease     history of CABG    Disease of thyroid gland     Hemorrhoids     last assessed 7/10/17    History of arterial duplex of LE 01/10/2017    Bilateral occlusion of the superficial femoral arteries, severe diminution of the ankle brachial index bilaterally, disease appears worse on left than right   History of echocardiogram 07/20/2011    hypokinesia of the inferior wall  EF 50%      HL (hearing loss)     Hyperlipidemia     Hypertension     PVD (peripheral vascular disease)      Present on Admission:   Hypertension   Subarachnoid hemorrhage (HCC)   Encephalopathy      Admitting Diagnosis: Subarachnoid hemorrhage (HCC) [I60 9]  Hypertension [I10]  Tibial plateau fracture [C67 082V]  Right arm weakness [R29 898]  Bleeding in head following injury with loss of consciousness ([address] ) [S06 309A]  Fall (on) (from) other stairs and steps, initial encounter [W10 8XXA]  Age/Sex: 67 y o  male  Admission Orders:  Scheduled Medications:  Medications:  atorvastatin 40 mg Oral Daily With Dinner   docusate sodium 100 mg Oral BID   levETIRAcetam 500 mg Intravenous Q12H [facility] assisted   levothyroxine 50 mcg Oral Early Morning   lisinopril 40 mg Oral Daily   metoprolol tartrate 50 mg Oral Q12H [facility] assisted   nicotine 1 patch Transdermal Daily   senna-docusate sodium 1 tablet Oral HS     Continuous IV Infusions:  niCARdipine 1-15 mg/hr Intravenous Titrated     PRN Meds:  hydrALAZINE 5 mg Intravenous Q6H PRN   Labetalol HCl 10 mg Intravenous Q6H PRN       IP CONSULT TO NEUROSURGERY  IP CONSULT TO NEUROLOGY  IP CONSULT TO GERONTOLOGY  IP CONSULT TO ORTHOPEDIC SURGERY  IP CONSULT TO CARDIOLOGY    Network Utilization Review Department  [email or web address]  ATTENTION: Please call with any questions or concerns to [phone] and carefully listen to the prompts so that you are directed to the right person  All voicemails are confidential   Refugio [employer] all requests for admission clinical reviews, approved or denied determinations and any other requests to dedicated fax number below belonging to the campus where the patient is receiving treatment    FACILITY NAME UR FAX NUMBER   ADMISSION DENIALS (Administrative/Medical Necessity) [address] [facility] (Maternity/NICU/Pediatrics) [phone]   [facility] [address] [facility] [address] [facility] [phone]   [address] [place] [address] [address] [facility] [phone]   [facility] [address] [facility] [address] [place] [address] [phone]

## 2019-11-16 NOTE — SPEECH THERAPY NOTE
Order received and chart reviewed  Spoke with RN, Thayer Frankel  Pt is being transported off the floor for an MRI, and is also lethargic  He has a keofed for nutrition and meds  Will check on pt tomorrow, and assess if pt able to participate/more alert

## 2019-11-16 NOTE — PROGRESS NOTES
Acceptance Note - Critical Care   Sherly Ramos 67 y o  male MRN: [id number]  Unit/Bed#: ICU 09 Encounter: [id number]        ----------------------------------------------------------------------------------------  HPI/24hr events: 67year old male with PMH HTN, hypothyroidism, HLD, CAD s/p CABG 7/17 who presents as a transfer from [facility] after a fall with injury to his R knee with R tibial plateau fracture on CT  Received DDAVP for ASA reversal  Was in hypertensive emergency with SBPs in 200s and mildly elevated troponin  Currently on NIcardine gtt at 5mg/hr  While at [facility] developed right sided hemiplegia and dysarthria and stroke alert was called  CT head and CTA head/neck showed small SAH and patient was transferred to [facility] for further evaluation      ---------------------------------------------------------------------------------------  SUBJECTIVE  Patient sitting in bed, opens eyes but not verbal  Able to move lower extremities and left upper extremity  RUE flaccid  RLE weakness  GCS 11  Review of Systems   Unable to perform ROS: Mental status change   All other systems reviewed and are negative  Review of systems was reviewed and negative unless stated above in HPI/24-hour events   ---------------------------------------------------------------------------------------  Assessment and Plan:    Neuro:    Diagnosis: SAH  o Plan: CT head/CTA head and neck: minor SAH in L frontal sulcus, chroinc L common carotid occlusion  o Repeat CT showed no new areas of hemorrhage     o R sided hemiplegia  o Neurosurg consulted- Symptoms not correlated with hemiplegia, will obtain MRI if able, stat CT head with decline GCS <2,  hx of ASA use s/p DDAVP, Keppra BID x 7 days for sz prpz, gold DVT ppx until stable CT head, SBP goal 140-160  o Overnight EEG performed per neurology recommendation  o Keppra 500mg q12h for seizure prophylaxis    CV:    Diagnosis: Hypertensive emergency, CAD s/p CABG July 2017  o Plan:  Metoprolol 50mg Q12, Nicardine 5mg/hr, on Atorvastatin, cardiology following, 2D echo ordered SBP goal 140-160      Pulm:   No acute issues, 94% on RA      GI:    No acute issues  Bowel regimen with colace and Senokot  :    No acute issues, Cr  1 06, monitor I/Os      F/E/N:    No maintenance fluids   Electrolytes- replete as necessary  K 3 6 today- repleting  Diet: cardiovascular diet once passed swallow test    Heme/Onc:    No acute issues   DVT ppx- SCDs, hold DVT ppx until CTH stable      Endo:    Diagnosis: Hyperlipidemia  o On Atorvastatin 40mg QD   Diagnosis: Hypothyroidism  o Plan: Continue levothyroxine 50mcg      ID:    No acute issues      MSK/Skin:    Diagnosis: R tibial plateau fx  o CT R knee with R tibial plateau fx, ortho following, NWB and non-operative at this time  o       Disposition: Continue Critical Care   Code Status: Level 3 - DNAR and DNI  ---------------------------------------------------------------------------------------  ICU CORE MEASURES    Prophylaxis   VTE Pharmacologic Prophylaxis: Pharmacologic VTE Prophylaxis contraindicated due to [facility]  VTE Mechanical Prophylaxis: sequential compression device  Stress Ulcer Prophylaxis: Prophylaxis Not Indicated     ABCDE Protocol (if indicated)  Plan to perform spontaneous awakening trial today? Not applicable  Plan to perform spontaneous breathing trial today? Not applicable  Obvious barriers to extubation? Not applicable  CAM-ICU: Negative    Invasive Devices Review  Invasive Devices     Peripheral Intravenous Line            Peripheral IV 11/15/19 Left Arm less than 1 day    Peripheral IV 11/16/19 Right Forearm less than 1 day              Can any invasive devices be discontinued today? Not applicable  ---------------------------------------------------------------------------------------  OBJECTIVE    Physical Exam   Constitutional: He appears well-developed and well-nourished     Eyes: Conjunctivae and EOM are normal    Neck: Normal range of motion  Cardiovascular: Normal rate and regular rhythm  Pulmonary/Chest: Effort normal and breath sounds normal    Abdominal: Soft  Neurological: He exhibits abnormal muscle tone  Vitals   Vitals:    19 0300 19 0400 19 0500 19 0600   BP: 121/52 137/56 141/59 140/58   BP Location:  Left arm     Pulse: 70 86 84 84   Resp: 20 19 (!) 24 (!) 25   Temp:  98 7 °F (37 1 °C)     TempSrc:  Oral     SpO2: 97% 96% 97% 96%   Weight:       Height:         Temp (24hrs), Av 6 °F (37 °C), Min:97 5 °F (36 4 °C), Max:100 °F (37 8 °C)  Current: Temperature: 98 7 °F (37 1 °C)      Invasive/non-invasive ventilation settings   Respiratory    Lab Data (Last 4 hours)    None         O2/Vent Data (Last 4 hours)    None                Height and Weights   Height: 5' 10" (177 8 cm)  IBW: 73 kg  Body mass index is 20 81 kg/m²  Weight (last 2 days)     Date/Time   Weight    11/15/19 1900   65 8 (145 06)    11/15/19 1400   65 8 (145)                Intake and Output  I/O     None          Nutrition       Diet Orders   (From admission, onward)             Start     Ordered    11/15/19 1548  Diet Cardiovascular; Sodium 2 GM  Diet effective now     Question Answer Comment   Diet Type Cardiovascular    Cardiac Sodium 2 GM    RD to adjust diet per protocol?  Yes        11/15/19 1549                  Laboratory and Diagnostics:  Results from last 7 days   Lab Units 19  0515 11/15/19  0457 19  1539   WBC Thousand/uL 12 73* 14 40* 10 60   HEMOGLOBIN g/dL 12 5 14 2 13 8*   HEMATOCRIT % 37 3 41 2* 40 8*   PLATELETS Thousands/uL 181 159 152   NEUTROS PCT %  --  77* 76*   MONOS PCT %  --  13* 13*     Results from last 7 days   Lab Units 19  0515 11/15/19  1503 11/15/19  0457 19  1539   SODIUM mmol/L 144 141 138 141   POTASSIUM mmol/L 3 6 3 4* 3 4* 3 6   CHLORIDE mmol/L 111* 106 103 104   CO2 mmol/L 20* 26 24 27   ANION GAP mmol/L 13 9 11 10   BUN mg/dL 17 14 13 15   CREATININE mg/dL 0  96 1 06 0 91 1 07   CALCIUM mg/dL 8 4 8 7 8 9 8 8   GLUCOSE RANDOM mg/dL 124 111 87 106*   ALT U/L  --   --  18 20   AST U/L  --   --  15 14   ALK PHOS U/L  --   --  60 56   ALBUMIN g/dL  --   --  3 8 3 9   TOTAL BILIRUBIN mg/dL  --   --  1 40* 1 00     Results from last 7 days   Lab Units 19  0515 11/15/19  0457   MAGNESIUM mg/dL 2 2 1 8*   PHOSPHORUS mg/dL 2 8 2 6*           Results from last 7 days   Lab Units 11/15/19  1949 11/15/19  1733 11/15/19  1503 11/15/19  0126 19  1539   TROPONIN I ng/mL 0 05* 0 05* 0 05* 0 06* 0 05* 0 04*     Results from last 7 days   Lab Units 11/15/19  1503   LACTIC ACID mmol/L 1 6   ABG:    VBG:          Micro        EK/14- NSR   Imaging: CT R knee- tibial plateau fracture       CT head/CTA head/neck: minor SAH in L frontal sulcus, chronic occlusion L common carotid       XR R humerus- negativeI have personally reviewed pertinent films in PACS    Active Medications  Scheduled Meds:    Current Facility-Administered Medications:  atorvastatin 40 mg Oral Daily With Vero ChemicalDESIREE    docusate sodium 100 mg Oral BID Richa Valentine PA-C    hydrALAZINE 10 mg Intravenous Q6H PRN DESIREE Camacho    Labetalol HCl 10 mg Intravenous Q6H PRN DESIREE Pizano    levETIRAcetam 500 mg Intravenous Q12H [address] Aman LIZZ PasterDO Last Rate: 500 mg (11/15/19 2145)   levothyroxine 50 mcg Oral Early Morning DESIREE Camacho    metoprolol tartrate 50 mg Oral Q12H [address] DESIREE Camacho    niCARdipine 1-15 mg/hr Intravenous Titrated DESIREE Bueno Last Rate: 12 5 mg/hr (19 0715)   nicotine 1 patch Transdermal Daily John Wright PA-C    potassium chloride 20 mEq Intravenous Once Group Phoebe [employer], DO    senna-docusate sodium 1 tablet Oral HS Richa Valentine PA-C      Continuous Infusions:    niCARdipine 1-15 mg/hr Last Rate: 12 5 mg/hr (19 0715)     PRN Meds:     hydrALAZINE 10 mg Q6H PRN   Labetalol HCl 10 mg Q6H PRN Allergies   No Known Allergies    Advance Directive and Living Will:      Power of :    POLST:        Counseling / Coordination of Care  Total Critical Care time spent 45 minutes excluding procedures, teaching and family updates  Michael Alford DO        Portions of the record may have been created with voice recognition software  Occasional wrong word or "sound a like" substitutions may have occurred due to the inherent limitations of voice recognition software    Read the chart carefully and recognize, using context, where substitutions have occurred

## 2019-11-16 NOTE — PROGRESS NOTES
PATIENT NAME: Christian Ramirez,  YOB: 1940  MEDICAL RECORD NUMBER: [id number]  Neurology Follow up Note     Following patient for: SAH, Right sided weakness    Overnight Events: non verbal this morning, flaccid RUE, had a stat CT this AM      Subjective: Patient unable to provide history or meaningful ROS  Scheduled Meds:  Current Facility-Administered Medications:  atorvastatin 40 mg Oral Daily With Respicardia ChemicalDESIREE    docusate sodium 100 mg Oral BID Andres Ferraro PA-C    hydrALAZINE 10 mg Intravenous Q6H PRN DESIREE Camacho    Labetalol HCl 10 mg Intravenous Q6H PRN DESIREE Mcguire    levETIRAcetam 500 mg Intravenous Q12H [address] Aman A Paster, DO Last Rate: 500 mg (11/15/19 2145)   levothyroxine 50 mcg Oral Early Morning DESIREE Camacho    lisinopril 40 mg Oral Daily Calista Rausch MD    metoprolol tartrate 50 mg Oral Q12H [address] DESIREE Camacho    niCARdipine 1-15 mg/hr Intravenous Titrated DESIREE Lazar Last Rate: 15 mg/hr (11/16/19 0798)   nicotine 1 patch Transdermal Daily Fide Combs PA-C    potassium chloride 20 mEq Intravenous Once Aman A Paster, DO Last Rate: 20 mEq (11/16/19 0903)   senna-docusate sodium 1 tablet Oral HS Richa Valentine PA-C      Continuous Infusions:  niCARdipine 1-15 mg/hr Last Rate: 15 mg/hr (11/16/19 0921)     PRN Meds: hydrALAZINE    Labetalol HCl      Objective  /57   Pulse 86   Temp 97 9 °F (36 6 °C)   Resp (!) 24   Ht 5' 10" (1 778 m)   Wt 65 8 kg (145 lb 1 oz)   SpO2 96%   BMI 20 81 kg/m²   GEN: Comfortable, NAD  HEENT: NC/AT  Anicteric  PPC  MMM   CVS: RRR  S1S2  No M/R/G    LUNGS: B/L entry, no wheezes, rhonchi or rales  EXT: B/L pulses, no edema  Mental Status: The patient squeezed hand on the left but not much more commands  Unable to name or repeat  Cranial Nerves:   Right facial weakness  Eye midline  WERO  Motor Examination/sensory:   Plegic on the right   LEft atleast 4/5    Diminished sensation right compared to left to pain  Reflexes:   Brisk b/l, L>R  upgoing toe on the left  Gait: unable to ambulate         Recent Results (from the past 24 hour(s))   Basic metabolic panel    Collection Time: 11/15/19  3:03 PM   Result Value Ref Range    Sodium 141 136 - 145 mmol/L    Potassium 3 4 (L) 3 5 - 5 3 mmol/L    Chloride 106 100 - 108 mmol/L    CO2 26 21 - 32 mmol/L    ANION GAP 9 4 - 13 mmol/L    BUN 14 5 - 25 mg/dL    Creatinine 1 06 0 60 - 1 30 mg/dL    Glucose 111 65 - 140 mg/dL    Calcium 8 7 8 3 - 10 1 mg/dL    eGFR 67 ml/min/1 73sq m   Troponin I    Collection Time: 11/15/19  3:03 PM   Result Value Ref Range    Troponin I 0 05 (H) <=0 04 ng/mL   Lactic acid, plasma    Collection Time: 11/15/19  3:03 PM   Result Value Ref Range    LACTIC ACID 1 6 0 5 - 2 0 mmol/L   Vitamin B12    Collection Time: 11/15/19  3:03 PM   Result Value Ref Range    Vitamin B-12 487 100 - 900 pg/mL   TSH, 3rd generation    Collection Time: 11/15/19  3:03 PM   Result Value Ref Range    TSH 3RD GENERATON 6 150 (H) 0 358 - 3 740 uIU/mL   Troponin I    Collection Time: 11/15/19  5:33 PM   Result Value Ref Range    Troponin I 0 05 (H) <=0 04 ng/mL   ECG 12 lead    Collection Time: 11/15/19  6:17 PM   Result Value Ref Range    Ventricular Rate 90 BPM    Atrial Rate 90 BPM    IN Interval 167 ms    QRSD Interval 104 ms    QT Interval 383 ms    QTC Interval 469 ms    P Axis 66 degrees    QRS Axis 5 degrees    T Wave Axis 84 degrees   ECG 12 lead    Collection Time: 11/15/19  6:17 PM   Result Value Ref Range    Ventricular Rate 88 BPM    Atrial Rate 88 BPM    IN Interval 154 ms    QRSD Interval 100 ms    QT Interval 388 ms    QTC Interval 470 ms    P Axis 60 degrees    QRS Axis -44 degrees    T Wave Axis 81 degrees   Ammonia    Collection Time: 11/15/19  7:49 PM   Result Value Ref Range    Ammonia 14 11 - 35 umol/L   Troponin I    Collection Time: 11/15/19  7:49 PM   Result Value Ref Range    Troponin I 0  05 (H) <=0 04 ng/mL   Basic metabolic panel    Collection Time: 11/16/19  5:15 AM   Result Value Ref Range    Sodium 144 136 - 145 mmol/L    Potassium 3 6 3 5 - 5 3 mmol/L    Chloride 111 (H) 100 - 108 mmol/L    CO2 20 (L) 21 - 32 mmol/L    ANION GAP 13 4 - 13 mmol/L    BUN 17 5 - 25 mg/dL    Creatinine 0 96 0 60 - 1 30 mg/dL    Glucose 124 65 - 140 mg/dL    Calcium 8 4 8 3 - 10 1 mg/dL    eGFR 75 ml/min/1 73sq m   Phosphorus    Collection Time: 11/16/19  5:15 AM   Result Value Ref Range    Phosphorus 2 8 2 3 - 4 1 mg/dL   Magnesium    Collection Time: 11/16/19  5:15 AM   Result Value Ref Range    Magnesium 2 2 1 6 - 2 6 mg/dL   CBC (With Platelets)    Collection Time: 11/16/19  5:15 AM   Result Value Ref Range    WBC 12 73 (H) 4 31 - 10 16 Thousand/uL    RBC 3 78 (L) 3 88 - 5 62 Million/uL    Hemoglobin 12 5 12 0 - 17 0 g/dL    Hematocrit 37 3 36 5 - 49 3 %    MCV 99 (H) 82 - 98 fL    MCH 33 1 26 8 - 34 3 pg    MCHC 33 5 31 4 - 37 4 g/dL    RDW 13 6 11 6 - 15 1 %    Platelets 525 793 - 559 Thousands/uL    MPV 11 0 8 9 - 12 7 fL         A/P  67 y o  male with high cortical left ICH, chronic left carotid occlusion with right sided weakness  Neurosurgical input appreciated  EEG monitoring overnight did not reveal seizure  MRI brain to assess for any other pathology which could have contributed to his neurological presentation as also clearly a significant risk for ischemis stroke as well although no large territorial stroke based on CT imaging (personally reviewed)  This would however not r/u smaller vessel to vessel embolic phenomenon  No anticoagulants at this point if stable 48 hours in then heparin 5000 sc q 12 can be started  Serial neuro exams  -160  Euglycemia/nomrmothermia  Eventually will likely need aspirin given his vascular disease  Continue atorvastatin 40  Keppra for 7 days ppx reasonable given cortical bleed with presumed at this point traumatic etiology       We will continue to follow  Addendum:   ** MRI brain showed ischemic stroke - combination of watershed and vessel to vessel  Start aspirin 81 if/when cleared by neurosurgery  -160

## 2019-11-16 NOTE — ORTHOTIC NOTE
Orthotic Note            Date: 11/16/2019      Patient Name: Torrey Oconnell knee brace, R LE unlocked, ordered by Katie Hughes PA-C        Reason for Consult:  Patient Active Problem List   Diagnosis    Allergic rhinitis    Coronary artery disease    Hyperlipidemia    Hard of hearing    Hypertension    Hypothyroidism    Palpitations    Tobacco use disorder    Peripheral vascular disease ([address]    Fall (on) (from) other stairs and steps, initial encounter    H/O noncompliance with medical treatment, presenting hazards to health    Subarachnoid hemorrhage ([address] )    Closed fracture of right tibial plateau with routine healing    Encephalopathy     Removed the immobilizer from the pt's right leg as he lay supine in the bed  Donned the hinged knee brace to the pt's right leg; adjusted the straps for optimal fit  The hinged knee brace is unlocked  Pt's nurse, Polo Hurley, is aware the brace is currently on the patient  Recommendations:  Please call the ortho tech, ext [phone], with any bracing questions and/or adjustments       General Aviasales,

## 2019-11-17 ENCOUNTER — APPOINTMENT (INPATIENT)
Dept: NON INVASIVE DIAGNOSTICS | Facility: HOSPITAL | Age: 79
DRG: 085 | End: 2019-11-17
Payer: COMMERCIAL

## 2019-11-17 LAB
ANION GAP SERPL CALCULATED.3IONS-SCNC: 9 MMOL/L (ref 4–13)
BASOPHILS # BLD AUTO: 0.06 THOUSANDS/ΜL (ref 0–0.1)
BASOPHILS NFR BLD AUTO: 1 % (ref 0–1)
BUN SERPL-MCNC: 17 MG/DL (ref 5–25)
CALCIUM SERPL-MCNC: 8 MG/DL (ref 8.3–10.1)
CHLORIDE SERPL-SCNC: 115 MMOL/L (ref 100–108)
CO2 SERPL-SCNC: 20 MMOL/L (ref 21–32)
CREAT SERPL-MCNC: 0.87 MG/DL (ref 0.6–1.3)
EOSINOPHIL # BLD AUTO: 0.08 THOUSAND/ΜL (ref 0–0.61)
EOSINOPHIL NFR BLD AUTO: 1 % (ref 0–6)
ERYTHROCYTE [DISTWIDTH] IN BLOOD BY AUTOMATED COUNT: 13.9 % (ref 11.6–15.1)
GFR SERPL CREATININE-BSD FRML MDRD: 83 ML/MIN/1.73SQ M
GLUCOSE SERPL-MCNC: 141 MG/DL (ref 65–140)
HCT VFR BLD AUTO: 38.8 % (ref 36.5–49.3)
HGB BLD-MCNC: 12.8 G/DL (ref 12–17)
IMM GRANULOCYTES # BLD AUTO: 0.12 THOUSAND/UL (ref 0–0.2)
IMM GRANULOCYTES NFR BLD AUTO: 1 % (ref 0–2)
LYMPHOCYTES # BLD AUTO: 0.7 THOUSANDS/ΜL (ref 0.6–4.47)
LYMPHOCYTES NFR BLD AUTO: 5 % (ref 14–44)
MCH RBC QN AUTO: 32.7 PG (ref 26.8–34.3)
MCHC RBC AUTO-ENTMCNC: 33 G/DL (ref 31.4–37.4)
MCV RBC AUTO: 99 FL (ref 82–98)
MONOCYTES # BLD AUTO: 1.65 THOUSAND/ΜL (ref 0.17–1.22)
MONOCYTES NFR BLD AUTO: 13 % (ref 4–12)
NEUTROPHILS # BLD AUTO: 10.53 THOUSANDS/ΜL (ref 1.85–7.62)
NEUTS SEG NFR BLD AUTO: 79 % (ref 43–75)
NRBC BLD AUTO-RTO: 0 /100 WBCS
PLATELET # BLD AUTO: 182 THOUSANDS/UL (ref 149–390)
PMV BLD AUTO: 10.5 FL (ref 8.9–12.7)
POTASSIUM SERPL-SCNC: 3.5 MMOL/L (ref 3.5–5.3)
RBC # BLD AUTO: 3.92 MILLION/UL (ref 3.88–5.62)
SODIUM SERPL-SCNC: 144 MMOL/L (ref 136–145)
WBC # BLD AUTO: 13.14 THOUSAND/UL (ref 4.31–10.16)

## 2019-11-17 PROCEDURE — 92610 EVALUATE SWALLOWING FUNCTION: CPT

## 2019-11-17 PROCEDURE — 85025 COMPLETE CBC W/AUTO DIFF WBC: CPT | Performed by: EMERGENCY MEDICINE

## 2019-11-17 PROCEDURE — 99232 SBSQ HOSP IP/OBS MODERATE 35: CPT | Performed by: INTERNAL MEDICINE

## 2019-11-17 PROCEDURE — G8996 SWALLOW CURRENT STATUS: HCPCS

## 2019-11-17 PROCEDURE — 80048 BASIC METABOLIC PNL TOTAL CA: CPT | Performed by: EMERGENCY MEDICINE

## 2019-11-17 PROCEDURE — 99233 SBSQ HOSP IP/OBS HIGH 50: CPT | Performed by: SURGERY

## 2019-11-17 PROCEDURE — 93306 TTE W/DOPPLER COMPLETE: CPT | Performed by: INTERNAL MEDICINE

## 2019-11-17 PROCEDURE — G8997 SWALLOW GOAL STATUS: HCPCS

## 2019-11-17 PROCEDURE — 93306 TTE W/DOPPLER COMPLETE: CPT

## 2019-11-17 PROCEDURE — 99233 SBSQ HOSP IP/OBS HIGH 50: CPT | Performed by: PSYCHIATRY & NEUROLOGY

## 2019-11-17 RX ORDER — HYDRALAZINE HYDROCHLORIDE 20 MG/ML
10 INJECTION INTRAMUSCULAR; INTRAVENOUS EVERY 6 HOURS PRN
Status: DISCONTINUED | OUTPATIENT
Start: 2019-11-17 | End: 2019-11-23 | Stop reason: HOSPADM

## 2019-11-17 RX ORDER — POTASSIUM CHLORIDE 14.9 MG/ML
20 INJECTION INTRAVENOUS ONCE
Status: COMPLETED | OUTPATIENT
Start: 2019-11-17 | End: 2019-11-17

## 2019-11-17 RX ORDER — LABETALOL 20 MG/4 ML (5 MG/ML) INTRAVENOUS SYRINGE
10 EVERY 6 HOURS PRN
Status: DISCONTINUED | OUTPATIENT
Start: 2019-11-17 | End: 2019-11-23 | Stop reason: HOSPADM

## 2019-11-17 RX ORDER — NIFEDIPINE 60 MG/1
60 TABLET, EXTENDED RELEASE ORAL DAILY
Status: DISCONTINUED | OUTPATIENT
Start: 2019-11-17 | End: 2019-11-17

## 2019-11-17 RX ORDER — HYDRALAZINE HYDROCHLORIDE 20 MG/ML
10 INJECTION INTRAMUSCULAR; INTRAVENOUS EVERY 6 HOURS PRN
Status: DISCONTINUED | OUTPATIENT
Start: 2019-11-17 | End: 2019-11-17

## 2019-11-17 RX ORDER — NIFEDIPINE 10 MG/1
10 CAPSULE ORAL EVERY 8 HOURS SCHEDULED
Status: DISCONTINUED | OUTPATIENT
Start: 2019-11-17 | End: 2019-11-17

## 2019-11-17 RX ORDER — NIFEDIPINE 10 MG/1
20 CAPSULE ORAL EVERY 8 HOURS SCHEDULED
Status: DISCONTINUED | OUTPATIENT
Start: 2019-11-17 | End: 2019-11-18

## 2019-11-17 RX ADMIN — HEPARIN SODIUM 5000 UNITS: 5000 INJECTION INTRAVENOUS; SUBCUTANEOUS at 14:53

## 2019-11-17 RX ADMIN — POTASSIUM CHLORIDE 20 MEQ: 14.9 INJECTION, SOLUTION INTRAVENOUS at 08:17

## 2019-11-17 RX ADMIN — SODIUM CHLORIDE 10 MG/HR: 0.9 INJECTION, SOLUTION INTRAVENOUS at 08:01

## 2019-11-17 RX ADMIN — LEVOTHYROXINE SODIUM 50 MCG: 50 TABLET ORAL at 06:03

## 2019-11-17 RX ADMIN — ATORVASTATIN CALCIUM 40 MG: 40 TABLET, FILM COATED ORAL at 17:12

## 2019-11-17 RX ADMIN — LEVETIRACETAM 500 MG: 100 INJECTION, SOLUTION INTRAVENOUS at 08:23

## 2019-11-17 RX ADMIN — NICOTINE 1 PATCH: 14 PATCH TRANSDERMAL at 08:11

## 2019-11-17 RX ADMIN — HEPARIN SODIUM 5000 UNITS: 5000 INJECTION INTRAVENOUS; SUBCUTANEOUS at 05:06

## 2019-11-17 RX ADMIN — SODIUM CHLORIDE 10 MG/HR: 0.9 INJECTION, SOLUTION INTRAVENOUS at 11:19

## 2019-11-17 RX ADMIN — ASPIRIN 81 MG 81 MG: 81 TABLET ORAL at 08:36

## 2019-11-17 RX ADMIN — LISINOPRIL 40 MG: 20 TABLET ORAL at 08:16

## 2019-11-17 RX ADMIN — SODIUM CHLORIDE 10 MG/HR: 0.9 INJECTION, SOLUTION INTRAVENOUS at 04:54

## 2019-11-17 RX ADMIN — SENNOSIDES AND DOCUSATE SODIUM 1 TABLET: 8.6; 5 TABLET ORAL at 22:00

## 2019-11-17 RX ADMIN — METOPROLOL TARTRATE 50 MG: 50 TABLET, FILM COATED ORAL at 08:14

## 2019-11-17 RX ADMIN — SODIUM CHLORIDE 10 MG/HR: 0.9 INJECTION, SOLUTION INTRAVENOUS at 01:32

## 2019-11-17 RX ADMIN — LEVETIRACETAM 500 MG: 100 INJECTION, SOLUTION INTRAVENOUS at 21:33

## 2019-11-17 RX ADMIN — HEPARIN SODIUM 5000 UNITS: 5000 INJECTION INTRAVENOUS; SUBCUTANEOUS at 22:13

## 2019-11-17 RX ADMIN — METOPROLOL TARTRATE 50 MG: 50 TABLET, FILM COATED ORAL at 21:59

## 2019-11-17 NOTE — PROGRESS NOTES
Acceptance Note - Critical Care   Nina Nava 67 y o  male MRN: [id number]  Unit/Bed#: ICU 09 Encounter: [id number]        ----------------------------------------------------------------------------------------  HPI: 67year old male with PMH HTN, hypothyroidism, HLD, CAD s/p CABG 7/17 who presents as a transfer from [facility] after a fall with injury to his R knee with R tibial plateau fracture on CT  While at [facility] developed right sided hemiplegia and dysarthria and stroke alert was called  CT head and CTA head/neck showed small SAH and patient was transferred to [facility] for further evaluation  24hr events: No change in mental status  Neuro/neurosurgery recommend restarted aspirin, heparin for dvt prophylaxis, maintain -160     ---------------------------------------------------------------------------------------  SUBJECTIVE  Patient eyes open but is not responsive to commands  Moving left upper and lower extremities, but not to command  GCS 10  Review of Systems   Unable to perform ROS: Mental status change   All other systems reviewed and are negative  Review of systems was reviewed and negative unless stated above in HPI/24-hour events   ---------------------------------------------------------------------------------------  Assessment and Plan:    Neuro:    Diagnosis: 1 Marcelino Sheppard  o 11/16 MRI performed as well as EEG  o Continue to follow with neurology  o R sided hemiplegia and aphasia  o Keppra BID x 7 days for sz prpz, SBP goal 140-160  o Keppra 500mg q12h for seizure prophylaxis  o Neurosurgery agreed to restarting patient's aspirin and heparin for dvt prophylaxis  o Neurology states goal -160    CV:    Diagnosis: Hypertensive emergency, CAD s/p CABG July 2017  Plan:  Metoprolol 50mg Q12, Nicardipine 10mg/hr, on Atorvastatin, cardiology following    Pulm:   No acute issues, 94% on RA      GI:    No acute issues  Bowel regimen with colace and Senokot      Started tube feeding diet 11/16      :    No acute issues,monitor I/Os      F/E/N:    No maintenance fluids   Electrolytes- replete as necessary  K 3 5 today- repleting  Diet: cardiovascular diet once passed swallow test    Heme/Onc:    No acute issues, Hb 12 8   DVT ppx- SCDs, hold DVT ppx until CTH stable      Endo:    Diagnosis: Hyperlipidemia  o On Atorvastatin 40mg QD   Diagnosis: Hypothyroidism  o Plan: Continue levothyroxine 50mcg      ID:    No acute issues      MSK/Skin:    Diagnosis: R tibial plateau fx  o CT R knee with R tibial plateau fx, ortho following, NWB and non-operative at this time  o Right hinged knee brace placed, unlocked      Disposition: Continue Critical Care   Code Status: Level 3 - DNAR and DNI  ---------------------------------------------------------------------------------------  ICU CORE MEASURES    Prophylaxis   VTE Pharmacologic Prophylaxis: Heparin  VTE Mechanical Prophylaxis: sequential compression device  Stress Ulcer Prophylaxis: Prophylaxis Not Indicated     ABCDE Protocol (if indicated)  Plan to perform spontaneous awakening trial today? Not applicable  Plan to perform spontaneous breathing trial today? Not applicable  Obvious barriers to extubation? Not applicable  CAM-ICU: Negative    Invasive Devices Review  Invasive Devices     Peripheral Intravenous Line            Peripheral IV 11/15/19 Left Arm 1 day    Peripheral IV 11/16/19 Right Forearm 1 day          Drain            NG/OG/Enteral Tube Enteral Feeding Tube 8 Fr Left nares less than 1 day              Can any invasive devices be discontinued today? Not applicable  ---------------------------------------------------------------------------------------  OBJECTIVE    Physical Exam   Constitutional: He appears well-developed and well-nourished  Eyes: Conjunctivae and EOM are normal    Neck: Normal range of motion  Cardiovascular: Normal rate and regular rhythm     Pulmonary/Chest: Effort normal and breath sounds normal  Abdominal: Soft  Neurological: He exhibits abnormal muscle tone  GCS 10,  E:4 V:1 M:5       Vitals   Vitals:    19 0000 19 0100 19 0200 19 0300   BP: 158/68 150/74 146/64 141/63   Pulse: 70 74 80 82   Resp: 22 (!) 23 (!) 25 (!) 23   Temp:       TempSrc:       SpO2: 95% 97% 95% 96%   Weight:       Height:         Temp (24hrs), Av 1 °F (36 7 °C), Min:97 7 °F (36 5 °C), Max:98 6 °F (37 °C)  Current: Temperature: 98 6 °F (37 °C)      Invasive/non-invasive ventilation settings   Respiratory    Lab Data (Last 4 hours)    None         O2/Vent Data (Last 4 hours)    None                Height and Weights   Height: 5' 10" (177 8 cm)  IBW: 73 kg  Body mass index is 20 81 kg/m²  Weight (last 2 days)     Date/Time   Weight    11/15/19 1900   65 8 (145 06)    11/15/19 1400   65 8 (145)                Intake and Output  I/O     None          Nutrition       Diet Orders   (From admission, onward)             Start     Ordered    19 1728  Diet Enteral/Parenteral; Tube Feeding No Oral Diet; Jevity 1 2 Kike; Continuous; 55; 150; Water; Every 6 hours  Diet effective now     Comments:  Start @ 20ml/hr  Increase by 20ml/hr q4h until at goal    Question Answer Comment   Diet Type Enteral/Parenteral    Enteral/Parenteral Tube Feeding No Oral Diet    Tube Feeding Formula: Jevity 1 2 Kike    Bolus/Cyclic/Continuous Continuous    Tube Feeding Goal Rate (mL/hr): 55    Tube Feeding water flush (mL): 150    Water Flush type: Water    Water flush frequency: Every 6 hours    RD to adjust diet per protocol?  Yes        19 1727                  Laboratory and Diagnostics:  Results from last 7 days   Lab Units 19  0453 19  0515 11/15/19  0457 19  1539   WBC Thousand/uL 13 14* 12 73* 14 40* 10 60   HEMOGLOBIN g/dL 12 8 12 5 14 2 13 8*   HEMATOCRIT % 38 8 37 3 41 2* 40 8*   PLATELETS Thousands/uL 182 181 159 152   NEUTROS PCT % 79*  --  77* 76*   MONOS PCT % 13*  --  13* 13*     Results from last 7 days   Lab Units 19  0453 19  0515 11/15/19  1503 11/15/19  0457 19  1539   SODIUM mmol/L 144 144 141 138 141   POTASSIUM mmol/L 3 5 3 6 3 4* 3 4* 3 6   CHLORIDE mmol/L 115* 111* 106 103 104   CO2 mmol/L 20* 20* 26 24 27   ANION GAP mmol/L 9 13 9 11 10   BUN mg/dL 17 17 14 13 15   CREATININE mg/dL 0 87 0 96 1 06 0 91 1 07   CALCIUM mg/dL 8 0* 8 4 8 7 8 9 8 8   GLUCOSE RANDOM mg/dL 141* 124 111 87 106*   ALT U/L  --   --   --  18 20   AST U/L  --   --   --  15 14   ALK PHOS U/L  --   --   --  60 56   ALBUMIN g/dL  --   --   --  3 8 3 9   TOTAL BILIRUBIN mg/dL  --   --   --  1 40* 1 00     Results from last 7 days   Lab Units 19  0515 11/15/19  0457   MAGNESIUM mg/dL 2 2 1 8*   PHOSPHORUS mg/dL 2 8 2 6*           Results from last 7 days   Lab Units 11/15/19  1949 11/15/19  1733 11/15/19  1503 11/15/19  0126 19  1539   TROPONIN I ng/mL 0 05* 0 05* 0 05* 0 06* 0 05* 0 04*     Results from last 7 days   Lab Units 11/15/19  1503   LACTIC ACID mmol/L 1 6   ABG:    VBG:          Micro        EK/14- NSR   Imaging: CT R knee- tibial plateau fracture       CT head/CTA head/neck: minor SAH in L frontal sulcus, chronic occlusion L common carotid       XR R humerus- negativeI have personally reviewed pertinent films in PACS    Active Medications  Scheduled Meds:    Current Facility-Administered Medications:  aspirin 81 mg Oral Daily Sky Villagomez MD    atorvastatin 40 mg Oral Daily With Dinner DESIREE Camacho    docusate sodium 100 mg Oral BID Richa Valentine PA-C    heparin (porcine) 5,000 Units Subcutaneous [facility] Sky Villagomez MD    levETIRAcetam 500 mg Intravenous Q12H [address] Aman A Paster, DO Last Rate: 500 mg (19)   levothyroxine 50 mcg Oral Early Morning DESIREE Camacho    lisinopril 40 mg Oral Daily Anali Augustin MD    metoprolol tartrate 50 mg Oral Q12H [address] DESIREE Camacho    niCARdipine 1-15 mg/hr Intravenous Titrated Vivienne Castellanos MD Last Rate: 10 mg/hr (11/17/19 0454)   nicotine 1 patch Transdermal Daily Brianna España PA-C    potassium chloride 20 mEq Intravenous Once Aman A Paster, DO    senna-docusate sodium 1 tablet Oral HS Richa Valentine PA-C      Continuous Infusions:    niCARdipine 1-15 mg/hr Last Rate: 10 mg/hr (11/17/19 0454)     PRN Meds:        Allergies   No Known Allergies    Advance Directive and Living Will:      Power of :    POLST:        Counseling / Coordination of Care  Total Critical Care time spent 45 minutes excluding procedures, teaching and family updates  Eduardo Medel, DO        Portions of the record may have been created with voice recognition software  Occasional wrong word or "sound a like" substitutions may have occurred due to the inherent limitations of voice recognition software    Read the chart carefully and recognize, using context, where substitutions have occurred

## 2019-11-17 NOTE — SOCIAL WORK
CM attempted to open the pt but pt is unable to articulate his words  CM also attempted to ascertain information from the pt's brother but he claims they aren't very close and that he doesn't know much about his brother

## 2019-11-17 NOTE — NUTRITION
11/17/19 1616   Recommendations/Interventions   Interventions Diet: order adjustment;Supplement initiate;EN continue as ordered  (Spoke with RN   per SLP recommendation approved for po diet  Need diet adjustment TF Plus Oral   Tolerating TF at goal rate  Per RD protocol will add Honeyshakes TID & Magic Cup BID  Pt non-verbal  )   Nutrition Recommendations Other (specify)  (Suggest changing diet to TF Plus Oral Diet Level 1 Dysphagia NTL per SLP recommendation  Continue TF for now to monitor pt's po tolerance  RD follow up to continue  Goal gradual decrease TF as po intake improves  )

## 2019-11-17 NOTE — SPEECH THERAPY NOTE
Bedside Swallow Evaluation:    Summary:  Pt presents w/ adequate oral and pharyngeal stages of swallowing with consistencies assessed (puree solids with honey and nectar thick liquids)  However, patient does have cough response to oral care  He is nonverbal and is unable to follow commands  As such, only conservative PO trials are given  From H&P: Rosy Alfonso is a 67 y o  male who presents with small SAH noticed on CT head during stroke alert at [facility]  He presented to [facility] after sustaining a fall down stairs, reportedly carrying heavy groceries  Pt states he lost his balance, denies any syncope or pre-syncope symptoms  He was being treated for a right tibial plateau fracture and HTN emergency with mildly elevated troponins  He is known to be non-compliant with his medications, s/p remote history of CABG and HTN  He was restarted on his prescribed medications including aspirin  He was noted to have right sided hemiplegia/weakness and slurred speach at some point while hospitalized and a stroke alert was called  Exact start of symptoms is unclear  CT revealed small SAH but this does not explain his symptoms  He was given DDAVP and transferred for further evaluation  Upon arrival, he is GCS 14, noted to be confused and continues to have right sided weakness  He will dorsiflex his right foot with noxious stimuli to the top of his foot and will move his right index finger with noxious stimuli  Otherwise, he does not move the right side  He has no gaze or optic deficits and his speech is clear, though he is confused  He denies having any family and gets agitated with persistent inquiry  He refuses to provide any contact information for his brother whom is noted by his PCP as his emergency contact but has no contact number provided  He adamantly states he would not want CPR or intubated  He reports that he stopped taking his medications "months ago  I decided I know just as well as the doctors   I'm old anyway  What do I need those for anymore " He denies any other injuries or complaints  His Right knee remains in a locked knee brace  Recommendations:  Diet: Puree  Liquid: Nectar   Meds: Crush in puree  Supervision: Full   Positioning:Upright  Strategies: Pt to take PO/Meds only when fully alert and upright  Oral care: Frequent   Aspiration precautions    Therapy Prognosis: Fair  Prognosis considerations: Right side weakness with inability to follow commands   Frequency: 3-5x week    Consider consult w/:  Nutrition    Reason for consult:  R/o aspiration  Determine safest and least restrictive diet  Failed nursing dysphagia assessment  Change in mental status  New neuro event    Precautions:  None     Current diet:  NPO  Premorbid diet[de-identified]  Assume regular with thin liquids   Previous VBS:  None   O2 requirement:  None   Voice/Speech:  Non verbal   Social:  Unsure   Follows commands:  Able to open mouth with visual cues  Unable to follow other commands                         Cognitive Status:  Awake and alert-cooperative  Unable to otherwise assess   Oral mech exam:  Dentition: WFL  Labial strength and ROM: Right side weakness  Lingual strength and ROM: Unable to assess  Mandibular strength and ROM: Appears adequate  Velum: Not visualized   Secretion management: WFL  Oral care completed     Items administered:  Puree, honey thick liquid, nectar thick liquid  Liquids were taken by tsp       Oral stage: WFL  Lip closure: WFL  Mastication: N/A  Bolus formation: WFL  Bolus control: WFL  Transfer: WFL  Oral residue: No  Pocketing: No    Pharyngeal stage: WFL  Swallow promptness: WFL  Laryngeal rise: WFL  Wet voice: No  Throat clear: No  Cough: No  Secondary swallows: No  Audible swallows: Occasional   No overt s/s aspiration    Esophageal stage:  No s/s reported    Aspiration precautions posted    Results d/w:  Pt, nursing    Goal(s):  Pt will tolerate least restrictive diet w/out s/s aspiration or oral/pharyngeal difficulties

## 2019-11-17 NOTE — PROGRESS NOTES
Cardiology Progress Note - Shankar Medina 67 y o  male MRN: [id number]    Unit/Bed#: ICU 09 Encounter: [id number]      Assessment:  Principal Problem:    Encephalopathy  Active Problems:    Hypertension    Subarachnoid hemorrhage ([address] )    Closed fracture of right tibial plateau with routine healing      Plan:  Patient with no chest pain or significant dyspnea  He is in sinus rhythm on telemetry  He has been cleared by neuro surgery to restart aspirin and subcutaneous heparin for DVT prophylaxis  Blood pressure is elevated  Will restart Procardia XL 60 mg per day which he was using as an outpatient  MRI of the brain did demonstrate scattered acute infarctions of the left cerebral hemisphere  He is back on aspirin  Echocardiogram has been ordered and is pending  He continues on intravenous nicardipine and has a Austin-feed tube in place  Subjective:   Patient seen and examined  No significant events overnight   negative  Objective:     Vitals: Blood pressure 147/62, pulse 66, temperature 98 7 °F (37 1 °C), resp  rate (!) 24, height 5' 10" (1 778 m), weight 65 8 kg (145 lb 1 oz), SpO2 96 %  , Body mass index is 20 81 kg/m² ,   Orthostatic Blood Pressures      Most Recent Value   Blood Pressure  147/62 filed at 11/17/2019 0900   Patient Position - Orthostatic VS  Lying filed at 11/16/2019 0400      ,      Intake/Output Summary (Last 24 hours) at 11/17/2019 1017  Last data filed at 11/17/2019 0600  Gross per 24 hour   Intake 4080 17 ml   Output    Net 4080 17 ml       No significant arrhythmias seen on telemetry review         Physical Exam:    GEN: Shankar Medina   NECK: supple, no carotid bruits, no JVD or HJR  HEART: normal rate, regular rhythm, normal S1 and S2, no murmurs, clicks, gallops or rubs   LUNGS: clear to auscultation bilaterally; no wheezes, rales, or rhonchi   ABDOMEN: normal bowel sounds, soft, no tenderness, no distention  EXTREMITIES: peripheral pulses normal; no clubbing, cyanosis, or edema  SKIN: warm and well perfused, no suspicious lesions on exposed skin    Labs & Results:    Admission on 11/15/2019   Component Date Value    Sodium 11/15/2019 141     Potassium 11/15/2019 3 4*    Chloride 11/15/2019 106     CO2 11/15/2019 26     ANION GAP 11/15/2019 9     BUN 11/15/2019 14     Creatinine 11/15/2019 1 06     Glucose 11/15/2019 111     Calcium 11/15/2019 8 7     eGFR 11/15/2019 67     Troponin I 11/15/2019 0 05*    LACTIC ACID 11/15/2019 1 6     Troponin I 11/15/2019 0 05*    Vitamin B-12 11/15/2019 487     Ammonia 11/15/2019 14     TSH 3RD GENERATON 11/15/2019 6 150*    Troponin I 11/15/2019 0 05*    Ventricular Rate 11/15/2019 90     Atrial Rate 11/15/2019 90     ID Interval 11/15/2019 167     QRSD Interval 11/15/2019 104     QT Interval 11/15/2019 383     QTC Interval 11/15/2019 469     P Axis 11/15/2019 66     QRS Axis 11/15/2019 5     T Wave Axis 11/15/2019 84     Ventricular Rate 11/15/2019 88     Atrial Rate 11/15/2019 88     ID Interval 11/15/2019 154     QRSD Interval 11/15/2019 100     QT Interval 11/15/2019 388     QTC Interval 11/15/2019 470     P Axis 11/15/2019 60     QRS Axis 11/15/2019 -40     T Wave Axis 11/15/2019 81     Sodium 11/16/2019 144     Potassium 11/16/2019 3 6     Chloride 11/16/2019 111*    CO2 11/16/2019 20*    ANION GAP 11/16/2019 13     BUN 11/16/2019 17     Creatinine 11/16/2019 0 96     Glucose 11/16/2019 124     Calcium 11/16/2019 8 4     eGFR 11/16/2019 75     Phosphorus 11/16/2019 2 8     Magnesium 11/16/2019 2 2     WBC 11/16/2019 12 73*    RBC 11/16/2019 3 78*    Hemoglobin 11/16/2019 12 5     Hematocrit 11/16/2019 37 3     MCV 11/16/2019 99*    MCH 11/16/2019 33 1     MCHC 11/16/2019 33 5     RDW 11/16/2019 13 6     Platelets 28/47/7726 181     MPV 11/16/2019 11 0     Free T4 11/16/2019 1 12     Sodium 11/17/2019 144     Potassium 11/17/2019 3 5     Chloride 11/17/2019 115*    CO2 11/17/2019 20*    ANION GAP 11/17/2019 9     BUN 11/17/2019 17     Creatinine 11/17/2019 0 87     Glucose 11/17/2019 141*    Calcium 11/17/2019 8 0*    eGFR 11/17/2019 83     WBC 11/17/2019 13 14*    RBC 11/17/2019 3 92     Hemoglobin 11/17/2019 12 8     Hematocrit 11/17/2019 38 8     MCV 11/17/2019 99*    MCH 11/17/2019 32 7     MCHC 11/17/2019 33 0     RDW 11/17/2019 13 9     MPV 11/17/2019 10 5     Platelets 12/15/4264 182     nRBC 11/17/2019 0     Neutrophils Relative 11/17/2019 79*    Immat GRANS % 11/17/2019 1     Lymphocytes Relative 11/17/2019 5*    Monocytes Relative 11/17/2019 13*    Eosinophils Relative 11/17/2019 1     Basophils Relative 11/17/2019 1     Neutrophils Absolute 11/17/2019 10 53*    Immature Grans Absolute 11/17/2019 0 12     Lymphocytes Absolute 11/17/2019 0 70     Monocytes Absolute 11/17/2019 1 65*    Eosinophils Absolute 11/17/2019 0 08     Basophils Absolute 11/17/2019 0 06        Xr Orbits For Foreign Body    Result Date: 11/16/2019  Narrative: ORBITS INDICATION:   Evaluate for foreign body  COMPARISON:  None VIEWS:  XR ORBITS FOR FOREIGN BODY FINDINGS: Nasogastric tube is noted  There is no evidence of radiopaque orbital foreign body  The paranasal sinuses are clear  Impression: No radiopaque orbital foreign body  Workstation performed: [id number]     Xr Chest 1 View Portable    Result Date: 11/14/2019  Narrative: CHEST INDICATION:   weakness  COMPARISON:  None EXAM PERFORMED/VIEWS:  XR CHEST PORTABLE FINDINGS:  Prior CABG  Fracture of the superior-most sternotomy wire without evidence for sternal dehiscence  Heart is enlarged  Mitral valvular prosthesis  Aortic tortuosity  Pulmonary vasculature appears slightly cephalized  Mild generalized increase in peripheral reticular lung markings (Kerley B lines)  No acute consolidation  No pneumothorax or effusion  Bones are unremarkable       Impression: Findings of mild volume overload with pulmonary vascular congestion and trace interstitial edema  Workstation performed: [id number]     Xr Humerus Right    Result Date: 11/15/2019  Narrative: RIGHT HUMERUS INDICATION:   right arm weakness  COMPARISON:  None VIEWS:  XR HUMERUS RIGHT FINDINGS: There is no acute fracture or dislocation  Mild acromial clavicular osteoarthritis No lytic or blastic lesions are seen  Soft tissues are unremarkable  Impression: No acute osseous abnormality  Workstation performed: [id number]     Xr Knee 4+ Views Right Injury    Result Date: 11/14/2019  Narrative: RIGHT KNEE INDICATION:   trauma  COMPARISON:  None VIEWS:  XR KNEE 4+ VW RIGHT INJURY FINDINGS: Large lipohemarthrosis is present  The finding is highly specific for fracture, though site of fracture is not well demonstrated  Statistically tibial plateaus most likely area of injury  Questionable fracture lucency posteriorly on lateral, though not definitive  No other areas concerning for fracture  Alignment is normal without subluxation  Mild conventional osteoarthritis involving the medial and patellofemoral compartments  No lytic or blastic lesions are seen  Soft tissues are unremarkable  Impression: Large lipohemarthrosis suggests intra-articular fracture, though fracture planes are poorly demonstrated  Statistically, tibial plateau is the most likely site and there may be some subtle cortical discontinuity posteriorly at the plateau on lateral view  Recommend CT for better characterization  Note: The study was marked in EPIC for immediate notification  Imaging follow-up reminder notification was scheduled in the electronic medical record  Workstation performed: [id number]     Ct Head Without Contrast    Result Date: 11/16/2019  Narrative: CT BRAIN - WITHOUT CONTRAST INDICATION:   Altered mental status  COMPARISON:  CT same day  TECHNIQUE:  CT examination of the brain was performed    In addition to axial images, coronal 2D reformatted images were created and submitted for interpretation  Radiation dose length product (DLP) for this visit:  904 mGy-cm   This examination, like all CT scans performed in the [facility], was performed utilizing techniques to minimize radiation dose exposure, including the use of iterative reconstruction and automated exposure control  IMAGE QUALITY:  Diagnostic  FINDINGS: PARENCHYMA: No interval parenchymal change  Decreased attenuation is noted in periventricular and subcortical white matter demonstrating an appearance that is statistically most likely to represent moderate microangiopathic change  Gliosis and encephalomalacia within the right temporal and parietal lobes secondary to remote MCA distribution infarction  Generalized parenchymal volume loss  No CT signs of acute infarction  VENTRICLES AND EXTRA-AXIAL SPACES:  Redemonstrated hyperattenuation within the left frontal sulcus  Stable ventriculomegaly and prominence of the sylvian fissures, slightly disproportionate to the degree of generalized cortical volume loss  VISUALIZED ORBITS AND PARANASAL SINUSES:  Unremarkable  CALVARIUM AND EXTRACRANIAL SOFT TISSUES:  Normal      Impression: Stable subarachnoid hemorrhage identified in the left frontal region  Workstation performed: [id number]     Ct Head Wo Contrast    Result Date: 11/15/2019  Narrative: CT BRAIN - WITHOUT CONTRAST INDICATION:   Altered mental status  COMPARISON:  Prior head CT and CT angiography of the head and neck from earlier the same day  TECHNIQUE:  CT examination of the brain was performed  In addition to axial images, coronal 2D reformatted images were created and submitted for interpretation  Radiation dose length product (DLP) for this visit:  889 mGy-cm   This examination, like all CT scans performed in the [facility], was performed utilizing techniques to minimize radiation dose exposure, including the use of iterative reconstruction and automated exposure control    IMAGE QUALITY:  Diagnostic  FINDINGS: PARENCHYMA: No interval parenchymal change  Decreased attenuation is noted in periventricular and subcortical white matter demonstrating an appearance that is statistically most likely to represent moderate microangiopathic change  Gliosis and encephalomalacia within the right temporal and parietal lobes secondary to remote MCA distribution infarction  Generalized parenchymal volume loss  No CT signs of acute infarction  VENTRICLES AND EXTRA-AXIAL SPACES:  Redemonstrated hyperattenuation within the left frontal sulcus  Stable ventriculomegaly and prominence of the sylvian fissures, slightly disproportionate to the degree of generalized cortical volume loss  VISUALIZED ORBITS AND PARANASAL SINUSES:  Unremarkable  CALVARIUM AND EXTRACRANIAL SOFT TISSUES:  Normal      Impression: Redemonstrated hyperattenuation in the high left frontal sulcus known to represent subarachnoid hemorrhage on the prior examination performed earlier today  No new areas of parenchymal or extra-axial hemorrhage  Generalized parenchymal atrophy, moderate cerebral chronic microangiopathic disease and sequela of remote right posterior MCA distribution infarction  Disproportionate ventriculomegaly to the degree of sulcal prominence  In the appropriate clinical setting, consider NPH  Workstation performed: [id number]     Ct Knee Right Wo Contrast    Result Date: 11/14/2019  Narrative: CT RIGHT KNEE INDICATION:   Trauma  COMPARISON: Plain film dated 11/14/2019 TECHNIQUE: Serial Axial CT images were acquired through the right knee  Coronal and sagittal reformation images were also obtained  This examination, like all CT scans performed in the [facility], was performed utilizing techniques to minimize radiation dose exposure, including the use of iterative reconstruction and automated exposure control  Rad dose 523 7 mGy-cm Contrast material was not utilized   FINDINGS: JOINT EFFUSION: Prominent layering lipohemarthrosis  ALIGNMENT: Anatomic  OSSEOUS STRUCTURES: Slight cortical depression noted at the anterolateral tibial plateau with cortical offset best seen on series 2 image 1:30  Findings are concerning for minimally displaced fracture  MENISCI: Menisci are not well evaluated by CT technique  CRUCIATE LIGAMENTS: Limited assessment by CT technique without gross evidence of tear  EXTENSOR APPARATUS: Limited assessment by CT technique without gross evidence of tear  COLLATERAL LIGAMENTS: Limited assessment by CT technique without gross evidence of tear  MUSCULATURE: Intact  REMAINING SOFT TISSUES: Unremarkable  Impression: Slight cortical depression deformity including cortical offset at the anterolateral tibial plateau concerning for fracture with corresponding prominent lipohemarthrosis  The study was marked in [facility] for immediate notification  Workstation performed: [id number]     Mri Brain Wo Contrast    Result Date: 11/16/2019  Narrative: MRI BRAIN WITHOUT CONTRAST INDICATION: right sided weakness  COMPARISON:   Head CTs from November 15, 2019 and November 16, 2019  TECHNIQUE:  Sagittal T1, axial T2, axial FLAIR, axial T1, axial [place] and axial diffusion imaging  IMAGE QUALITY:  Diagnostic  FINDINGS: BRAIN PARENCHYMA:  There are multiple small scattered foci of acute infarction in the left cerebral hemisphere such as that seen in the left caudate head, left frontal periventricular white matter, left frontal cortical, subcortical and deep white matter, left anterior parietal cortex and deep white matter and numerous foci within the left centrum semiovale  No right cerebral, brainstem or cerebellar infarction is noted  There are scattered punctate foci of gradient susceptibility artifact in the left basal ganglia and right thalamus likely related to prior hypertensive hemorrhagic insult   There is curvilinear hypointensity in the left frontal sulci consistent with the CT defined area of subarachnoid hemorrhage  Periventricular and deep cerebral white matter chronic microangiopathic disease is noted  There is cortical and subcortical gliosis in the right temporal and parietal lobes from remote MCA distribution infarction  There are punctate chronic infarctions  within both cerebellar hemispheres  VENTRICLES:  Mild ventriculomegaly, commensurate with the degree of generalized cerebral and cerebellar sulcal prominence indicating generalized parenchymal volume loss  SELLA AND PITUITARY GLAND:  Normal  ORBITS:  Normal  PARANASAL SINUSES:  Normal  VASCULATURE:  Absent flow void in the left intradural vertebral artery and in the left carotid artery  CALVARIUM AND SKULL BASE:  Normal  EXTRACRANIAL SOFT TISSUES:  Normal      Impression: Scattered acute infarctions in the left cerebral hemisphere which may represent a combination of embolic and watershed zone infarctions  Flow signal characteristics within the intracranial left ICA and vertebral arteries  Please see the separate CTA head and neck report for additional detail  Redemonstrated subarachnoid hemorrhage within the left frontal sulci  Moderate cerebral chronic microangiopathic disease and generalized parenchymal volume loss with chronic right posterior MCA distribution infarction  I personally discussed this study with the covering physician, Dr Lalitha Amezcua on 11/16/2019 at 4:08 PM  Workstation performed: [id number]     Xr Abdomen 1 Vw Portable    Result Date: 11/17/2019  Narrative: ABDOMEN INDICATION:   keofed  COMPARISON:  None VIEWS:  AP supine FINDINGS: Feeding tube tip coiled in the stomach  There is a nonobstructive bowel gas pattern  No discernible free air on this supine study  Upright or left lateral decubitus imaging is more sensitive to detect subtle free air in the appropriate setting  No pathologic calcifications or soft tissue masses  Left basilar atelectasis/small infiltrate   Visualized osseous structures are unremarkable for the patient's age  Impression: Feeding tube tip coiled in the stomach  Workstation performed: [id number]     Ct Stroke Alert Brain    Addendum Date: 11/15/2019 Addendum:   ADDENDUM: In retrospect, there is ill-defined high density within a high left frontal sulcus, series 2 image 32, consistent with acute posttraumatic subarachnoid hemorrhage  * I personally telephoned this result to [facility] on 11/15/2019 10:19 AM, and Jay Jay Wasserman at 10:04 AM     Result Date: 11/15/2019  Narrative: CT BRAIN - STROKE ALERT PROTOCOL INDICATION:   Altered mental status  COMPARISON:  None  TECHNIQUE:  CT examination of the brain was performed  In addition to axial images, coronal reformatted images were created and submitted for interpretation  Radiation dose length product (DLP) for this visit:  972 1 mGy-cm   This examination, like all CT scans performed in the [facility], was performed utilizing techniques to minimize radiation dose exposure, including the use of iterative reconstruction and automated exposure control  IMAGE QUALITY:  Diagnostic  FINDINGS:  PARENCHYMA:  No intracranial mass, mass effect or midline shift  No acute intracranial hemorrhage  No CT signs of acute infarction  Diffuse generalized volume loss, consistent with age  Encephalomalacia in the posterior temporal parietal region on the right likely reflecting sequela of remote ischemia  Scattered vascular calcifications  VENTRICLES AND EXTRA-AXIAL SPACES:  Normal for patient's age  VISUALIZED ORBITS AND PARANASAL SINUSES:  Unremarkable  CALVARIUM AND EXTRACRANIAL SOFT TISSUES:   Normal      Impression: No acute disease  Footprint of remote chronic large and small vessel ischemia  Findings were directly discussed with Dr Alexi Gardner, ICU attending on 11/15/2019 9:44 AM  Workstation performed: [id number]     Xr Chest Portable Icu    Result Date: 11/17/2019  Narrative: CHEST INDICATION:   confirm keofed   COMPARISON:  11/14/2019 EXAM PERFORMED/VIEWS:  XR CHEST PORTABLE ICU FINDINGS:  Feeding tube tip coiled in the distal esophagus  This has already been advanced into the stomach  Cardiomediastinal silhouette appears unremarkable  Valve prosthesis  Small left basilar atelectasis/infiltrate  No pneumothorax or pleural effusion  Osseous structures appear within normal limits for patient age  Impression: Feeding tube tip coiled in the distal esophagus  This has already been advanced into the stomach  Workstation performed: [id number]     Cta Stroke Alert (head/neck)    Result Date: 11/15/2019  Narrative: CTA NECK AND BRAIN WITH CONTRAST INDICATION: altered mental status rule out stroke COMPARISON:   Contemporary CT TECHNIQUE:   Post contrast imaging was performed after administration of iodinated contrast through the neck and brain  Post contrast axial 0 625 mm images timed to opacify the arterial system  3D rendering was performed on an independent workstation  MIP reconstructions performed  Coronal reconstructions were performed of the noncontrast portion of the brain  Radiation dose length product (DLP) for this visit:  177 4 mGy-cm   This examination, like all CT scans performed in the [facility], was performed utilizing techniques to minimize radiation dose exposure, including the use of iterative reconstruction and automated exposure control  IV Contrast:  85 mL of iohexol (OMNIPAQUE)  IMAGE QUALITY:   Diagnostic FINDINGS: CERVICAL VASCULATURE AORTIC ARCH AND GREAT VESSELS:  Moderate ahteroschlerotic disease of the arch and great vessels  Left common carotid artery occluded just beyond its origin  RIGHT VERTEBRAL ARTERY CERVICAL SEGMENT:  Minor origin stenosis The vessel is normal in caliber throughout the neck  LEFT VERTEBRAL ARTERY CERVICAL SEGMENT:  Moderately severe origin stenosis The vessel is normal in caliber throughout the neck  RIGHT EXTRACRANIAL CAROTID SEGMENT:  Normal origin    Atherosclerotic change at the bifurcation where discontiguous plaque ascends into the proximal ICA, no hemodynamically significant stenosis  LEFT EXTRACRANIAL CAROTID SEGMENT:  Left common carotid artery is occluded  This appears chronic  The ECA collaterals without significant reconstitution of the ICA  NASCET criteria was used to determine the degree of internal carotid artery diameter stenosis  INTRACRANIAL VASCULATURE INTERNAL CAROTID ARTERIES:  Right ICA is patent without hemodynamically significant stenosis  Scattered cavernous and supraclinoid ICA calcification  Ophthalmic artery origin normal   ICA terminus normal, posterior communicating artery is prominent  On the left, the ICA 1st appears at the level of the ophthalmic artery  Reflux through the posterior communicating artery with relatively normal ICA terminus  ANTERIOR CIRCULATION:  Right A1 is dominant, both are patent, anterior communicating artery patent  ELADIA branches within the interhemispheric fissure are normal  MIDDLE CEREBRAL ARTERY CIRCULATION:  M1 segment and middle cerebral artery branches demonstrate normal enhancement bilaterally  Minor stenosis of a right M2 branch origin  DISTAL VERTEBRAL ARTERIES:  Right vertebral artery dominant, PICA origins normal   Segmental occlusion of the distal left V4 segment, with reflux down the terminal segment of the vessel  BASILAR ARTERY:  Atherosclerotic changes within the basilar artery without critical stenosis  Superior cerebellar artery origins are normal  POSTERIOR CEREBRAL ARTERIES: Both posterior cerebral arteries  demonstrate normal enhancement  Persistent fetal circulation on the right  Left posterior communicating artery prominent  DURAL VENOUS SINUSES:  Normal  NON VASCULAR ANATOMY BONY STRUCTURES:  No acute osseous abnormality  SOFT TISSUES OF THE NECK:  Unremarkable  THORACIC INLET:  Unremarkable    Median sternotomy     Impression: 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, reconstitution of the supraclinoid ICA on the left with the patent ophthalmic, anterior and posterior communicating arteries  Flow restrictive disease at the origin and termination of the left vertebral artery  No flow restrictive disease elsewhere  Minor Post traumatic subarachnoid hemorrhage in a left frontal sulcus  Findings were directly discussed with Marek Fry on 11/15/2019 10:19 AM  and Sangeetha Zamorano at 10:04AM  Workstation performed: [id number]       EKG personally reviewed by Terrell Rankin MD      Counseling / Coordination of Care  Total floor / unit time spent today 30 minutes  Greater than 50% of total time was spent with the patient and / or family counseling and / or coordination of care

## 2019-11-17 NOTE — PROGRESS NOTES
PATIENT NAME: Mamadou Carrera,  YOB: 1940  MEDICAL RECORD NUMBER: [id number]  Neurology Follow up Note     Following patient for: ischemic stroke and SAH  Overnight Events: None    Subjective: Patient aphasic, can't give much of a history  Scheduled Meds:  Current Facility-Administered Medications:  aspirin 81 mg Oral Daily Genny Recinos MD    atorvastatin 40 mg Oral Daily With Dinner DESIREE Camacho    docusate sodium 100 mg Oral BID Will Last PA-C    heparin (porcine) 5,000 Units Subcutaneous Q8H [address] Genny Recinos MD    hydrALAZINE 10 mg Intravenous Q6H PRN Aman A Paster, DO    Labetalol HCl 10 mg Intravenous Q6H PRN Aman A Paster, DO    levETIRAcetam 500 mg Intravenous Q12H [address] Aman A Paster, DO Last Rate: 500 mg (11/17/19 7746)   levothyroxine 50 mcg Oral Early Morning DESIREE Camacho    lisinopril 40 mg Oral Daily Cadence Ramos MD    metoprolol tartrate 50 mg Oral Q12H [address] DESIREE Montanez    niCARdipine 1-15 mg/hr Intravenous Titrated Genny Recinos MD Last Rate: Stopped (11/17/19 1403)   nicotine 1 patch Transdermal Daily Carlos Patterson PA-C    NIFEdipine 20 mg Oral Q8H [address] Aman A Paster, DO    senna-docusate sodium 1 tablet Oral HS Richa Valentine PA-C      Continuous Infusions:  niCARdipine 1-15 mg/hr Last Rate: Stopped (11/17/19 1403)     PRN Meds: hydrALAZINE    Labetalol HCl      Objective  /62   Pulse 80   Temp 98 3 °F (36 8 °C)   Resp (!) 25   Ht 5' 10" (1 778 m)   Wt 65 8 kg (145 lb 1 oz)   SpO2 95%   BMI 20 81 kg/m²   GEN: Comfortable, NAD  HEENT: NC/AT  Anicteric  PPC  MMM   CVS: RRR  S1S2  No M/R/G    EXT: B/L pulses, no edema  Right lower extremity in large brace  Mental Status: The patient was awake, aphasic, follows some commands on the left  Oriented x 1  Cranial Nerves:   Right facial weakness  PERRL  No gaze preference  Audition grossly intact       Motor Examination:   Right sided hypotonic, right arm flaccid  Right leg in brace limited exam          Coordination: not following commands well enough  Sensory: diminished on the right compared to the left  Gait: unable to ambulate due to hemiparesis  Recent Results (from the past 24 hour(s))   Basic metabolic panel    Collection Time: 11/17/19  4:53 AM   Result Value Ref Range    Sodium 144 136 - 145 mmol/L    Potassium 3 5 3 5 - 5 3 mmol/L    Chloride 115 (H) 100 - 108 mmol/L    CO2 20 (L) 21 - 32 mmol/L    ANION GAP 9 4 - 13 mmol/L    BUN 17 5 - 25 mg/dL    Creatinine 0 87 0 60 - 1 30 mg/dL    Glucose 141 (H) 65 - 140 mg/dL    Calcium 8 0 (L) 8 3 - 10 1 mg/dL    eGFR 83 ml/min/1 73sq m   CBC and differential    Collection Time: 11/17/19  4:53 AM   Result Value Ref Range    WBC 13 14 (H) 4 31 - 10 16 Thousand/uL    RBC 3 92 3 88 - 5 62 Million/uL    Hemoglobin 12 8 12 0 - 17 0 g/dL    Hematocrit 38 8 36 5 - 49 3 %    MCV 99 (H) 82 - 98 fL    MCH 32 7 26 8 - 34 3 pg    MCHC 33 0 31 4 - 37 4 g/dL    RDW 13 9 11 6 - 15 1 %    MPV 10 5 8 9 - 12 7 fL    Platelets 340 178 - 589 Thousands/uL    nRBC 0 /100 WBCs    Neutrophils Relative 79 (H) 43 - 75 %    Immat GRANS % 1 0 - 2 %    Lymphocytes Relative 5 (L) 14 - 44 %    Monocytes Relative 13 (H) 4 - 12 %    Eosinophils Relative 1 0 - 6 %    Basophils Relative 1 0 - 1 %    Neutrophils Absolute 10 53 (H) 1 85 - 7 62 Thousands/µL    Immature Grans Absolute 0 12 0 00 - 0 20 Thousand/uL    Lymphocytes Absolute 0 70 0 60 - 4 47 Thousands/µL    Monocytes Absolute 1 65 (H) 0 17 - 1 22 Thousand/µL    Eosinophils Absolute 0 08 0 00 - 0 61 Thousand/µL    Basophils Absolute 0 06 0 00 - 0 10 Thousands/µL         A/P  67 y o  Male with stroke in setting of carotid occlusion and SAH  -160  Aspirin started as was DVT ppx  Atorvastatin 40 mg daily  Euglycemia  Normothermia  PT/OT/Speech     Eventually would consider plavix or even AC (or combination low dose xarelto with antiplatlet) for stroke prevention but would need SAH to completely clear up) as he failed aspirin  Outpatient f/u with vascular neurology

## 2019-11-18 PROBLEM — I63.412 CEREBROVASCULAR ACCIDENT (CVA) DUE TO EMBOLISM OF LEFT MIDDLE CEREBRAL ARTERY (HCC): Status: ACTIVE | Noted: 2019-11-18

## 2019-11-18 PROBLEM — G93.40 ENCEPHALOPATHY: Status: RESOLVED | Noted: 2019-11-15 | Resolved: 2019-11-18

## 2019-11-18 LAB
ANION GAP SERPL CALCULATED.3IONS-SCNC: 11 MMOL/L (ref 4–13)
BASOPHILS # BLD AUTO: 0.06 THOUSANDS/ΜL (ref 0–0.1)
BASOPHILS NFR BLD AUTO: 1 % (ref 0–1)
BUN SERPL-MCNC: 18 MG/DL (ref 5–25)
CALCIUM SERPL-MCNC: 8.5 MG/DL (ref 8.3–10.1)
CHLORIDE SERPL-SCNC: 110 MMOL/L (ref 100–108)
CO2 SERPL-SCNC: 22 MMOL/L (ref 21–32)
CREAT SERPL-MCNC: 1.02 MG/DL (ref 0.6–1.3)
EOSINOPHIL # BLD AUTO: 0.05 THOUSAND/ΜL (ref 0–0.61)
EOSINOPHIL NFR BLD AUTO: 1 % (ref 0–6)
ERYTHROCYTE [DISTWIDTH] IN BLOOD BY AUTOMATED COUNT: 13.6 % (ref 11.6–15.1)
GFR SERPL CREATININE-BSD FRML MDRD: 70 ML/MIN/1.73SQ M
GLUCOSE SERPL-MCNC: 111 MG/DL (ref 65–140)
HCT VFR BLD AUTO: 41.1 % (ref 36.5–49.3)
HGB BLD-MCNC: 13.7 G/DL (ref 12–17)
IMM GRANULOCYTES # BLD AUTO: 0.08 THOUSAND/UL (ref 0–0.2)
IMM GRANULOCYTES NFR BLD AUTO: 1 % (ref 0–2)
LYMPHOCYTES # BLD AUTO: 0.97 THOUSANDS/ΜL (ref 0.6–4.47)
LYMPHOCYTES NFR BLD AUTO: 9 % (ref 14–44)
MAGNESIUM SERPL-MCNC: 1.9 MG/DL (ref 1.6–2.6)
MCH RBC QN AUTO: 32.8 PG (ref 26.8–34.3)
MCHC RBC AUTO-ENTMCNC: 33.3 G/DL (ref 31.4–37.4)
MCV RBC AUTO: 98 FL (ref 82–98)
MONOCYTES # BLD AUTO: 1.36 THOUSAND/ΜL (ref 0.17–1.22)
MONOCYTES NFR BLD AUTO: 13 % (ref 4–12)
NEUTROPHILS # BLD AUTO: 8.38 THOUSANDS/ΜL (ref 1.85–7.62)
NEUTS SEG NFR BLD AUTO: 75 % (ref 43–75)
NRBC BLD AUTO-RTO: 0 /100 WBCS
PHOSPHATE SERPL-MCNC: 3.7 MG/DL (ref 2.3–4.1)
PLATELET # BLD AUTO: 198 THOUSANDS/UL (ref 149–390)
PMV BLD AUTO: 11 FL (ref 8.9–12.7)
POTASSIUM SERPL-SCNC: 3.4 MMOL/L (ref 3.5–5.3)
RBC # BLD AUTO: 4.18 MILLION/UL (ref 3.88–5.62)
SODIUM SERPL-SCNC: 143 MMOL/L (ref 136–145)
WBC # BLD AUTO: 10.9 THOUSAND/UL (ref 4.31–10.16)

## 2019-11-18 PROCEDURE — 84100 ASSAY OF PHOSPHORUS: CPT | Performed by: EMERGENCY MEDICINE

## 2019-11-18 PROCEDURE — NC001 PR NO CHARGE: Performed by: SURGERY

## 2019-11-18 PROCEDURE — G8988 SELF CARE GOAL STATUS: HCPCS

## 2019-11-18 PROCEDURE — G8978 MOBILITY CURRENT STATUS: HCPCS

## 2019-11-18 PROCEDURE — 80048 BASIC METABOLIC PNL TOTAL CA: CPT | Performed by: EMERGENCY MEDICINE

## 2019-11-18 PROCEDURE — 99232 SBSQ HOSP IP/OBS MODERATE 35: CPT | Performed by: INTERNAL MEDICINE

## 2019-11-18 PROCEDURE — G8979 MOBILITY GOAL STATUS: HCPCS

## 2019-11-18 PROCEDURE — G8987 SELF CARE CURRENT STATUS: HCPCS

## 2019-11-18 PROCEDURE — 99232 SBSQ HOSP IP/OBS MODERATE 35: CPT | Performed by: SURGERY

## 2019-11-18 PROCEDURE — 99233 SBSQ HOSP IP/OBS HIGH 50: CPT | Performed by: PSYCHIATRY & NEUROLOGY

## 2019-11-18 PROCEDURE — 99223 1ST HOSP IP/OBS HIGH 75: CPT | Performed by: PHYSICAL MEDICINE & REHABILITATION

## 2019-11-18 PROCEDURE — 85025 COMPLETE CBC W/AUTO DIFF WBC: CPT | Performed by: EMERGENCY MEDICINE

## 2019-11-18 PROCEDURE — 83735 ASSAY OF MAGNESIUM: CPT | Performed by: EMERGENCY MEDICINE

## 2019-11-18 PROCEDURE — 92526 ORAL FUNCTION THERAPY: CPT

## 2019-11-18 PROCEDURE — 97163 PT EVAL HIGH COMPLEX 45 MIN: CPT

## 2019-11-18 PROCEDURE — 97167 OT EVAL HIGH COMPLEX 60 MIN: CPT

## 2019-11-18 RX ORDER — MAGNESIUM SULFATE HEPTAHYDRATE 40 MG/ML
2 INJECTION, SOLUTION INTRAVENOUS ONCE
Status: COMPLETED | OUTPATIENT
Start: 2019-11-18 | End: 2019-11-18

## 2019-11-18 RX ORDER — CARVEDILOL 6.25 MG/1
6.25 TABLET ORAL 2 TIMES DAILY WITH MEALS
Status: DISCONTINUED | OUTPATIENT
Start: 2019-11-18 | End: 2019-11-19

## 2019-11-18 RX ORDER — POTASSIUM CHLORIDE 20MEQ/15ML
40 LIQUID (ML) ORAL ONCE
Status: COMPLETED | OUTPATIENT
Start: 2019-11-18 | End: 2019-11-18

## 2019-11-18 RX ORDER — NIFEDIPINE 60 MG/1
60 TABLET, EXTENDED RELEASE ORAL DAILY
Status: DISCONTINUED | OUTPATIENT
Start: 2019-11-18 | End: 2019-11-18

## 2019-11-18 RX ORDER — AMLODIPINE BESYLATE 5 MG/1
5 TABLET ORAL DAILY
Status: DISCONTINUED | OUTPATIENT
Start: 2019-11-18 | End: 2019-11-19

## 2019-11-18 RX ORDER — LEVETIRACETAM 100 MG/ML
500 SOLUTION ORAL EVERY 12 HOURS SCHEDULED
Status: COMPLETED | OUTPATIENT
Start: 2019-11-18 | End: 2019-11-22

## 2019-11-18 RX ADMIN — LEVETIRACETAM 500 MG: 100 SOLUTION ORAL at 22:00

## 2019-11-18 RX ADMIN — LABETALOL 20 MG/4 ML (5 MG/ML) INTRAVENOUS SYRINGE 10 MG: at 01:37

## 2019-11-18 RX ADMIN — AMLODIPINE BESYLATE 5 MG: 5 TABLET ORAL at 17:02

## 2019-11-18 RX ADMIN — LABETALOL 20 MG/4 ML (5 MG/ML) INTRAVENOUS SYRINGE 10 MG: at 23:56

## 2019-11-18 RX ADMIN — HEPARIN SODIUM 5000 UNITS: 5000 INJECTION INTRAVENOUS; SUBCUTANEOUS at 15:14

## 2019-11-18 RX ADMIN — LISINOPRIL 40 MG: 20 TABLET ORAL at 08:27

## 2019-11-18 RX ADMIN — METOPROLOL TARTRATE 50 MG: 50 TABLET, FILM COATED ORAL at 08:27

## 2019-11-18 RX ADMIN — LABETALOL 20 MG/4 ML (5 MG/ML) INTRAVENOUS SYRINGE 10 MG: at 08:27

## 2019-11-18 RX ADMIN — ATORVASTATIN CALCIUM 40 MG: 40 TABLET, FILM COATED ORAL at 15:14

## 2019-11-18 RX ADMIN — LEVETIRACETAM 500 MG: 100 INJECTION, SOLUTION INTRAVENOUS at 08:37

## 2019-11-18 RX ADMIN — LEVOTHYROXINE SODIUM 50 MCG: 50 TABLET ORAL at 06:25

## 2019-11-18 RX ADMIN — MAGNESIUM SULFATE HEPTAHYDRATE 2 G: 40 INJECTION, SOLUTION INTRAVENOUS at 08:32

## 2019-11-18 RX ADMIN — HEPARIN SODIUM 5000 UNITS: 5000 INJECTION INTRAVENOUS; SUBCUTANEOUS at 23:11

## 2019-11-18 RX ADMIN — SENNOSIDES AND DOCUSATE SODIUM 1 TABLET: 8.6; 5 TABLET ORAL at 23:11

## 2019-11-18 RX ADMIN — CARVEDILOL 6.25 MG: 6.25 TABLET, FILM COATED ORAL at 17:01

## 2019-11-18 RX ADMIN — ASPIRIN 81 MG 81 MG: 81 TABLET ORAL at 08:26

## 2019-11-18 RX ADMIN — HEPARIN SODIUM 5000 UNITS: 5000 INJECTION INTRAVENOUS; SUBCUTANEOUS at 06:25

## 2019-11-18 RX ADMIN — NICOTINE 1 PATCH: 14 PATCH TRANSDERMAL at 08:32

## 2019-11-18 RX ADMIN — POTASSIUM CHLORIDE 40 MEQ: 20 SOLUTION ORAL at 08:27

## 2019-11-18 NOTE — CONSULTS
PHYSICAL MEDICINE AND REHABILITATION CONSULT NOTE  Penny Hughes 67 y o  male MRN: [id number]  Unit/Bed#: ICU 09 Encounter: [id number]    Requested by (Physician/Service): Alfredo Cuellar MD  Reason for Consultation:  Assessment of rehabilitation needs  Reason for Hospitalization:  Subarachnoid hemorrhage ([facility] [address] ) [I60 9]  Hypertension [I10]  Tibial plateau fracture [T73 801P]  Right arm weakness [R29 898]  Bleeding in head following injury with loss of consciousness ([facility] [address] ) [D39 657A]  Fall (on) (from) other stairs and steps, initial encounter [W10 8XXA]  Rehabilitation Diagnosis: SAH    History of Present Illness:  Penny Hughes is a 67 y o  male who  has a past medical history of Coronary artery disease, Disease of thyroid gland, Hemorrhoids, History of arterial duplex of LE (01/10/2017), History of echocardiogram (07/20/2011), HL (hearing loss), Hyperlipidemia, Hypertension, and PVD (peripheral vascular disease)  who presented to the [address] [place] [address] on 11/14/19 after fall from stairs  He lives on 2nd floor apartment building  He was carrying groceries up the stairs when he had mechanical fall  He was admitted for right tibial plateaur fracture and HTN emergency  During his hospitalization, he was noted to have right hemiparesis, dysarthria  Stroke alert was activated  CTH revealed left frontal hemorrhage  CTA head/neck revealed 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, flow restriction at left vertebral, and 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  Neurosurgery was consulted, and recommended medical management  Orthopedics was consulted for right tibial plateau fracture, recommended NWB, hinged knee brace  PM&R consulted for rehabilitation recommendations      Restrictions include:  None     Hospital Complications/Comorbidities:   Complications: As above  Comorbidities: As above    Morbid Obesity (BMI > 40) no     Last Weight Last BMI   Wt Readings from Last 1 Encounters: 11/15/19 65 8 kg (145 lb 1 oz)    Body mass index is 20 81 kg/m²  Functional History:     Prior to Admission:   Independent ADLs, mobility       Present:  Physical Therapy Occupational Therapy Speech Therapy   pending pending      Past Medical History:   Past Surgical History:   Family History:     Past Medical History:   Diagnosis Date    Coronary artery disease     history of CABG    Disease of thyroid gland     Hemorrhoids     last assessed 7/10/17    History of arterial duplex of LE 01/10/2017    Bilateral occlusion of the superficial femoral arteries, severe diminution of the ankle brachial index bilaterally, disease appears worse on left than right   History of echocardiogram 07/20/2011    hypokinesia of the inferior wall  EF 50%   HL (hearing loss)     Hyperlipidemia     Hypertension     PVD (peripheral vascular disease)     Past Surgical History:   Procedure Laterality Date    COLONOSCOPY      Last assessed 7/10/17    CORONARY ARTERY BYPASS GRAFT  08/23/2010    3V CABG:  LIMA to LAD, VG to RI, VG to OM1      DENTAL SURGERY      Last assessed  7/10/17    KNEE SURGERY Left     Last assessed 7/10/17    TONSILLECTOMY AND ADENOIDECTOMY      Last assessed 7/10/17    TOOTH EXTRACTION       Family History   Problem Relation Age of Onset    Alzheimer's disease Mother     Heart disease Father         cardiac disorder    Heart disease Brother         cardiac disorder          Medications:    Current Facility-Administered Medications:     aspirin chewable tablet 81 mg, 81 mg, Oral, Daily, Concha Sanchez MD, 81 mg at 11/18/19 4390    atorvastatin (LIPITOR) tablet 40 mg, 40 mg, Oral, Daily With Dinner, DESIREE Camacho, 40 mg at 11/17/19 1712    docusate sodium (COLACE) capsule 100 mg, 100 mg, Oral, BID, Richa Valentine PA-C    heparin (porcine) subcutaneous injection 5,000 Units, 5,000 Units, Subcutaneous, Q8H [facility], Concha Sanchez MD, 5,000 Units at 11/18/19 9091   hydrALAZINE (APRESOLINE) injection 10 mg, 10 mg, Intravenous, Q6H PRN, Aman A Paster, DO    Labetalol HCl (NORMODYNE) injection 10 mg, 10 mg, Intravenous, Q6H PRN, Aman A Paster, DO, 10 mg at 11/18/19 0827    levETIRAcetam (KEPPRA) oral solution 500 mg, 500 mg, Oral, Q12H MANUEL, Anabella Reece PA-C    levothyroxine tablet 50 mcg, 50 mcg, Oral, Early Morning, Natalie YOLI TempletonHein, CRNP, 50 mcg at 11/18/19 4796    lisinopril (ZESTRIL) tablet 40 mg, 40 mg, Oral, Daily, Goldie Wang MD, 40 mg at 11/18/19 0827    metoprolol tartrate (LOPRESSOR) tablet 50 mg, 50 mg, Oral, Q12H MANUEL, Natalie Hein, CRNP, 50 mg at 11/18/19 0827    nicotine (NICODERM CQ) 14 mg/24hr TD 24 hr patch 1 patch, 1 patch, Transdermal, Daily, Romi Haynes PA-C, 1 patch at 11/18/19 8893    NIFEdipine (PROCARDIA) capsule 20 mg, 20 mg, Oral, Q8H MANUEL, Aman A Paster, DO    senna-docusate sodium (SENOKOT S) 8 6-50 mg per tablet 1 tablet, 1 tablet, Oral, HS, Richa Valentine PA-C, 1 tablet at 11/17/19 2200    Allergies:   No Known Allergies     Social History:    Social History     Socioeconomic History    Marital status: Single     Spouse name: None    Number of children: 0    Years of education: None    Highest education level: None   Occupational History    Occupation: retired- factory maintenance   Social Needs    Financial resource strain: None    Food insecurity:     Worry: None     Inability: None    Transportation needs:     Medical: None     Non-medical: None   Tobacco Use    Smoking status: Current Every Day Smoker     Packs/day: 0 50     Types: Cigarettes    Smokeless tobacco: Never Used   Substance and Sexual Activity    Alcohol use: Not Currently     Alcohol/week: 1 0 standard drinks     Types: 1 Glasses of wine per week     Frequency: Monthly or less     Drinks per session: 1 or 2     Binge frequency: Less than monthly    Drug use: No    Sexual activity: Not Currently   Lifestyle    Physical activity:     Days per week: None     Minutes per session: None    Stress: None   Relationships    Social connections:     Talks on phone: None     Gets together: None     Attends Yazdanism service: None     Active member of club or organization: None     Attends meetings of clubs or organizations: None     Relationship status: None    Intimate partner violence:     Fear of current or ex partner: None     Emotionally abused: None     Physically abused: None     Forced sexual activity: None   Other Topics Concern    None   Social History Narrative    Has no children    Lives a lone without help available    Previous  service: Louise deployed to [employer] lives alone in 2nd floor apartment  No elevator access  24/7 supervision not available on discharge       Review of Systems: limited ROS due to nonverbal    Physical Exam:  Vital Signs:      Temp:  [98 2 °F (36 8 °C)-99 7 °F (37 6 °C)] 98 4 °F (36 9 °C)  HR:  [] 74  Resp:  [23-33] 27  BP: (141-194)/(62-94) 155/69   Intake/Output Summary (Last 24 hours) at 11/18/2019 1148  Last data filed at 11/18/2019 0800  Gross per 24 hour   Intake 2474 33 ml   Output 775 ml   Net 1699 33 ml        Laboratory:      Lab Results   Component Value Date    HGB 13 7 11/18/2019    HGB 13 7 03/11/2015    HCT 41 1 11/18/2019    HCT 40 8 03/11/2015    WBC 10 90 (H) 11/18/2019    WBC 7 63 03/11/2015     Lab Results   Component Value Date    BUN 18 11/18/2019    BUN 12 03/11/2015     (H) 03/11/2015    K 3 4 (L) 11/18/2019    K 4 1 03/11/2015     (H) 11/18/2019     03/11/2015    GLUCOSE 82 03/11/2015    CREATININE 1 02 11/18/2019    CREATININE 0 93 03/11/2015     No results found for: PROTIME, INR     GEN: NAD, sitting comfortably in bed  Head: NCAT, no gross lesions  Eyes: PERRL, EOMI  Throat: dry MM, no thrush; NGT in place  Pulm: CTAB, no rales/wheezes  CV: RRR, normal s1/s2  Abd: soft, NTND  Ext: no pedal edema bilaterally, distal extremities warm and well perfused  Psych: unable to assess due to nonverbal; no agitation observed  Skin: no observable rashes  Neuro: alert, nonverbal, moving LUE and LLE spontaneously, unable to follow commands consistently   0/5 RUE and RLE      Imaging: Reviewed  Xr Orbits For Foreign Body    Result Date: 11/16/2019  Impression: No radiopaque orbital foreign body  Workstation performed: [id number]     Xr Humerus Right    Result Date: 11/15/2019  Impression: No acute osseous abnormality  Workstation performed: [id number]     Ct Head Without Contrast    Result Date: 11/16/2019  Impression: Stable subarachnoid hemorrhage identified in the left frontal region  Workstation performed: [id number]     Ct Head Wo Contrast    Result Date: 11/15/2019  Impression: Redemonstrated hyperattenuation in the high left frontal sulcus known to represent subarachnoid hemorrhage on the prior examination performed earlier today  No new areas of parenchymal or extra-axial hemorrhage  Generalized parenchymal atrophy, moderate cerebral chronic microangiopathic disease and sequela of remote right posterior MCA distribution infarction  Disproportionate ventriculomegaly to the degree of sulcal prominence  In the appropriate clinical setting, consider NPH  Workstation performed: [id number]     Mri Brain Wo Contrast    Result Date: 11/16/2019  Impression: Scattered acute infarctions in the left cerebral hemisphere which may represent a combination of embolic and watershed zone infarctions  Flow signal characteristics within the intracranial left ICA and vertebral arteries  Please see the separate CTA head and neck report for additional detail  Redemonstrated subarachnoid hemorrhage within the left frontal sulci  Moderate cerebral chronic microangiopathic disease and generalized parenchymal volume loss with chronic right posterior MCA distribution infarction    I personally discussed this study with the covering physician, Dr Lara George on 11/16/2019 at 4:08 PM  Workstation performed: [id number]     Xr Abdomen 1 Vw Portable    Result Date: 11/17/2019  Impression: Feeding tube tip coiled in the stomach  Workstation performed: [id number]     Ct Stroke Alert Brain    Addendum Date: 11/15/2019    ADDENDUM: In retrospect, there is ill-defined high density within a high left frontal sulcus, series 2 image 32, consistent with acute posttraumatic subarachnoid hemorrhage  * I personally telephoned this result to [facility] on 11/15/2019 10:19 AM, and Monique Davenport at 10:04 AM     Result Date: 11/15/2019  Impression: No acute disease  Footprint of remote chronic large and small vessel ischemia  Findings were directly discussed with Dr Leonardo Caba, ICU attending on 11/15/2019 9:44 AM  Workstation performed: [id number]     Xr Chest Portable Icu    Result Date: 11/17/2019  Impression: Feeding tube tip coiled in the distal esophagus  This has already been advanced into the stomach  Workstation performed: [id number]     Cta Stroke Alert (head/neck)    Result Date: 11/15/2019  Impression: 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, reconstitution of the supraclinoid ICA on the left with the patent ophthalmic, anterior and posterior communicating arteries  Flow restrictive disease at the origin and termination of the left vertebral artery  No flow restrictive disease elsewhere  Minor Post traumatic subarachnoid hemorrhage in a left frontal sulcus  Findings were directly discussed with [facility] on 11/15/2019 10:19 AM  and Monique Davenport at 10:04AM  Workstation performed: [id number]       Assessment and Recommendations:  67 y o  male with right hemiplegia, aphasia, with left SAH  PM&R consulted for rehabilitation recommendations  Impairments:  Impaired functional mobility and ability to perform ADL's  Impaired cognition and speech    Recommendations:  1   Rehab  - pending PT/OT eval  - based on physical exam, patient will likely require 24/7 supervision for safety on discharge - recommend SNF pending medical stability   - unsafe home environment - 2nd floor, no supervision available in setting of aphasia and dense right hemiplegia   - recommend PRAFO while in bed to RLE to prevent plantarflexion contracture  - frequent q2h repositioning to prevent decubitus ulcers     2  SAH  - serial CTH per neurology  - keppra x7 days for seizure prophylaxis  - SBP <160 per neurology  - holding anticoagulation/antiplatelet at this time     3  DVT ppx  - SCDs    4  Right tibial plateau fracture  - NWB  - hinged knee brace   - follow up films 6 weeks per ortho    5  Dysphagia   - with NGT currently  - SLP following     6  Bowel/bladder  - with bonilla - recommend voiding trial when out of ICU or not requiring close I/O monitoring   - consider colace/senna for constipation     Thank you for allowing the PM&R service to participate in the care of this patient  We will continue to follow Vicenta Pearson's progress with you  Please do not hesitate to call with questions or concerns    ** Please Note: Fluency Direct voice to text software may have been used in the creation of this document   **

## 2019-11-18 NOTE — PLAN OF CARE
Problem: PHYSICAL THERAPY ADULT  Goal: Performs mobility at highest level of function for planned discharge setting  See evaluation for individualized goals  Description  Treatment/Interventions: Functional transfer training, LE strengthening/ROM, Therapeutic exercise, Endurance training, Cognitive reorientation, Equipment eval/education, Bed mobility, Spoke to nursing, OT          See flowsheet documentation for full assessment, interventions and recommendations  Note:   Prognosis: Guarded  Problem List: Decreased strength, Decreased range of motion, Decreased endurance, Decreased mobility, Impaired balance, Decreased coordination, Decreased cognition, Impaired judgement, Decreased safety awareness, Impaired hearing, Pain, Orthopedic restrictions  Assessment: Pt is a 67year old male presenting [facility] ED after a fall on 11/14/19  CT R knee revealed cortical depression deformity and cortical offset at anterolateral tibial plateau concerning for fx  Per ortho, pt NWB RLE, non operative management, hinged knee brace unlocked  Pt noted to have slurred speech, R facial droop, and R UE weakness on 11/15/19, CT revealed "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, reconstitution of the supraclinoid ICA on the left with the patent ophthalmic, anterior and posterior communicating arteries, Minor Post traumatic subarachnoid hemorrhage in a left frontal sulcus " Pt transferred to [facility] on 11/15/19 for increased level of care  Pt with PMH significant for CAD, HLD, PVD, tobacco use, palpitations, HTN, CABG, thyroid dx, and hearing loss  Pt presents today for initial physical therapy evaluation supine  Pt is lethargic but able to open eyes, with difficulty communicating throughout session  Pt demonstrates almost no verbalization during therapy  Pt follows ~10-15% of simple one step commands   Pt displays limited purposeful movement, requiring frequent verbal and tactile stimuli for participation in therapy  Pt requires reminders for safety, as pt occasionally pulls at keofed tube with LUE  Pt transferred supine to sit with maxAx2  Pt sat EOB ~5 min with maxAx1 for trunk support  Pt demonstrates posterior and R lateral trunk lean, with decreased awareness of body position and requires maxAx1 to correct trunk lean  Further transfers deferred due to pt cognitive deficits, inconsistent command following, and decreased trunk control while seated EOB  Pt returned to supine with maxAx2 and remained in bed in chair position at end of session with all needs within reach, bed alarm and LUE mitt on, and NATHAN Lee notified  PT to recommend inpt rehab to maximize pt safety and functional mobility  PT to follow         Recommendation: (S) Post acute IP rehab     PT - OK to Discharge: (S) Yes(when medically stable)    See flowsheet documentation for full assessment        [facility], [facility]

## 2019-11-18 NOTE — PROGRESS NOTES
Progress Note - Neurology   Mra Israel 67 y o  male [id number]  Unit/Bed#: ICU 09/ICU 09    Assessment:  Mar Israel is a 67 y o  male with multiple vascular risk factors, who presented to the hospital after a fall on 11/14, likely causing post-traumatic left subarachnoid hemorrhage  On 11/15 patient was noted to have slower speech, right facial droop, and right hemiparesis  Further workup revealed a chronic left carotid occlusion with scattered acute infarctions within the left hemisphere, thought to be vessel-to-vessel vs watershed in etiology  Plan:  - Repeat CT head in 7-10 days   - Continue aspirin 81 mg daily   - Would recommend dual antiplatelet therapy with addition of Plavix in the future, can be discussed as an outpatient given current SAH   - No events of atrial fibrillation detected on telemetry, unlikely to benefit from loop recorder at this time, and cannot consider anticoagulation at this time given SAH  Can be discussed with stroke team in outpatient setting    - Continue atorvastatin 40 mg daily   - Continue Keppra for seizure prophylaxis, per neurosurgical recommendations   - PT/OT/ST  - Continue telemetry   - Maintain blood pressure [phone] systolic   - An outpatient hospital follow up appointment has been requested with the neurology team  The office will call the patient to help set up an appointment  - Medical management and supportive care per primary team  Correction of any metabolic or infectious disturbances  Subjective:   Patient was seen and examined with attending neurologist, Dr Ele Agarwal  He is having difficulty following simple commands  He exhibits little to no verbal output on examination, although sister in law, Mimi [place], at bedside states he did attempt to speak to her when she got there, although he did not make sense  Both his language and comprehension are impaired   He remains in a brace in the right leg due to fracture and no spontaneous movement of his right arm is evident on exam  His blood pressures have been labile  At 8am this mornign it was 193/86, prior to his morning medications  He was then given his morning anti-hypertensives and labetalol 10 mg  At 9am this morning, it was 155/69  Past Medical History:   Diagnosis Date    Coronary artery disease     history of CABG    Disease of thyroid gland     Hemorrhoids     last assessed 7/10/17    History of arterial duplex of LE 01/10/2017    Bilateral occlusion of the superficial femoral arteries, severe diminution of the ankle brachial index bilaterally, disease appears worse on left than right   History of echocardiogram 07/20/2011    hypokinesia of the inferior wall  EF 50%   HL (hearing loss)     Hyperlipidemia     Hypertension     PVD (peripheral vascular disease)      Past Surgical History:   Procedure Laterality Date    COLONOSCOPY      Last assessed 7/10/17    CORONARY ARTERY BYPASS GRAFT  08/23/2010    3V CABG:  LIMA to LAD, VG to RI, VG to OM1      DENTAL SURGERY      Last assessed  7/10/17    KNEE SURGERY Left     Last assessed 7/10/17    TONSILLECTOMY AND ADENOIDECTOMY      Last assessed 7/10/17    TOOTH EXTRACTION       Family History   Problem Relation Age of Onset    Alzheimer's disease Mother     Heart disease Father         cardiac disorder    Heart disease Brother         cardiac disorder     Social History     Socioeconomic History    Marital status: Single     Spouse name: None    Number of children: 0    Years of education: None    Highest education level: None   Occupational History    Occupation: retired- factory maintenance   Social Needs    Financial resource strain: None    Food insecurity:     Worry: None     Inability: None    Transportation needs:     Medical: None     Non-medical: None   Tobacco Use    Smoking status: Current Every Day Smoker     Packs/day: 0 50     Types: Cigarettes    Smokeless tobacco: Never Used   Substance and Sexual Activity    Alcohol use: Not Currently     Alcohol/week: 1 0 standard drinks     Types: 1 Glasses of wine per week     Frequency: Monthly or less     Drinks per session: 1 or 2     Binge frequency: Less than monthly    Drug use: No    Sexual activity: Not Currently   Lifestyle    Physical activity:     Days per week: None     Minutes per session: None    Stress: None   Relationships    Social connections:     Talks on phone: None     Gets together: None     Attends [employer] service: None     Active member of club or organization: None     Attends meetings of clubs or organizations: None     Relationship status: None    Intimate partner violence:     Fear of current or ex partner: None     Emotionally abused: None     Physically abused: None     Forced sexual activity: None   Other Topics Concern    None   Social History Narrative    Has no children    Lives a lone without help available    Previous  service: [place] deployed to [place]         Medications:   All current active meds have been reviewed and current meds:  Scheduled Meds:  Current Facility-Administered Medications:  aspirin 81 mg Oral Daily Jeronimo Galloway MD   atorvastatin 40 mg Oral Daily With Dinner DESIREE Camacho   docusate sodium 100 mg Oral BID Ashley Vazquez PA-C   heparin (porcine) 5,000 Units Subcutaneous Q8H [address] MD Bernadine   hydrALAZINE 10 mg Intravenous Q6H PRN Aman A Paster, DO   Labetalol HCl 10 mg Intravenous Q6H PRN Aman A Paster, DO   levETIRAcetam 500 mg Oral Q12H [address] Raiza Hendricks PA-C   levothyroxine 50 mcg Oral Early Morning DESIREE Camacho   lisinopril 40 mg Oral Daily Brenda Brooks MD   metoprolol tartrate 50 mg Oral Q12H [address] DESIREE Heard   nicotine 1 patch Transdermal Daily Miles Jamil PA-C   NIFEdipine 20 mg Oral Q8H [address] Aman A Paster, DO   senna-docusate sodium 1 tablet Oral HS Richa Valentine PA-C     Continuous Infusions:   PRN Meds: hydrALAZINE    Labetalol HCl       ROS:   Review of Systems   Reason unable to perform ROS: Aphasia  Vitals:   /69 (BP Location: Right arm)   Pulse 74   Temp 98 4 °F (36 9 °C) (Oral)   Resp (!) 27   Ht 5' 10" (1 778 m)   Wt 65 8 kg (145 lb 1 oz)   SpO2 98%   BMI 20 81 kg/m²     Physical Exam:   Physical Exam   Constitutional: He appears well-developed  No distress  Frail elderly male, resting in bed  Keofed tube in place  HENT:   Head: Normocephalic and atraumatic  Right Ear: External ear normal    Left Ear: External ear normal    Nose: Nose normal    Mouth/Throat: Oropharynx is clear and moist    Eyes: Pupils are equal, round, and reactive to light  Conjunctivae and EOM are normal  Right eye exhibits no discharge  Left eye exhibits no discharge  No scleral icterus  Tracks examiner    Neck: Normal range of motion  Neck supple  Cardiovascular: Normal rate  Pulmonary/Chest: Effort normal  No respiratory distress  Abdominal: Soft  Musculoskeletal: He exhibits no edema or deformity  Brace on RLE  ROM limited by brace as well as right hemiparesis  Neurological: He is alert  Skin: Skin is warm and dry  No rash noted  He is not diaphoretic  No pallor  Psychiatric:   Difficult to assess given aphasia  Nursing note and vitals reviewed  Neurologic Exam     Mental Status   Patient is awake and alert  He exhibits minimal verbal output  He does not attempt to answer orientation questions given aphasia  Comprehension is limited  Able to perform some 1 step commands  Able to repeat 1 syllable words intermittently  Unable to repeat more complex words  Unable to name objects  Cranial Nerves     CN II   Visual acuity: (Blinks to threat, inconsistent)    CN III, IV, VI   Pupils are equal, round, and reactive to light  Extraocular motions are normal    Right pupil: Shape: regular  Left pupil: Shape: regular     Conjugate gaze: present    CN VII   Right facial weakness: Slight right facial weakness  CN VIII   Hearing impaired: Difficult to assess given aphasia  Motor Exam   Overall muscle tone: normal  Right hemiparesis, limited assessment of RLE due to brace placed for fracture  Able to maintain LUE and LLE antigravity  Sensory Exam   Sensory exam unreliable given aphasia  Does not withdrawal to noxious stimuli on the right upper extremity  Grimaces to noxious stimuli on the left upper extremity  Gait, Coordination, and Reflexes     Gait  Gait: (Deferred)    Tremor   Resting tremor: absent  Intention tremor: absent  Action tremor: absent          Labs: I have personally reviewed pertinent reports     Recent Results (from the past 24 hour(s))   Basic metabolic panel    Collection Time: 11/18/19  5:23 AM   Result Value Ref Range    Sodium 143 136 - 145 mmol/L    Potassium 3 4 (L) 3 5 - 5 3 mmol/L    Chloride 110 (H) 100 - 108 mmol/L    CO2 22 21 - 32 mmol/L    ANION GAP 11 4 - 13 mmol/L    BUN 18 5 - 25 mg/dL    Creatinine 1 02 0 60 - 1 30 mg/dL    Glucose 111 65 - 140 mg/dL    Calcium 8 5 8 3 - 10 1 mg/dL    eGFR 70 ml/min/1 73sq m   CBC and differential    Collection Time: 11/18/19  5:23 AM   Result Value Ref Range    WBC 10 90 (H) 4 31 - 10 16 Thousand/uL    RBC 4 18 3 88 - 5 62 Million/uL    Hemoglobin 13 7 12 0 - 17 0 g/dL    Hematocrit 41 1 36 5 - 49 3 %    MCV 98 82 - 98 fL    MCH 32 8 26 8 - 34 3 pg    MCHC 33 3 31 4 - 37 4 g/dL    RDW 13 6 11 6 - 15 1 %    MPV 11 0 8 9 - 12 7 fL    Platelets 604 357 - 352 Thousands/uL    nRBC 0 /100 WBCs    Neutrophils Relative 75 43 - 75 %    Immat GRANS % 1 0 - 2 %    Lymphocytes Relative 9 (L) 14 - 44 %    Monocytes Relative 13 (H) 4 - 12 %    Eosinophils Relative 1 0 - 6 %    Basophils Relative 1 0 - 1 %    Neutrophils Absolute 8 38 (H) 1 85 - 7 62 Thousands/µL    Immature Grans Absolute 0 08 0 00 - 0 20 Thousand/uL    Lymphocytes Absolute 0 97 0 60 - 4 47 Thousands/µL    Monocytes Absolute 1 36 (H) 0 17 - 1 22 Thousand/µL    Eosinophils Absolute 0 05 0 00 - 0 61 Thousand/µL    Basophils Absolute 0 06 0 00 - 0 10 Thousands/µL   Magnesium    Collection Time: 11/18/19  5:23 AM   Result Value Ref Range    Magnesium 1 9 1 6 - 2 6 mg/dL   Phosphorus    Collection Time: 11/18/19  5:23 AM   Result Value Ref Range    Phosphorus 3 7 2 3 - 4 1 mg/dL       Imaging: I have personally reviewed pertinent imaging in PACS, including MRI, CT, CTA,  and I have personally reviewed PACS reports  EKG, Pathology, and Other Studies: I have personally reviewed pertinent reports         VTE Prophylaxis: Heparin

## 2019-11-18 NOTE — ASSESSMENT & PLAN NOTE
Reportedly noncompliant with medications prior to admission  Had hypertensive urgency on admission  Currently back on home medications, however still mildly elevated blood pressure  Goal systolic blood pressure [phone] per Neurology

## 2019-11-18 NOTE — ASSESSMENT & PLAN NOTE
Non operative per Orthopedics  Nonweightbearing right lower extremity  Unlocked right hinged knee brace  Continue PT and OT  Will likely require rehab

## 2019-11-18 NOTE — SPEECH THERAPY NOTE
Speech Language/Pathology    Speech/Language Pathology Progress Note    Patient Name: Heidi Dobbs  [id number] Date: 11/18/2019     Problem List  Principal Problem:    Cerebrovascular accident (CVA) due to embolism of left middle cerebral artery ([address] )  Active Problems:    Hypertension    Subarachnoid hemorrhage (HCC)    Closed fracture of right tibial plateau with routine healing    Encephalopathy       Past Medical History  Past Medical History:   Diagnosis Date    Coronary artery disease     history of CABG    Disease of thyroid gland     Hemorrhoids     last assessed 7/10/17    History of arterial duplex of LE 01/10/2017    Bilateral occlusion of the superficial femoral arteries, severe diminution of the ankle brachial index bilaterally, disease appears worse on left than right   History of echocardiogram 07/20/2011    hypokinesia of the inferior wall  EF 50%   HL (hearing loss)     Hyperlipidemia     Hypertension     PVD (peripheral vascular disease)         Past Surgical History  Past Surgical History:   Procedure Laterality Date    COLONOSCOPY      Last assessed 7/10/17    CORONARY ARTERY BYPASS GRAFT  08/23/2010    3V CABG:  LIMA to LAD, VG to RI, VG to OM1  [facility] DENTAL SURGERY      Last assessed  7/10/17    KNEE SURGERY Left     Last assessed 7/10/17    TONSILLECTOMY AND ADENOIDECTOMY      Last assessed 7/10/17    TOOTH EXTRACTION           Subjective:  "I don't know"     Objective: The patient continues to be mostly nonverbal, but is able to state "I don't know" in response to some questions re: food preferences  He continues with [facility] nutrition  Nursing report that he was not given am meal due to fatigue  He is sitting upright with bed in chair position  The patient is awake and alert and is agreeable to lunch meal  Oral care is provided to remove a small amount of dried secretions from lips  SLP feeds patient   He is assessed with puree items and nectar thick liquids, all given via tsp  Oral closure for retrieval of tsp is adequate  A-P transfer time appears appropriate, but patient has some decreased control of bolus as session progresses  He has some oral residue with mashed potatoes, but clears with liquid wash  Laryngeal rise is palpated and judged to be adequate  He typically needs multiple swallows to clear tsp  Patient appears to fatigue at end of meal as evidenced by decreased coordination with swallowing, with more audible swallow  Assessment:  Patient tolerated puree solids and nectar thick liquids well-all via tsp  Need to watch for fatigue as meal progresses  Plan/Recommendations:  Continue with current diet  Continue ST to further assess

## 2019-11-18 NOTE — PROGRESS NOTES
Progress Note - Roque Lucas 1940, 67 y o  male MRN: [id number]    Unit/Bed#: ICU 09 Encounter: [id number]    Primary Care Provider: Salomón Simon DO   Date and time admitted to hospital: 11/15/2019  1:51 PM        Closed fracture of right tibial plateau with routine healing  Assessment & Plan  Non operative per Orthopedics  Nonweightbearing right lower extremity  Unlocked right hinged knee brace  Continue PT and OT  Will likely require rehab  [address]arachnoid hemorrhage ([facility] [address] )  Assessment & Plan  No expansion on most recent CT head  Seen by Neurosurgery who signed off  Unlikely to be contributing to patient's current symptoms  Hypertension  Assessment & Plan  Reportedly noncompliant with medications prior to admission  Had hypertensive urgency on admission  Currently back on home medications, however still mildly elevated blood pressure  Goal systolic blood pressure [phone] per Neurology  * Cerebrovascular accident (CVA) due to embolism of left middle cerebral artery [facility])  Assessment & Plan  Remains with right hemiplegia and expressive aphasia  May be some component of receptive aphasia  Being followed by Neurology  Goal systolic blood pressure [phone]  Has been running greater than 160 and is back on home antihypertensives  Continue frequent neurologic checks  Code Status: Level 3 - DNAR and DNI  POA:    POLST:      Reason for ICU admission:   Acute stroke, subarachnoid hemorrhage    Active problems:   Principal Problem:    Cerebrovascular accident (CVA) due to embolism of left middle cerebral artery ([address] )  Active Problems:    Hypertension    Subarachnoid hemorrhage ([address] )    Closed fracture of right tibial plateau with routine healing  Resolved Problems:    Encephalopathy      Consultants:   Neurology  Neurosurgery (signed off)  Orthopedics    History of Present Illness:   64-year-old male seen at [facility] after a fall down stairs while carrying groceries  Appeared to be a mechanical fall with no syncope  No loss of consciousness  Was found to have small subarachnoid hemorrhage on CT head  Also noted to have a right tibial plateau fracture and hypertensive emergency  Due to the number of injuries, patient was given DDAVP and transferred to [address] [place] [address] for trauma evaluation  On arrival here, patient was a GCS of 14 due to confusion and continued to have right-sided weakness  Summary of clinical course:   Patient was seen by Neurology and Neurosurgery  Neurosurgery felt that the subarachnoid hemorrhage required no intervention and was unlikely to be contributing to his symptoms  Patient subsequently underwent MRI per Neurology recommendations and was found to have a left MCA territory infarct  Patient was transferred to the ICU due to worsening neurologic status  He was started on aspirin and a statin  Patient was admitted to the ICU to maintain a systolic blood pressure of 140-160 which was accomplished without the need for pressors  Patient's symptoms remained relatively unchanged throughout his stay in the ICU  Recent or scheduled procedures:   None    Outstanding/pending diagnostics:   None    Cultures:   None       Mobilization Plan:   Out of bed with PT and OT  Nonweightbearing right lower extremity  Nutrition Plan:   Diet with nectar thick liquids  Patient recently with Keofed tube, tube feeds  VTE Pharmacologic Prophylaxis: Heparin  VTE Mechanical Prophylaxis: sequential compression device    Discharge Plan:   Patient should be ready for discharge to rehab on or after 11/19/19    Initial Physical Therapy Recommendations:  Acute rehab  Initial Occupational Therapy Recommendations:  Acute rehab  Initial /Plan:  Pending    Home medications that are not reordered and reason why:   None      Spoke with Natalie  regarding transfer  Please call [phone] with any questions or concerns       Portions of the record may have been created with voice recognition software  Occasional wrong word or "sound a like" substitutions may have occurred due to the inherent limitations of voice recognition software  Read the chart carefully and recognize, using context, where substitutions have occurred

## 2019-11-18 NOTE — ASSESSMENT & PLAN NOTE
Remains with right hemiplegia and expressive aphasia  May be some component of receptive aphasia  Being followed by Neurology  Goal systolic blood pressure 235-355  Has been running greater than 160 and is back on home antihypertensives  Continue frequent neurologic checks

## 2019-11-18 NOTE — PROGRESS NOTES
Cardiology Progress Note - Mar Israel 67 y o  male MRN: [id number]    Unit/Bed#: ICU 09 Encounter: [id number]      Assessment:  21-year-old gentleman, who originally presented to [facility] on 11/14/2019 after a fall while Getting groceries up a flight of stairs to his apartment  No associated symptoms with the fall, and was primarily related to a misstep  even surely crawl back to his apartment in lay bed all day  Next morning, he continued to have excrutiating pain in his right knee, and as a result ended up calling EMS  In the ED, he was found to have up a small posttraumatic subarachnoid hemorrhage in the left frontal sulcus  additionally a CT in right knee showed possible tibial plateau fracture  As a result, he was transferred to [facility] for further orthopedic evaluation     1  Hypertensive urgency  2  CAD s/p CABGx3 (2003)  3  Non-MI troponin elevation - related to JED/trauma  4  Right knee pain - osteoarthritis with non-displaced tibial plateau fracture - being medically managed  5  Subarachnoid hemorrhage - small, s/p DDAVP for h/o aspirin use  6  Hyperlipidemia  7  Hypothyroidism  8  Chronic left common carotid occlusion, noted on CTA  9  Active smoker  10   Peripheral vascular disease - Rt LESTER 0 43, Lt LESTER 0 31, medically manage, not interested in vascular surgical evaluation     Outpatient cardiologist: Dr Jane Cifuentes  Hypertensive urgency  · Nicardipine drip stopped yesterday at 2:00 p m   on lisinopril 40 mg, metoprolol 50 mg b i d , nifedipine 20 mg Q 8  · He did not get nifedipine capsule yesterday & this morning as he was unable to swallow it and it could not be crushed (has NG tube) & unable to give nifedipine XR as well, as can't be crushed --> will switch to amlodipine 5 mg  · Blood pressure again elevated in 190s this moring  · Switch metoprolol to carvedilol 6 25 mg b i d , and uptitrate as tolerated    CAD s/p CABGx3  · On aspirin 81, carvedilol 6 25 mg b i d , lisinopril 40 mg, atorvastatin 40 mg    Subjective:   No active complains, although is sleepy and tired  Did open eyes and correctly said his outpatient cardiologist's name Dr Azalia Kayser of Systems  No c/o chest pain, No c/o palpitations, No c/o shortness of breath, No c/o dizziness or light-headedness, No c/o abdominal pain, no c/o nausea/vomitting  All other systems negative    Telemetry Review: short episode of narrow complex tachycardia overnight in 110-150, likely representing atrial tachycardia / MAT    Objective:   Vitals: Blood pressure 163/69, pulse 68, temperature 98 4 °F (36 9 °C), temperature source Oral, resp  rate (!) 26, height 5' 10" (1 778 m), weight 65 8 kg (145 lb 1 oz), SpO2 98 %  , Body mass index is 20 81 kg/m² ,   Orthostatic Blood Pressures      Most Recent Value   Blood Pressure  163/69 filed at 11/18/2019 1200   Patient Position - Orthostatic VS  Lying filed at 11/18/2019 1497         Systolic (12BWA), VGY:337 , Min:145 , KVH:470     Diastolic (99ZIJ), MUS:85, Min:62, Max:94    Wt Readings from Last 5 Encounters:   11/15/19 65 8 kg (145 lb 1 oz)   11/15/19 65 8 kg (145 lb 1 oz)   11/15/19 66 kg (145 lb 8 1 oz)   08/10/19 68 kg (149 lb 14 6 oz)   06/06/19 68 kg (150 lb)     I/O       11/16 0701 - 11/17 0700 11/17 0701 - 11/18 0700 11/18 0701 - 11/19 0700    P  O    118    I V  (mL/kg) 3250 2 (49 4) 718 3 (10 9)     NG/ 600     IV Piggyback 200 100 150    Feedings 330 1046 220    Total Intake(mL/kg) 4080 2 (62) 2464 3 (37 5) 488 (7 4)    Urine (mL/kg/hr)  775 (0 5)     Total Output  775     Net +4080 2 +1689 3 +488           Unmeasured Urine Occurrence 3 x 3 x               Physical Exam    Constitutional:  Lorena Rung appears is tired and sleepy    Did open eyes and answer occasional word appropriately   HEENT:    Normocephalic, NG tube in place   Neck:  Supple, No JVD   Lungs:  Clear to auscultation bilaterally, respirations unlabored    Chest Wall:  No tenderness or deformity    Heart::  Regular rate, Regular rhythm, S1 and S2 normal, no murmur, rub or gallop    Abdomen:  Soft, non-tender, non-distended   Neurological:  Awake, alert, oriented x3  Non-focal exam    Extremities:  No pedal edema, No calf tenderness         Laboratory Results:  Results from last 7 days   Lab Units 11/15/19  1949 11/15/19  1733 11/15/19  1503   TROPONIN I ng/mL 0 05* 0 05* 0 05*       CBC with diff:   Results from last 7 days   Lab Units 11/18/19  0523 11/17/19  0453 11/16/19  0515 11/15/19  0457 11/14/19  1539   WBC Thousand/uL 10 90* 13 14* 12 73* 14 40* 10 60   HEMOGLOBIN g/dL 13 7 12 8 12 5 14 2 13 8*   HEMATOCRIT % 41 1 38 8 37 3 41 2* 40 8*   MCV fL 98 99* 99* 98 99   PLATELETS Thousands/uL 198 182 181 159 152   MCH pg 32 8 32 7 33 1 33 6 33 5   MCHC g/dL 33 3 33 0 33 5 34 3 33 9   RDW % 13 6 13 9 13 6 13 5 13 7   MPV fL 11 0 10 5 11 0 9 6 8 6   NRBC AUTO /100 WBCs 0 0  --   --   --          CMP:  Results from last 7 days   Lab Units 11/18/19  0523 11/17/19  0453 11/16/19  0515 11/15/19  1503 11/15/19  0457 11/14/19  1539   POTASSIUM mmol/L 3 4* 3 5 3 6 3 4* 3 4* 3 6   CHLORIDE mmol/L 110* 115* 111* 106 103 104   CO2 mmol/L 22 20* 20* 26 24 27   BUN mg/dL 18 17 17 14 13 15   CREATININE mg/dL 1 02 0 87 0 96 1 06 0 91 1 07   CALCIUM mg/dL 8 5 8 0* 8 4 8 7 8 9 8 8   AST U/L  --   --   --   --  15 14   ALT U/L  --   --   --   --  18 20   ALK PHOS U/L  --   --   --   --  60 56   EGFR ml/min/1 73sq m 70 83 75 67 80 66         BMP:  Results from last 7 days   Lab Units 11/18/19 0523 11/17/19  0453 11/16/19  0515 11/15/19  1503 11/15/19  0457 11/14/19  1539   POTASSIUM mmol/L 3 4* 3 5 3 6 3 4* 3 4* 3 6   CHLORIDE mmol/L 110* 115* 111* 106 103 104   CO2 mmol/L 22 20* 20* 26 24 27   BUN mg/dL 18 17 17 14 13 15   CREATININE mg/dL 1 02 0 87 0 96 1 06 0 91 1 07   CALCIUM mg/dL 8 5 8 0* 8 4 8 7 8 9 8 8       BNP: No results for input(s): BNP in the last 72 hours      Magnesium:   Results from last 7 days   Lab Units 11/18/19  0523 11/16/19  0515 11/15/19  0457   MAGNESIUM mg/dL 1 9 2 2 1 8*       Coags:       TSH:        Hemoglobin A1C   Results from last 7 days   Lab Units 11/15/19  0457   HEMOGLOBIN A1C % 5 1       Lipid Profile:   Results from last 7 days   Lab Units 11/15/19  0126   TRIGLYCERIDES mg/dL 103   HDL mg/dL 52       Meds/Allergies   all current active meds have been reviewed and current meds:   Current Facility-Administered Medications   Medication Dose Route Frequency    aspirin chewable tablet 81 mg  81 mg Oral Daily    atorvastatin (LIPITOR) tablet 40 mg  40 mg Oral Daily With Dinner    docusate sodium (COLACE) capsule 100 mg  100 mg Oral BID    heparin (porcine) subcutaneous injection 5,000 Units  5,000 Units Subcutaneous Q8H [facility]    hydrALAZINE (APRESOLINE) injection 10 mg  10 mg Intravenous Q6H PRN    Labetalol HCl (NORMODYNE) injection 10 mg  10 mg Intravenous Q6H PRN    levETIRAcetam (KEPPRA) oral solution 500 mg  500 mg Oral Q12H [facility]    levothyroxine tablet 50 mcg  50 mcg Oral Early Morning    lisinopril (ZESTRIL) tablet 40 mg  40 mg Oral Daily    metoprolol tartrate (LOPRESSOR) tablet 50 mg  50 mg Oral Q12H [facility]    nicotine (NICODERM CQ) 14 mg/24hr TD 24 hr patch 1 patch  1 patch Transdermal Daily    NIFEdipine (PROCARDIA) capsule 20 mg  20 mg Oral Q8H [facility]    senna-docusate sodium (SENOKOT S) 8 6-50 mg per tablet 1 tablet  1 tablet Oral HS     Medications Prior to Admission   Medication    aspirin (ECOTRIN LOW STRENGTH) 81 mg EC tablet    atorvastatin (LIPITOR) 40 mg tablet    levothyroxine 50 mcg tablet    lisinopril (ZESTRIL) 40 mg tablet    metoprolol tartrate (LOPRESSOR) 50 mg tablet    NIFEdipine (PROCARDIA XL) 60 mg 24 hr tablet            Cardiac testing:   Results for orders placed during the hospital encounter of 11/15/19   Echo complete with contrast if indicated    Narrative [address]  [place] [address], [employer] [id number] [phone]    Transthoracic Echocardiogram  2D, M-mode, Doppler, and Color Doppler    Study date:  2019    Patient: Daniela Martinez  MR number: [id number]  Account number: [de-identified]  : 1940  Age: 67 years  Gender: Male  Status: Inpatient  Location: Bedside  Height: 70 in  Weight: 144 8 lb  BP: 139/ 60 mmHg    Indications: Assess left ventricular function  Diagnoses: I25 10 - Atherosclerotic heart disease of native coronary artery without angina pectoris    Sonographer:  MODE Oro  Referring Physician:  Jonetta Buerger, PA-C  Group:  [facility]  Interpreting Physician:  Fatmata Jacinto MD    SUMMARY    LEFT VENTRICLE:  Systolic function was normal  Ejection fraction was estimated to be 60 %  Although no diagnostic regional wall motion abnormality was identified, this possibility cannot be completely excluded on the basis of this study  Wall thickness was increased  Features were consistent with a pseudonormal left ventricular filling pattern, with concomitant abnormal relaxation and increased filling pressure (grade 2 diastolic dysfunction)  VENTRICULAR SEPTUM:  There was dyssynergic motion  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  RIGHT VENTRICLE:  The size was at the upper limits of normal   Systolic function was normal     AORTIC VALVE:  There was mild regurgitation  TRICUSPID VALVE:  There was moderate regurgitation  Estimated peak PA pressure was 45 mmHg  The findings suggest mild pulmonary hypertension  HISTORY: PRIOR HISTORY: Elevated troponin, CAD s/p CABG, Hypertension, Tobacco abuse, SAH    PROCEDURE: The procedure was performed at the bedside  This was a routine study  The transthoracic approach was used  The study included complete 2D imaging, M-mode, complete spectral Doppler, and color Doppler  The heart rate was 68 bpm,  at the start of the study   Images were obtained from the parasternal, apical, subcostal, and suprasternal notch acoustic windows  Image quality was adequate  LEFT VENTRICLE: Size was normal  Systolic function was normal  Ejection fraction was estimated to be 60 %  Although no diagnostic regional wall motion abnormality was identified, this possibility cannot be completely excluded on the basis  of this study  Wall thickness was increased  DOPPLER: The ratio of early ventricular filling to atrial contraction velocities was within the normal range  Features were consistent with a pseudonormal left ventricular filling pattern, with  concomitant abnormal relaxation and increased filling pressure (grade 2 diastolic dysfunction)  VENTRICULAR SEPTUM: There was dyssynergic motion  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  RIGHT VENTRICLE: The size was at the upper limits of normal  Systolic function was normal     LEFT ATRIUM: The atrium was dilated  RIGHT ATRIUM: The atrium was dilated  MITRAL VALVE: There was moderate annular calcification  Valve structure was normal  There was normal leaflet separation  DOPPLER: There was possible mild stenosis  There was no significant regurgitation  AORTIC VALVE: The valve was trileaflet  Leaflets exhibited mildly increased thickness, mild calcification, and sclerosis  DOPPLER: There was no evidence for stenosis  There was mild regurgitation  TRICUSPID VALVE: The valve structure was normal  There was normal leaflet separation  DOPPLER: There was no evidence for stenosis  There was moderate regurgitation  Estimated peak PA pressure was 45 mmHg  The findings suggest mild  pulmonary hypertension  PULMONIC VALVE: Leaflets exhibited normal thickness, no calcification, and normal cuspal separation  DOPPLER: The transpulmonic velocity was within the normal range  There was mild to moderate regurgitation  PERICARDIUM: There was no pericardial effusion  The pericardium was normal in appearance      AORTA: The root exhibited normal size and fibrocalcific change  SYSTEM MEASUREMENT TABLES    2D  %FS: 27 33 %  Ao Diam: 3 06 cm  EDV(Teich): 126 06 ml  EF Biplane: 59 72 %  EF(Teich): 52 84 %  ESV(Teich): 59 45 ml  IVSd: 1 12 cm  LA Area: 26 18 cm2  LA Diam: 3 97 cm  LVEDV MOD A2C: 73 63 ml  LVEDV MOD A4C: 93 73 ml  LVEDV MOD BP: 88 94 ml  LVEF MOD A2C: 57 13 %  LVEF MOD A4C: 57 65 %  LVESV MOD A2C: 31 57 ml  LVESV MOD A4C: 39 7 ml  LVESV MOD BP: 35 83 ml  LVIDd: 5 14 cm  LVIDs: 3 74 cm  LVLd A2C: 8 17 cm  LVLd A4C: 8 91 cm  LVLs A2C: 7 86 cm  LVLs A4C: 7 69 cm  LVOT Diam: 2 13 cm  LVPWd: 1 13 cm  RA Area: 20 91 cm2  RVIDd: 3 98 cm  SV MOD A2C: 42 06 ml  SV MOD A4C: 54 03 ml  SV(Teich): 66 61 ml    CW  TR Vmax: 3 21 m/s  TR maxP 16 mmHg    MM  TAPSE: 2 14 cm    PW  AVC: 373 39 ms  E': 0 07 m/s  E/E': 20 45  LVOT Env  Ti: 304 5 ms  LVOT VTI: 20 24 cm  LVOT Vmax: 0 95 m/s  LVOT Vmean: 0 66 m/s  LVOT maxPG: 3 63 mmHg  LVOT meanP 01 mmHg  LVSI Dopp: 39 64 ml/m2  LVSV Dopp: 72 15 ml  MV A Jimbo: 0 99 m/s  MV Dec [place]: 6 3 m/s2  MV DecT: 238 88 ms  MV E Jimbo: 1 5 m/s  MV E/A Ratio: 1 52  MV PHT: 69 27 ms  MVA By PHT: 3 18 cm2    [employer] Accredited Echocardiography Laboratory    Prepared and electronically signed by    Megan Carias MD  Signed [id number] 12:59:19       No results found for this or any previous visit  No results found for this or any previous visit  No results found for this or any previous visit  Counseling / Coordination of Care  Total floor / unit time spent today 25 minutes

## 2019-11-18 NOTE — PHYSICAL THERAPY NOTE
Physical Therapy Evaluation     11/18/19 0931   Note Type   Note type Eval only   Pain Assessment   Pain Assessment FLACC   Pain Location Head;Knee   Pain Rating: FLACC (Rest) - Face 0   Pain Rating: FLACC (Rest) - Legs 0   Pain Rating: FLACC (Rest) - Activity 0   Pain Rating: FLACC (Rest) - Cry 0   Pain Rating: FLACC (Rest) - Consolability 0   Score: FLACC (Rest) 0   Pain Rating: FLACC (Activity) - Face 0   Pain Rating: FLACC (Activity) - Legs 0   Pain Rating: FLACC (Activity) - Activity 0   Pain Rating: FLACC (Activity) - Cry 0   Pain Rating: FLACC (Activity) - Consolability 0   Score: FLACC (Activity) 0   Home Living   Type of Home Apartment   Home Layout Stairs to enter with rails; Performs ADLs on one level; Able to live on main level with bedroom/bathroom  (2nd floor apt with full flight of stairs to enter)   Additional Comments Pt poor historian due to cognition, difficulty communicating  Home setup information provided from previous PT note, pt lives in 2nd floor apt with full flight steps to enter  Unclear if pt owns DME or has elevator access  Prior Function   Level of [place] Independent with ADLs and functional mobility   ADL Assistance Independent   Falls in the last 6 months 1 to 4   Comments Pt poor historian due to cognition, difficulty communicating  Per chart, pt independent PTA  Unclear at this time if pt lives with anyone or has nearby friends/family for support   Restrictions/Precautions   Weight Bearing Precautions Per Order Yes   RLE Weight Bearing Per Order NWB  (in unlocked hinged knee brace)   Braces or Orthoses LE Braces  (RLE hinged knee brace)   Other Precautions Cognitive;Aspiration; Seizure;WBS; Bed Alarm; Restraints;Multiple lines;Telemetry;Pain; Fall Risk;Hard of hearing   General   Family/Caregiver Present No   Cognition   Overall Cognitive Status Impaired   Arousal/Participation Lethargic   Attention Difficulty attending to directions   Orientation Level Oriented to person Following Commands Follows one step commands inconsistently   Comments Pt is lethargic but able to open eyes, with difficulty communicating throughout session  Pt demonstrates almost no verbalization during therapy  Pt follows ~10-15% of simple one step commands  Pt displays limited purposeful movement, requiring frequent verbal and tactile stimuli for participation in therapy  Pt requires reminders for safety, as pt occasionally pulls at keofed tube with LUE  RLE Assessment   RLE Assessment   (actively wiggles toes, slight ankle DF/PF (2/5))   LLE Assessment   LLE Assessment   (Pt inconsistent with MMT/ROM command following)   LLE Overall PROM   L Hip Flexion full PROM achieved in supine, easily movable no noted increased tone   L Knee Flexion full PROM achieved in supine, easily movable no noted increased tone   Strength LLE   L Hip Flexion   (no active movement observed)   L Knee Extension   (no active movement observed)   L Ankle Dorsiflexion 2+/5   L Ankle Plantar Flexion 2+/5   Coordination   Movements are Fluid and Coordinated 0   Coordination and Movement Description Pt follows MMT/ROM commands inconsistently, pt requires increased time and maxAx2 for all mobility    Sensation   (Unable to assess due to pt cognition)   Bed Mobility   Supine to Sit 2  Maximal assistance   Additional items Assist x 2;HOB elevated; Increased time required;LE management;Verbal cues   Sit to Supine 2  Maximal assistance   Additional items Assist x 2; Increased time required;Verbal cues;LE management   Transfers   Additional Comments Further transfers deferred due to pt cognitive deficits, inconsistent command following, and decreased trunk control while seated EOB     Balance   Static Sitting Poor -   Dynamic Sitting Poor -   Static Standing Zero   Ambulatory Zero   Endurance Deficit   Endurance Deficit Yes   Endurance Deficit Description Pt with decreased activity tolerance due to cognition, inconsistent command following, and difficulty communicating   Activity Tolerance   Activity Tolerance Patient limited by fatigue  (Pt limited by cognition, decreased command following)   Medical Staff Made Aware OT   Nurse Made Aware Yes, Rosa   Assessment   Prognosis Guarded   Problem List Decreased strength;Decreased range of motion;Decreased endurance;Decreased mobility; Impaired balance;Decreased coordination;Decreased cognition; Impaired judgement;Decreased safety awareness; Impaired hearing;Pain;Orthopedic restrictions   Assessment Pt is a 67year old male presenting to [facility] ED after a fall on 11/14/19  CT R knee revealed cortical depression deformity and cortical offset at anterolateral tibial plateau concerning for fx  Per ortho, pt NWB RLE, non operative management, hinged knee brace unlocked  Pt noted to have slurred speech, R facial droop, and R UE weakness on 11/15/19, CT revealed "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, reconstitution of the supraclinoid ICA on the left with the patent ophthalmic, anterior and posterior communicating arteries, Minor Post traumatic subarachnoid hemorrhage in a left frontal sulcus " Pt transferred to [facility] on 11/15/19 for increased level of care  Pt with PMH significant for CAD, HLD, PVD, tobacco use, palpitations, HTN, CABG, thyroid dx, and hearing loss  Pt presents today for initial physical therapy evaluation supine  Pt is lethargic but able to open eyes, with difficulty communicating throughout session  Pt demonstrates almost no verbalization during therapy  Pt follows ~10-15% of simple one step commands  Pt displays limited purposeful movement, requiring frequent verbal and tactile stimuli for participation in therapy  Pt requires reminders for safety, as pt occasionally pulls at keofed tube with LUE  Pt transferred supine to sit with maxAx2  Pt sat EOB ~5 min with maxAx1 for trunk support   Pt demonstrates posterior and R lateral trunk lean, with decreased awareness of body position and requires maxAx1 to correct trunk lean  Further transfers deferred due to pt cognitive deficits, inconsistent command following, and decreased trunk control while seated EOB  Pt returned to supine with maxAx2 and remained in bed in chair position at end of session with all needs within reach, bed alarm and LUE mitt on, and NATHAN Lee notified  PT to recommend inpt rehab to maximize pt safety and functional mobility  PT to follow    Goals   Patient Goals pt unable to state goals due to cognition, difficulty communicating   STG Expiration Date 12/02/19   Short Term Goal #1 In 10 sessions pt will: 1  Roll R and L with maxAx2 and use of bedrails if needed 2  Transfer supine to sit with maxAx1 and use of bed rails 3  Engage in LE exercise program 4  Sit EOB ~10min with modAx1 for seated trunk balance 5  Follow simple one step commands at least 25% of the time 6  Increase activity tolerance to >20 min    PT Treatment Day 0   Plan   Treatment/Interventions Functional transfer training;LE strengthening/ROM; Therapeutic exercise; Endurance training;Cognitive reorientation;Equipment eval/education; Bed mobility;Spoke to nursing;OT   PT Frequency 2-3x/wk  (Increase frequency as appropriate per pt command following)   Recommendation   Recommendation Post acute IP rehab   PT - OK to Discharge Yes  (when medically stable)   Modified [place] Scale   Modified [place] Scale 4   Barthel Index   Feeding 0   Bathing 0   Grooming Score 0   Dressing Score 0   Bladder Score 0   Bowels Score 10   Toilet Use Score 5   Transfers (Bed/Chair) Score 5   Mobility (Level Surface) Score 0   Stairs Score 0   Barthel Index Score 20     SALLIE Silva

## 2019-11-18 NOTE — ASSESSMENT & PLAN NOTE
No expansion on most recent CT head  Seen by Neurosurgery who signed off  Unlikely to be contributing to patient's current symptoms

## 2019-11-18 NOTE — OCCUPATIONAL THERAPY NOTE
[address] Evaluation     Patient Name: Spencer White  REQHC'G Date: 11/18/2019  Problem List  Principal Problem:    Cerebrovascular accident (CVA) due to embolism of left middle cerebral artery ([address] )  Active Problems:    Hypertension    Subarachnoid hemorrhage (HCC)    Closed fracture of right tibial plateau with routine healing    Encephalopathy    Past Medical History  Past Medical History:   Diagnosis Date    Coronary artery disease     history of CABG    Disease of thyroid gland     Hemorrhoids     last assessed 7/10/17    History of arterial duplex of LE 01/10/2017    Bilateral occlusion of the superficial femoral arteries, severe diminution of the ankle brachial index bilaterally, disease appears worse on left than right   History of echocardiogram 07/20/2011    hypokinesia of the inferior wall  EF 50%   HL (hearing loss)     Hyperlipidemia     Hypertension     PVD (peripheral vascular disease)      Past Surgical History  Past Surgical History:   Procedure Laterality Date    COLONOSCOPY      Last assessed 7/10/17    CORONARY ARTERY BYPASS GRAFT  08/23/2010    3V CABG:  LIMA to LAD, VG to RI, VG to OM1   DENTAL SURGERY      Last assessed  7/10/17    KNEE SURGERY Left     Last assessed 7/10/17    TONSILLECTOMY AND ADENOIDECTOMY      Last assessed 7/10/17    TOOTH EXTRACTION               11/18/19 0930   Note Type   Note type Eval/Treat   Restrictions/Precautions   Weight Bearing Precautions Per Order Yes   RLE Weight Bearing Per Order NWB   Braces or Orthoses LE Immobilizer  (knee immobilizer- unlocked (RLE))   Other Precautions Cognitive; Impulsive; Bed Alarm; Restraints;WBS;Multiple lines;Telemetry; Fall Risk;Pain  (Hand mitt (LUE), Gila fed)   Pain Assessment   Pain Assessment FLACC   Pain Rating: FLACC (Rest) - Face 0   Pain Rating: FLACC (Rest) - Legs 0   Pain Rating: FLACC (Rest) - Activity 0   Pain Rating: FLACC (Rest) - Cry 0   Pain Rating: FLACC (Rest) - Consolability 0 Score: FLACC (Rest) 0   Pain Rating: FLACC (Activity) - Face 1   Pain Rating: FLACC (Activity) - Legs 0   Pain Rating: FLACC (Activity) - Activity 1   Pain Rating: FLACC (Activity) - Cry 0   Pain Rating: FLACC (Activity) - Consolability 0   Score: FLACC (Activity) 2   Home Living   Type of Home Apartment   Home Layout   (2nd floor, FF stairs up)   Additional Comments Pt is a poor historian 2' cogntive deficits and difficulty communicating  [facility] [address] [facility]  Will follow up and confirm w/ CM   Prior Function   Level of [place] Independent with ADLs and functional mobility   Lives With Alone   ADL Assistance Independent   IADLs Independent   Falls in the last 6 months 1 to 4   Comments Pt is a poor historian 2' cognitive deficits and difficulty communicating, PLOF obtained from OT's note [address] [facility] [facility] [address]  Will follow-up and confirm w/ CM   Lifestyle   Autonomy Per previous OT's note [facility] [address] [facility] [address] [facility] [facility] [address], pt was I PTA w/ ADLs/IADLs   Reciprocal Relationships lives alone   Intrinsic Gratification unable to gather, pt is poor historian 2' cognitive deficits and difficulty communicating   Psychosocial   Psychosocial (WDL) X   Ability to Express Feelings Unable to express   Ability to Express Needs Unable to express   Ability to Express Thoughts Unable to express   Ability to Understand Others Sometimes understands   ADL   Eating Assistance 2  Maximal Assistance   Grooming Assistance 2  Maximal Assistance   UB Bathing Assistance 2  Maximal Assistance   LB Bathing Assistance 2  Maximal Assistance   UB Dressing Assistance 2  Maximal Assistance   LB Dressing Assistance 2  Maximal [address]  2  Maximal [address] Unable to assess   Functional Deficit   (2' cognitive deficits, poor trunk control, and WBS)   Bed Mobility   Supine to Sit 2  Maximal assistance   Additional items Assist x 2;HOB elevated; Increased time required;Verbal cues;LE management   Sit to Supine 2  Maximal assistance   Additional items Assist x 2; Increased time required;Verbal cues;LE management   Additional Comments Received supine in bed w/ HOB elevated  Required Max A x 2 w/ LE mgmt and VC for safety, and assistance to initiate purposeful movement  Once EOB pt dangled for 5 min w/ Max A x 1   Transfers   Sit to Stand Unable to assess   Stand to Sit Unable to assess   Additional Comments Pt unsafe to stand at this time due to poor trunk control, cognitive deficits, and poor command following   Functional Mobility   Additional Comments Unable to assess 2' pt being unsafe to stand at this time due to cognitive deficits, poor trunk control, and poor command following   Balance   Static Sitting Poor -   Dynamic Sitting Poor -   Static Standing Zero   Ambulatory Zero   Activity Tolerance   Activity Tolerance Patient limited by fatigue; Other (Comment)  (limited by cognitive deficits and communication difficulties)   Medical Staff Made Aware PT   Nurse Made Aware yes, NATHAN Lee   RUE Assessment   RUE Assessment X  (grossly 0/5)   LUE Assessment   LUE Assessment X  (grossly 2+/5)   Hand Function   Gross Motor Coordination Impaired   Fine Motor Coordination Impaired   Sensation   Light Touch Not tested  (unable to test 2' pt being nonverbal & cog deficits)   Vision - Complex Assessment   Ocular Range of Motion WFL   Head Position WDL   Tracking Decreased smoothness of horizontal tracking;Decreased smoothness of vertical tracking   Saccades Decreased speed of pursuit between targets   Cognition   Overall Cognitive Status Impaired   Arousal/Participation Arousable;Lethargic;Cooperative   Attention Attends with cues to redirect   Orientation Level Disoriented to person;Disoriented to place; Disoriented to time;Disoriented to situation   Memory Unable to assess  (due to cog deficits and communication difficulties)   Following Commands Follows one step commands inconsistently   Comments Pt is arousable but presents as nonverbal due to cognitive and communication deficits  Pt demonstrates inconsistent command following, following about 10-15% of commands w/ increased time and repetition  Pt demonstrates decreased safety awareness and limited insight to deficits  Pt unable to use alternative forms of communication (i e  squeezes) due to cognitive deficits  Assessment   Limitation Decreased ADL status; Decreased UE ROM; Decreased UE strength;Decreased Safe judgement during ADL;Decreased cognition;Decreased endurance;Decreased fine motor control;Decreased self-care trans;Decreased high-level ADLs; Non-func R UE;Non-func L UE   Prognosis Fair   Assessment Pt is a 68 y/o male initially adm to [facility] s/p fall down stairs  Pt noted to have R-sided hemiplegia/weakness and slurred speech during hospitalization and stroke alert was called  Pt dx'd w/ R tibial plateau fx, small SAH, L MCA stroke  Pt transferred to [facility] for further mgmt  Pt  has a past medical history of Coronary artery disease, Disease of thyroid gland, Hemorrhoids, History of arterial duplex of LE, History of echocardiogram, HL (hearing loss), Hyperlipidemia, Hypertension, and PVD (peripheral vascular disease)  Pt currently NWB in RLE and wearing knee immobilizer (unlocked) on RLE  Pt seen for OT evaluation on 11/18/19 with active OT orders and up and OOB as tolerated orders  Pt is a poor historian, Home set up and PLOF obtained from previous OT note at [address]  Will follow-up with CM to confirm  Pt lives alone in 2nd floor apartment with full flight of stairs  Pt was I w/ ADLS and IADLS, and functional mobility/transfers  Unclear if pt was using DME or has any available  Pt is currently demonstrating the following occupational deficits: Max A x 2 for bed mobility and Max A x 1 for UB/LB ADLs  Pt unsafe to perform functional transfers/mobility at this time 2' cognitive deficits, communication difficulties, poor command following, and poor trunk control  These deficits that are impacting pt's baseline areas of occupation are a result of the following impairments: pain, endurance, activity tolerance, functional mobility, forward functional reach, balance, trunk control, functional standing tolerance, unsupportive home environment, decreased I w/ ADLS/IADLS, strength, ROM, aphasia, R sided hemiplegia, cognitive impairments, decreased safety awareness, decreased insight into deficits, impaired fine motor skills and coordination deficits  The following Occupational Performance Areas to address include: eating, grooming, bathing/shower, toilet hygiene, dressing, medication management, health maintenance, functional mobility, community mobility, clothing management, cleaning, meal prep, money management, household maintenance and job performance/volunteering  Pt scored overall 20/100 on the Barthel Index  Based on the aforementioned OT evaluation, functional performance deficits, and assessments, pt has been identified as a high complexity evaluation  Recommend STR upon D/C  Pt to continue to benefit from acute immediate OT services to address the following goals 3-5x/week to  w/in 10-14 days:    Goals   Patient Goals unable to state 2' cognitive deficits and communication difficulties   LTG Time Frame 10-   Long Term Goal #1 see below listed goals   Plan   Treatment Interventions ADL retraining;Functional transfer training;UE strengthening/ROM; Endurance training;Cognitive reorientation;Patient/family training;Equipment evaluation/education; Fine motor coordination activities; Compensatory technique education;Continued evaluation; Energy conservation; Activityengagement   Goal Expiration Date 19   OT Frequency 3-5x/wk   Recommendation   OT Discharge Recommendation Short Term Rehab   OT - OK to Discharge Yes  (once medically stable)   Barthel Index   Feeding 0   Bathing 0   Grooming Score 0   Dressing Score 0   Bladder Score 0   Bowels Score 10   Toilet Use Score 5   Transfers (Bed/Chair) Score 5   Mobility (Level Surface) Score 0   Stairs Score 0   Barthel Index Score 20   Modified Holmen Scale   Modified Holmen Scale 4     GOALS  Pt will perform bed mobility with Mod A x 1 and use of AE/DME as needed to increase functional independence for engagement in meaningful activities  Pt will complete all UB ADLs with Min A x 1 and use of AE/DME as needed to increase independence with ADL performance  Pt will complete all LB ADLs with Mod A x 1 and use of AE/DME as needed to increase independence with ADL performance  Pt will complete toileting with Min A x 1 and use of AE/DME as needed to increase functional independence  Pt will be AOx4 75% of the time to improve overall engagement in therapeutic activities  Pt will sit EOB for 15 min to complete ADL task w/ Min A x 1 with G trunk control  And use of AE/DME as needed to increase dynamic sitting balance in preparation for functional seated activities  Pt will follow all one-step commands 75% of the time to improve participation during therapeutic activity to improve functional independence  Pt will tolerate 25-30 min skilled OT session with no rest breaks to improve overall activity tolerance for increased participation in functional activities  Pt will improve bilateral UE strength/AROM by 1 MMG to increase UE use during UB ADLs  Pt will complete light grooming activity w/ Min A x 1 and use of AE/DME as needed to increase functional independence w/ ADLs/IADLs  Pt will attend to a 10 min ADL task w/ no more than 1 VC for redirection to increase overall engagement during therapeutic activity      *OTR to see to assess functional transfers/mobility when appropriate    YRN Peguero

## 2019-11-18 NOTE — PLAN OF CARE
Problem: OCCUPATIONAL THERAPY ADULT  Goal: Performs self-care activities at highest level of function for planned discharge setting  See evaluation for individualized goals  Description  Treatment Interventions: ADL retraining, Functional transfer training, UE strengthening/ROM, Endurance training, Cognitive reorientation, Patient/family training, Equipment evaluation/education, Fine motor coordination activities, Compensatory technique education, Continued evaluation, Energy conservation, Activityengagement          See flowsheet documentation for full assessment, interventions and recommendations  Note:   Limitation: Decreased ADL status, Decreased UE ROM, Decreased UE strength, Decreased Safe judgement during ADL, Decreased cognition, Decreased endurance, Decreased fine motor control, Decreased self-care trans, Decreased high-level ADLs, Non-func R UE, Non-func L UE  Prognosis: Fair  Assessment: Pt is a 66 y/o male initially adm to [facility] s/p fall down stairs  Pt noted to have R-sided hemiplegia/weakness and slurred speech during hospitalization and stroke alert was called  Pt dx'd w/ R tibial plateau fx, small SAH, L MCA stroke  Pt transferred to [facility] for further mgmt  Pt  has a past medical history of Coronary artery disease, Disease of thyroid gland, Hemorrhoids, History of arterial duplex of LE, History of echocardiogram, HL (hearing loss), Hyperlipidemia, Hypertension, and PVD (peripheral vascular disease)  Pt currently NWB in RLE and wearing knee immobilizer (unlocked) on RLE  Pt seen for OT evaluation on 11/18/19 with active OT orders and up and OOB as tolerated orders  Pt is a poor historian, Home set up and PLOF obtained from previous OT note at [address]  Will follow-up with CM to confirm  Pt lives alone in 2nd floor apartment with full flight of stairs  Pt was I w/ ADLS and IADLS, and functional mobility/transfers  Unclear if pt was using DME or has any available   Pt is currently demonstrating the following occupational deficits: Max A x 2 for bed mobility and Max A x 1 for UB/LB ADLs  Pt unsafe to perform functional transfers/mobility at this time 2' cognitive deficits, communication difficulties, poor command following, and poor trunk control  These deficits that are impacting pt's baseline areas of occupation are a result of the following impairments: pain, endurance, activity tolerance, functional mobility, forward functional reach, balance, trunk control, functional standing tolerance, unsupportive home environment, decreased I w/ ADLS/IADLS, strength, ROM, aphasia, R sided hemiplegia, cognitive impairments, decreased safety awareness, decreased insight into deficits, impaired fine motor skills and coordination deficits  The following Occupational Performance Areas to address include: eating, grooming, bathing/shower, toilet hygiene, dressing, medication management, health maintenance, functional mobility, community mobility, clothing management, cleaning, meal prep, money management, household maintenance and job performance/volunteering  Pt scored overall 20/100 on the Barthel Index  Based on the aforementioned OT evaluation, functional performance deficits, and assessments, pt has been identified as a high complexity evaluation  Recommend STR upon D/C   Pt to continue to benefit from acute immediate OT services to address the following goals 3-5x/week to  w/in 10-14 days:      OT Discharge Recommendation: Short Term Rehab  OT - OK to Discharge: Yes(once medically stable)    YRN Santos

## 2019-11-18 NOTE — PROGRESS NOTES
Daily Progress Note - Critical Care   Mar Israel 67 y o  male MRN: [id number]  Unit/Bed#: ICU 09 Encounter: [id number]        ----------------------------------------------------------------------------------------  HPI/24hr events: No significant 24 hour events  ---------------------------------------------------------------------------------------  SUBJECTIVE    Patient unable to offer complaint  Review of Systems  Review of systems was unable to be performed secondary to Aphasia  ---------------------------------------------------------------------------------------  Assessment and Plan:    Neuro:    Diagnosis:   Subarachnoid hemorrhage  Left MCA stroke  o Plan:   Remains aphasic with right-sided hemiplegia  Neurology following  Exam unchanged over past several days  Goal systolic blood pressure [phone], has maintained this on his own  Remains on Keppra x7 days for seizure prophylaxis  Aspirin and statin started several days ago  Continue frequent neurologic exams, daily CAM ICU, delirium precautions  Maintain adequate sleep-wake cycle  Continue PT and OT  Continue geriatric pain protocol  Geriatrician following  Consider PM&R       CV:    Diagnosis:   Hypertension  o Plan:  Patient reportedly noncompliant with medications at home  Goal systolic blood pressure [phone], see above  Has been hypertensive, started back on Procardia XL, metoprolol, Zestril  Hydralazine and labetalol available p r n  For systolic blood pressure greater than 160  Remains sinus rhythm in the 60s to 80s  Pulm:   Diagnosis:   No acute issues  o Plan:  Doing well on room air  Encourage deep breathing and coughing  Incentive spirometry if able  GI:    Diagnosis:   Dysphagia  o Plan:  Secondary to stroke  [place] in place and tolerating tube feeds at goal   May require PEG tube if unable to improve swallowing function  Abdominal exam benign  :    Diagnosis:   No acute issues    o Plan: BUN and creatinine normal   Continue to monitor  Good urine output  F/E/N:    Plan:   Mild hypomagnesemia, hypokalemia, will replete  Remaining electrolytes unremarkable  Continue to monitor and replete as needed  Heme/Onc:    Diagnosis:   No acute issues  o Plan:  H&H, platelets normal   Continue subcutaneous heparin and SCDs for DVT prophylaxis  Endo:    Diagnosis:   No acute issues  o Plan:  Blood glucose normal   Continue to monitor  Maintain normoglycemia  ID:    Diagnosis:    No acute issues  o Plan:  Afebrile  No leukocytosis  No indication of infection  MSK/Skin:    Diagnosis:     Right tibial plateau fracture  o Plan:  No skin breakdown  Continue frequent repositioning and offloading to avoid pressure wounds  Out of bed as tolerated  Fracture non operative  Nonweightbearing right lower extremity  Orthopedics following  Continue right hinged knee brace  Disposition: Transfer to Med-Surg   Code Status: Level 3 - DNAR and DNI  ---------------------------------------------------------------------------------------  ICU CORE MEASURES    Prophylaxis   VTE Pharmacologic Prophylaxis: Heparin  VTE Mechanical Prophylaxis: sequential compression device  Stress Ulcer Prophylaxis: Prophylaxis Not Indicated     ABCDE Protocol (if indicated)  Plan to perform spontaneous awakening trial today? Not applicable  Plan to perform spontaneous breathing trial today? Not applicable  Obvious barriers to extubation? Not applicable  CAM-ICU:   Aphasic    Invasive Devices Review  Invasive Devices     Peripheral Intravenous Line            Peripheral IV 11/16/19 Right Forearm 2 days    Peripheral IV 11/17/19 Left Antecubital less than 1 day          Drain            NG/OG/Enteral Tube Enteral Feeding Tube 8 Fr Left nares 1 day              Can any invasive devices be discontinued today?  Not applicable  ---------------------------------------------------------------------------------------  OBJECTIVE    Physical Exam   Constitutional: He appears cachectic  He is cooperative  Non-toxic appearance  He has a sickly appearance  He appears ill  No distress  Eyes: Pupils are equal, round, and reactive to light  Conjunctivae are normal    Cardiovascular: Normal rate, regular rhythm, intact distal pulses and normal pulses  Pulmonary/Chest: Effort normal and breath sounds normal    Abdominal: Soft  Normal appearance  He exhibits no distension  There is no tenderness  There is no rigidity and no guarding  Musculoskeletal:    Right knee hinged brace unlocked  Neurological: He is alert  GCS eye subscore is 4  GCS verbal subscore is 1  GCS motor subscore is 5  Flaccid paralysis right upper and lower extremities  Moves left upper and lower extremities spontaneously but not to command  Does not follow commands  No verbal responses  Skin: Skin is warm, dry and intact  Capillary refill takes less than 2 seconds  Vitals   Vitals:    19 0300 19 0400 19 0500 19 0600   BP: 170/71 168/69 162/73 (!) 194/94   Pulse: 80 68 88 84   Resp: (!) 29 (!) 24 (!) 29 (!) 31   Temp:  99 1 °F (37 3 °C)     TempSrc:  Oral     SpO2: 96% 96% 96% 96%   Weight:       Height:         Temp (24hrs), Av 8 °F (37 1 °C), Min:98 2 °F (36 8 °C), Max:99 7 °F (37 6 °C)  Current: Temperature: 99 1 °F (37 3 °C)  HR:  84  BP:  194/ 94  RR:  31  SpO2:  96%    Invasive/non-invasive ventilation settings   Respiratory    Lab Data (Last 4 hours)    None         O2/Vent Data (Last 4 hours)    None                Height and Weights   Height: 5' 10" (177 8 cm)  IBW: 73 kg  Body mass index is 20 81 kg/m²    Weight (last 2 days)     None            Intake and Output  I/O       11701 -  07 -  07    I V  (mL/kg) 3250 2 (49 4) 718 3 (10 9)    NG/ 300    IV Piggyback 200 100 Feedings 330 496    Total Intake(mL/kg) 4080 2 (62) 1614 3 (24 5)    Net +4080 2 +1614 3          Unmeasured Urine Occurrence 3 x 3 x        UOP:  undocumented ml/kg/hr     Nutrition       Diet Orders   (From admission, onward)             Start     Ordered    11/17/19 2025  Diet Enteral/Parenteral; Tube Feeding with Oral Diet; Jevity 1 2 Kike; Continuous; 55; 150; Water; Every 6 hours; Dysphagia/Modified Consistency; Dysphagia 1-Pureed; Nectar Thick Liquid  Diet effective now     Comments:  Start @ 20ml/hr  Increase by 20ml/hr q4h until at goal    Question Answer Comment   Diet Type Enteral/Parenteral    Enteral/Parenteral Tube Feeding with Oral Diet    Tube Feeding Formula: Jevity 1 2 Kike    Bolus/Cyclic/Continuous Continuous    Tube Feeding Goal Rate (mL/hr): 55    Tube Feeding water flush (mL): 150    Water Flush type: Water    Water flush frequency: Every 6 hours    Diet Type Dysphagia/Modified Consistency    Dysphagia/Modified Consistency Dysphagia 1-Pureed    Liquid Modifier Nectar Thick Liquid    RD to adjust diet per protocol? Yes        11/17/19 2024 11/17/19 1639  Dietary nutrition supplements  Once     Question Answer Comment   Select Supplement: [facility]    Frequency Breakfast, Lunch, Dinner        11/17/19 1639 11/17/19 1639  Dietary nutrition supplements  Once     Question Answer Comment   Select Supplement: [employer], Dinner        11/17/19 1639              TF currently running at  55 ml/hr with a goal of  55 ml/hr   Formula:  Jevity 1 2    Laboratory and Diagnostics:  Results from last 7 days   Lab Units 11/17/19  0453 11/16/19  0515 11/15/19  0457 11/14/19  1539   WBC Thousand/uL 13 14* 12 73* 14 40* 10 60   HEMOGLOBIN g/dL 12 8 12 5 14 2 13 8*   HEMATOCRIT % 38 8 37 3 41 2* 40 8*   PLATELETS Thousands/uL 182 181 159 152   NEUTROS PCT % 79*  --  77* 76*   MONOS PCT % 13*  --  13* 13*     Results from last 7 days   Lab Units 11/18/19  0523 11/17/19 0453 11/16/19  0515 11/15/19  1503 11/15/19  0457 11/14/19  1539   SODIUM mmol/L 143 144 144 141 138 141   POTASSIUM mmol/L 3 4* 3 5 3 6 3 4* 3 4* 3 6   CHLORIDE mmol/L 110* 115* 111* 106 103 104   CO2 mmol/L 22 20* 20* 26 24 27   ANION GAP mmol/L 11 9 13 9 11 10   BUN mg/dL 18 17 17 14 13 15   CREATININE mg/dL 1 02 0 87 0 96 1 06 0 91 1 07   CALCIUM mg/dL 8 5 8 0* 8 4 8 7 8 9 8 8   GLUCOSE RANDOM mg/dL 111 141* 124 111 87 106*   ALT U/L  --   --   --   --  18 20   AST U/L  --   --   --   --  15 14   ALK PHOS U/L  --   --   --   --  60 56   ALBUMIN g/dL  --   --   --   --  3 8 3 9   TOTAL BILIRUBIN mg/dL  --   --   --   --  1 40* 1 00     Results from last 7 days   Lab Units 11/18/19  0523 11/16/19  0515 11/15/19  0457   MAGNESIUM mg/dL 1 9 2 2 1 8*   PHOSPHORUS mg/dL 3 7 2 8 2 6*           Results from last 7 days   Lab Units 11/15/19  1949 11/15/19  1733 11/15/19  1503 11/15/19  0126 11/14/19 2015 11/14/19  1539   TROPONIN I ng/mL 0 05* 0 05* 0 05* 0 06* 0 05* 0 04*     Results from last 7 days   Lab Units 11/15/19  1503   LACTIC ACID mmol/L 1 6     ABG:    VBG:          Micro        EKG:  None today  Imaging:  None today  I have personally reviewed pertinent reports     and I have personally reviewed pertinent films in PACS    Active Medications  Scheduled Meds:  Current Facility-Administered Medications:  aspirin 81 mg Oral Daily Lisa Clifford MD    atorvastatin 40 mg Oral Daily With Dinner DESIREE Camacho    docusate sodium 100 mg Oral BID Petty Class, PA-C    heparin (porcine) 5,000 Units Subcutaneous Q8H [address] MD Bernadine    hydrALAZINE 10 mg Intravenous Q6H PRN Aman A Paster, DO    Labetalol HCl 10 mg Intravenous Q6H PRN Aman A Paster, DO    levETIRAcetam 500 mg Intravenous Q12H [facility] Aman A Paster, DO Last Rate: 500 mg (11/17/19 2133)   levothyroxine 50 mcg Oral Early Morning DESIREE Camacho    lisinopril 40 mg Oral Daily Lupe Bethea MD    metoprolol tartrate 50 mg Oral Q12H [address] DESIREE Titus    nicotine 1 patch Transdermal Daily Casey Contreras PA-C    NIFEdipine 20 mg Oral Q8H [address] Aman A Paster,     senna-docusate sodium 1 tablet Oral HS Richa Valentine PA-C      Continuous Infusions:     PRN Meds:     hydrALAZINE 10 mg Q6H PRN   Labetalol HCl 10 mg Q6H PRN       Allergies   No Known Allergies    Advance Directive and Living Will:      Power of :    POLST:        Counseling / Coordination of Care  Total Critical Care time spent 37 minutes excluding procedures, teaching and family updates  Dina Live PA-C        Portions of the record may have been created with voice recognition software  Occasional wrong word or "sound a like" substitutions may have occurred due to the inherent limitations of voice recognition software    Read the chart carefully and recognize, using context, where substitutions have occurred

## 2019-11-19 LAB
ANION GAP SERPL CALCULATED.3IONS-SCNC: 9 MMOL/L (ref 4–13)
BASOPHILS # BLD AUTO: 0.05 THOUSANDS/ΜL (ref 0–0.1)
BASOPHILS NFR BLD AUTO: 1 % (ref 0–1)
BUN SERPL-MCNC: 22 MG/DL (ref 5–25)
CALCIUM SERPL-MCNC: 8.3 MG/DL (ref 8.3–10.1)
CHLORIDE SERPL-SCNC: 111 MMOL/L (ref 100–108)
CO2 SERPL-SCNC: 23 MMOL/L (ref 21–32)
CREAT SERPL-MCNC: 0.86 MG/DL (ref 0.6–1.3)
EOSINOPHIL # BLD AUTO: 0.18 THOUSAND/ΜL (ref 0–0.61)
EOSINOPHIL NFR BLD AUTO: 2 % (ref 0–6)
ERYTHROCYTE [DISTWIDTH] IN BLOOD BY AUTOMATED COUNT: 13.7 % (ref 11.6–15.1)
GFR SERPL CREATININE-BSD FRML MDRD: 83 ML/MIN/1.73SQ M
GLUCOSE SERPL-MCNC: 108 MG/DL (ref 65–140)
HCT VFR BLD AUTO: 38.1 % (ref 36.5–49.3)
HGB BLD-MCNC: 13 G/DL (ref 12–17)
IMM GRANULOCYTES # BLD AUTO: 0.18 THOUSAND/UL (ref 0–0.2)
IMM GRANULOCYTES NFR BLD AUTO: 2 % (ref 0–2)
LYMPHOCYTES # BLD AUTO: 1.03 THOUSANDS/ΜL (ref 0.6–4.47)
LYMPHOCYTES NFR BLD AUTO: 10 % (ref 14–44)
MAGNESIUM SERPL-MCNC: 2 MG/DL (ref 1.6–2.6)
MCH RBC QN AUTO: 33.2 PG (ref 26.8–34.3)
MCHC RBC AUTO-ENTMCNC: 34.1 G/DL (ref 31.4–37.4)
MCV RBC AUTO: 97 FL (ref 82–98)
MONOCYTES # BLD AUTO: 1.33 THOUSAND/ΜL (ref 0.17–1.22)
MONOCYTES NFR BLD AUTO: 13 % (ref 4–12)
NEUTROPHILS # BLD AUTO: 7.61 THOUSANDS/ΜL (ref 1.85–7.62)
NEUTS SEG NFR BLD AUTO: 72 % (ref 43–75)
NRBC BLD AUTO-RTO: 0 /100 WBCS
PLATELET # BLD AUTO: 173 THOUSANDS/UL (ref 149–390)
PMV BLD AUTO: 10.8 FL (ref 8.9–12.7)
POTASSIUM SERPL-SCNC: 3.4 MMOL/L (ref 3.5–5.3)
RBC # BLD AUTO: 3.92 MILLION/UL (ref 3.88–5.62)
SODIUM SERPL-SCNC: 143 MMOL/L (ref 136–145)
WBC # BLD AUTO: 10.38 THOUSAND/UL (ref 4.31–10.16)

## 2019-11-19 PROCEDURE — 85025 COMPLETE CBC W/AUTO DIFF WBC: CPT | Performed by: PHYSICIAN ASSISTANT

## 2019-11-19 PROCEDURE — 99232 SBSQ HOSP IP/OBS MODERATE 35: CPT | Performed by: PHYSICIAN ASSISTANT

## 2019-11-19 PROCEDURE — 83735 ASSAY OF MAGNESIUM: CPT | Performed by: PHYSICIAN ASSISTANT

## 2019-11-19 PROCEDURE — 80048 BASIC METABOLIC PNL TOTAL CA: CPT | Performed by: PHYSICIAN ASSISTANT

## 2019-11-19 PROCEDURE — 99231 SBSQ HOSP IP/OBS SF/LOW 25: CPT | Performed by: INTERNAL MEDICINE

## 2019-11-19 PROCEDURE — 92526 ORAL FUNCTION THERAPY: CPT

## 2019-11-19 PROCEDURE — 97535 SELF CARE MNGMENT TRAINING: CPT

## 2019-11-19 RX ORDER — CARVEDILOL 6.25 MG/1
6.25 TABLET ORAL ONCE
Status: COMPLETED | OUTPATIENT
Start: 2019-11-19 | End: 2019-11-19

## 2019-11-19 RX ORDER — POTASSIUM CHLORIDE 20MEQ/15ML
40 LIQUID (ML) ORAL ONCE
Status: COMPLETED | OUTPATIENT
Start: 2019-11-19 | End: 2019-11-19

## 2019-11-19 RX ORDER — LABETALOL 20 MG/4 ML (5 MG/ML) INTRAVENOUS SYRINGE
10 ONCE
Status: COMPLETED | OUTPATIENT
Start: 2019-11-19 | End: 2019-11-19

## 2019-11-19 RX ORDER — AMLODIPINE BESYLATE 10 MG/1
10 TABLET ORAL DAILY
Status: DISCONTINUED | OUTPATIENT
Start: 2019-11-20 | End: 2019-11-23 | Stop reason: HOSPADM

## 2019-11-19 RX ORDER — CARVEDILOL 12.5 MG/1
12.5 TABLET ORAL 2 TIMES DAILY WITH MEALS
Status: DISCONTINUED | OUTPATIENT
Start: 2019-11-19 | End: 2019-11-21

## 2019-11-19 RX ORDER — AMLODIPINE BESYLATE 5 MG/1
5 TABLET ORAL ONCE
Status: COMPLETED | OUTPATIENT
Start: 2019-11-19 | End: 2019-11-19

## 2019-11-19 RX ADMIN — HEPARIN SODIUM 5000 UNITS: 5000 INJECTION INTRAVENOUS; SUBCUTANEOUS at 21:56

## 2019-11-19 RX ADMIN — DOCUSATE SODIUM 100 MG: 100 CAPSULE, LIQUID FILLED ORAL at 16:49

## 2019-11-19 RX ADMIN — HYDRALAZINE HYDROCHLORIDE 10 MG: 20 INJECTION INTRAMUSCULAR; INTRAVENOUS at 16:50

## 2019-11-19 RX ADMIN — AMLODIPINE BESYLATE 5 MG: 5 TABLET ORAL at 09:50

## 2019-11-19 RX ADMIN — ASPIRIN 81 MG 81 MG: 81 TABLET ORAL at 09:51

## 2019-11-19 RX ADMIN — ATORVASTATIN CALCIUM 40 MG: 40 TABLET, FILM COATED ORAL at 16:49

## 2019-11-19 RX ADMIN — LEVOTHYROXINE SODIUM 50 MCG: 50 TABLET ORAL at 05:16

## 2019-11-19 RX ADMIN — HYDRALAZINE HYDROCHLORIDE 10 MG: 20 INJECTION INTRAMUSCULAR; INTRAVENOUS at 10:00

## 2019-11-19 RX ADMIN — SENNOSIDES AND DOCUSATE SODIUM 1 TABLET: 8.6; 5 TABLET ORAL at 21:56

## 2019-11-19 RX ADMIN — HEPARIN SODIUM 5000 UNITS: 5000 INJECTION INTRAVENOUS; SUBCUTANEOUS at 14:37

## 2019-11-19 RX ADMIN — LISINOPRIL 40 MG: 20 TABLET ORAL at 09:50

## 2019-11-19 RX ADMIN — CARVEDILOL 6.25 MG: 6.25 TABLET, FILM COATED ORAL at 09:51

## 2019-11-19 RX ADMIN — LABETALOL 20 MG/4 ML (5 MG/ML) INTRAVENOUS SYRINGE 10 MG: at 04:29

## 2019-11-19 RX ADMIN — LEVETIRACETAM 500 MG: 100 SOLUTION ORAL at 21:56

## 2019-11-19 RX ADMIN — LEVETIRACETAM 500 MG: 100 SOLUTION ORAL at 09:59

## 2019-11-19 RX ADMIN — AMLODIPINE BESYLATE 5 MG: 5 TABLET ORAL at 12:06

## 2019-11-19 RX ADMIN — CARVEDILOL 12.5 MG: 12.5 TABLET, FILM COATED ORAL at 16:46

## 2019-11-19 RX ADMIN — NICOTINE 1 PATCH: 14 PATCH TRANSDERMAL at 10:04

## 2019-11-19 RX ADMIN — POTASSIUM CHLORIDE 40 MEQ: 20 SOLUTION ORAL at 12:05

## 2019-11-19 RX ADMIN — DOCUSATE SODIUM 100 MG: 100 CAPSULE, LIQUID FILLED ORAL at 09:51

## 2019-11-19 RX ADMIN — HYDRALAZINE HYDROCHLORIDE 10 MG: 20 INJECTION INTRAMUSCULAR; INTRAVENOUS at 02:09

## 2019-11-19 RX ADMIN — CARVEDILOL 6.25 MG: 6.25 TABLET, FILM COATED ORAL at 12:06

## 2019-11-19 RX ADMIN — LABETALOL 20 MG/4 ML (5 MG/ML) INTRAVENOUS SYRINGE 10 MG: at 12:08

## 2019-11-19 RX ADMIN — HEPARIN SODIUM 5000 UNITS: 5000 INJECTION INTRAVENOUS; SUBCUTANEOUS at 05:17

## 2019-11-19 NOTE — SPEECH THERAPY NOTE
Speech Language/Pathology    Speech/Language Pathology Progress Note    Patient Name: Mariela Aldridge  [id number]'[id number] Date: 11/19/2019     Problem List  Principal Problem:    Cerebrovascular accident (CVA) due to embolism of left middle cerebral artery ([address] )  Active Problems:    Hypertension    Subarachnoid hemorrhage (HCC)    Closed fracture of right tibial plateau with routine healing       Past Medical History  Past Medical History:   Diagnosis Date    Coronary artery disease     history of CABG    Disease of thyroid gland     Hemorrhoids     last assessed 7/10/17    History of arterial duplex of LE 01/10/2017    Bilateral occlusion of the superficial femoral arteries, severe diminution of the ankle brachial index bilaterally, disease appears worse on left than right   History of echocardiogram 07/20/2011    hypokinesia of the inferior wall  EF 50%   HL (hearing loss)     Hyperlipidemia     Hypertension     PVD (peripheral vascular disease)         Past Surgical History  Past Surgical History:   Procedure Laterality Date    COLONOSCOPY      Last assessed 7/10/17    CORONARY ARTERY BYPASS GRAFT  08/23/2010    3V CABG:  LIMA to LAD, VG to RI, VG to OM1  Jeral Hose DENTAL SURGERY      Last assessed  7/10/17    KNEE SURGERY Left     Last assessed 7/10/17    TONSILLECTOMY AND ADENOIDECTOMY      Last assessed 7/10/17    TOOTH EXTRACTION           Subjective:  "Do I have a say?" (re: eating lunch)    Objective: The patient is awake and alert today and seen at lunch meal for dysphagia therapy  He has some improved speech output, but requires extra time to process and respond  NG was removed today  He is assessed with puree solids and nectar thick liquids  SLP feeds patient  He has adequate retrieval, transfer and clearance with puree solids  The patient is trialed with straw for nectar thick liquids, but has some difficulty with strong draw from straw   He is able to take 2 straw sips, but is observed with a cough x1  Remaining nectar thick liquids are given via tsp which the patient tolerates well  SLP left patient with nursing students to finish meal  Explained reason for current diet and risk of aspiration  They said the patient did well at am meal      Assessment:  Patient has improved stamina today and appears to tolerate current diet well  Plan/Recommendations:  Continue with puree diet and nectar thick liquids  Continue ST to further assess  Also will complete speech/language evaluation

## 2019-11-19 NOTE — PLAN OF CARE
Problem: Prexisting or High Potential for Compromised Skin Integrity  Goal: Skin integrity is maintained or improved  Description  INTERVENTIONS:  - Identify patients at risk for skin breakdown  - Assess and monitor skin integrity  - Assess and monitor nutrition and hydration status  - Monitor labs   - Assess for incontinence   - Turn and reposition patient  - Assist with mobility/ambulation  - Relieve pressure over bony prominences  - Avoid friction and shearing  - Provide appropriate hygiene as needed including keeping skin clean and dry  - Evaluate need for skin moisturizer/barrier cream  - Collaborate with interdisciplinary team   - Patient/family teaching  - Consider wound care consult   Outcome: Progressing     Problem: Potential for Falls  Goal: Patient will remain free of falls  Description  INTERVENTIONS:  - Assess patient frequently for physical needs  -  Identify cognitive and physical deficits and behaviors that affect risk of falls  -  [place] fall precautions as indicated by assessment   - Educate patient/family on patient safety including physical limitations  - Instruct patient to call for assistance with activity based on assessment  - Modify environment to reduce risk of injury  - Consider OT/PT consult to assist with strengthening/mobility  Outcome: Progressing     Problem: Nutrition/Hydration-ADULT  Goal: Nutrient/Hydration intake appropriate for improving, restoring or maintaining nutritional needs  Description  Monitor and assess patient's nutrition/hydration status for malnutrition  Collaborate with interdisciplinary team and initiate plan and interventions as ordered  Monitor patient's weight and dietary intake as ordered or per policy  Utilize nutrition screening tool and intervene as necessary  Determine patient's food preferences and provide high-protein, high-caloric foods as appropriate       INTERVENTIONS:  - Monitor oral intake, urinary output, labs, and treatment plans  - Assess nutrition and hydration status and recommend course of action  - Evaluate amount of meals eaten  - Assist patient with eating if necessary   - Allow adequate time for meals  - Recommend/ encourage appropriate diets, oral nutritional supplements, and vitamin/mineral supplements  - Order, calculate, and assess calorie counts as needed  - Recommend, monitor, and adjust tube feedings and TPN/PPN based on assessed needs  - Assess need for intravenous fluids  - Provide specific nutrition/hydration education as appropriate  - Include patient/family/caregiver in decisions related to nutrition  Outcome: Progressing     Problem: CONFUSION/THOUGHT DISTURBANCE  Goal: Thought disturbances (confusion, delirium, depression, dementia or psychosis) are managed to maintain or return to baseline mental status and functional level  Description  INTERVENTIONS:  - Assess for possible contributors to  thought disturbance, including but not limited to medications, infection, impaired vision or hearing, underlying metabolic abnormalities, dehydration, respiratory compromise,  psychiatric diagnoses and notify attending PHYSICAN/AP  - Monitor and intervene to maintain adequate nutrition, hydration, elimination, sleep and activity  - Decrease environmental stimuli, including noise as appropriate  - Provide frequent contacts to provide refocusing, direction and reassurance as needed  Approach patient calmly with eye contact and at their level    - [place] high risk fall precautions, aspiration precautions and other safety measures, as indicated  - If delirium suspected, notify physician/AP of change in condition and request immediate in-person evaluation  - Pursue consults as appropriate including Geriatric (campus dependent), OT for cognitive evaluation/activity planning, psychiatric, pastoral care, etc   Outcome: Progressing     Problem: BEHAVIOR  Goal: Pt/Family maintain appropriate behavior and adhere to behavioral management agreement, if implemented  Description  INTERVENTIONS:  - Assess the family dynamic   - Encourage verbalization of thoughts and concerns in a socially appropriate manner  - Assess patient/family's coping skills and non-compliant behavior (including use of illegal substances)  - Utilize positive, consistent limit setting strategies supporting safety of patient, staff and others  - Initiate consult with Case Management, Spiritual Care or other ancillary services as appropriate  - If a patient's/visitor's behavior jeopardizes the safety of the patient, staff, or others, refer to organization procedure  - Notify Security of behavior or suspected illegal substances which indicate the need for search of the patient and/or belongings  - Encourage participation in the decision making process about a behavioral management agreement; implement if patient meets criteria  Outcome: Progressing     Problem: NEUROSENSORY - ADULT  Goal: Achieves stable or improved neurological status  Description  INTERVENTIONS  - Monitor and report changes in neurological status  - Monitor vital signs such as temperature, blood pressure, glucose, and any other labs ordered   - Initiate measures to prevent increased intracranial pressure  - Monitor for seizure activity and implement precautions if appropriate      Outcome: Progressing  Goal: Achieves maximal functionality and self care  Description  INTERVENTIONS  - Monitor swallowing and airway patency with patient fatigue and changes in neurological status  - Encourage and assist patient to increase activity and self care     - Encourage visually impaired, hearing impaired and aphasic patients to use assistive/communication devices  Outcome: Progressing     Problem: MUSCULOSKELETAL - ADULT  Goal: Maintain or return mobility to safest level of function  Description  INTERVENTIONS:  - Assess patient's ability to carry out ADLs; assess patient's baseline for ADL function and identify physical deficits which impact ability to perform ADLs (bathing, care of mouth/teeth, toileting, grooming, dressing, etc )  - Assess/evaluate cause of self-care deficits   - Assess range of motion  - Assess patient's mobility  - Assess patient's need for assistive devices and provide as appropriate  - Encourage maximum independence but intervene and supervise when necessary  - Involve family in performance of ADLs  - Assess for home care needs following discharge   - Consider OT consult to assist with ADL evaluation and planning for discharge  - Provide patient education as appropriate  Outcome: Progressing  Goal: Maintain proper alignment of affected body part  Description  INTERVENTIONS:  - Support, maintain and protect limb and body alignment  - Provide patient/ family with appropriate education  Outcome: Progressing

## 2019-11-19 NOTE — PROGRESS NOTES
Progress Note - Del See 1940, 67 y o  male MRN: [id number]    Unit/Bed#: [facility] 601-01 Encounter: [id number]    Primary Care Provider: Rocco Pelaez DO   Date and time admitted to hospital: 11/15/2019  1:51 PM        Closed fracture of right tibial plateau with routine healing  Assessment & Plan  Non operative per Orthopedics  Nonweightbearing right lower extremity  Unlocked right hinged knee brace  Continue PT and OT  Will likely require rehab  Subarachnoid hemorrhage ([address] )  Assessment & Plan  No expansion on most recent CT head  Seen by Neurosurgery who signed off  Unlikely to be contributing to patient's current symptoms  Hypertension  Assessment & Plan  Reportedly noncompliant with medications prior to admission  Had hypertensive urgency on admission  Currently back on home medications, however still elevated currently this a m  Called Cardiology to have them assess patient  They made recommendations; will follow up the recommendations continue to monitor  P r n  Medications are ordered  Goal systolic blood pressure [phone] per Neurology  * Cerebrovascular accident (CVA) due to embolism of left middle cerebral artery [facility])  Assessment & Plan  Remains with right hemiplegia and expressive aphasia  May be some component of receptive aphasia  Being followed by Neurology  Goal systolic blood pressure 150-900  Has been running greater than 160 and is back on home antihypertensives  Continue frequent neurologic checks  Patient currently tolerating p o  Intake  Keofed discontinued    DVT prophylaxis: SCDs and SQH  PT and OT: eval and treat    Disposition:  Patient will likely require rehab at this point  Will follow up with case management  Bedside rounds completed with nurse  SUBJECTIVE:  Chief Complaint:  Patient resting comfortably in bed    Subjective:  Patient denying any complaints  Does not appear to have any overt complaints of pain    Tolerating his breakfast well this morning  OBJECTIVE:     Meds/Allergies     Current Facility-Administered Medications:     [START ON 11/20/2019] amLODIPine (NORVASC) tablet 10 mg, 10 mg, Oral, Daily, Malcom Amanda MD    aspirin chewable tablet 81 mg, 81 mg, Oral, Daily, Jefferson Peters PA-C, 81 mg at 11/19/19 0951    atorvastatin (LIPITOR) tablet 40 mg, 40 mg, Oral, Daily With Víctor Mercado, PA-C, 40 mg at 11/19/19 1649    carvedilol (COREG) tablet 12 5 mg, 12 5 mg, Oral, BID With Meals, MICHELA Waller-C, 12 5 mg at 11/19/19 1646    docusate sodium (COLACE) capsule 100 mg, 100 mg, Oral, BID, Jeffersonpraful Peters PA-C, 100 mg at 11/19/19 1649    heparin (porcine) subcutaneous injection 5,000 Units, 5,000 Units, Subcutaneous, Q8H [address], Jefferson Fran, PA-C, 5,000 Units at 11/19/19 1437    hydrALAZINE (APRESOLINE) injection 10 mg, 10 mg, Intravenous, Q6H PRN, Jeffersonpraful Peters PA-C, 10 mg at 11/19/19 1650    Labetalol HCl (NORMODYNE) injection 10 mg, 10 mg, Intravenous, Q6H PRN, Jeffersonpraful Peters PA-C, 10 mg at 11/19/19 1208    levETIRAcetam (KEPPRA) oral solution 500 mg, 500 mg, Oral, Q12H [address], Jefferson Fran, PA-C, 500 mg at 11/19/19 1381    levothyroxine tablet 50 mcg, 50 mcg, Oral, Early Morning, Jefferson Fran, PA-C, 50 mcg at 11/19/19 0516    lisinopril (ZESTRIL) tablet 40 mg, 40 mg, Oral, Daily, Jefferson Fran, PA-C, 40 mg at 11/19/19 0950    nicotine (NICODERM CQ) 14 mg/24hr TD 24 hr patch 1 patch, 1 patch, Transdermal, Daily, Jefferson Peters PA-C, 1 patch at 11/19/19 1004    senna-docusate sodium (SENOKOT S) 8 6-50 mg per tablet 1 tablet, 1 tablet, Oral, HS, Jefferson Peters PA-C, 1 tablet at 11/18/19 2311     Vitals:   Vitals:    11/19/19 1646   BP: (!) 195/84   Pulse: 81   Resp:    Temp:    SpO2:        Intake/Output:  I/O       11/17 0701 - 11/18 0700 11/18 0701 - 11/19 0700 11/19 0701 - 11/20 0700    P  O   318 360    I V  (mL/kg) 718 3 (10 9)      NG/ 510     IV Piggyback 100 150     Feedings 1046 880     Total Intake(mL/kg) 2464 3 (37 5) 1858 (28 2) 360 (5 5)    Urine (mL/kg/hr) 775 (0 5) 2725 (1 7) 735 (1 1)    Total Output 775 2725 735    Net +1689 3 -867 -375           Unmeasured Urine Occurrence 3 x             Nutrition/GI Proph/Bowel Reg:  Continue current diet    Physical Exam:   GENERAL APPEARANCE:  No acute distress  NEURO:  No acute distress  HEENT:  Normocephalic  CV:  Regular rate and rhythm  LUNGS:  CTA bilaterally  GI:  Bowel sounds x4, nontender  MSK:   Right sided hemiparesis stable from previous exams; currently a GCS of 14  :  Condom catheter in place  SKIN:  Warm, dry, intact    Invasive Devices     Peripheral Intravenous Line            Peripheral IV 11/18/19 Left;Upper;Ventral (anterior) Arm 1 day          Drain            External Urinary Catheter Small 1 day                 Lab Results:   Results: I have personally reviewed pertinent reports   , BMP/CMP:   Lab Results   Component Value Date    SODIUM 143 11/19/2019    K 3 4 (L) 11/19/2019     (H) 11/19/2019    CO2 23 11/19/2019    BUN 22 11/19/2019    CREATININE 0 86 11/19/2019    CALCIUM 8 3 11/19/2019    EGFR 83 11/19/2019    and CBC:   Lab Results   Component Value Date    WBC 10 38 (H) 11/19/2019    HGB 13 0 11/19/2019    HCT 38 1 11/19/2019    MCV 97 11/19/2019     11/19/2019    MCH 33 2 11/19/2019    MCHC 34 1 11/19/2019    RDW 13 7 11/19/2019    MPV 10 8 11/19/2019    NRBC 0 11/19/2019     Imaging/EKG Studies: Results: I have personally reviewed pertinent reports      Other Studies:  No other studies  VTE Prophylaxis:  SCDs and subcutaneous heparin

## 2019-11-19 NOTE — ASSESSMENT & PLAN NOTE
Reportedly noncompliant with medications prior to admission  Had hypertensive urgency on admission  Currently back on home medications, however still elevated currently this missy matias  Called Cardiology to have them assess patient  They made recommendations; will follow up the recommendations continue to monitor  P r n  Medications are ordered  Goal systolic blood pressure [phone] per Neurology

## 2019-11-19 NOTE — PROGRESS NOTES
Cardiology Progress Note - Roque Lucas 67 y o  male MRN: [id number]    [facility] Encounter: [id number]      Assessment:  81-year-old gentleman, who originally presented to [facility] [address] on 11/14/2019 after a fall while Getting groceries up a flight of stairs to his apartment  No associated symptoms with the fall, and was primarily related to a misstep  even surely crawl back to his apartment in lay bed all day  Next morning, he continued to have excrutiating pain in his right knee, and as a result ended up calling EMS  In the ED, he was found to have up a small posttraumatic subarachnoid hemorrhage in the left frontal sulcus  additionally a CT in right knee showed possible tibial plateau fracture  As a result, he was transferred to [facility] for further orthopedic evaluation     1  Hypertensive urgency  2  CAD s/p CABGx3 (2003)  3  Non-MI troponin elevation - related to JED/trauma  4  Right knee pain - osteoarthritis with non-displaced tibial plateau fracture - being medically managed  5  Subarachnoid hemorrhage - small, s/p DDAVP for h/o aspirin use  6  Hyperlipidemia  7  Hypothyroidism  8  Chronic left common carotid occlusion, noted on CTA  9  Active smoker  10  Peripheral vascular disease - Rt LESTER 0 43, Lt LESTER 0 31, medically manage, not interested in vascular surgical evaluation     Outpatient cardiologist: Dr Bharati Somers  Hypertensive urgency  · Nicardipine drip stopped 11/17/19 at 2:00 p m  · Currently on lisinopril 40 mg, carvedilol 6 25 bid, amlodipine 5 mg  · Unable to take nifedipine capsules or extended release tablets due to being on NG tube in unable to swallow   · Blood pressure again elevated in 190s-200s overnight  · Got IV labetalol 10 at 429 AM & IV hydralazine 10 at 2 AM & 10 AM  · Increase carvedilol to 12 5 mg b i d   And increase amlodipine to 10 mg daily    CAD s/p CABGx3  · On aspirin 81, carvedilol 6 25 mg b i d , lisinopril 40 mg, atorvastatin 40 mg    Subjective:   Transfer out of ICU yesterday  No active complains, although is sleepy and tired  Did open eyes and correctly said his outpatient cardiologist's name Dr Josee Win [facility]  No c/o chest pain, No c/o palpitations, No c/o shortness of breath, No c/o dizziness or light-headedness, No c/o abdominal pain, no c/o nausea/vomitting  All other systems negative    Telemetry Review:  Not on telemetry currently    Objective:   Vitals: Blood pressure (!) 182/82, pulse 82, temperature 98 4 °F (36 9 °C), resp  rate (!) 23, height 5' 10" (1 778 m), weight 66 kg (145 lb 8 1 oz), SpO2 99 %  , Body mass index is 20 88 kg/m² ,   Orthostatic Blood Pressures      Most Recent Value   Blood Pressure  (!) 182/82 filed at 2019 8416   Patient Position - Orthostatic VS  Lying filed at 2019 3664         Systolic (33ENJ), SVT:627 , Min:159 , PJH:302     Diastolic (13CJX), GW, Min:66, Max:97    Wt Readings from Last 5 Encounters:   19 66 kg (145 lb 8 1 oz)   11/15/19 65 8 kg (145 lb 1 oz)   11/15/19 66 kg (145 lb 8 1 oz)   08/10/19 68 kg (149 lb 14 6 oz)   19 68 kg (150 lb)     I/O        0701 -  0700  0701 -  0700  07 -  0700    P  O    118    I V  (mL/kg) 3250 2 (49 4) 718 3 (10 9)     NG/ 600     IV Piggyback 200 100 150    Feedings 330 1046 220    Total Intake(mL/kg) 4080 2 (62) 2464 3 (37 5) 488 (7 4)    Urine (mL/kg/hr)  775 (0 5)     Total Output  775     Net +4080 2 +1689 3 +488           Unmeasured Urine Occurrence 3 x 3 x               Physical Exam    Constitutional:  Del See appears is tired and sleepy    Did open eyes and answer occasional word appropriately   HEENT:    Normocephalic, NG tube in place   Neck:  Supple, No JVD   Lungs:  Clear to auscultation bilaterally, respirations unlabored    Chest Wall:  No tenderness or deformity    Heart::  Regular rate, Regular rhythm, S1 and S2 normal, no murmur, rub or gallop    Abdomen:  Soft, non-tender, non-distended   Neurological:  Awake, alert, oriented x3   Non-focal exam    Extremities:  No pedal edema, No calf tenderness         Laboratory Results:  Results from last 7 days   Lab Units 11/15/19  1949 11/15/19  1733 11/15/19  1503   TROPONIN I ng/mL 0 05* 0 05* 0 05*       CBC with diff:   Results from last 7 days   Lab Units 11/19/19  0440 11/18/19  0523 11/17/19  0453 11/16/19  0515 11/15/19  0457 11/14/19  1539   WBC Thousand/uL 10 38* 10 90* 13 14* 12 73* 14 40* 10 60   HEMOGLOBIN g/dL 13 0 13 7 12 8 12 5 14 2 13 8*   HEMATOCRIT % 38 1 41 1 38 8 37 3 41 2* 40 8*   MCV fL 97 98 99* 99* 98 99   PLATELETS Thousands/uL 173 198 182 181 159 152   MCH pg 33 2 32 8 32 7 33 1 33 6 33 5   MCHC g/dL 34 1 33 3 33 0 33 5 34 3 33 9   RDW % 13 7 13 6 13 9 13 6 13 5 13 7   MPV fL 10 8 11 0 10 5 11 0 9 6 8 6   NRBC AUTO /100 WBCs 0 0 0  --   --   --          CMP:  Results from last 7 days   Lab Units 11/19/19 0440 11/18/19 0523 11/17/19 0453 11/16/19  0515 11/15/19  1503 11/15/19  0457 11/14/19  1539   POTASSIUM mmol/L 3 4* 3 4* 3 5 3 6 3 4* 3 4* 3 6   CHLORIDE mmol/L 111* 110* 115* 111* 106 103 104   CO2 mmol/L 23 22 20* 20* 26 24 27   BUN mg/dL 22 18 17 17 14 13 15   CREATININE mg/dL 0 86 1 02 0 87 0 96 1 06 0 91 1 07   CALCIUM mg/dL 8 3 8 5 8 0* 8 4 8 7 8 9 8 8   AST U/L  --   --   --   --   --  15 14   ALT U/L  --   --   --   --   --  18 20   ALK PHOS U/L  --   --   --   --   --  60 56   EGFR ml/min/1 73sq m 83 70 83 75 67 80 66         BMP:  Results from last 7 days   Lab Units 11/19/19  0440 11/18/19  0523 11/17/19  0453 11/16/19  0515 11/15/19  1503 11/15/19  0457 11/14/19  1539   POTASSIUM mmol/L 3 4* 3 4* 3 5 3 6 3 4* 3 4* 3 6   CHLORIDE mmol/L 111* 110* 115* 111* 106 103 104   CO2 mmol/L 23 22 20* 20* 26 24 27   BUN mg/dL 22 18 17 17 14 13 15   CREATININE mg/dL 0 86 1 02 0 87 0 96 1 06 0 91 1 07   CALCIUM mg/dL 8 3 8 5 8 0* 8 4 8 7 8 9 8 8       BNP: No results for input(s): BNP in the last 72 hours      Magnesium:   Results from last 7 days   Lab Units 11/19/19  0440 11/18/19  0523 11/16/19  0515 11/15/19  0457   MAGNESIUM mg/dL 2 0 1 9 2 2 1 8*       Coags:       TSH:        Hemoglobin A1C   Results from last 7 days   Lab Units 11/15/19  0457   HEMOGLOBIN A1C % 5 1       Lipid Profile:   Results from last 7 days   Lab Units 11/15/19  0126   TRIGLYCERIDES mg/dL 103   HDL mg/dL 52       Meds/Allergies   all current active meds have been reviewed and current meds:   Current Facility-Administered Medications   Medication Dose Route Frequency    [START ON 11/20/2019] amLODIPine (NORVASC) tablet 10 mg  10 mg Oral Daily    amLODIPine (NORVASC) tablet 5 mg  5 mg Oral Once    aspirin chewable tablet 81 mg  81 mg Oral Daily    atorvastatin (LIPITOR) tablet 40 mg  40 mg Oral Daily With Dinner    carvedilol (COREG) tablet 12 5 mg  12 5 mg Oral BID With Meals    carvedilol (COREG) tablet 6 25 mg  6 25 mg Oral Once    docusate sodium (COLACE) capsule 100 mg  100 mg Oral BID    heparin (porcine) subcutaneous injection 5,000 Units  5,000 Units Subcutaneous Q8H [address]    hydrALAZINE (APRESOLINE) injection 10 mg  10 mg Intravenous Q6H PRN    Labetalol HCl (NORMODYNE) injection 10 mg  10 mg Intravenous Q6H PRN    levETIRAcetam (KEPPRA) oral solution 500 mg  500 mg Oral Q12H [address]    levothyroxine tablet 50 mcg  50 mcg Oral Early Morning    lisinopril (ZESTRIL) tablet 40 mg  40 mg Oral Daily    nicotine (NICODERM CQ) 14 mg/24hr TD 24 hr patch 1 patch  1 patch Transdermal Daily    potassium chloride 10 % oral solution 40 mEq  40 mEq Oral Once    senna-docusate sodium (SENOKOT S) 8 6-50 mg per tablet 1 tablet  1 tablet Oral HS     Medications Prior to Admission   Medication    aspirin (ECOTRIN LOW STRENGTH) 81 mg EC tablet    atorvastatin (LIPITOR) 40 mg tablet    levothyroxine 50 mcg tablet    lisinopril (ZESTRIL) 40 mg tablet    metoprolol tartrate (LOPRESSOR) 50 mg tablet    NIFEdipine (PROCARDIA XL) 60 mg 24 hr tablet            Cardiac testing:   Results for orders placed during the hospital encounter of 11/15/19   Echo complete with contrast if indicated    Narrative [address]  [facility] [place], [address] [facility]  [phone]    Transthoracic Echocardiogram  2D, M-mode, Doppler, and Color Doppler    Study date:  2019    Patient: Albert Kramer  MR number: [id number]  Account number: [de-identified]  : 1940  Age: 67 years  Gender: Male  Status: Inpatient  Location: Bedside  Height: 70 in  Weight: 144 8 lb  BP: 139/ 60 mmHg    Indications: Assess left ventricular function  Diagnoses: I25 10 - Atherosclerotic heart disease of native coronary artery without angina pectoris    Sonographer:  MODE Duarte  Referring Physician:  Yasmine Echavarria PA-C  Group:  [facility]  Interpreting Physician:  Avila Whalen MD    SUMMARY    LEFT VENTRICLE:  Systolic function was normal  Ejection fraction was estimated to be 60 %  Although no diagnostic regional wall motion abnormality was identified, this possibility cannot be completely excluded on the basis of this study  Wall thickness was increased  Features were consistent with a pseudonormal left ventricular filling pattern, with concomitant abnormal relaxation and increased filling pressure (grade 2 diastolic dysfunction)  VENTRICULAR SEPTUM:  There was dyssynergic motion  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  RIGHT VENTRICLE:  The size was at the upper limits of normal   Systolic function was normal     AORTIC VALVE:  There was mild regurgitation  TRICUSPID VALVE:  There was moderate regurgitation  Estimated peak PA pressure was 45 mmHg  The findings suggest mild pulmonary hypertension  HISTORY: PRIOR HISTORY: Elevated troponin, CAD s/p CABG, Hypertension, Tobacco abuse, SAH    PROCEDURE: The procedure was performed at the bedside  This was a routine study  The transthoracic approach was used  The study included complete 2D imaging, M-mode, complete spectral Doppler, and color Doppler  The heart rate was 68 bpm,  at the start of the study  Images were obtained from the parasternal, apical, subcostal, and suprasternal notch acoustic windows  Image quality was adequate  LEFT VENTRICLE: Size was normal  Systolic function was normal  Ejection fraction was estimated to be 60 %  Although no diagnostic regional wall motion abnormality was identified, this possibility cannot be completely excluded on the basis  of this study  Wall thickness was increased  DOPPLER: The ratio of early ventricular filling to atrial contraction velocities was within the normal range  Features were consistent with a pseudonormal left ventricular filling pattern, with  concomitant abnormal relaxation and increased filling pressure (grade 2 diastolic dysfunction)  VENTRICULAR SEPTUM: There was dyssynergic motion  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  RIGHT VENTRICLE: The size was at the upper limits of normal  Systolic function was normal     LEFT ATRIUM: The atrium was dilated  RIGHT ATRIUM: The atrium was dilated  MITRAL VALVE: There was moderate annular calcification  Valve structure was normal  There was normal leaflet separation  DOPPLER: There was possible mild stenosis  There was no significant regurgitation  AORTIC VALVE: The valve was trileaflet  Leaflets exhibited mildly increased thickness, mild calcification, and sclerosis  DOPPLER: There was no evidence for stenosis  There was mild regurgitation  TRICUSPID VALVE: The valve structure was normal  There was normal leaflet separation  DOPPLER: There was no evidence for stenosis  There was moderate regurgitation  Estimated peak PA pressure was 45 mmHg  The findings suggest mild  pulmonary hypertension      PULMONIC VALVE: Leaflets exhibited normal thickness, no calcification, and normal cuspal separation  DOPPLER: The transpulmonic velocity was within the normal range  There was mild to moderate regurgitation  PERICARDIUM: There was no pericardial effusion  The pericardium was normal in appearance  AORTA: The root exhibited normal size and fibrocalcific change  SYSTEM MEASUREMENT TABLES    2D  %FS: 27 33 %  Ao Diam: 3 06 cm  EDV(Teich): 126 06 ml  EF Biplane: 59 72 %  EF(Teich): 52 84 %  ESV(Teich): 59 45 ml  IVSd: 1 12 cm  LA Area: 26 18 cm2  LA Diam: 3 97 cm  LVEDV MOD A2C: 73 63 ml  LVEDV MOD A4C: 93 73 ml  LVEDV MOD BP: 88 94 ml  LVEF MOD A2C: 57 13 %  LVEF MOD A4C: 57 65 %  LVESV MOD A2C: 31 57 ml  LVESV MOD A4C: 39 7 ml  LVESV MOD BP: 35 83 ml  LVIDd: 5 14 cm  LVIDs: 3 74 cm  LVLd A2C: 8 17 cm  LVLd A4C: 8 91 cm  LVLs A2C: 7 86 cm  LVLs A4C: 7 69 cm  LVOT Diam: 2 13 cm  LVPWd: 1 13 cm  RA Area: 20 91 cm2  RVIDd: 3 98 cm  SV MOD A2C: 42 06 ml  SV MOD A4C: 54 03 ml  SV(Teich): 66 61 ml    CW  TR Vmax: 3 21 m/s  TR maxP 16 mmHg    MM  TAPSE: 2 14 cm    PW  AVC: 373 39 ms  E': 0 07 m/s  E/E': 20 45  LVOT Env  Ti: 304 5 ms  LVOT VTI: 20 24 cm  LVOT Vmax: 0 95 m/s  LVOT Vmean: 0 66 m/s  LVOT maxPG: 3 63 mmHg  LVOT meanP 01 mmHg  LVSI Dopp: 39 64 ml/m2  LVSV Dopp: 72 15 ml  MV A Jimbo: 0 99 m/s  MV Dec [place]: 6 3 m/s2  MV DecT: 238 88 ms  MV E Jimbo: 1 5 m/s  MV E/A Ratio: 1 52  MV PHT: 69 27 ms  MVA By PHT: 3 18 cm2    [facility] Accredited Echocardiography [facility]    Prepared and electronically signed by    Rishi Mock MD  Signed [id number] 12:59:19       No results found for this or any previous visit  No results found for this or any previous visit  No results found for this or any previous visit  Counseling / Coordination of Care  Total floor / unit time spent today 25 minutes

## 2019-11-19 NOTE — ASSESSMENT & PLAN NOTE
Remains with right hemiplegia and expressive aphasia  May be some component of receptive aphasia  Being followed by Neurology  Goal systolic blood pressure [phone]  Has been running greater than 160 and is back on home antihypertensives  Continue frequent neurologic checks  Patient currently tolerating p o   Intake  Keofed discontinued

## 2019-11-19 NOTE — OCCUPATIONAL THERAPY NOTE
Occupational Therapy Treatment Note:       11/19/19 7393   Restrictions/Precautions   RLE Weight Bearing Per Order NWB   Braces or Orthoses LE Immobilizer  (unlocked hinged knee brace)   Other Precautions Cognitive   Pain Assessment   Pain Assessment FLACC   Pain Rating: FLACC (Rest) - Face 0   Pain Rating: FLACC (Rest) - Legs 0   Pain Rating: FLACC (Rest) - Activity 0   Pain Rating: FLACC (Rest) - Cry 0   Pain Rating: FLACC (Rest) - Consolability 0   Score: FLACC (Rest) 0   Pain Rating: FLACC (Activity) - Face 0   Pain Rating: FLACC (Activity) - Legs 0   Pain Rating: FLACC (Activity) - Activity 0   Pain Rating: FLACC (Activity) - Cry 0   Pain Rating: FLACC (Activity) - Consolability 0   Score: FLACC (Activity) 0   ADL   Where Assessed Edge of bed   Grooming Assistance 4  Minimal Assistance   Grooming Comments seated eob, mod asst to maintain balance during haircombing  pt requried cues to comb r side of head  Bed Mobility   Rolling R 3  Moderate assistance   Rolling L 1  Dependent   Supine to Sit 2  Maximal assistance   Additional items Assist x 2   Sit to Supine 2  Maximal assistance   Additional items Assist x 2   Therapeutic Exercise - ROM   UE-ROM Yes   ROM- Right Upper Extremities   R Shoulder AAROM   R Elbow PROM   R Wrist PROM   R Hand PROM   RUE ROM Comment 1 x 10 reps flacid throughout   Cognition   Overall Cognitive Status Impaired   Arousal/Participation Alert   Attention Difficulty attending to directions   Following Commands Follows one step commands inconsistently   Comments pt was alert and able to communicate miniamlly with few word sentences  pt however was noted to repeat ot's statements  pt was able to use comb appropraite in sitting  pt is slow to respon and react to commands      Activity Tolerance   Activity Tolerance Patient tolerated treatment well   Assessment   Assessment pt participated in pm ot session and was seen focusing on bed mobility, sitting tolerence and balance during grooming tasks and dynamic reaching / gentle wbing r ue  pt is noted to be a pusher using l ue when allowed while seated eob  pt's brother was present this session  pt was minimally verbal and was noted to repeat things said by o t  pt did have difficulty following some commands  pt with r innatention during hair combing task  pt had no noted arom r ue elbow wrsit or hand  pt with minimal swelling r hand  elevated on pillow elbow to hand  will follow focusing on goals from ie  pt demontrated poor sitting balance eob   Plan   Treatment Interventions ADL retraining;Functional transfer training;Visual perceptual retraining;UE strengthening/ROM; Endurance training;Cognitive reorientation;Patient/family training;Equipment evaluation/education; Neuromuscular reeducation; Compensatory technique education; Activityengagement   Goal Expiration Date 12/02/19   OT Treatment Day 1   OT Frequency 3-5x/wk   Recommendation   Recommendation Physiatry Consult   OT Discharge Recommendation Short Term Rehab   OT - OK to Discharge Yes   Barthel Index   Feeding 0   Bathing 0   Grooming Score 0   Dressing Score 0   Bladder Score 0   Bowels Score 10   Toilet Use Score 5   Transfers (Bed/Chair) Score 5   Mobility (Level Surface) Score 0   Stairs Score 0   Barthel Index Score 20   Modified Sanya Scale   Modified Sanya Scale 4   April A BRETT Mackey

## 2019-11-20 LAB
ANION GAP SERPL CALCULATED.3IONS-SCNC: 7 MMOL/L (ref 4–13)
BASOPHILS # BLD AUTO: 0.06 THOUSANDS/ΜL (ref 0–0.1)
BASOPHILS NFR BLD AUTO: 1 % (ref 0–1)
BUN SERPL-MCNC: 22 MG/DL (ref 5–25)
CALCIUM SERPL-MCNC: 8.2 MG/DL (ref 8.3–10.1)
CHLORIDE SERPL-SCNC: 110 MMOL/L (ref 100–108)
CO2 SERPL-SCNC: 25 MMOL/L (ref 21–32)
CREAT SERPL-MCNC: 0.96 MG/DL (ref 0.6–1.3)
EOSINOPHIL # BLD AUTO: 0.23 THOUSAND/ΜL (ref 0–0.61)
EOSINOPHIL NFR BLD AUTO: 2 % (ref 0–6)
ERYTHROCYTE [DISTWIDTH] IN BLOOD BY AUTOMATED COUNT: 13.5 % (ref 11.6–15.1)
GFR SERPL CREATININE-BSD FRML MDRD: 75 ML/MIN/1.73SQ M
GLUCOSE SERPL-MCNC: 94 MG/DL (ref 65–140)
HCT VFR BLD AUTO: 38.1 % (ref 36.5–49.3)
HGB BLD-MCNC: 12.7 G/DL (ref 12–17)
IMM GRANULOCYTES # BLD AUTO: 0.16 THOUSAND/UL (ref 0–0.2)
IMM GRANULOCYTES NFR BLD AUTO: 2 % (ref 0–2)
LYMPHOCYTES # BLD AUTO: 1.26 THOUSANDS/ΜL (ref 0.6–4.47)
LYMPHOCYTES NFR BLD AUTO: 13 % (ref 14–44)
MAGNESIUM SERPL-MCNC: 1.9 MG/DL (ref 1.6–2.6)
MCH RBC QN AUTO: 32.9 PG (ref 26.8–34.3)
MCHC RBC AUTO-ENTMCNC: 33.3 G/DL (ref 31.4–37.4)
MCV RBC AUTO: 99 FL (ref 82–98)
MONOCYTES # BLD AUTO: 1.1 THOUSAND/ΜL (ref 0.17–1.22)
MONOCYTES NFR BLD AUTO: 12 % (ref 4–12)
NEUTROPHILS # BLD AUTO: 6.69 THOUSANDS/ΜL (ref 1.85–7.62)
NEUTS SEG NFR BLD AUTO: 70 % (ref 43–75)
NRBC BLD AUTO-RTO: 0 /100 WBCS
PHOSPHATE SERPL-MCNC: 3.7 MG/DL (ref 2.3–4.1)
PLATELET # BLD AUTO: 178 THOUSANDS/UL (ref 149–390)
PMV BLD AUTO: 11 FL (ref 8.9–12.7)
POTASSIUM SERPL-SCNC: 3.7 MMOL/L (ref 3.5–5.3)
RBC # BLD AUTO: 3.86 MILLION/UL (ref 3.88–5.62)
SODIUM SERPL-SCNC: 142 MMOL/L (ref 136–145)
WBC # BLD AUTO: 9.5 THOUSAND/UL (ref 4.31–10.16)

## 2019-11-20 PROCEDURE — 99232 SBSQ HOSP IP/OBS MODERATE 35: CPT | Performed by: PHYSICIAN ASSISTANT

## 2019-11-20 PROCEDURE — 97530 THERAPEUTIC ACTIVITIES: CPT

## 2019-11-20 PROCEDURE — 84100 ASSAY OF PHOSPHORUS: CPT | Performed by: PHYSICIAN ASSISTANT

## 2019-11-20 PROCEDURE — 83735 ASSAY OF MAGNESIUM: CPT | Performed by: PHYSICIAN ASSISTANT

## 2019-11-20 PROCEDURE — 97112 NEUROMUSCULAR REEDUCATION: CPT

## 2019-11-20 PROCEDURE — 85025 COMPLETE CBC W/AUTO DIFF WBC: CPT | Performed by: PHYSICIAN ASSISTANT

## 2019-11-20 PROCEDURE — 92526 ORAL FUNCTION THERAPY: CPT

## 2019-11-20 PROCEDURE — 99231 SBSQ HOSP IP/OBS SF/LOW 25: CPT | Performed by: INTERNAL MEDICINE

## 2019-11-20 PROCEDURE — 80048 BASIC METABOLIC PNL TOTAL CA: CPT | Performed by: PHYSICIAN ASSISTANT

## 2019-11-20 RX ORDER — HYDRALAZINE HYDROCHLORIDE 50 MG/1
50 TABLET, FILM COATED ORAL EVERY 8 HOURS SCHEDULED
Status: DISCONTINUED | OUTPATIENT
Start: 2019-11-20 | End: 2019-11-20

## 2019-11-20 RX ORDER — HYDRALAZINE HYDROCHLORIDE 50 MG/1
50 TABLET, FILM COATED ORAL EVERY 8 HOURS SCHEDULED
Status: DISCONTINUED | OUTPATIENT
Start: 2019-11-20 | End: 2019-11-21

## 2019-11-20 RX ADMIN — ASPIRIN 81 MG 81 MG: 81 TABLET ORAL at 08:11

## 2019-11-20 RX ADMIN — AMLODIPINE BESYLATE 10 MG: 10 TABLET ORAL at 08:11

## 2019-11-20 RX ADMIN — LEVETIRACETAM 500 MG: 100 SOLUTION ORAL at 08:16

## 2019-11-20 RX ADMIN — HYDRALAZINE HYDROCHLORIDE 10 MG: 20 INJECTION INTRAMUSCULAR; INTRAVENOUS at 01:53

## 2019-11-20 RX ADMIN — LEVOTHYROXINE SODIUM 50 MCG: 50 TABLET ORAL at 05:17

## 2019-11-20 RX ADMIN — CARVEDILOL 12.5 MG: 12.5 TABLET, FILM COATED ORAL at 08:11

## 2019-11-20 RX ADMIN — HYDRALAZINE HYDROCHLORIDE 50 MG: 50 TABLET ORAL at 23:43

## 2019-11-20 RX ADMIN — LABETALOL 20 MG/4 ML (5 MG/ML) INTRAVENOUS SYRINGE 10 MG: at 00:29

## 2019-11-20 RX ADMIN — HEPARIN SODIUM 5000 UNITS: 5000 INJECTION INTRAVENOUS; SUBCUTANEOUS at 05:17

## 2019-11-20 RX ADMIN — NICOTINE 1 PATCH: 14 PATCH TRANSDERMAL at 08:12

## 2019-11-20 RX ADMIN — LEVETIRACETAM 500 MG: 100 SOLUTION ORAL at 21:23

## 2019-11-20 RX ADMIN — HEPARIN SODIUM 5000 UNITS: 5000 INJECTION INTRAVENOUS; SUBCUTANEOUS at 21:23

## 2019-11-20 RX ADMIN — ATORVASTATIN CALCIUM 40 MG: 40 TABLET, FILM COATED ORAL at 17:27

## 2019-11-20 RX ADMIN — LISINOPRIL 40 MG: 20 TABLET ORAL at 08:11

## 2019-11-20 RX ADMIN — CARVEDILOL 12.5 MG: 12.5 TABLET, FILM COATED ORAL at 17:32

## 2019-11-20 RX ADMIN — HYDRALAZINE HYDROCHLORIDE 50 MG: 50 TABLET ORAL at 17:32

## 2019-11-20 RX ADMIN — HEPARIN SODIUM 5000 UNITS: 5000 INJECTION INTRAVENOUS; SUBCUTANEOUS at 13:30

## 2019-11-20 RX ADMIN — HYDRALAZINE HYDROCHLORIDE 50 MG: 50 TABLET, FILM COATED ORAL at 11:07

## 2019-11-20 NOTE — ASSESSMENT & PLAN NOTE
Reportedly noncompliant with medications prior to admission  Had hypertensive urgency on admission  Currently back on home medications, however still elevated currently this a m  Cardiology continues to follow the patient  Will continue to follow the recommendations regarding long-term treatment plan with antihypertensives as well as in the interim  Patient has elevated blood pressures this a m  Will follow up cardiology recs  P r n  Medications are ordered  Goal systolic blood pressure [phone] per Neurology

## 2019-11-20 NOTE — ASSESSMENT & PLAN NOTE
Remains with right hemiplegia and expressive aphasia  May be some component of receptive aphasia  Being followed by Neurology  Goal systolic blood pressure [phone]  Has been running greater than 160 and is back on home antihypertensives  Cardiology's has been currently following the patient to assess blood pressures; will continue to follow the recommendation  Continue frequent neurologic checks  Patient currently tolerating p o   Intake  Keofed discontinued

## 2019-11-20 NOTE — PLAN OF CARE
Problem: Prexisting or High Potential for Compromised Skin Integrity  Goal: Skin integrity is maintained or improved  Description  INTERVENTIONS:  - Identify patients at risk for skin breakdown  - Assess and monitor skin integrity  - Assess and monitor nutrition and hydration status  - Monitor labs   - Assess for incontinence   - Turn and reposition patient  - Assist with mobility/ambulation  - Relieve pressure over bony prominences  - Avoid friction and shearing  - Provide appropriate hygiene as needed including keeping skin clean and dry  - Evaluate need for skin moisturizer/barrier cream  - Collaborate with interdisciplinary team   - Patient/family teaching  - Consider wound care consult   Outcome: Progressing     Problem: Potential for Falls  Goal: Patient will remain free of falls  Description  INTERVENTIONS:  - Assess patient frequently for physical needs  -  Identify cognitive and physical deficits and behaviors that affect risk of falls  -  [place] fall precautions as indicated by assessment   - Educate patient/family on patient safety including physical limitations  - Instruct patient to call for assistance with activity based on assessment  - Modify environment to reduce risk of injury  - Consider OT/PT consult to assist with strengthening/mobility  Outcome: Progressing     Problem: Nutrition/Hydration-ADULT  Goal: Nutrient/Hydration intake appropriate for improving, restoring or maintaining nutritional needs  Description  Monitor and assess patient's nutrition/hydration status for malnutrition  Collaborate with interdisciplinary team and initiate plan and interventions as ordered  Monitor patient's weight and dietary intake as ordered or per policy  Utilize nutrition screening tool and intervene as necessary  Determine patient's food preferences and provide high-protein, high-caloric foods as appropriate       INTERVENTIONS:  - Monitor oral intake, urinary output, labs, and treatment plans  - Assess nutrition and hydration status and recommend course of action  - Evaluate amount of meals eaten  - Assist patient with eating if necessary   - Allow adequate time for meals  - Recommend/ encourage appropriate diets, oral nutritional supplements, and vitamin/mineral supplements  - Order, calculate, and assess calorie counts as needed  - Recommend, monitor, and adjust tube feedings and TPN/PPN based on assessed needs  - Assess need for intravenous fluids  - Provide specific nutrition/hydration education as appropriate  - Include patient/family/caregiver in decisions related to nutrition  Outcome: Progressing     Problem: CONFUSION/THOUGHT DISTURBANCE  Goal: Thought disturbances (confusion, delirium, depression, dementia or psychosis) are managed to maintain or return to baseline mental status and functional level  Description  INTERVENTIONS:  - Assess for possible contributors to  thought disturbance, including but not limited to medications, infection, impaired vision or hearing, underlying metabolic abnormalities, dehydration, respiratory compromise,  psychiatric diagnoses and notify attending PHYSICAN/AP  - Monitor and intervene to maintain adequate nutrition, hydration, elimination, sleep and activity  - Decrease environmental stimuli, including noise as appropriate  - Provide frequent contacts to provide refocusing, direction and reassurance as needed  Approach patient calmly with eye contact and at their level    - [place] high risk fall precautions, aspiration precautions and other safety measures, as indicated  - If delirium suspected, notify physician/AP of change in condition and request immediate in-person evaluation  - Pursue consults as appropriate including Geriatric (campus dependent), OT for cognitive evaluation/activity planning, psychiatric, pastoral care, etc   Outcome: Progressing     Problem: BEHAVIOR  Goal: Pt/Family maintain appropriate behavior and adhere to behavioral management agreement, if implemented  Description  INTERVENTIONS:  - Assess the family dynamic   - Encourage verbalization of thoughts and concerns in a socially appropriate manner  - Assess patient/family's coping skills and non-compliant behavior (including use of illegal substances)  - Utilize positive, consistent limit setting strategies supporting safety of patient, staff and others  - Initiate consult with Case Management, Spiritual Care or other ancillary services as appropriate  - If a patient's/visitor's behavior jeopardizes the safety of the patient, staff, or others, refer to organization procedure  - Notify Security of behavior or suspected illegal substances which indicate the need for search of the patient and/or belongings  - Encourage participation in the decision making process about a behavioral management agreement; implement if patient meets criteria  Outcome: Progressing     Problem: NEUROSENSORY - ADULT  Goal: Achieves stable or improved neurological status  Description  INTERVENTIONS  - Monitor and report changes in neurological status  - Monitor vital signs such as temperature, blood pressure, glucose, and any other labs ordered   - Initiate measures to prevent increased intracranial pressure  - Monitor for seizure activity and implement precautions if appropriate      Outcome: Progressing  Goal: Achieves maximal functionality and self care  Description  INTERVENTIONS  - Monitor swallowing and airway patency with patient fatigue and changes in neurological status  - Encourage and assist patient to increase activity and self care     - Encourage visually impaired, hearing impaired and aphasic patients to use assistive/communication devices  Outcome: Progressing     Problem: MUSCULOSKELETAL - ADULT  Goal: Maintain or return mobility to safest level of function  Description  INTERVENTIONS:  - Assess patient's ability to carry out ADLs; assess patient's baseline for ADL function and identify physical deficits which impact ability to perform ADLs (bathing, care of mouth/teeth, toileting, grooming, dressing, etc )  - Assess/evaluate cause of self-care deficits   - Assess range of motion  - Assess patient's mobility  - Assess patient's need for assistive devices and provide as appropriate  - Encourage maximum independence but intervene and supervise when necessary  - Involve family in performance of ADLs  - Assess for home care needs following discharge   - Consider OT consult to assist with ADL evaluation and planning for discharge  - Provide patient education as appropriate  Outcome: Progressing  Goal: Maintain proper alignment of affected body part  Description  INTERVENTIONS:  - Support, maintain and protect limb and body alignment  - Provide patient/ family with appropriate education  Outcome: Progressing

## 2019-11-20 NOTE — PHYSICAL THERAPY NOTE
Physical Therapy Progress Note     11/20/19 1319   Pain Assessment   Pain Assessment FLACC   Pain Rating: FLACC (Rest) - Face 0   Pain Rating: FLACC (Rest) - Legs 0   Pain Rating: FLACC (Rest) - Activity 0   Pain Rating: FLACC (Rest) - Cry 0   Pain Rating: FLACC (Rest) - Consolability 0   Score: FLACC (Rest) 0   Pain Rating: FLACC (Activity) - Face 0   Pain Rating: FLACC (Activity) - Legs 0   Pain Rating: FLACC (Activity) - Activity 0   Pain Rating: FLACC (Activity) - Cry 0   Pain Rating: FLACC (Activity) - Consolability 0   Score: FLACC (Activity) 0   Restrictions/Precautions   RLE Weight Bearing Per Order NWB   Braces or Orthoses   (HKB unlocked)   Other Precautions Cognitive; Bed Alarm;WBS;Fall Risk   Subjective   Subjective Pt encountered supine in bed, appeared to be asleep, but aroused with gentle touch & talking  Appeared fatigued at first, unable to keep eyes open, but improved with activity/positional change  Continues with poor command following during session, occasionally adjusting posture upon command, but not lifting head or moving L arm when prompted  RN reports pt able to participate in feeding actviities this AM    Bed Mobility   Supine to Sit 2  Maximal assistance   Additional items Assist x 2; Increased time required;LE management   Sit to Supine 2  Maximal assistance   Additional items Assist x 2;LE management; Increased time required   Balance   Static Sitting Poor   Dynamic Sitting Poor -   Endurance Deficit   Endurance Deficit Yes   Endurance Deficit Description impaired cognition, fatigue   Activity Tolerance   Activity Tolerance Patient limited by fatigue   Nurse [address], RN   Assessment   Prognosis Guarded   Problem List Decreased strength;Decreased range of motion;Decreased endurance;Decreased mobility; Impaired balance;Decreased coordination;Decreased cognition; Impaired judgement;Decreased safety awareness; Impaired hearing;Pain;Orthopedic restrictions   Assessment Pt continues to require maximal assist for all transfers & seated EOB due to impaired cognition, fatigue, & R sided flacidity  Pt able to demonstrate improved balance with L hand on his lap and occasionally attended to instructions to sit forward over DAISY, but unable to maintain posture or hand positioning without constant assist   Pt sat EOB ~5 mins, then returned to supine position with all needs in reach & alarm active  Will continue to benefit from therapy services to improve strength, mobility, activity tolerance, and command following to reduce burden of care at this time  Goals   Patient Goals none stated due to aphasia   STG Expiration Date 12/02/19   PT Treatment Day 1   Plan   Treatment/Interventions Functional transfer training;LE strengthening/ROM; Therapeutic exercise; Endurance training;Patient/family training;Equipment eval/education; Bed mobility   Progress Progressing toward goals   PT Frequency 2-3x/wk   Recommendation   Recommendation Post acute IP rehab     Amy Kerr PTA

## 2019-11-20 NOTE — PROGRESS NOTES
Cardiology Progress Note - Glorious Iba 67 y o  male MRN: [id number]    [facility] Encounter: [id number]      Assessment:  67-year-old gentleman, who originally presented to [facility] on 11/14/2019 after a fall while Getting groceries up a flight of stairs to his apartment  No associated symptoms with the fall, and was primarily related to a misstep  even surely crawl back to his apartment in lay bed all day  Next morning, he continued to have excrutiating pain in his right knee, and as a result ended up calling EMS  In the ED, he was found to have up a small posttraumatic subarachnoid hemorrhage in the left frontal sulcus  additionally a CT in right knee showed possible tibial plateau fracture  As a result, he was transferred to [facility] for further orthopedic evaluation     1  Hypertensive urgency  2  CAD s/p CABGx3 (2003)  3  Non-MI troponin elevation - related to JED/trauma  4  Right knee pain - osteoarthritis with non-displaced tibial plateau fracture - being medically managed  5  Subarachnoid hemorrhage - small, s/p DDAVP for h/o aspirin use  6  Hyperlipidemia  7  Hypothyroidism  8  Chronic left common carotid occlusion, noted on CTA  9  Active smoker  10  Peripheral vascular disease - Rt LESTER 0 43, Lt LESTER 0 31, medically manage, not interested in vascular surgical evaluation     Outpatient cardiologist: Dr Melanie Sheets  Hypertensive urgency  · Nicardipine drip stopped 11/17/19 at 2:00 p m  · Currently on lisinopril 40 mg, carvedilol 12 5 bid, amlodipine 10 mg  · Blood pressure again elevated in 170s-190s overnight  · Got IV labetalol 10x2 & IV hydralazine x3 from PRN doses yesterday, but BP still in 170-200s  · Add hydralazine 50 mg tid    CAD s/p CABGx3  · On aspirin 81, carvedilol 12 5 mg b i d , lisinopril 40 mg, atorvastatin 40 mg    Subjective:   No active complains  Opens eyes and says few appropriate words & smiles intermittently   Appears weak & frail  Review of Systems  No c/o chest pain, No c/o palpitations, No c/o shortness of breath, No c/o dizziness or light-headedness, No c/o abdominal pain, no c/o nausea/vomitting  All other systems negative    Telemetry Review:  Not on telemetry currently    Objective:   Vitals: Blood pressure (!) 182/72, pulse 73, temperature 97 7 °F (36 5 °C), resp  rate 16, height 5' 10" (1 778 m), weight 66 kg (145 lb 8 1 oz), SpO2 96 %  , Body mass index is 20 88 kg/m² ,   Orthostatic Blood Pressures      Most Recent Value   Blood Pressure  (!) 182/72 filed at 11/20/2019 0810   Patient Position - Orthostatic VS  Lying filed at 11/20/2019 1231         Systolic (37XKT), MDV:534 , Min:155 , XOJ:492     Diastolic (58PGI), FIM:04, Min:70, Max:98    Wt Readings from Last 5 Encounters:   11/18/19 66 kg (145 lb 8 1 oz)   11/15/19 65 8 kg (145 lb 1 oz)   11/15/19 66 kg (145 lb 8 1 oz)   08/10/19 68 kg (149 lb 14 6 oz)   06/06/19 68 kg (150 lb)     I/O       11/16 0701 - 11/17 0700 11/17 0701 - 11/18 0700 11/18 0701 - 11/19 0700    P  O    118    I V  (mL/kg) 3250 2 (49 4) 718 3 (10 9)     NG/ 600     IV Piggyback 200 100 150    Feedings 330 1046 220    Total Intake(mL/kg) 4080 2 (62) 2464 3 (37 5) 488 (7 4)    Urine (mL/kg/hr)  775 (0 5)     Total Output  775     Net +4080 2 +1689 3 +488           Unmeasured Urine Occurrence 3 x 3 x               Physical Exam    Constitutional:  Christian Kingston appears weak  Opens eyes intermittently and smiles   HEENT:    Normocephalic   Neck:  Supple, No JVD   Lungs:  Clear to auscultation bilaterally, respirations unlabored    Chest Wall:  No tenderness or deformity    Heart::  Regular rate, Regular rhythm, S1 and S2 normal, no murmur, rub or gallop    Abdomen:  Soft, non-tender, non-distended   Neurological:  Awake, alert, oriented x3   Non-focal exam    Extremities:  No pedal edema, No calf tenderness         Laboratory Results:  Results from last 7 days   Lab Units 11/15/19  4310 11/15/19  1733 11/15/19  1503   TROPONIN I ng/mL 0 05* 0 05* 0 05*       CBC with diff:   Results from last 7 days   Lab Units 11/20/19  0437 11/19/19  0440 11/18/19  0523 11/17/19  0453 11/16/19  0515 11/15/19  0457 11/14/19  1539   WBC Thousand/uL 9 50 10 38* 10 90* 13 14* 12 73* 14 40* 10 60   HEMOGLOBIN g/dL 12 7 13 0 13 7 12 8 12 5 14 2 13 8*   HEMATOCRIT % 38 1 38 1 41 1 38 8 37 3 41 2* 40 8*   MCV fL 99* 97 98 99* 99* 98 99   PLATELETS Thousands/uL 178 173 198 182 181 159 152   MCH pg 32 9 33 2 32 8 32 7 33 1 33 6 33 5   MCHC g/dL 33 3 34 1 33 3 33 0 33 5 34 3 33 9   RDW % 13 5 13 7 13 6 13 9 13 6 13 5 13 7   MPV fL 11 0 10 8 11 0 10 5 11 0 9 6 8 6   NRBC AUTO /100 WBCs 0 0 0 0  --   --   --          CMP:  Results from last 7 days   Lab Units 11/20/19 0437 11/19/19  0440 11/18/19  0523 11/17/19  0453 11/16/19  0515 11/15/19  1503 11/15/19  0457 11/14/19  1539   POTASSIUM mmol/L 3 7 3 4* 3 4* 3 5 3 6 3 4* 3 4* 3 6   CHLORIDE mmol/L 110* 111* 110* 115* 111* 106 103 104   CO2 mmol/L 25 23 22 20* 20* 26 24 27   BUN mg/dL 22 22 18 17 17 14 13 15   CREATININE mg/dL 0 96 0 86 1 02 0 87 0 96 1 06 0 91 1 07   CALCIUM mg/dL 8 2* 8 3 8 5 8 0* 8 4 8 7 8 9 8 8   AST U/L  --   --   --   --   --   --  15 14   ALT U/L  --   --   --   --   --   --  18 20   ALK PHOS U/L  --   --   --   --   --   --  60 56   EGFR ml/min/1 73sq m 75 83 70 83 75 67 80 66         BMP:  Results from last 7 days   Lab Units 11/20/19  0437 11/19/19  0440 11/18/19  0523 11/17/19  0453 11/16/19  0515 11/15/19  1503 11/15/19  0457   POTASSIUM mmol/L 3 7 3 4* 3 4* 3 5 3 6 3 4* 3 4*   CHLORIDE mmol/L 110* 111* 110* 115* 111* 106 103   CO2 mmol/L 25 23 22 20* 20* 26 24   BUN mg/dL 22 22 18 17 17 14 13   CREATININE mg/dL 0 96 0 86 1 02 0 87 0 96 1 06 0 91   CALCIUM mg/dL 8 2* 8 3 8 5 8 0* 8 4 8 7 8 9       BNP: No results for input(s): BNP in the last 72 hours      Magnesium:   Results from last 7 days   Lab Units 11/20/19 0437 11/19/19  0440 11/18/19  0523 11/16/19  0515 11/15/19  0457   MAGNESIUM mg/dL 1 9 2 0 1 9 2 2 1 8*       Coags:       TSH:        Hemoglobin A1C   Results from last 7 days   Lab Units 11/15/19  0457   HEMOGLOBIN A1C % 5 1       Lipid Profile:   Results from last 7 days   Lab Units 11/15/19  0126   TRIGLYCERIDES mg/dL 103   HDL mg/dL 52       Meds/Allergies   all current active meds have been reviewed and current meds:   Current Facility-Administered Medications   Medication Dose Route Frequency    amLODIPine (NORVASC) tablet 10 mg  10 mg Oral Daily    aspirin chewable tablet 81 mg  81 mg Oral Daily    atorvastatin (LIPITOR) tablet 40 mg  40 mg Oral Daily With Dinner    carvedilol (COREG) tablet 12 5 mg  12 5 mg Oral BID With Meals    docusate sodium (COLACE) capsule 100 mg  100 mg Oral BID    heparin (porcine) subcutaneous injection 5,000 Units  5,000 Units Subcutaneous Q8H [facility]    hydrALAZINE (APRESOLINE) injection 10 mg  10 mg Intravenous Q6H PRN    Labetalol HCl (NORMODYNE) injection 10 mg  10 mg Intravenous Q6H PRN    levETIRAcetam (KEPPRA) oral solution 500 mg  500 mg Oral Q12H [facility]    levothyroxine tablet 50 mcg  50 mcg Oral Early Morning    lisinopril (ZESTRIL) tablet 40 mg  40 mg Oral Daily    nicotine (NICODERM CQ) 14 mg/24hr TD 24 hr patch 1 patch  1 patch Transdermal Daily    senna-docusate sodium (SENOKOT S) 8 6-50 mg per tablet 1 tablet  1 tablet Oral HS     Medications Prior to Admission   Medication    aspirin (ECOTRIN LOW STRENGTH) 81 mg EC tablet    atorvastatin (LIPITOR) 40 mg tablet    levothyroxine 50 mcg tablet    lisinopril (ZESTRIL) 40 mg tablet    metoprolol tartrate (LOPRESSOR) 50 mg tablet    NIFEdipine (PROCARDIA XL) 60 mg 24 hr tablet            Cardiac testing:   Results for orders placed during the hospital encounter of 11/15/19   Echo complete with contrast if indicated    [address]  [place], [place] [id number] [phone]    Transthoracic Echocardiogram  2D, M-mode, Doppler, and Color Doppler    Study date:  2019    Patient: Saad Estrada  MR number: [id number]  Account number: [de-identified]  : 1940  Age: 67 years  Gender: Male  Status: Inpatient  Location: Bedside  Height: 70 in  Weight: 144 8 lb  BP: 139/ 60 mmHg    Indications: Assess left ventricular function  Diagnoses: I25 10 - Atherosclerotic heart disease of native coronary artery without angina pectoris    Sonographer:  MODE Mcgee  Referring Physician:  Jordy Hernandez PA-C  Group:  [facility]  Interpreting Physician:  Leanna Rubinstein, MD    SUMMARY    LEFT VENTRICLE:  Systolic function was normal  Ejection fraction was estimated to be 60 %  Although no diagnostic regional wall motion abnormality was identified, this possibility cannot be completely excluded on the basis of this study  Wall thickness was increased  Features were consistent with a pseudonormal left ventricular filling pattern, with concomitant abnormal relaxation and increased filling pressure (grade 2 diastolic dysfunction)  VENTRICULAR SEPTUM:  There was dyssynergic motion  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  RIGHT VENTRICLE:  The size was at the upper limits of normal   Systolic function was normal     AORTIC VALVE:  There was mild regurgitation  TRICUSPID VALVE:  There was moderate regurgitation  Estimated peak PA pressure was 45 mmHg  The findings suggest mild pulmonary hypertension  HISTORY: PRIOR HISTORY: Elevated troponin, CAD s/p CABG, Hypertension, Tobacco abuse, SAH    PROCEDURE: The procedure was performed at the bedside  This was a routine study  The transthoracic approach was used  The study included complete 2D imaging, M-mode, complete spectral Doppler, and color Doppler  The heart rate was 68 bpm,  at the start of the study   Images were obtained from the parasternal, apical, subcostal, and suprasternal notch acoustic windows  Image quality was adequate  LEFT VENTRICLE: Size was normal  Systolic function was normal  Ejection fraction was estimated to be 60 %  Although no diagnostic regional wall motion abnormality was identified, this possibility cannot be completely excluded on the basis  of this study  Wall thickness was increased  DOPPLER: The ratio of early ventricular filling to atrial contraction velocities was within the normal range  Features were consistent with a pseudonormal left ventricular filling pattern, with  concomitant abnormal relaxation and increased filling pressure (grade 2 diastolic dysfunction)  VENTRICULAR SEPTUM: There was dyssynergic motion  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  RIGHT VENTRICLE: The size was at the upper limits of normal  Systolic function was normal     LEFT ATRIUM: The atrium was dilated  RIGHT ATRIUM: The atrium was dilated  MITRAL VALVE: There was moderate annular calcification  Valve structure was normal  There was normal leaflet separation  DOPPLER: There was possible mild stenosis  There was no significant regurgitation  AORTIC VALVE: The valve was trileaflet  Leaflets exhibited mildly increased thickness, mild calcification, and sclerosis  DOPPLER: There was no evidence for stenosis  There was mild regurgitation  TRICUSPID VALVE: The valve structure was normal  There was normal leaflet separation  DOPPLER: There was no evidence for stenosis  There was moderate regurgitation  Estimated peak PA pressure was 45 mmHg  The findings suggest mild  pulmonary hypertension  PULMONIC VALVE: Leaflets exhibited normal thickness, no calcification, and normal cuspal separation  DOPPLER: The transpulmonic velocity was within the normal range  There was mild to moderate regurgitation  PERICARDIUM: There was no pericardial effusion  The pericardium was normal in appearance      AORTA: The root exhibited normal size and fibrocalcific change  SYSTEM MEASUREMENT TABLES    2D  %FS: 27 33 %  Ao Diam: 3 06 cm  EDV(Teich): 126 06 ml  EF Biplane: 59 72 %  EF(Teich): 52 84 %  ESV(Teich): 59 45 ml  IVSd: 1 12 cm  LA Area: 26 18 cm2  LA Diam: 3 97 cm  LVEDV MOD A2C: 73 63 ml  LVEDV MOD A4C: 93 73 ml  LVEDV MOD BP: 88 94 ml  LVEF MOD A2C: 57 13 %  LVEF MOD A4C: 57 65 %  LVESV MOD A2C: 31 57 ml  LVESV MOD A4C: 39 7 ml  LVESV MOD BP: 35 83 ml  LVIDd: 5 14 cm  LVIDs: 3 74 cm  LVLd A2C: 8 17 cm  LVLd A4C: 8 91 cm  LVLs A2C: 7 86 cm  LVLs A4C: 7 69 cm  LVOT Diam: 2 13 cm  LVPWd: 1 13 cm  RA Area: 20 91 cm2  RVIDd: 3 98 cm  SV MOD A2C: 42 06 ml  SV MOD A4C: 54 03 ml  SV(Teich): 66 61 ml    CW  TR Vmax: 3 21 m/s  TR maxP 16 mmHg    MM  TAPSE: 2 14 cm    PW  AVC: 373 39 ms  E': 0 07 m/s  E/E': 20 45  LVOT Env  Ti: 304 5 ms  LVOT VTI: 20 24 cm  LVOT Vmax: 0 95 m/s  LVOT Vmean: 0 66 m/s  LVOT maxPG: 3 63 mmHg  LVOT meanP 01 mmHg  LVSI Dopp: 39 64 ml/m2  LVSV Dopp: 72 15 ml  MV A Jimbo: 0 99 m/s  MV Dec [place]: 6 3 m/s2  MV DecT: 238 88 ms  MV E Jimbo: 1 5 m/s  MV E/A Ratio: 1 52  MV PHT: 69 27 ms  MVA By PHT: 3 18 cm2    [employer] Accredited Echocardiography Laboratory    Prepared and electronically signed by    Codie Koch MD  Signed [id number] 12:59:19       No results found for this or any previous visit  No results found for this or any previous visit  No results found for this or any previous visit  Counseling / Coordination of Care  Total floor / unit time spent today 25 minutes

## 2019-11-20 NOTE — PROGRESS NOTES
Progress Note - [facility] 1940, 67 y o  male MRN: [id number]    Unit/Bed#: [facility] 601-01 Encounter: [id number]    Primary Care Provider: Hair Manning DO   Date and time admitted to hospital: 11/15/2019  1:51 PM        Closed fracture of right tibial plateau with routine healing  Assessment & Plan  Non operative per Orthopedics  Nonweightbearing right lower extremity  Unlocked right hinged knee brace  Continue PT and OT  Will likely require rehab  Subarachnoid hemorrhage ([facility] [address] )  Assessment & Plan  No expansion on most recent CT head  Seen by Neurosurgery who signed off  Continues to be a GCS of 14  Baseline exam with right hemiplegia    Hypertension  Assessment & Plan  Reportedly noncompliant with medications prior to admission  Had hypertensive urgency on admission  Currently back on home medications, however still elevated currently this a m  Cardiology continues to follow the patient  Will continue to follow the recommendations regarding long-term treatment plan with antihypertensives as well as in the interim  Patient has elevated blood pressures this a m  Will follow up cardiology recs  P r n  Medications are ordered  Goal systolic blood pressure [phone] per Neurology  * Cerebrovascular accident (CVA) due to embolism of left middle cerebral artery [facility])  Assessment & Plan  Remains with right hemiplegia and expressive aphasia  May be some component of receptive aphasia  Being followed by Neurology  Goal systolic blood pressure [phone]  Has been running greater than 160 and is back on home antihypertensives  Cardiology's has been currently following the patient to assess blood pressures; will continue to follow the recommendation  Continue frequent neurologic checks  Patient currently tolerating p o   Intake  Keofed discontinued     DVT prophylaxis:  SCDs and subcutaneous heparin  PT and OT:  Eval and treat; recommending rehab    Disposition:  Patient appropriate for discharge to medical facility  Cardiology continues to follow the patient for elevated blood pressures  Will continue to follow up the recommendations  Patient has multiple p r n  Medications ordered; will continue to trend this afternoon  Bedside rounds completed with nurse  SUBJECTIVE:  Chief Complaint: "No new complaints "    Subjective:  Patient resting comfortably in bed with no new complaints  He is resting comfortably eating his breakfast   He is doing this on his own accord  He was able to state in his own words that he has no pain today        OBJECTIVE:     Meds/Allergies     Current Facility-Administered Medications:     amLODIPine (NORVASC) tablet 10 mg, 10 mg, Oral, Daily, Malcom Bell MD, 10 mg at 11/20/19 3817    aspirin chewable tablet 81 mg, 81 mg, Oral, Daily, Mat Po, PA-C, 81 mg at 11/20/19 4453    atorvastatin (LIPITOR) tablet 40 mg, 40 mg, Oral, Daily With Dillon Tejeda, PA-C, 40 mg at 11/20/19 1727    carvedilol (COREG) tablet 12 5 mg, 12 5 mg, Oral, BID With Meals, Abril Menchaca, PA-C, 12 5 mg at 11/20/19 1732    docusate sodium (COLACE) capsule 100 mg, 100 mg, Oral, BID, Mat Po, PA-C, 100 mg at 11/19/19 1649    heparin (porcine) subcutaneous injection 5,000 Units, 5,000 Units, Subcutaneous, Q8H [address], Mat Po, PA-C, 5,000 Units at 11/20/19 1330    hydrALAZINE (APRESOLINE) injection 10 mg, 10 mg, Intravenous, Q6H PRN, Mat Po, PA-C, 10 mg at 11/20/19 0153    hydrALAZINE (APRESOLINE) tablet 50 mg, 50 mg, Oral, Q8H [address], Wilda Adams MD, 50 mg at 11/20/19 1732    Labetalol HCl (NORMODYNE) injection 10 mg, 10 mg, Intravenous, Q6H PRN, Mat Po, PA-C, 10 mg at 11/20/19 0029    levETIRAcetam (KEPPRA) oral solution 500 mg, 500 mg, Oral, Q12H [address], Mat Po, PA-C, 500 mg at 11/20/19 0816    levothyroxine tablet 50 mcg, 50 mcg, Oral, Early Morning, Mat Po, PA-C, 50 mcg at 11/20/19 0517   lisinopril (ZESTRIL) tablet 40 mg, 40 mg, Oral, Daily, Rosalva Lopez PA-C, 40 mg at 11/20/19 0811    nicotine (NICODERM CQ) 14 mg/24hr TD 24 hr patch 1 patch, 1 patch, Transdermal, Daily, Rosalva Lopez PA-C, 1 patch at 11/20/19 5048    senna-docusate sodium (SENOKOT S) 8 6-50 mg per tablet 1 tablet, 1 tablet, Oral, HS, Rosalva Lopez PA-C, 1 tablet at 11/19/19 2156     Vitals:   Vitals:    11/20/19 1819   BP: 152/70   Pulse: 84   Resp:    Temp:    SpO2:        Intake/Output:  I/O       11/18 0701 - 11/19 0700 11/19 0701 - 11/20 0700 11/20 0701 - 11/21 0700    P  O  318 660 180    I V  (mL/kg)       NG/      IV Piggyback 150      Feedings 880      Total Intake(mL/kg) 1858 (28 2) 660 (10) 180 (2 7)    Urine (mL/kg/hr) 2725 (1 7) 1875 (1 2) 450 (0 6)    Stool   0    Total Output 2725 1875 450    Net -867 -1215 -270           Unmeasured Stool Occurrence   1 x           Nutrition/GI Proph/Bowel Reg:  Continue current diet    Physical Exam:   GENERAL APPEARANCE:  No acute distress  NEURO:  GCS 14  HEENT:  Normocephalic  CV:  Regular rate and rhythm  LUNGS:  CTA bilaterally  GI:  Bowel sounds x4, nontender  :  No Brennan, condom catheter  MSK:  +2 pulses on extremities, patient with residual right-sided hemiplegia; left side is 5/5 appropriate strength; hinged knee brace on right lower extremity  SKIN:  Warm, dry, intact    Invasive Devices     Peripheral Intravenous Line            Peripheral IV 11/18/19 Left;Upper;Ventral (anterior) Arm 2 days          Drain            External Urinary Catheter Small 2 days                 Lab Results:   Results: I have personally reviewed pertinent reports   , BMP/CMP:   Lab Results   Component Value Date    SODIUM 142 11/20/2019    K 3 7 11/20/2019     (H) 11/20/2019    CO2 25 11/20/2019    BUN 22 11/20/2019    CREATININE 0 96 11/20/2019    CALCIUM 8 2 (L) 11/20/2019    EGFR 75 11/20/2019    and CBC:   Lab Results   Component Value Date    WBC 9 50 11/20/2019 HGB 12 7 11/20/2019    HCT 38 1 11/20/2019    MCV 99 (H) 11/20/2019     11/20/2019    MCH 32 9 11/20/2019    MCHC 33 3 11/20/2019    RDW 13 5 11/20/2019    MPV 11 0 11/20/2019    NRBC 0 11/20/2019     Imaging/EKG Studies: Results: I have personally reviewed pertinent reports      Other Studies:  No other studies  VTE Prophylaxis:  SCDs and subcutaneous heparin

## 2019-11-20 NOTE — SOCIAL WORK
CM attempted to contact brother, Deny Bright, to discuss IMM  CM left voice mail message requesting return call as pt is unable to sign

## 2019-11-20 NOTE — PLAN OF CARE
Problem: Prexisting or High Potential for Compromised Skin Integrity  Goal: Skin integrity is maintained or improved  Description  INTERVENTIONS:  - Identify patients at risk for skin breakdown  - Assess and monitor skin integrity  - Assess and monitor nutrition and hydration status  - Monitor labs   - Assess for incontinence   - Turn and reposition patient  - Assist with mobility/ambulation  - Relieve pressure over bony prominences  - Avoid friction and shearing  - Provide appropriate hygiene as needed including keeping skin clean and dry  - Evaluate need for skin moisturizer/barrier cream  - Collaborate with interdisciplinary team   - Patient/family teaching  - Consider wound care consult   Outcome: Progressing     Problem: Potential for Falls  Goal: Patient will remain free of falls  Description  INTERVENTIONS:  - Assess patient frequently for physical needs  -  Identify cognitive and physical deficits and behaviors that affect risk of falls  -  [place] fall precautions as indicated by assessment   - Educate patient/family on patient safety including physical limitations  - Instruct patient to call for assistance with activity based on assessment  - Modify environment to reduce risk of injury  - Consider OT/PT consult to assist with strengthening/mobility  Outcome: Progressing     Problem: Nutrition/Hydration-ADULT  Goal: Nutrient/Hydration intake appropriate for improving, restoring or maintaining nutritional needs  Description  Monitor and assess patient's nutrition/hydration status for malnutrition  Collaborate with interdisciplinary team and initiate plan and interventions as ordered  Monitor patient's weight and dietary intake as ordered or per policy  Utilize nutrition screening tool and intervene as necessary  Determine patient's food preferences and provide high-protein, high-caloric foods as appropriate       INTERVENTIONS:  - Monitor oral intake, urinary output, labs, and treatment plans  - Assess nutrition and hydration status and recommend course of action  - Evaluate amount of meals eaten  - Assist patient with eating if necessary   - Allow adequate time for meals  - Recommend/ encourage appropriate diets, oral nutritional supplements, and vitamin/mineral supplements  - Order, calculate, and assess calorie counts as needed  - Recommend, monitor, and adjust tube feedings and TPN/PPN based on assessed needs  - Assess need for intravenous fluids  - Provide specific nutrition/hydration education as appropriate  - Include patient/family/caregiver in decisions related to nutrition  Outcome: Progressing     Problem: CONFUSION/THOUGHT DISTURBANCE  Goal: Thought disturbances (confusion, delirium, depression, dementia or psychosis) are managed to maintain or return to baseline mental status and functional level  Description  INTERVENTIONS:  - Assess for possible contributors to  thought disturbance, including but not limited to medications, infection, impaired vision or hearing, underlying metabolic abnormalities, dehydration, respiratory compromise,  psychiatric diagnoses and notify attending PHYSICAN/AP  - Monitor and intervene to maintain adequate nutrition, hydration, elimination, sleep and activity  - Decrease environmental stimuli, including noise as appropriate  - Provide frequent contacts to provide refocusing, direction and reassurance as needed  Approach patient calmly with eye contact and at their level    - [place] high risk fall precautions, aspiration precautions and other safety measures, as indicated  - If delirium suspected, notify physician/AP of change in condition and request immediate in-person evaluation  - Pursue consults as appropriate including Geriatric (campus dependent), OT for cognitive evaluation/activity planning, psychiatric, pastoral care, etc   Outcome: Progressing     Problem: BEHAVIOR  Goal: Pt/Family maintain appropriate behavior and adhere to behavioral management agreement, if implemented  Description  INTERVENTIONS:  - Assess the family dynamic   - Encourage verbalization of thoughts and concerns in a socially appropriate manner  - Assess patient/family's coping skills and non-compliant behavior (including use of illegal substances)  - Utilize positive, consistent limit setting strategies supporting safety of patient, staff and others  - Initiate consult with Case Management, Spiritual Care or other ancillary services as appropriate  - If a patient's/visitor's behavior jeopardizes the safety of the patient, staff, or others, refer to organization procedure  - Notify Security of behavior or suspected illegal substances which indicate the need for search of the patient and/or belongings  - Encourage participation in the decision making process about a behavioral management agreement; implement if patient meets criteria  Outcome: Progressing     Problem: NEUROSENSORY - ADULT  Goal: Achieves stable or improved neurological status  Description  INTERVENTIONS  - Monitor and report changes in neurological status  - Monitor vital signs such as temperature, blood pressure, glucose, and any other labs ordered   - Initiate measures to prevent increased intracranial pressure  - Monitor for seizure activity and implement precautions if appropriate      Outcome: Progressing  Goal: Achieves maximal functionality and self care  Description  INTERVENTIONS  - Monitor swallowing and airway patency with patient fatigue and changes in neurological status  - Encourage and assist patient to increase activity and self care     - Encourage visually impaired, hearing impaired and aphasic patients to use assistive/communication devices  Outcome: Progressing     Problem: MUSCULOSKELETAL - ADULT  Goal: Maintain or return mobility to safest level of function  Description  INTERVENTIONS:  - Assess patient's ability to carry out ADLs; assess patient's baseline for ADL function and identify physical deficits which impact ability to perform ADLs (bathing, care of mouth/teeth, toileting, grooming, dressing, etc )  - Assess/evaluate cause of self-care deficits   - Assess range of motion  - Assess patient's mobility  - Assess patient's need for assistive devices and provide as appropriate  - Encourage maximum independence but intervene and supervise when necessary  - Involve family in performance of ADLs  - Assess for home care needs following discharge   - Consider OT consult to assist with ADL evaluation and planning for discharge  - Provide patient education as appropriate  Outcome: Progressing  Goal: Maintain proper alignment of affected body part  Description  INTERVENTIONS:  - Support, maintain and protect limb and body alignment  - Provide patient/ family with appropriate education  Outcome: Progressing

## 2019-11-20 NOTE — PLAN OF CARE
Problem: PHYSICAL THERAPY ADULT  Goal: Performs mobility at highest level of function for planned discharge setting  See evaluation for individualized goals  Description  Treatment/Interventions: Functional transfer training, LE strengthening/ROM, Therapeutic exercise, Endurance training, Cognitive reorientation, Equipment eval/education, Bed mobility, Spoke to nursing, OT          See flowsheet documentation for full assessment, interventions and recommendations  Outcome: Progressing  Note:   Prognosis: Guarded  Problem List: Decreased strength, Decreased range of motion, Decreased endurance, Decreased mobility, Impaired balance, Decreased coordination, Decreased cognition, Impaired judgement, Decreased safety awareness, Impaired hearing, Pain, Orthopedic restrictions  Assessment: Pt continues to require maximal assist for all transfers & seated EOB due to impaired cognition, fatigue, & R sided flacidity  Pt able to demonstrate improved balance with L hand on his lap and occasionally attended to instructions to sit forward over DAISY, but unable to maintain posture or hand positioning without constant assist   Pt sat EOB ~5 mins, then returned to supine position with all needs in reach & alarm active  Will continue to benefit from therapy services to improve strength, mobility, activity tolerance, and command following to reduce burden of care at this time  Recommendation: Post acute IP rehab     PT - OK to Discharge: (S) Yes(when medically stable)    See flowsheet documentation for full assessment

## 2019-11-20 NOTE — ASSESSMENT & PLAN NOTE
No expansion on most recent CT head  Seen by Neurosurgery who signed off    Continues to be a GCS of 14  Baseline exam with right hemiplegia

## 2019-11-21 ENCOUNTER — TELEPHONE (OUTPATIENT)
Dept: NEUROLOGY | Facility: CLINIC | Age: 79
End: 2019-11-21

## 2019-11-21 LAB
ANION GAP SERPL CALCULATED.3IONS-SCNC: 10 MMOL/L (ref 4–13)
BASOPHILS # BLD AUTO: 0.08 THOUSANDS/ΜL (ref 0–0.1)
BASOPHILS NFR BLD AUTO: 1 % (ref 0–1)
BUN SERPL-MCNC: 29 MG/DL (ref 5–25)
CALCIUM SERPL-MCNC: 8.3 MG/DL (ref 8.3–10.1)
CHLORIDE SERPL-SCNC: 109 MMOL/L (ref 100–108)
CO2 SERPL-SCNC: 24 MMOL/L (ref 21–32)
CREAT SERPL-MCNC: 1 MG/DL (ref 0.6–1.3)
EOSINOPHIL # BLD AUTO: 0.34 THOUSAND/ΜL (ref 0–0.61)
EOSINOPHIL NFR BLD AUTO: 3 % (ref 0–6)
ERYTHROCYTE [DISTWIDTH] IN BLOOD BY AUTOMATED COUNT: 13.3 % (ref 11.6–15.1)
GFR SERPL CREATININE-BSD FRML MDRD: 72 ML/MIN/1.73SQ M
GLUCOSE SERPL-MCNC: 100 MG/DL (ref 65–140)
HCT VFR BLD AUTO: 37 % (ref 36.5–49.3)
HGB BLD-MCNC: 12.4 G/DL (ref 12–17)
IMM GRANULOCYTES # BLD AUTO: 0.17 THOUSAND/UL (ref 0–0.2)
IMM GRANULOCYTES NFR BLD AUTO: 2 % (ref 0–2)
LYMPHOCYTES # BLD AUTO: 1.28 THOUSANDS/ΜL (ref 0.6–4.47)
LYMPHOCYTES NFR BLD AUTO: 13 % (ref 14–44)
MCH RBC QN AUTO: 32.9 PG (ref 26.8–34.3)
MCHC RBC AUTO-ENTMCNC: 33.5 G/DL (ref 31.4–37.4)
MCV RBC AUTO: 98 FL (ref 82–98)
MONOCYTES # BLD AUTO: 1.23 THOUSAND/ΜL (ref 0.17–1.22)
MONOCYTES NFR BLD AUTO: 12 % (ref 4–12)
NEUTROPHILS # BLD AUTO: 7.05 THOUSANDS/ΜL (ref 1.85–7.62)
NEUTS SEG NFR BLD AUTO: 69 % (ref 43–75)
NRBC BLD AUTO-RTO: 0 /100 WBCS
PLATELET # BLD AUTO: 188 THOUSANDS/UL (ref 149–390)
PMV BLD AUTO: 11 FL (ref 8.9–12.7)
POTASSIUM SERPL-SCNC: 3.8 MMOL/L (ref 3.5–5.3)
RBC # BLD AUTO: 3.77 MILLION/UL (ref 3.88–5.62)
SODIUM SERPL-SCNC: 143 MMOL/L (ref 136–145)
WBC # BLD AUTO: 10.15 THOUSAND/UL (ref 4.31–10.16)

## 2019-11-21 PROCEDURE — 99232 SBSQ HOSP IP/OBS MODERATE 35: CPT | Performed by: INTERNAL MEDICINE

## 2019-11-21 PROCEDURE — 99232 SBSQ HOSP IP/OBS MODERATE 35: CPT | Performed by: PHYSICIAN ASSISTANT

## 2019-11-21 PROCEDURE — 80048 BASIC METABOLIC PNL TOTAL CA: CPT | Performed by: PHYSICIAN ASSISTANT

## 2019-11-21 PROCEDURE — 97535 SELF CARE MNGMENT TRAINING: CPT

## 2019-11-21 PROCEDURE — 85025 COMPLETE CBC W/AUTO DIFF WBC: CPT | Performed by: PHYSICIAN ASSISTANT

## 2019-11-21 RX ADMIN — HEPARIN SODIUM 5000 UNITS: 5000 INJECTION INTRAVENOUS; SUBCUTANEOUS at 22:03

## 2019-11-21 RX ADMIN — HEPARIN SODIUM 5000 UNITS: 5000 INJECTION INTRAVENOUS; SUBCUTANEOUS at 05:28

## 2019-11-21 RX ADMIN — ASPIRIN 81 MG 81 MG: 81 TABLET ORAL at 08:04

## 2019-11-21 RX ADMIN — LEVOTHYROXINE SODIUM 50 MCG: 50 TABLET ORAL at 05:27

## 2019-11-21 RX ADMIN — CARVEDILOL 18.75 MG: 12.5 TABLET, FILM COATED ORAL at 18:16

## 2019-11-21 RX ADMIN — HYDRALAZINE HYDROCHLORIDE 75 MG: 50 TABLET ORAL at 23:30

## 2019-11-21 RX ADMIN — AMLODIPINE BESYLATE 10 MG: 10 TABLET ORAL at 08:03

## 2019-11-21 RX ADMIN — CARVEDILOL 12.5 MG: 12.5 TABLET, FILM COATED ORAL at 08:04

## 2019-11-21 RX ADMIN — LISINOPRIL 40 MG: 20 TABLET ORAL at 08:03

## 2019-11-21 RX ADMIN — HEPARIN SODIUM 5000 UNITS: 5000 INJECTION INTRAVENOUS; SUBCUTANEOUS at 13:14

## 2019-11-21 RX ADMIN — HYDRALAZINE HYDROCHLORIDE 75 MG: 50 TABLET ORAL at 18:16

## 2019-11-21 RX ADMIN — SENNOSIDES AND DOCUSATE SODIUM 1 TABLET: 8.6; 5 TABLET ORAL at 22:03

## 2019-11-21 RX ADMIN — DOCUSATE SODIUM 100 MG: 100 CAPSULE, LIQUID FILLED ORAL at 18:16

## 2019-11-21 RX ADMIN — DOCUSATE SODIUM 100 MG: 100 CAPSULE, LIQUID FILLED ORAL at 08:03

## 2019-11-21 RX ADMIN — HYDRALAZINE HYDROCHLORIDE 50 MG: 50 TABLET ORAL at 08:04

## 2019-11-21 RX ADMIN — ATORVASTATIN CALCIUM 40 MG: 40 TABLET, FILM COATED ORAL at 18:16

## 2019-11-21 RX ADMIN — NICOTINE 1 PATCH: 14 PATCH TRANSDERMAL at 08:04

## 2019-11-21 RX ADMIN — LEVETIRACETAM 500 MG: 100 SOLUTION ORAL at 08:12

## 2019-11-21 RX ADMIN — LEVETIRACETAM 500 MG: 100 SOLUTION ORAL at 22:03

## 2019-11-21 NOTE — ASSESSMENT & PLAN NOTE
- Remains with right hemiplegia and expressive aphasia  - May be some component of receptive aphasia  - Being followed by Neurology  - Goal systolic blood pressure [phone]   - Has been running greater than 160 and is back on home antihypertensives  - Cardiology's has been currently following the patient to assess blood pressures; will continue to follow the recommendation  - Continue frequent neurologic checks  - Patient currently tolerating p o   Intake  - Keofed discontinued

## 2019-11-21 NOTE — TELEPHONE ENCOUNTER
----- Message from Kylah Lopez PA-C sent at 11/18/2019 11:29 AM EST -----  Regarding: HFU    Diagnosis/Reason for follow-up: SAH, scattered left hemispheric CVA  Subspecialty for follow-up: Stroke  Attending or AP?: Either  Existing neurologist: None  Tests/Labs/Imaging ordered: CT head in 7-10 days    Patient was started on Aspirin 81 mg daily  Would prefer eventual Plavix 75 mg daily  Will have repeat CTH in 7-10 days from today  Will defer to outpatient stroke team when to start Plavix (given that he currently has the subarachnoid hem)

## 2019-11-21 NOTE — PLAN OF CARE
Problem: OCCUPATIONAL THERAPY ADULT  Goal: Performs self-care activities at highest level of function for planned discharge setting  See evaluation for individualized goals  Description  Treatment Interventions: ADL retraining, Functional transfer training, UE strengthening/ROM, Endurance training, Cognitive reorientation, Patient/family training, Equipment evaluation/education, Fine motor coordination activities, Compensatory technique education, Continued evaluation, Energy conservation, Activityengagement          See flowsheet documentation for full assessment, interventions and recommendations  Outcome: Progressing  Note:   Limitation: Decreased ADL status, Decreased UE ROM, Decreased UE strength, Decreased Safe judgement during ADL, Decreased cognition, Decreased endurance, Decreased fine motor control, Decreased self-care trans, Decreased high-level ADLs, Non-func R UE, Non-func L UE  Prognosis: Fair  Assessment: pt participated in am ot session and was seen focusing on ub and lb adls seated eob and supine for lb  pt requires max asst to sit and maintain balance, with noted l ue pushing (slightly improved from damian's tx) pt was noted to verbal;ize 1 few word sentence this session  pt was also noted to smile several times this session  pt was able to initiate and perform light grooming tasks with hand over hand for initiation of task  pt requried max asst for oral care and min asst for thoroughness of other grooming  pt requires max asst for lb care and mod/max for ub  pt tolerated sitting eob fir over 15 minutes  pt with + incontinence of bowel and  this session needing ta for clean up  Recommendation: Physiatry Consult  OT Discharge Recommendation: Short Term Rehab  OT - OK to Discharge:  Yes  April A BRETT Mackey

## 2019-11-21 NOTE — PROGRESS NOTES
General Cardiology   Progress Note -  Team One   Daniela Mijares 67 y o  male MRN: [id number]    Unit/Bed#: [facility] 601-01 Encounter: [id number]    Assessment/ Plan    1  Hypertension urgency   Cardene gtt has been off since 11/17  Currently on lisinopril 40 mg PO daily, coreg 12 5 mg PO BID, amlodipine 10 mg PO daily and hydralazine 50 mg PO TID was added yesterday  BP average is 172/76  BP documented this morning was 192/82 prior to receiving scheduled medications  BP now is 160/70    2  CAD s/p CABG x 3 in 2003  On aspirin, statin and BB     3  SAH- s/p DDAVP    4  Hyperlipidemia- on statin     5  Tobacco abuse- Continue nicotine patch      Subjective  Patient offers no complaints  He is alert to stimuli but will not answer my questions  Discussed with nursing that this is baseline  He is currently in room with nursing yelling at her to leave him alone  Review of Systems   Unable to perform ROS: other   Declines to respond     Objective:   Vitals: Blood pressure (!) 192/82, pulse 67, temperature 97 7 °F (36 5 °C), resp  rate 18, height 5' 10" (1 778 m), weight 66 kg (145 lb 8 1 oz), SpO2 97 %  ,       Body mass index is 20 88 kg/m²  ,     Systolic (74JGD), HAE:528 , Min:140 , FLORA:223     Diastolic (80CQM), OEL:28, Min:63, Max:90      Intake/Output Summary (Last 24 hours) at 11/21/2019 1009  Last data filed at 11/21/2019 0900  Gross per 24 hour   Intake 600 ml   Output 1275 ml   Net -675 ml     Weight (last 2 days)     None        Telemetry Review: No telemetry     Physical Exam   Constitutional: No distress  HENT:   Head: Normocephalic  Neck: Neck supple  Cardiovascular: Normal rate, regular rhythm, normal heart sounds and intact distal pulses  Pulmonary/Chest: Effort normal  No stridor  No respiratory distress  RA  Decreased in bases    Abdominal: Soft  Bowel sounds are normal    Musculoskeletal: He exhibits no edema  Neurological: He is alert     Alert to verbal stimuli    Skin: Skin is warm and dry  He is not diaphoretic     Psychiatric:   Agitated        LABORATORY RESULTS  Results from last 7 days   Lab Units 11/15/19  1949 11/15/19  1733 11/15/19  1503   TROPONIN I ng/mL 0 05* 0 05* 0 05*     CBC with diff:   Results from last 7 days   Lab Units 11/21/19  0435 11/20/19  0437 11/19/19  0440 11/18/19  0523 11/17/19  0453 11/16/19  0515 11/15/19  0457   WBC Thousand/uL 10 15 9 50 10 38* 10 90* 13 14* 12 73* 14 40*   HEMOGLOBIN g/dL 12 4 12 7 13 0 13 7 12 8 12 5 14 2   HEMATOCRIT % 37 0 38 1 38 1 41 1 38 8 37 3 41 2*   MCV fL 98 99* 97 98 99* 99* 98   PLATELETS Thousands/uL 188 178 173 198 182 181 159   MCH pg 32 9 32 9 33 2 32 8 32 7 33 1 33 6   MCHC g/dL 33 5 33 3 34 1 33 3 33 0 33 5 34 3   RDW % 13 3 13 5 13 7 13 6 13 9 13 6 13 5   MPV fL 11 0 11 0 10 8 11 0 10 5 11 0 9 6   NRBC AUTO /100 WBCs 0 0 0 0 0  --   --        CMP:  Results from last 7 days   Lab Units 11/21/19 0435 11/20/19 0437 11/19/19  0440 11/18/19  0523 11/17/19  0453 11/16/19  0515 11/15/19  1503 11/15/19  0457 11/14/19  1539   POTASSIUM mmol/L 3 8 3 7 3 4* 3 4* 3 5 3 6 3 4* 3 4* 3 6   CHLORIDE mmol/L 109* 110* 111* 110* 115* 111* 106 103 104   CO2 mmol/L 24 25 23 22 20* 20* 26 24 27   BUN mg/dL 29* 22 22 18 17 17 14 13 15   CREATININE mg/dL 1 00 0 96 0 86 1 02 0 87 0 96 1 06 0 91 1 07   CALCIUM mg/dL 8 3 8 2* 8 3 8 5 8 0* 8 4 8 7 8 9 8 8   AST U/L  --   --   --   --   --   --   --  15 14   ALT U/L  --   --   --   --   --   --   --  18 20   ALK PHOS U/L  --   --   --   --   --   --   --  60 56   EGFR ml/min/1 73sq m 72 75 83 70 83 75 67 80 66       BMP:  Results from last 7 days   Lab Units 11/21/19  0435 11/20/19  0437 11/19/19  0440 11/18/19  0523 11/17/19  0453 11/16/19  0515 11/15/19  1503   POTASSIUM mmol/L 3 8 3 7 3 4* 3 4* 3 5 3 6 3 4*   CHLORIDE mmol/L 109* 110* 111* 110* 115* 111* 106   CO2 mmol/L 24 25 23 22 20* 20* 26   BUN mg/dL 29* 22 22 18 17 17 14   CREATININE mg/dL 1 00 0 96 0 86 1 02 0 87 0 96 1 06   CALCIUM mg/dL 8 3 8 2* 8 3 8 5 8 0* 8 4 8 7        Results from last 7 days   Lab Units 19  0437 19  0440 19  0523 19  0515 11/15/19  0457   MAGNESIUM mg/dL 1 9 2 0 1 9 2 2 1 8*          Results from last 7 days   Lab Units 11/15/19  0457   HEMOGLOBIN A1C % 5 1          Results from last 7 days   Lab Units 19  0515 11/15/19  1503   TSH 3RD GENERATON uIU/mL  --  6 150*   FREE T4 ng/dL 1 12  --              Lipid Profile:   Lab Results   Component Value Date    CHOL 122 2015     Lab Results   Component Value Date    HDL 52 11/15/2019    HDL 50 2019    HDL 48 2017     Lab Results   Component Value Date    LDLCALC 149 (H) 11/15/2019    LDLCALC 63 2019    LDLCALC 66 2017     Lab Results   Component Value Date    TRIG 103 11/15/2019    TRIG 116 2019    TRIG 120 2018       Cardiac testing:   Results for orders placed during the hospital encounter of 11/15/19   Echo complete with contrast if indicated    Narrative [address]  [address]  [phone]    Transthoracic Echocardiogram  2D, M-mode, Doppler, and Color Doppler    Study date:  2019    Patient: Angelina Hammer  MR number: [id number]  Account number: [de-identified]  : 1940  Age: 67 years  Gender: Male  Status: Inpatient  Location: Bedside  Height: 70 in  Weight: 144 8 lb  BP: 139/ 60 mmHg    Indications: Assess left ventricular function  Diagnoses: I25 10 - Atherosclerotic heart disease of native coronary artery without angina pectoris    Sonographer:  MODE Maddox  Referring Physician:  Ankush Nassar PA-C  Group:  [facility]  Interpreting Physician:  Domingo Padilla MD    SUMMARY    LEFT VENTRICLE:  Systolic function was normal  Ejection fraction was estimated to be 60 %    Although no diagnostic regional wall motion abnormality was identified, this possibility cannot be completely excluded on the basis of this study  Wall thickness was increased  Features were consistent with a pseudonormal left ventricular filling pattern, with concomitant abnormal relaxation and increased filling pressure (grade 2 diastolic dysfunction)  VENTRICULAR SEPTUM:  There was dyssynergic motion  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  RIGHT VENTRICLE:  The size was at the upper limits of normal   Systolic function was normal     AORTIC VALVE:  There was mild regurgitation  TRICUSPID VALVE:  There was moderate regurgitation  Estimated peak PA pressure was 45 mmHg  The findings suggest mild pulmonary hypertension  HISTORY: PRIOR HISTORY: Elevated troponin, CAD s/p CABG, Hypertension, Tobacco abuse, SAH    PROCEDURE: The procedure was performed at the bedside  This was a routine study  The transthoracic approach was used  The study included complete 2D imaging, M-mode, complete spectral Doppler, and color Doppler  The heart rate was 68 bpm,  at the start of the study  Images were obtained from the parasternal, apical, subcostal, and suprasternal notch acoustic windows  Image quality was adequate  LEFT VENTRICLE: Size was normal  Systolic function was normal  Ejection fraction was estimated to be 60 %  Although no diagnostic regional wall motion abnormality was identified, this possibility cannot be completely excluded on the basis  of this study  Wall thickness was increased  DOPPLER: The ratio of early ventricular filling to atrial contraction velocities was within the normal range  Features were consistent with a pseudonormal left ventricular filling pattern, with  concomitant abnormal relaxation and increased filling pressure (grade 2 diastolic dysfunction)  VENTRICULAR SEPTUM: There was dyssynergic motion  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      RIGHT VENTRICLE: The size was at the upper limits of normal  Systolic function was normal     LEFT ATRIUM: The atrium was dilated  RIGHT ATRIUM: The atrium was dilated  MITRAL VALVE: There was moderate annular calcification  Valve structure was normal  There was normal leaflet separation  DOPPLER: There was possible mild stenosis  There was no significant regurgitation  AORTIC VALVE: The valve was trileaflet  Leaflets exhibited mildly increased thickness, mild calcification, and sclerosis  DOPPLER: There was no evidence for stenosis  There was mild regurgitation  TRICUSPID VALVE: The valve structure was normal  There was normal leaflet separation  DOPPLER: There was no evidence for stenosis  There was moderate regurgitation  Estimated peak PA pressure was 45 mmHg  The findings suggest mild  pulmonary hypertension  PULMONIC VALVE: Leaflets exhibited normal thickness, no calcification, and normal cuspal separation  DOPPLER: The transpulmonic velocity was within the normal range  There was mild to moderate regurgitation  PERICARDIUM: There was no pericardial effusion  The pericardium was normal in appearance  AORTA: The root exhibited normal size and fibrocalcific change  SYSTEM MEASUREMENT TABLES    2D  %FS: 27 33 %  Ao Diam: 3 06 cm  EDV(Teich): 126 06 ml  EF Biplane: 59 72 %  EF(Teich): 52 84 %  ESV(Teich): 59 45 ml  IVSd: 1 12 cm  LA Area: 26 18 cm2  LA Diam: 3 97 cm  LVEDV MOD A2C: 73 63 ml  LVEDV MOD A4C: 93 73 ml  LVEDV MOD BP: 88 94 ml  LVEF MOD A2C: 57 13 %  LVEF MOD A4C: 57 65 %  LVESV MOD A2C: 31 57 ml  LVESV MOD A4C: 39 7 ml  LVESV MOD BP: 35 83 ml  LVIDd: 5 14 cm  LVIDs: 3 74 cm  LVLd A2C: 8 17 cm  LVLd A4C: 8 91 cm  LVLs A2C: 7 86 cm  LVLs A4C: 7 69 cm  LVOT Diam: 2 13 cm  LVPWd: 1 13 cm  RA Area: 20 91 cm2  RVIDd: 3 98 cm  SV MOD A2C: 42 06 ml  SV MOD A4C: 54 03 ml  SV(Teich): 66 61 ml    CW  TR Vmax: 3 21 m/s  TR maxP 16 mmHg    MM  TAPSE: 2 14 cm    PW  AVC: 373 39 ms  E': 0 07 m/s  E/E': 20 45  LVOT Env  Ti: 304 5 ms  LVOT VTI: 20 24 cm  LVOT Vmax: 0 95 m/s  LVOT Vmean: 0 66 m/s  LVOT maxPG: 3 63 mmHg  LVOT meanP 01 mmHg  LVSI Dopp: 39 64 ml/m2  LVSV Dopp: 72 15 ml  MV A Jimbo: 0 99 m/s  MV Dec Roosevelt: 6 3 m/s2  MV DecT: 238 88 ms  MV E Jimbo: 1 5 m/s  MV E/A Ratio: 1 52  MV PHT: 69 27 ms  MVA By PHT: 3 18 cm2    [facility]    Prepared and electronically signed by    Domingo Padilla MD  Signed [id number] 12:59:19         Meds/Allergies   all current active meds have been reviewed and current meds:   Current Facility-Administered Medications   Medication Dose Route Frequency    amLODIPine (NORVASC) tablet 10 mg  10 mg Oral Daily    aspirin chewable tablet 81 mg  81 mg Oral Daily    atorvastatin (LIPITOR) tablet 40 mg  40 mg Oral Daily With Dinner    carvedilol (COREG) tablet 12 5 mg  12 5 mg Oral BID With Meals    docusate sodium (COLACE) capsule 100 mg  100 mg Oral BID    heparin (porcine) subcutaneous injection 5,000 Units  5,000 Units Subcutaneous Q8H [address]    hydrALAZINE (APRESOLINE) injection 10 mg  10 mg Intravenous Q6H PRN    hydrALAZINE (APRESOLINE) tablet 50 mg  50 mg Oral Q8H [address]    Labetalol HCl (NORMODYNE) injection 10 mg  10 mg Intravenous Q6H PRN    levETIRAcetam (KEPPRA) oral solution 500 mg  500 mg Oral Q12H [address]    levothyroxine tablet 50 mcg  50 mcg Oral Early Morning    lisinopril (ZESTRIL) tablet 40 mg  40 mg Oral Daily    nicotine (NICODERM CQ) 14 mg/24hr TD 24 hr patch 1 patch  1 patch Transdermal Daily    senna-docusate sodium (SENOKOT S) 8 6-50 mg per tablet 1 tablet  1 tablet Oral HS     Medications Prior to Admission   Medication    aspirin (ECOTRIN LOW STRENGTH) 81 mg EC tablet    atorvastatin (LIPITOR) 40 mg tablet    levothyroxine 50 mcg tablet    lisinopril (ZESTRIL) 40 mg tablet    metoprolol tartrate (LOPRESSOR) 50 mg tablet    NIFEdipine (PROCARDIA XL) 60 mg 24 hr tablet       Counseling / Coordination of Care  Total floor / unit time spent today 20 minutes    Greater than 50% of total time was spent with the patient and / or family counseling and / or coordination of care  ** Please Note: Dragon 360 Dictation voice to text software may have been used in the creation of this document   **

## 2019-11-21 NOTE — PROGRESS NOTES
Progress Note - Olinda Caicedo 1940, 67 y o  male MRN: [id number]    Unit/Bed#: [facility] 601-01 Encounter: [id number]    Primary Care Provider: Moses Denton DO   Date and time admitted to hospital: 11/15/2019  1:51 PM        Closed fracture of right tibial plateau with routine healing  Assessment & Plan  - Non operative per Orthopedics   - Nonweightbearing right lower extremity   - Unlocked right hinged knee brace  - Continue PT and OT   - Will likely require rehab  Subarachnoid hemorrhage (HCC)  Assessment & Plan  - No expansion on most recent CT head  - Seen by Neurosurgery who signed off   - Continues to be a GCS of 14  - Baseline exam with right hemiplegia    Hypertension  Assessment & Plan  - Reportedly noncompliant with medications prior to admission   - Had hypertensive urgency on admission   - Currently back on home medications, however still elevated currently this a m   - Cardiology continues to follow the patient  - Will continue to follow the recommendations regarding long-term treatment plan with antihypertensives as well as in the interim  - Patient has elevated blood pressures this a m  Will follow up cardiology recs  - P r n  Medications are ordered  - Goal systolic blood pressure [phone] per Neurology  * Cerebrovascular accident (CVA) due to embolism of left middle cerebral artery ([facility] [address] )  Assessment & Plan  - Remains with right hemiplegia and expressive aphasia  - May be some component of receptive aphasia  - Being followed by Neurology  - Goal systolic blood pressure [phone]   - Has been running greater than 160 and is back on home antihypertensives  - Cardiology's has been currently following the patient to assess blood pressures; will continue to follow the recommendation  - Continue frequent neurologic checks  - Patient currently tolerating p o   Intake  - Keofed discontinued    DVT prophylaxis:  SCDs and subcutaneous heparin  PT and OT:  Recommended to rehab    Disposition: SOPHIE planning  Patient tolerating p o  Intake well  Bedside rounds completed with nurse  SUBJECTIVE:  Chief Complaint:  Resting comfortably in bed    Subjective: Patient is resting comfortably in bed without any new complaints  Patient appears to be doing well and tolerating his breakfast this morning        OBJECTIVE:     Meds/Allergies     Current Facility-Administered Medications:     amLODIPine (NORVASC) tablet 10 mg, 10 mg, Oral, Daily, Malcom Lr MD, 10 mg at 11/21/19 0803    aspirin chewable tablet 81 mg, 81 mg, Oral, Daily, Norman Macias PA-C, 81 mg at 11/21/19 0804    atorvastatin (LIPITOR) tablet 40 mg, 40 mg, Oral, Daily With Dari Keen PA-C, 40 mg at 11/20/19 1727    carvedilol (COREG) tablet 12 5 mg, 12 5 mg, Oral, BID With Meals, Jeanette Villavicencio PA-C, 12 5 mg at 11/21/19 0804    docusate sodium (COLACE) capsule 100 mg, 100 mg, Oral, BID, Norman Macias PA-C, 100 mg at 11/21/19 0803    heparin (porcine) subcutaneous injection 5,000 Units, 5,000 Units, Subcutaneous, Q8H [address], Norman Macias PA-C, 5,000 Units at 11/21/19 1314    hydrALAZINE (APRESOLINE) injection 10 mg, 10 mg, Intravenous, Q6H PRN, Norman Macias PA-C, 10 mg at 11/20/19 0153    hydrALAZINE (APRESOLINE) tablet 50 mg, 50 mg, Oral, Q8H [address], Wilda Adams MD, 50 mg at 11/21/19 0804    Labetalol HCl (NORMODYNE) injection 10 mg, 10 mg, Intravenous, Q6H PRN, Norman Macias PA-C, 10 mg at 11/20/19 0029    levETIRAcetam (KEPPRA) oral solution 500 mg, 500 mg, Oral, Q12H [address], Norman Macias PA-C, 500 mg at 11/21/19 7757    levothyroxine tablet 50 mcg, 50 mcg, Oral, Early Morning, Norman Macias PA-C, 50 mcg at 11/21/19 0527    lisinopril (ZESTRIL) tablet 40 mg, 40 mg, Oral, Daily, Norman Macias PA-C, 40 mg at 11/21/19 0803    nicotine (NICODERM CQ) 14 mg/24hr TD 24 hr patch 1 patch, 1 patch, Transdermal, Daily, Norman Macias PA-C, 1 patch at 11/21/19 0804    senna-docusate sodium (SENOKOT S) 8 6-50 mg per tablet 1 tablet, 1 tablet, Oral, HS, Timo Nolen PA-C, 1 tablet at 11/19/19 2156     Vitals:   Vitals:    11/21/19 0713   BP: (!) 192/82   Pulse: 67   Resp: 18   Temp: 97 7 °F (36 5 °C)   SpO2: 97%       Intake/Output:  I/O       11/19 0701 - 11/20 0700 11/20 0701 - 11/21 0700 11/21 0701 - 11/22 0700    P  O  660 600 120    NG/GT       IV Piggyback       Feedings       Total Intake(mL/kg) 660 (10) 600 (9 1) 120 (1 8)    Urine (mL/kg/hr) 1875 (1 2) 1275 (0 8)     Stool  0     Total Output 1875 1275     Net -1215 -675 +120           Unmeasured Stool Occurrence  3 x            Nutrition/GI Proph/Bowel Reg:  Continue current diet    Physical Exam:   GENERAL APPEARANCE:  No acute distress, well-appearing  NEURO:  GCS 14; 1 off for confusion  HEENT:  Normocephalic, PERRLA  CV:  Regular rate and rhythm  LUNGS:  CTA bilaterally  GI:  Bowel sounds x4, nontender  :  No Brennan  MSK:  +2 pulses on extremities, right-sided hemiplegia; right hinged knee brace in place  SKIN:  Warm, dry, intact    Invasive Devices     Peripheral Intravenous Line            Peripheral IV 11/18/19 Left;Upper;Ventral (anterior) Arm 3 days          Drain            External Urinary Catheter Small 3 days                 Lab Results:   Results: I have personally reviewed pertinent reports   , BMP/CMP:   Lab Results   Component Value Date    SODIUM 143 11/21/2019    K 3 8 11/21/2019     (H) 11/21/2019    CO2 24 11/21/2019    BUN 29 (H) 11/21/2019    CREATININE 1 00 11/21/2019    CALCIUM 8 3 11/21/2019    EGFR 72 11/21/2019    and CBC:   Lab Results   Component Value Date    WBC 10 15 11/21/2019    HGB 12 4 11/21/2019    HCT 37 0 11/21/2019    MCV 98 11/21/2019     11/21/2019    MCH 32 9 11/21/2019    MCHC 33 5 11/21/2019    RDW 13 3 11/21/2019    MPV 11 0 11/21/2019    NRBC 0 11/21/2019     Imaging/EKG Studies: Results: I have personally reviewed pertinent reports      Other Studies:  No other studies  VTE Prophylaxis:  SCDs and subcutaneous heparin

## 2019-11-21 NOTE — TELEPHONE ENCOUNTER
Scheduled 1/22 with Dr Pearlie Peabody in [facility] jaclyn  Will follow up via in person

## 2019-11-21 NOTE — ASSESSMENT & PLAN NOTE
- Non operative per Orthopedics   - Nonweightbearing right lower extremity   - Unlocked right hinged knee brace  - Continue PT and OT   - Will likely require rehab

## 2019-11-21 NOTE — ASSESSMENT & PLAN NOTE
- No expansion on most recent CT head    - Seen by Neurosurgery who signed off   - Continues to be a GCS of 14  - Baseline exam with right hemiplegia

## 2019-11-21 NOTE — PROGRESS NOTES
Received appt request from inpatient neurology  Scheduled 1/22 with Dr Cecilio Miller in [address] [facility]  Patient seen for stroke  Reviewed patient chart  -    Met with patient  Tried to discuss follow up care, discharge planning, and stroke education  Provided him new patient packet and my card  Explained nurse navigator role  Patient confused  Oriented to self  States it is December, refused to answer location and situation questions  States he lives alone  -    Per CM, patient recommended to be discharged to inpatient rehab  Will continue to follow

## 2019-11-21 NOTE — ASSESSMENT & PLAN NOTE
- Reportedly noncompliant with medications prior to admission   - Had hypertensive urgency on admission   - Currently back on home medications, however still elevated currently this a m   - Cardiology continues to follow the patient  - Will continue to follow the recommendations regarding long-term treatment plan with antihypertensives as well as in the interim  - Patient has elevated blood pressures this a m  Will follow up cardiology recs  - P r n  Medications are ordered  - Goal systolic blood pressure [phone] per Neurology

## 2019-11-21 NOTE — OCCUPATIONAL THERAPY NOTE
Occupational Therapy Treatment Note:       11/21/19 0935   Restrictions/Precautions   RLE Weight Bearing Per Order NWB   Braces or Orthoses   (hinged knee brace (unlocked))   Other Precautions Cognitive; Bed Alarm; Fall Risk   Pain Assessment   Pain Assessment FLACC   Pain Rating: FLACC (Rest) - Face 0   Pain Rating: FLACC (Rest) - Legs 0   Pain Rating: FLACC (Rest) - Activity 0   Pain Rating: FLACC (Rest) - Cry 0   Pain Rating: FLACC (Rest) - Consolability 0   Score: FLACC (Rest) 0   Pain Rating: FLACC (Activity) - Face 0   Pain Rating: FLACC (Activity) - Legs 0   Pain Rating: FLACC (Activity) - Activity 0   Pain Rating: FLACC (Activity) - Cry 0   Pain Rating: FLACC (Activity) - Consolability 0   Score: FLACC (Activity) 0   ADL   Where Assessed Edge of bed  (supine for lb)   Grooming Assistance 4  Minimal Assistance   Grooming Comments asst to intiaite motor tasks ie face washing and hair combing   UB Bathing Assistance 2  Maximal Assistance   LB Bathing Assistance 1  Total Assistance   UB Dressing Assistance 2  Maximal Assistance   LB Dressing Assistance 1  Total Assistance   Toileting Assistance  1  Total Assistance   Toileting Comments [place] catheter   Bed Mobility   Rolling R 3  Moderate assistance   Additional items Assist x 1   Rolling L 2  Maximal assistance   Supine to Sit 2  Maximal assistance   Additional items Assist x 2   Sit to Supine 2  Maximal assistance   Additional items Assist x 2   Additional Comments pt sat eob 15 min with poor sitting balance  pt requries diversion for l ue as not to push slef towards right while sitting eob with l ue   poor body positioning awareness   Transfers   Sit to Stand   (n/a)   Cognition   Overall Cognitive Status Impaired   Arousal/Participation Alert   Activity Tolerance   Activity Tolerance Patient limited by fatigue   Assessment   Assessment pt participated in am ot session and was seen focusing on ub and lb adls seated eob and supine for lb  pt requires max asst to sit and maintain balance, with noted l ue pushing (slightly improved from yesterda's tx) pt was noted to verbal;ize 1 few word sentence this session  pt was also noted to smile several times this session  pt was able to initiate and perform light grooming tasks with hand over hand for initiation of task  pt requried max asst for oral care and min asst for thoroughness of other grooming  pt requires max asst for lb care and mod/max for ub  pt tolerated sitting eob fir over 15 minutes  pt with + incontinence of bowel and  this session needing ta for clean up   Plan   Treatment Interventions ADL retraining;Functional transfer training; Activityengagement;Equipment evaluation/education;Patient/family training;Cognitive reorientation; Endurance training;UE strengthening/ROM; Neuromuscular reeducation   Goal Expiration Date 12/02/19   OT Treatment Day 2   OT Frequency 3-5x/wk   Recommendation   Recommendation Physiatry Consult   OT Discharge Recommendation Short Term Rehab   OT - OK to Discharge Yes   Barthel Index   Feeding 0   Bathing 0   Grooming Score 0   Dressing Score 0   Bladder Score 0   Bowels Score 5   Toilet Use Score 5   Transfers (Bed/Chair) Score 5   Mobility (Level Surface) Score 0   Stairs Score 0   Barthel Index Score 15   Modified [place] Scale   Modified [place] Scale 4   April A BRETT Mackey

## 2019-11-21 NOTE — TELEPHONE ENCOUNTER
Provided new patient packet via in person  Please see my notes from hospital  Encounter 11/25/2019 for more information  Closing encounter

## 2019-11-21 NOTE — SOCIAL WORK
As per Henry JULIO, "[facility] denied  They did give a courtesy SNF auth  It's [id number]"  A peer to peer can be completed  Robe Harrison has the details

## 2019-11-22 PROCEDURE — 97535 SELF CARE MNGMENT TRAINING: CPT

## 2019-11-22 PROCEDURE — 83835 ASSAY OF METANEPHRINES: CPT | Performed by: INTERNAL MEDICINE

## 2019-11-22 PROCEDURE — 84244 ASSAY OF RENIN: CPT | Performed by: INTERNAL MEDICINE

## 2019-11-22 PROCEDURE — 99232 SBSQ HOSP IP/OBS MODERATE 35: CPT | Performed by: PHYSICIAN ASSISTANT

## 2019-11-22 PROCEDURE — 99232 SBSQ HOSP IP/OBS MODERATE 35: CPT | Performed by: INTERNAL MEDICINE

## 2019-11-22 PROCEDURE — 97530 THERAPEUTIC ACTIVITIES: CPT

## 2019-11-22 PROCEDURE — 82088 ASSAY OF ALDOSTERONE: CPT | Performed by: INTERNAL MEDICINE

## 2019-11-22 PROCEDURE — 92526 ORAL FUNCTION THERAPY: CPT

## 2019-11-22 RX ADMIN — AMLODIPINE BESYLATE 10 MG: 10 TABLET ORAL at 08:58

## 2019-11-22 RX ADMIN — LEVETIRACETAM 500 MG: 100 SOLUTION ORAL at 09:03

## 2019-11-22 RX ADMIN — LISINOPRIL 40 MG: 20 TABLET ORAL at 08:56

## 2019-11-22 RX ADMIN — NICOTINE 1 PATCH: 14 PATCH TRANSDERMAL at 09:00

## 2019-11-22 RX ADMIN — CARVEDILOL 18.75 MG: 12.5 TABLET, FILM COATED ORAL at 17:54

## 2019-11-22 RX ADMIN — LABETALOL 20 MG/4 ML (5 MG/ML) INTRAVENOUS SYRINGE 10 MG: at 02:06

## 2019-11-22 RX ADMIN — LEVOTHYROXINE SODIUM 50 MCG: 50 TABLET ORAL at 05:20

## 2019-11-22 RX ADMIN — SENNOSIDES AND DOCUSATE SODIUM 1 TABLET: 8.6; 5 TABLET ORAL at 22:30

## 2019-11-22 RX ADMIN — HYDRALAZINE HYDROCHLORIDE 75 MG: 50 TABLET ORAL at 08:58

## 2019-11-22 RX ADMIN — CARVEDILOL 18.75 MG: 12.5 TABLET, FILM COATED ORAL at 08:59

## 2019-11-22 RX ADMIN — HYDRALAZINE HYDROCHLORIDE 75 MG: 50 TABLET ORAL at 17:55

## 2019-11-22 RX ADMIN — HEPARIN SODIUM 5000 UNITS: 5000 INJECTION INTRAVENOUS; SUBCUTANEOUS at 05:20

## 2019-11-22 RX ADMIN — HEPARIN SODIUM 5000 UNITS: 5000 INJECTION INTRAVENOUS; SUBCUTANEOUS at 13:14

## 2019-11-22 RX ADMIN — ASPIRIN 81 MG 81 MG: 81 TABLET ORAL at 08:59

## 2019-11-22 RX ADMIN — DOCUSATE SODIUM 100 MG: 100 CAPSULE, LIQUID FILLED ORAL at 08:57

## 2019-11-22 RX ADMIN — ATORVASTATIN CALCIUM 40 MG: 40 TABLET, FILM COATED ORAL at 17:55

## 2019-11-22 RX ADMIN — HEPARIN SODIUM 5000 UNITS: 5000 INJECTION INTRAVENOUS; SUBCUTANEOUS at 22:30

## 2019-11-22 NOTE — ASSESSMENT & PLAN NOTE
- Remains with right hemiplegia and expressive aphasia  - May be some component of receptive aphasia  - Being followed by Neurology  - Goal systolic blood pressure [phone]    - BPs have been > 160 and is back on home antihypertensives  - avg -190s/90s  - Cardiology following the patient to assess BPs; will continue to follow their recommendation  - Continue frequent neurologic checks  - Patient currently tolerating p o   Intake   - dysphagia diet 2, mechanical soft/thin liquid   - am labs ordered

## 2019-11-22 NOTE — PROGRESS NOTES
General Cardiology   Progress Note -  [employer] One   Cortez Tellez 67 y o  male MRN: [id number]    Unit/Bed#: [facility] 601-01 Encounter: [id number]    Assessment/ Plan    1  Hypertension urgency   Cardene gtt has been off since 11/17  Currently on lisinopril 40 mg PO daily, coreg increased to 18 75 mg PO BID yesterday, amlodipine 10 mg PO daily and hydralazine increased to 75 mg PO TID yesterday  BP average is 175/85  Renin, aldosterone and plasma free metanephrine levels pending      2  CAD s/p CABG x 3 in 2003  On aspirin, statin and BB      3  SAH- s/p DDAVP     4  Hyperlipidemia- on statin      5  Tobacco abuse- Continue nicotine patch       Subjective  Patient reports he is not having any pain or SOB  ROS very limited due to patient's limited responses  Review of Systems   Constitution: Positive for decreased appetite  Cardiovascular: Negative for chest pain  Respiratory: Negative for shortness of breath  Musculoskeletal: Negative for back pain  Objective:   Vitals: Blood pressure (!) 171/89, pulse 83, temperature 99 1 °F (37 3 °C), resp  rate 16, height 5' 10" (1 778 m), weight 66 kg (145 lb 8 1 oz), SpO2 94 %  ,       Body mass index is 20 88 kg/m²  ,     Systolic (50UTC), TQH:139 , Min:158 , RHV:664     Diastolic (92UTN), MSJ:98, Min:68, Max:92      Intake/Output Summary (Last 24 hours) at 11/22/2019 0913  Last data filed at 11/22/2019 0630  Gross per 24 hour   Intake 500 ml   Output 1500 ml   Net -1000 ml     Weight (last 2 days)     None        Telemetry Review: No telemetry     Physical Exam   Constitutional: No distress  Frail  Chronically ill    HENT:   Head: Normocephalic  Neck: Neck supple  Cardiovascular: Normal rate, regular rhythm, normal heart sounds and intact distal pulses  Pulmonary/Chest: Effort normal  No stridor  No respiratory distress  Decreased in bases due to poor effort  No crackles  RA    Abdominal: Soft  Bowel sounds are normal  He exhibits no distension  Musculoskeletal: He exhibits no edema  Neurological: He is alert  Alert to verbal stimuli    Skin: Skin is warm and dry  He is not diaphoretic     Psychiatric:   Flat        LABORATORY RESULTS  Results from last 7 days   Lab Units 11/15/19  1949 11/15/19  1733 11/15/19  1503   TROPONIN I ng/mL 0 05* 0 05* 0 05*     CBC with diff:   Results from last 7 days   Lab Units 11/21/19 0435 11/20/19 0437 11/19/19 0440 11/18/19  0523 11/17/19  0453 11/16/19  0515   WBC Thousand/uL 10 15 9 50 10 38* 10 90* 13 14* 12 73*   HEMOGLOBIN g/dL 12 4 12 7 13 0 13 7 12 8 12 5   HEMATOCRIT % 37 0 38 1 38 1 41 1 38 8 37 3   MCV fL 98 99* 97 98 99* 99*   PLATELETS Thousands/uL 188 178 173 198 182 181   MCH pg 32 9 32 9 33 2 32 8 32 7 33 1   MCHC g/dL 33 5 33 3 34 1 33 3 33 0 33 5   RDW % 13 3 13 5 13 7 13 6 13 9 13 6   MPV fL 11 0 11 0 10 8 11 0 10 5 11 0   NRBC AUTO /100 WBCs 0 0 0 0 0  --        CMP:  Results from last 7 days   Lab Units 11/21/19 0435 11/20/19 0437 11/19/19 0440 11/18/19 0523 11/17/19  0453 11/16/19  0515 11/15/19  1503   POTASSIUM mmol/L 3 8 3 7 3 4* 3 4* 3 5 3 6 3 4*   CHLORIDE mmol/L 109* 110* 111* 110* 115* 111* 106   CO2 mmol/L 24 25 23 22 20* 20* 26   BUN mg/dL 29* 22 22 18 17 17 14   CREATININE mg/dL 1 00 0 96 0 86 1 02 0 87 0 96 1 06   CALCIUM mg/dL 8 3 8 2* 8 3 8 5 8 0* 8 4 8 7   EGFR ml/min/1 73sq m 72 75 83 70 83 75 67       BMP:  Results from last 7 days   Lab Units 11/21/19 0435 11/20/19 0437 11/19/19 0440 11/18/19  0523 11/17/19 0453 11/16/19  0515 11/15/19  1503   POTASSIUM mmol/L 3 8 3 7 3 4* 3 4* 3 5 3 6 3 4*   CHLORIDE mmol/L 109* 110* 111* 110* 115* 111* 106   CO2 mmol/L 24 25 23 22 20* 20* 26   BUN mg/dL 29* 22 22 18 17 17 14   CREATININE mg/dL 1 00 0 96 0 86 1 02 0 87 0 96 1 06   CALCIUM mg/dL 8 3 8 2* 8 3 8 5 8 0* 8 4 8 7       No results found for: NTBNP          Results from last 7 days   Lab Units 11/20/19  0437 11/19/19  0440 11/18/19  0523 11/16/19  0515   MAGNESIUM mg/dL 1 9 2 0 1 9 2 2                 Results from last 7 days   Lab Units 19  0515 11/15/19  1503   TSH 3RD GENERATON uIU/mL  --  6 150*   FREE T4 ng/dL 1 12  --              Lipid Profile:   Lab Results   Component Value Date    CHOL 122 2015     Lab Results   Component Value Date    HDL 52 11/15/2019    HDL 50 2019    HDL 48 2017     Lab Results   Component Value Date    LDLCALC 149 (H) 11/15/2019    LDLCALC 63 2019    LDLCALC 66 2017     Lab Results   Component Value Date    TRIG 103 11/15/2019    TRIG 116 2019    TRIG 120 2018       Cardiac testing:   Results for orders placed during the hospital encounter of 11/15/19   Echo complete with contrast if indicated    Jaclyn Hess [address]  [address] [place] [address]  [phone]    Transthoracic Echocardiogram  2D, M-mode, Doppler, and Color Doppler    Study date:  2019    Patient: Iram Rogel  MR number: [id number]  Account number: [de-identified]  : 1940  Age: 67 years  Gender: Male  Status: Inpatient  Location: Bedside  Height: 70 in  Weight: 144 8 lb  BP: 139/ 60 mmHg    Indications: Assess left ventricular function  Diagnoses: I25 10 - Atherosclerotic heart disease of native coronary artery without angina pectoris    Sonographer:  MODE Carty  Referring Physician:  Florence Hong PA-C  Group:  [facility]  Interpreting Physician:  Noreen Galindo MD    SUMMARY    LEFT VENTRICLE:  Systolic function was normal  Ejection fraction was estimated to be 60 %  Although no diagnostic regional wall motion abnormality was identified, this possibility cannot be completely excluded on the basis of this study  Wall thickness was increased  Features were consistent with a pseudonormal left ventricular filling pattern, with concomitant abnormal relaxation and increased filling pressure (grade 2 diastolic dysfunction)      VENTRICULAR SEPTUM:  There was dyssynergic motion  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  RIGHT VENTRICLE:  The size was at the upper limits of normal   Systolic function was normal     AORTIC VALVE:  There was mild regurgitation  TRICUSPID VALVE:  There was moderate regurgitation  Estimated peak PA pressure was 45 mmHg  The findings suggest mild pulmonary hypertension  HISTORY: PRIOR HISTORY: Elevated troponin, CAD s/p CABG, Hypertension, Tobacco abuse, SAH    PROCEDURE: The procedure was performed at the bedside  This was a routine study  The transthoracic approach was used  The study included complete 2D imaging, M-mode, complete spectral Doppler, and color Doppler  The heart rate was 68 bpm,  at the start of the study  Images were obtained from the parasternal, apical, subcostal, and suprasternal notch acoustic windows  Image quality was adequate  LEFT VENTRICLE: Size was normal  Systolic function was normal  Ejection fraction was estimated to be 60 %  Although no diagnostic regional wall motion abnormality was identified, this possibility cannot be completely excluded on the basis  of this study  Wall thickness was increased  DOPPLER: The ratio of early ventricular filling to atrial contraction velocities was within the normal range  Features were consistent with a pseudonormal left ventricular filling pattern, with  concomitant abnormal relaxation and increased filling pressure (grade 2 diastolic dysfunction)  VENTRICULAR SEPTUM: There was dyssynergic motion  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  RIGHT VENTRICLE: The size was at the upper limits of normal  Systolic function was normal     LEFT ATRIUM: The atrium was dilated  RIGHT ATRIUM: The atrium was dilated  MITRAL VALVE: There was moderate annular calcification  Valve structure was normal  There was normal leaflet separation  DOPPLER: There was possible mild stenosis   There was no significant regurgitation  AORTIC VALVE: The valve was trileaflet  Leaflets exhibited mildly increased thickness, mild calcification, and sclerosis  DOPPLER: There was no evidence for stenosis  There was mild regurgitation  TRICUSPID VALVE: The valve structure was normal  There was normal leaflet separation  DOPPLER: There was no evidence for stenosis  There was moderate regurgitation  Estimated peak PA pressure was 45 mmHg  The findings suggest mild  pulmonary hypertension  PULMONIC VALVE: Leaflets exhibited normal thickness, no calcification, and normal cuspal separation  DOPPLER: The transpulmonic velocity was within the normal range  There was mild to moderate regurgitation  PERICARDIUM: There was no pericardial effusion  The pericardium was normal in appearance  AORTA: The root exhibited normal size and fibrocalcific change  SYSTEM MEASUREMENT TABLES    2D  %FS: 27 33 %  Ao Diam: 3 06 cm  EDV(Teich): 126 06 ml  EF Biplane: 59 72 %  EF(Teich): 52 84 %  ESV(Teich): 59 45 ml  IVSd: 1 12 cm  LA Area: 26 18 cm2  LA Diam: 3 97 cm  LVEDV MOD A2C: 73 63 ml  LVEDV MOD A4C: 93 73 ml  LVEDV MOD BP: 88 94 ml  LVEF MOD A2C: 57 13 %  LVEF MOD A4C: 57 65 %  LVESV MOD A2C: 31 57 ml  LVESV MOD A4C: 39 7 ml  LVESV MOD BP: 35 83 ml  LVIDd: 5 14 cm  LVIDs: 3 74 cm  LVLd A2C: 8 17 cm  LVLd A4C: 8 91 cm  LVLs A2C: 7 86 cm  LVLs A4C: 7 69 cm  LVOT Diam: 2 13 cm  LVPWd: 1 13 cm  RA Area: 20 91 cm2  RVIDd: 3 98 cm  SV MOD A2C: 42 06 ml  SV MOD A4C: 54 03 ml  SV(Teich): 66 61 ml    CW  TR Vmax: 3 21 m/s  TR maxP 16 mmHg    MM  TAPSE: 2 14 cm    PW  AVC: 373 39 ms  E': 0 07 m/s  E/E': 20 45  LVOT Env  Ti: 304 5 ms  LVOT VTI: 20 24 cm  LVOT Vmax: 0 95 m/s  LVOT Vmean: 0 66 m/s  LVOT maxPG: 3 63 mmHg  LVOT meanP 01 mmHg  LVSI Dopp: 39 64 ml/m2  LVSV Dopp: 72 15 ml  MV A Jimbo: 0 99 m/s  MV Dec Kimball: 6 3 m/s2  MV DecT: 238 88 ms  MV E Jimbo: 1 5 m/s  MV E/A Ratio: 1 52  MV PHT: 69 27 ms  MVA By PHT: 3 18 cm2    [employer] Accredited Echocardiography Laboratory    Prepared and electronically signed by    Justo Benjamin MD  Signed [id number] 12:59:19       Meds/Allergies   all current active meds have been reviewed and current meds:   Current Facility-Administered Medications   Medication Dose Route Frequency    amLODIPine (NORVASC) tablet 10 mg  10 mg Oral Daily    aspirin chewable tablet 81 mg  81 mg Oral Daily    atorvastatin (LIPITOR) tablet 40 mg  40 mg Oral Daily With Dinner    carvedilol (COREG) tablet 18 75 mg  18 75 mg Oral BID With Meals    docusate sodium (COLACE) capsule 100 mg  100 mg Oral BID    heparin (porcine) subcutaneous injection 5,000 Units  5,000 Units Subcutaneous Q8H [address]    hydrALAZINE (APRESOLINE) injection 10 mg  10 mg Intravenous Q6H PRN    hydrALAZINE (APRESOLINE) tablet 75 mg  75 mg Oral Q8H [address]    Labetalol HCl (NORMODYNE) injection 10 mg  10 mg Intravenous Q6H PRN    levothyroxine tablet 50 mcg  50 mcg Oral Early Morning    lisinopril (ZESTRIL) tablet 40 mg  40 mg Oral Daily    nicotine (NICODERM CQ) 14 mg/24hr TD 24 hr patch 1 patch  1 patch Transdermal Daily    senna-docusate sodium (SENOKOT S) 8 6-50 mg per tablet 1 tablet  1 tablet Oral HS     Medications Prior to Admission   Medication    aspirin (ECOTRIN LOW STRENGTH) 81 mg EC tablet    atorvastatin (LIPITOR) 40 mg tablet    levothyroxine 50 mcg tablet    lisinopril (ZESTRIL) 40 mg tablet    metoprolol tartrate (LOPRESSOR) 50 mg tablet    NIFEdipine (PROCARDIA XL) 60 mg 24 hr tablet       Counseling / Coordination of Care  Total floor / unit time spent today 20 minutes  Greater than 50% of total time was spent with the patient and / or family counseling and / or coordination of care  ** Please Note: Dragon 360 Dictation voice to text software may have been used in the creation of this document   **

## 2019-11-22 NOTE — TRANSPORTATION MEDICAL NECESSITY
Section I - General Information    Name of Patient: Alyse Shen                 : 1940    Medicare #: [id number]  Transport Date: 2019 (PCS is valid for round trips on this date and for all repetitive trips in the 60-day range as noted below )  Origin: [address] [facility] 6                                                         Destination: [facility]  Is the pt's stay covered under Medicare Part A (PPS/DRG)   [x]     Closest appropriate facility? If no, why is transport to more distant facility required? Yes  If hospice pt, is this transport related to pt's terminal illness? No       Section II - Medical Necessity Questionnaire  Ambulance transportation is medically necessary only if other means of transport are contraindicated or would be potentially harmful to the patient  To meet this requirement, the patient must either be "bed confined" or suffer from a condition such that transport by means other than ambulance is contraindicated by the patient's condition  The following questions must be answered by the medical professional signing below for this form to be valid:    1)  Describe the MEDICAL CONDITION (physical and/or mental) of this patient AT [address] [place] [address] that requires the patient to be transported in an ambulance and why transport by other means is contraindicated by the patient's condition: CVA    2) Is the patient "bed confined" as defined below? Yes  To be "be confined" the patient must satisfy all three of the following conditions: (1) unable to get up from bed without Assistance; AND (2) unable to ambulate; AND (3) unable to sit in a chair or wheelchair  3) Can this patient safely be transported by car or wheelchair van (i e , seated during transport without a medical attendant or monitoring)?    No    4) In addition to completing questions 1-3 above, please check any of the following conditions that apply*:   *Note: supporting documentation for any boxes checked must be maintained in the patient's medical records  If hosp-hosp transfer, describe services needed at 2nd facility not available at 1st facility? Patient is confused  Unable to tolerate seated position for time needed to transport   Other(specify) Ambulatory Dysfunction, Max Assist      Section III - Signature of Physician or Healthcare Professional  I certify that the above information is true and correct based on my evaluation of this patient, and represent that the patient requires transport by ambulance and that other forms of transport are contraindicated  I understand that this information will be used by the Centers for Medicare and Medicaid Services (CMS) to support the determination of medical necessity for ambulance services, and I represent that I have personal knowledge of the patient's condition at time of transport  []  If this box is checked, I also certify that the patient is physically or mentally incapable of signing the ambulance service's claim and that the institution with which I am affiliated has furnished care, services, or assistance to the patient  My signature below is made on behalf of the patient pursuant to 42 CFR §424 36(b)(4)  In accordance with 42 CFR §424 37, the specific reason(s) that the patient is physically or mentally incapable of signing the claim form is as follows: Marinda Mcardle of Physician* or Healthcare Professional____________________________________  Signature Date 11/22/19 (For scheduled repetitive transports, this form is not valid for transports performed more than 60 days after this date)    Printed Name & Credentials of Physician or Healthcare Professional (MD, DO, RN, etc )__MYKEL Dumont    *Form must be signed by patient's attending physician for scheduled, repetitive transports   For non-repetitive, unscheduled ambulance transports, if unable to obtain the signature of the attending physician, any of the following may sign (choose appropriate option below)  [] Physician Assistant []  Clinical Nurse Specialist []  Registered Nurse  []  Nurse Practitioner  [x] Discharge Planner

## 2019-11-22 NOTE — PHYSICAL THERAPY NOTE
Physical Therapy Progress Note     11/22/19 1459   Pain Assessment   Pain Assessment FLACC   Diversional Activities Television   Pain Rating: FLACC (Rest) - Face 0   Pain Rating: FLACC (Rest) - Legs 0   Pain Rating: FLACC (Rest) - Activity 0   Pain Rating: FLACC (Rest) - Cry 0   Pain Rating: FLACC (Rest) - Consolability 0   Score: FLACC (Rest) 0   Pain Rating: FLACC (Activity) - Face 0   Pain Rating: FLACC (Activity) - Legs 0   Pain Rating: FLACC (Activity) - Activity 0   Pain Rating: FLACC (Activity) - Cry 0   Pain Rating: FLACC (Activity) - Consolability 0   Score: FLACC (Activity) 0   Restrictions/Precautions   Weight Bearing Precautions Per Order Yes   RLE Weight Bearing Per Order NWB   Braces or Orthoses   (RLE HKB unlocked)   Other Precautions Cognitive; Bed Alarm; Fall Risk   General   Chart Reviewed Yes   Response to Previous Treatment Patient with no complaints from previous session  Family/Caregiver Present No   Cognition   Overall Cognitive Status Impaired   Arousal/Participation Alert   Attention Attends with cues to redirect   Orientation Level Oriented to person   Memory Unable to assess   Following Commands Follows one step commands with increased time or repetition   Bed Mobility   Supine to Sit 2  Maximal assistance   Additional items Assist x 2   Sit to Supine 2  Maximal assistance   Additional items Assist x 2   Endurance Deficit   Endurance Deficit Yes   Activity Tolerance   Activity Tolerance Patient limited by fatigue   Nurse Made Aware RN ok to see   Assessment   Prognosis Guarded   Problem List Decreased strength;Decreased range of motion;Decreased endurance; Impaired balance;Decreased mobility; Decreased coordination;Decreased cognition; Impaired judgement;Orthopedic restrictions;Pain;Decreased safety awareness   Assessment Pt supine in bed upon arrival, pleasant and agreeable to EOB sitting   Pt requires Mod assist x2 for bed mobility once sitting EOB pt requires Min-Mod assistx 1 for balance, pt pushing to right with LUE, once LUE removed from bed pt was able to maintain upright posture to sit EOB with Min assist and working with [employer]  PT requires assistx1 throughout session to correct posture and regain baalnce but was able to sit EOB 20 min  Pt returned to supine in bed with all ammenities within reach resting comfforatbly  Will continue to bebenfit from skilled PT to increase strength, endurance and balance to maximize safe functional mobility  Goals   Patient Goals none stated   STG Expiration Date 12/02/19   Short Term Goal #1 In 7-10 days: Increase bilateral LE strength 1/2 grade to facilitate independent mobility, Perform all bed mobility tasks with min A of 1 to decrease caregiver burden, Tolerate 4 hr OOB to faciliate upright tolerance, Complete % of the time, PT provider will perform functional balance assessment to determine fall risk and PT to see and establish goals for transfers, ambulation, elevations when appropriate   Plan   Treatment/Interventions Functional transfer training;LE strengthening/ROM; Therapeutic exercise; Endurance training;Bed mobility;Gait training   Progress Progressing toward goals   PT Frequency   (2-3x/week)   Recommendation   Recommendation Post acute IP rehab   Equipment Recommended   (TBD pending progress)   PT - OK to Discharge Yes  (when mediucally ready)     Dixie Marinelli, ANTHONY

## 2019-11-22 NOTE — ASSESSMENT & PLAN NOTE
- Non operative per Orthopedics   - NWB RLE   - continue unlocked right hinged knee brace  - Continue PT/OT  - Will likely require rehab

## 2019-11-22 NOTE — SOCIAL WORK
Trauma PA Alfreda Hill completed [id number]  Authorization was approved for Pt to d/c to [facility] spoke with Juanis Guo liaison at [facility]  Per Mike Fisher MD would like to follow patient over night due to concerns regarding blood pressure  CM notified Trauma PA  Pending medical clearance Pt will d/c tomorrow to facility  CM arranged tentative BLS transport with Anirudh at [facility] [phone] for a 4:30 pm   Transport auth received # [id number]  CMN completed  CM will follow

## 2019-11-22 NOTE — SPEECH THERAPY NOTE
Speech Language/Pathology    Speech/Language Pathology Progress Note    Patient Name: Nina Nava  ESPHF'B Date: 11/22/2019     Problem List  Principal Problem:    Cerebrovascular accident (CVA) due to embolism of left middle cerebral artery (HCC)  Active Problems:    Hypertension    Subarachnoid hemorrhage (HCC)    Closed fracture of right tibial plateau with routine healing       Past Medical History  Past Medical History:   Diagnosis Date    Coronary artery disease     history of CABG    Disease of thyroid gland     Hemorrhoids     last assessed 7/10/17    History of arterial duplex of LE 01/10/2017    Bilateral occlusion of the superficial femoral arteries, severe diminution of the ankle brachial index bilaterally, disease appears worse on left than right   History of echocardiogram 07/20/2011    hypokinesia of the inferior wall  EF 50%   HL (hearing loss)     Hyperlipidemia     Hypertension     PVD (peripheral vascular disease)         Past Surgical History  Past Surgical History:   Procedure Laterality Date    COLONOSCOPY      Last assessed 7/10/17    CORONARY ARTERY BYPASS GRAFT  08/23/2010    3V CABG:  LIMA to LAD, VG to RI, VG to OM1   DENTAL SURGERY      Last assessed  7/10/17    KNEE SURGERY Left     Last assessed 7/10/17    TONSILLECTOMY AND ADENOIDECTOMY      Last assessed 7/10/17    TOOTH EXTRACTION           Subjective:  "It doesn't matter"    Objective:  Patient is awake and alert, but with flatter affect today and does not attempt to feed himself  SLP feeds patient lunch meal  He is assessed with mechanical soft solids and thin liquids  Mastication time is prolonged with mechanical soft solids, with oral residue observed in between bites  With time and liquid wash, the patient clears residue  He has a slow draw from straw today with Ensure, with delayed a-p transfer and swallow initiation   The patient has intermittent coughing observed with mechanical soft solids and thin liquids  When given water via straw, the patient has oral holding with piecemeal deglutition resulting in cough response  He is trialed with puree solids and nectar thick liquids which he tolerates better with more timely transfer and no overt signs of aspiration  Assessment:  Appears fatigued with poor interest in PO intake and signs of aspiration with mechanical soft solids and thin liquids  Plan/Recommendations:  Recommend diet change to puree with nectar thick liquids  Continue ST to further assess

## 2019-11-22 NOTE — PROGRESS NOTES
Progress Note - Elli Wan 1940, 67 y o  male MRN: [id number]    Unit/Bed#: [facility] 601-01 Encounter: [id number]    Primary Care Provider: Jesus Moraes DO   Date and time admitted to hospital: 11/15/2019  1:51 PM    * Cerebrovascular accident (CVA) due to embolism of left middle cerebral artery ([address] )  Assessment & Plan  - Remains with right hemiplegia and expressive aphasia  - May be some component of receptive aphasia  - Being followed by Neurology  - Goal systolic blood pressure [phone]    - BPs have been > 160 and is back on home antihypertensives  - avg -190s/90s  - Cardiology following the patient to assess BPs; will continue to follow their recommendation  - Continue frequent neurologic checks  - Patient currently tolerating p o  Intake   - dysphagia diet 2, mechanical soft/thin liquid   - am labs ordered     Subarachnoid hemorrhage (HCC)  Assessment & Plan  - No expansion on most recent CT head  - Seen by Neurosurgery who signed off   - Continues to be a GCS of 14  - Baseline exam with right hemiplegia    Closed fracture of right tibial plateau with routine healing  Assessment & Plan  - Non operative per Orthopedics   - NWB RLE   - continue unlocked right hinged knee brace  - Continue PT/OT  - Will likely require rehab  Hypertension  Assessment & Plan  - Reportedly noncompliant with medications prior to admission   - Had hypertensive urgency on admission   - Currently back on home medications, however still elevated this a m   - Cardiology continues to follow the patient   - cardio ordered renin, aldosterone, and plasma free metanephrines - pending    - will continue to follow up with recs regarding both long-term and current treatment plant    - elevated BP this am and overnight - will follow up cardio recs   - PRN Medications are ordered  - Goal systolic blood pressure [phone] per Neurology  - am labs ordered       Bedside rounds completed with nurse yes       Disposition: inpatient      SUBJECTIVE:  Chief Complaint: resting comfortably in bed    Subjective: Pt was seen and examined this morning  Pt is currently resting in bed comfortably  He denies any pain today  Pt appears to be in no acute distress and doing well   He tolerated breakfast this am        OBJECTIVE:     Meds/Allergies     Current Facility-Administered Medications:     amLODIPine (NORVASC) tablet 10 mg, 10 mg, Oral, Daily, Malcom Chin MD, 10 mg at 11/22/19 0858    aspirin chewable tablet 81 mg, 81 mg, Oral, Daily, Angie Bennett PA-C, 81 mg at 11/22/19 0859    atorvastatin (LIPITOR) tablet 40 mg, 40 mg, Oral, Daily With Taurus Keen PA-C, 40 mg at 11/21/19 1816    carvedilol (COREG) tablet 18 75 mg, 18 75 mg, Oral, BID With Meals, Bebe Kim MD, 18 75 mg at 11/22/19 0859    docusate sodium (COLACE) capsule 100 mg, 100 mg, Oral, BID, Angie Bennett PA-C, 100 mg at 11/22/19 0857    heparin (porcine) subcutaneous injection 5,000 Units, 5,000 Units, Subcutaneous, Q8H [facility], Angie Bennett PA-C, 5,000 Units at 11/22/19 0520    hydrALAZINE (APRESOLINE) injection 10 mg, 10 mg, Intravenous, Q6H PRN, Angie Bennett PA-C, 10 mg at 11/20/19 0153    hydrALAZINE (APRESOLINE) tablet 75 mg, 75 mg, Oral, Q8H [facility], Malcom Chin MD, 75 mg at 11/22/19 0858    Labetalol HCl (NORMODYNE) injection 10 mg, 10 mg, Intravenous, Q6H PRN, Angie Bennett PA-C, 10 mg at 11/22/19 0206    levothyroxine tablet 50 mcg, 50 mcg, Oral, Early Morning, Angie Bennett PA-C, 50 mcg at 11/22/19 0520    lisinopril (ZESTRIL) tablet 40 mg, 40 mg, Oral, Daily, Angie Bennett PA-C, 40 mg at 11/22/19 0856    nicotine (NICODERM CQ) 14 mg/24hr TD 24 hr patch 1 patch, 1 patch, Transdermal, Daily, Angie Bennett PA-C, 1 patch at 11/22/19 0900    senna-docusate sodium (SENOKOT S) 8 6-50 mg per tablet 1 tablet, 1 tablet, Oral, HS, Angie Bennett PA-C, 1 tablet at 11/21/19 2203     Vitals:   Vitals:    11/22/19 1103   BP: 143/61   Pulse: 72   Resp:    Temp:    SpO2: 95%       Intake/Output:  I/O       11/20 0701 - 11/21 0700 11/21 0701 - 11/22 0700 11/22 0701 - 11/23 0700    P  O  600 620 120    Total Intake(mL/kg) 600 (9 1) 620 (9 4) 120 (1 8)    Urine (mL/kg/hr) 1275 (0 8) 1500 (0 9)     Stool 0 0     Total Output 1275 1500     Net -675 -880 +120           Unmeasured Stool Occurrence 3 x 1 x            Nutrition/GI Proph/Bowel Reg: senakot, colace     Physical Exam:   Limited by pt condition - expressive aphasia, rigth hemiparesis  GENERAL APPEARANCE: in no acute distress  Resting in bed comfortably   NEURO: persistent right sided hemiparesis  GCS 14, minus 1 for confusion   HEENT: normocephalic, atraumatic   CV: regular rate and rhythm  Pulses +2 on extremities   LUNGS: breath sounds noted and CTA bilaterally   GI: abdomen soft, non-tender, non-distended   : no bonilla  MSK: right hinged knee brace  Right sided hemiplegia  SKIN: intact     Invasive Devices     Peripheral Intravenous Line            Peripheral IV 11/22/19 Left Forearm less than 1 day          Drain            External Urinary Catheter Small 4 days               Lab Results: Results: I have personally reviewed pertinent reports  Imaging/EKG Studies: Results: I have personally reviewed pertinent reports      VTE Prophylaxis: Sequential compression device (Venodyne)  and Heparin

## 2019-11-22 NOTE — ASSESSMENT & PLAN NOTE
- Reportedly noncompliant with medications prior to admission   - Had hypertensive urgency on admission   - Currently back on home medications, however still elevated this a m   - Cardiology continues to follow the patient   - cardio ordered renin, aldosterone, and plasma free metanephrines - pending    - will continue to follow up with recs regarding both long-term and current treatment plant    - elevated BP this am and overnight - will follow up cardio recs   - PRN Medications are ordered  - Goal systolic blood pressure [phone] per Neurology    - am labs ordered

## 2019-11-22 NOTE — OCCUPATIONAL THERAPY NOTE
Occupational Therapy Treatment Note:       11/22/19 1500   Restrictions/Precautions   RLE Weight Bearing Per Order NWB   Braces or Orthoses   (hinged knee brace unlocked)   Other Precautions Cognitive; Bed Alarm   Pain Assessment   Pain Assessment FLACC   Pain Score No Pain   Bed Mobility   Supine to Sit 2  Maximal assistance   Additional items Assist x 2   Sit to Supine 2  Maximal assistance   Additional items Assist x 2   Additional Comments pt tolerated sitting eob 20 min with up to mod asst      ROM- Right Upper Extremities   R Shoulder AAROM   R Elbow PROM   R Wrist PROM   R Hand PROM   Cognition   Overall Cognitive Status Impaired   Arousal/Participation Alert   Attention Attends with cues to redirect   Memory Unable to assess   Following Commands Follows one step commands inconsistently   Comments pt was noted to engage in balloon activity however did not comprehend task of tapping balloon , instead he was able to catch / throw balloon despite visual cues/ commands    Activity Tolerance   Activity Tolerance Patient tolerated treatment well   Assessment   Assessment pt participated in am ot session and was seen focusing on  sitting balance / tolernece during l ue task at attempt at balloon tap, r ue aarom for all shoulder ranges  pt toelrated gentle wbing r ue in sitting  pt contiues to "push with l ue if allowed on mattress  pt was more verbal today, is noted to be hard of hearing stating "what" frequently pt was noted to jke with therapist x 2  pt requires mod / max asst adls at this time  pt was able to hold and drink from straw while sitting eob wioth mod asst for balance support  Plan   Treatment Interventions ADL retraining;Functional transfer training; Activityengagement;Patient/family training; Endurance training   Goal Expiration Date 12/02/19   OT Treatment Day 3   OT Frequency 3-5x/wk   Recommendation   Recommendation Physiatry Consult   OT Discharge Recommendation Short Term Rehab   OT - OK to Discharge Yes   Barthel Index   Feeding 0   Bathing 0   Grooming Score 0   Dressing Score 0   Bladder Score 0   Bowels Score 5   Toilet Use Score 5   Transfers (Bed/Chair) Score 5   Mobility (Level Surface) Score 0   Stairs Score 0   Barthel Index Score 15   Modified Sanya Scale   Modified Sanya Scale 5   April A BRETT Mackey

## 2019-11-22 NOTE — UTILIZATION REVIEW
Continued Stay Review    Date: 11/22                     Current Patient Class: Inpatient Current Level of Care: Med Surg    HPI:79 y o  male initially admitted on 11/15 presents with small SAH noticed on CT head during stroke alert at [facility]  Assessment/Plan: CVA due to embolism of left middle cerebral artery, SAH, HTN and Fracture of Right Tibial Plateau  Remains with right hemiplegia and expressive aphasia  May be some component of receptive aphasia  Goal -160  Ave B/P 180-1902/90s  Continue frequent neurological checks  Tolerating po intake  AM Labs  Continues with GCS of 14 with baseline right hemiplegia  Cardiology following for hypertensive urgency - ordered renin, aldosterone, and plasma free metanephrines - pending  elevated BP this am and overnight - will follow up cardio recs  Goal -160 per Neurology  11/22 Per Cardiology; S/P Cardene gtt off since 11/17  Currently on lisinopril 40 mg PO daily, coreg increased to 18 75 mg PO BID yesterday, amlodipine 10 mg PO daily and hydralazine increased to 75 mg PO TID yesterday   BP average is 175/85  Renin, aldosterone and plasma free metanephrine levels pending     Pertinent Labs/Diagnostic Results:   Results from last 7 days   Lab Units 11/21/19  0435 11/20/19  0437 11/19/19  0440 11/18/19  0523 11/17/19  0453   WBC Thousand/uL 10 15 9 50 10 38* 10 90* 13 14*   HEMOGLOBIN g/dL 12 4 12 7 13 0 13 7 12 8   HEMATOCRIT % 37 0 38 1 38 1 41 1 38 8   PLATELETS Thousands/uL 188 178 173 198 182   NEUTROS ABS Thousands/µL 7 05 6 69 7 61 8 38* 10 53*         Results from last 7 days   Lab Units 11/21/19  0435 11/20/19  0437 11/19/19  0440 11/18/19  0523 11/17/19  0453 11/16/19  0515   SODIUM mmol/L 143 142 143 143 144 144   POTASSIUM mmol/L 3 8 3 7 3 4* 3 4* 3 5 3 6   CHLORIDE mmol/L 109* 110* 111* 110* 115* 111*   CO2 mmol/L 24 25 23 22 20* 20*   ANION GAP mmol/L 10 7 9 11 9 13   BUN mg/dL 29* 22 22 18 17 17   CREATININE mg/dL 1 00 0 96 0 86 1 02 0  87 0 96   EGFR ml/min/1 73sq m 72 75 83 70 83 75   CALCIUM mg/dL 8 3 8 2* 8 3 8 5 8 0* 8 4   MAGNESIUM mg/dL  --  1 9 2 0 1 9  --  2 2   PHOSPHORUS mg/dL  --  3 7  --  3 7  --  2 8     Results from last 7 days   Lab Units 11/15/19  1949   AMMONIA umol/L 14         Results from last 7 days   Lab Units 11/21/19  0435 11/20/19  0437 11/19/19  0440 11/18/19  0523 11/17/19  0453 11/16/19  0515 11/15/19  1503   GLUCOSE RANDOM mg/dL 100 94 108 111 141* 124 111     Results from last 7 days   Lab Units 11/15/19  1949 11/15/19  1733 11/15/19  1503   TROPONIN I ng/mL 0 05* 0 05* 0 05*     Results from last 7 days   Lab Units 11/15/19  1503   TSH 3RD GENERATON uIU/mL 6 150*         Results from last 7 days   Lab Units 11/15/19  1503   LACTIC ACID mmol/L 1 6     Vital Signs:   11/22/19 11:03:50    72    143/61  88  95 %    11/22/19 07:24:56  99 1 °F (37 3 °C)  83  16  171/89  Abnormal   116  94 %    11/22/19 0330        160/92        11/22/19 03:27:44    81    169/91  117  97 %    11/22/19 03:15:20    76  16  172/91  Abnormal   118  96 %    11/22/19 01:55:57    80    182/82  Abnormal   115  94 %      11/21/19 2330        184/82  Abnormal          Lying   BP: meds given at 11/21/19 2330   11/21/19 1816    86    182/86  Abnormal            11/21/19 1518        184/86  Abnormal            11/21/19 15:12:52  97 9 °F (36 6 °C)  84  16  189/84  Abnormal   119           Medications:   Scheduled Medications:  Medications:  amLODIPine 10 mg Oral Daily   aspirin 81 mg Oral Daily   atorvastatin 40 mg Oral Daily With Dinner   carvedilol 18 75 mg Oral BID With Meals   docusate sodium 100 mg Oral BID   heparin (porcine) 5,000 Units Subcutaneous Q8H [address]   hydrALAZINE 75 mg Oral Q8H MANUEL   levothyroxine 50 mcg Oral Early Morning   lisinopril 40 mg Oral Daily   nicotine 1 patch Transdermal Daily   senna-docusate sodium 1 tablet Oral HS     Continuous IV Infusions: None     PRN Meds:  hydrALAZINE 10 mg Intravenous Q6H PRN   Labetalol HCl 10 mg Intravenous Q6H PRN  11/22 x1     Discharge Plan: TBD    Network Utilization Review Department  [email or web address]  ATTENTION: Please call with any questions or concerns to [phone] and carefully listen to the prompts so that you are directed to the right person  All voicemails are confidential   Venkata Swain all requests for admission clinical reviews, approved or denied determinations and any other requests to dedicated fax number below belonging to the campus where the patient is receiving treatment    FACILITY NAME UR FAX NUMBER   ADMISSION DENIALS (Administrative/Medical Necessity) [address] [facility] (Maternity/NICU/Pediatrics) [phone]   [facility] [address] [place] [address] [address] [facility] [phone]   [address] [facility] [address] [facility] [phone]   [facility] [address] [phone]

## 2019-11-22 NOTE — PLAN OF CARE
Problem: PHYSICAL THERAPY ADULT  Goal: Performs mobility at highest level of function for planned discharge setting  See evaluation for individualized goals  Description  Treatment/Interventions: Functional transfer training, LE strengthening/ROM, Therapeutic exercise, Endurance training, Cognitive reorientation, Equipment eval/education, Bed mobility, Spoke to nursing, OT          See flowsheet documentation for full assessment, interventions and recommendations  Outcome: Progressing  Note:   Prognosis: Guarded  Problem List: Decreased strength, Decreased range of motion, Decreased endurance, Impaired balance, Decreased mobility, Decreased coordination, Decreased cognition, Impaired judgement, Orthopedic restrictions, Pain, Decreased safety awareness  Assessment: Pt supine in bed upon arrival, pleasant and agreeable to EOB sitting  Pt requires Mod assist x2 for bed mobility once sitting EOB pt requires Min-Mod assistx 1 for balance, pt pushing to right with LUE, once LUE removed from bed pt was able to maintain upright posture to sit EOB with Min assist and working with [employer]  PT requires assistx1 throughout session to correct posture and regain baalnce but was able to sit EOB 20 min  Pt returned to supine in bed with all ammenities within reach resting comfforatbly  Will continue to bebenfit from skilled PT to increase strength, endurance and balance to maximize safe functional mobility  Recommendation: Post acute IP rehab     PT - OK to Discharge: Yes(when mediucally ready)    See flowsheet documentation for full assessment

## 2019-11-22 NOTE — PLAN OF CARE
Problem: OCCUPATIONAL THERAPY ADULT  Goal: Performs self-care activities at highest level of function for planned discharge setting  See evaluation for individualized goals  Description  Treatment Interventions: ADL retraining, Functional transfer training, UE strengthening/ROM, Endurance training, Cognitive reorientation, Patient/family training, Equipment evaluation/education, Fine motor coordination activities, Compensatory technique education, Continued evaluation, Energy conservation, Activityengagement          See flowsheet documentation for full assessment, interventions and recommendations  Outcome: Progressing  Note:   Limitation: Decreased ADL status, Decreased UE ROM, Decreased UE strength, Decreased Safe judgement during ADL, Decreased cognition, Decreased endurance, Decreased fine motor control, Decreased self-care trans, Decreased high-level ADLs, Non-func R UE, Non-func L UE  Prognosis: Fair  Assessment: pt participated in am ot session and was seen focusing on  sitting balance / tolernece during l ue task at attempt at balloon tap, r ue aarom for all shoulder ranges  pt toelrated gentle wbing r ue in sitting  pt contiues to "push with l ue if allowed on mattress  pt was more verbal today, is noted to be hard of hearing stating "what" frequently pt was noted to jke with therapist x 2  pt requires mod / max asst adls at this time  pt was able to hold and drink from straw while sitting eob wioth mod asst for balance support  Recommendation: Physiatry Consult  OT Discharge Recommendation: Short Term Rehab  OT - OK to Discharge:  Yes     BRETT Erwin

## 2019-11-23 ENCOUNTER — HOSPITAL ENCOUNTER (INPATIENT)
Facility: HOSPITAL | Age: 79
LOS: 17 days | Discharge: NURSING, REHAB OR LONG-TERM CARE | DRG: 057 | End: 2019-12-10
Attending: PHYSICAL MEDICINE & REHABILITATION | Admitting: PHYSICAL MEDICINE & REHABILITATION
Payer: COMMERCIAL

## 2019-11-23 VITALS
HEART RATE: 70 BPM | WEIGHT: 145.5 LBS | DIASTOLIC BLOOD PRESSURE: 77 MMHG | HEIGHT: 70 IN | BODY MASS INDEX: 20.83 KG/M2 | RESPIRATION RATE: 16 BRPM | TEMPERATURE: 97.9 F | OXYGEN SATURATION: 94 % | SYSTOLIC BLOOD PRESSURE: 142 MMHG

## 2019-11-23 DIAGNOSIS — I25.10 CORONARY ARTERY DISEASE INVOLVING NATIVE HEART WITHOUT ANGINA PECTORIS, UNSPECIFIED VESSEL OR LESION TYPE: Primary | ICD-10-CM

## 2019-11-23 DIAGNOSIS — I10 ESSENTIAL HYPERTENSION: ICD-10-CM

## 2019-11-23 DIAGNOSIS — S89.90XA KNEE INJURY, INITIAL ENCOUNTER: ICD-10-CM

## 2019-11-23 DIAGNOSIS — R41.82 ALTERED MENTAL STATUS: ICD-10-CM

## 2019-11-23 LAB
ANION GAP SERPL CALCULATED.3IONS-SCNC: 7 MMOL/L (ref 4–13)
BUN SERPL-MCNC: 27 MG/DL (ref 5–25)
CALCIUM SERPL-MCNC: 8.8 MG/DL (ref 8.3–10.1)
CHLORIDE SERPL-SCNC: 107 MMOL/L (ref 100–108)
CO2 SERPL-SCNC: 26 MMOL/L (ref 21–32)
CREAT SERPL-MCNC: 1.06 MG/DL (ref 0.6–1.3)
ERYTHROCYTE [DISTWIDTH] IN BLOOD BY AUTOMATED COUNT: 13 % (ref 11.6–15.1)
GFR SERPL CREATININE-BSD FRML MDRD: 67 ML/MIN/1.73SQ M
GLUCOSE SERPL-MCNC: 93 MG/DL (ref 65–140)
HCT VFR BLD AUTO: 38 % (ref 36.5–49.3)
HGB BLD-MCNC: 12.7 G/DL (ref 12–17)
MAGNESIUM SERPL-MCNC: 2.2 MG/DL (ref 1.6–2.6)
MCH RBC QN AUTO: 32.7 PG (ref 26.8–34.3)
MCHC RBC AUTO-ENTMCNC: 33.4 G/DL (ref 31.4–37.4)
MCV RBC AUTO: 98 FL (ref 82–98)
PHOSPHATE SERPL-MCNC: 3.4 MG/DL (ref 2.3–4.1)
PLATELET # BLD AUTO: 225 THOUSANDS/UL (ref 149–390)
PMV BLD AUTO: 11.2 FL (ref 8.9–12.7)
POTASSIUM SERPL-SCNC: 3.6 MMOL/L (ref 3.5–5.3)
RBC # BLD AUTO: 3.88 MILLION/UL (ref 3.88–5.62)
SODIUM SERPL-SCNC: 140 MMOL/L (ref 136–145)
WBC # BLD AUTO: 8 THOUSAND/UL (ref 4.31–10.16)

## 2019-11-23 PROCEDURE — NC001 PR NO CHARGE: Performed by: NURSE PRACTITIONER

## 2019-11-23 PROCEDURE — 85027 COMPLETE CBC AUTOMATED: CPT | Performed by: PHYSICIAN ASSISTANT

## 2019-11-23 PROCEDURE — 94760 N-INVAS EAR/PLS OXIMETRY 1: CPT

## 2019-11-23 PROCEDURE — 80048 BASIC METABOLIC PNL TOTAL CA: CPT | Performed by: PHYSICIAN ASSISTANT

## 2019-11-23 PROCEDURE — 99223 1ST HOSP IP/OBS HIGH 75: CPT | Performed by: PHYSICAL MEDICINE & REHABILITATION

## 2019-11-23 PROCEDURE — 99238 HOSP IP/OBS DSCHRG MGMT 30/<: CPT | Performed by: SURGERY

## 2019-11-23 PROCEDURE — 99232 SBSQ HOSP IP/OBS MODERATE 35: CPT | Performed by: INTERNAL MEDICINE

## 2019-11-23 PROCEDURE — 84100 ASSAY OF PHOSPHORUS: CPT | Performed by: PHYSICIAN ASSISTANT

## 2019-11-23 PROCEDURE — 83735 ASSAY OF MAGNESIUM: CPT | Performed by: PHYSICIAN ASSISTANT

## 2019-11-23 RX ORDER — ACETAMINOPHEN 325 MG/1
650 TABLET ORAL EVERY 6 HOURS PRN
Status: DISCONTINUED | OUTPATIENT
Start: 2019-11-23 | End: 2019-12-10

## 2019-11-23 RX ORDER — AMLODIPINE BESYLATE 10 MG/1
10 TABLET ORAL DAILY
Refills: 0 | Status: ON HOLD
Start: 2019-11-24 | End: 2019-12-10 | Stop reason: CLARIF

## 2019-11-23 RX ORDER — AMOXICILLIN 250 MG
1 CAPSULE ORAL
Refills: 0 | Status: ON HOLD
Start: 2019-11-23 | End: 2019-12-10 | Stop reason: CLARIF

## 2019-11-23 RX ORDER — LISINOPRIL 20 MG/1
40 TABLET ORAL DAILY
Status: DISCONTINUED | OUTPATIENT
Start: 2019-11-24 | End: 2019-11-27

## 2019-11-23 RX ORDER — ATORVASTATIN CALCIUM 40 MG/1
40 TABLET, FILM COATED ORAL DAILY
Status: DISCONTINUED | OUTPATIENT
Start: 2019-11-24 | End: 2019-11-24

## 2019-11-23 RX ORDER — LEVOTHYROXINE SODIUM 0.03 MG/1
50 TABLET ORAL
Status: DISCONTINUED | OUTPATIENT
Start: 2019-11-24 | End: 2019-11-27

## 2019-11-23 RX ORDER — ONDANSETRON 4 MG/1
4 TABLET, ORALLY DISINTEGRATING ORAL EVERY 6 HOURS PRN
Status: DISCONTINUED | OUTPATIENT
Start: 2019-11-23 | End: 2019-12-10

## 2019-11-23 RX ORDER — CARVEDILOL 6.25 MG/1
18.75 TABLET ORAL 2 TIMES DAILY WITH MEALS
Refills: 0 | Status: ON HOLD
Start: 2019-11-23 | End: 2019-12-10 | Stop reason: CLARIF

## 2019-11-23 RX ORDER — HYDRALAZINE HYDROCHLORIDE 25 MG/1
75 TABLET, FILM COATED ORAL EVERY 8 HOURS SCHEDULED
Refills: 0 | Status: ON HOLD
Start: 2019-11-23 | End: 2019-12-10 | Stop reason: CLARIF

## 2019-11-23 RX ORDER — NIFEDIPINE 30 MG/1
60 TABLET, EXTENDED RELEASE ORAL DAILY
Status: DISCONTINUED | OUTPATIENT
Start: 2019-11-24 | End: 2019-12-10 | Stop reason: HOSPADM

## 2019-11-23 RX ORDER — NICOTINE 21 MG/24HR
1 PATCH, TRANSDERMAL 24 HOURS TRANSDERMAL DAILY
Status: DISCONTINUED | OUTPATIENT
Start: 2019-11-24 | End: 2019-12-10

## 2019-11-23 RX ORDER — DOCUSATE SODIUM 100 MG/1
100 CAPSULE, LIQUID FILLED ORAL 2 TIMES DAILY
Qty: 10 CAPSULE | Refills: 0 | Status: ON HOLD
Start: 2019-11-23 | End: 2019-12-10 | Stop reason: CLARIF

## 2019-11-23 RX ORDER — METOPROLOL TARTRATE 50 MG/1
50 TABLET, FILM COATED ORAL EVERY 12 HOURS SCHEDULED
Status: DISCONTINUED | OUTPATIENT
Start: 2019-11-23 | End: 2019-12-03

## 2019-11-23 RX ORDER — DOCUSATE SODIUM 100 MG/1
100 CAPSULE, LIQUID FILLED ORAL 2 TIMES DAILY
Status: DISCONTINUED | OUTPATIENT
Start: 2019-11-23 | End: 2019-12-10

## 2019-11-23 RX ORDER — ONDANSETRON 2 MG/ML
4 INJECTION INTRAMUSCULAR; INTRAVENOUS EVERY 6 HOURS PRN
Status: DISCONTINUED | OUTPATIENT
Start: 2019-11-23 | End: 2019-11-23

## 2019-11-23 RX ADMIN — CARVEDILOL 18.75 MG: 12.5 TABLET, FILM COATED ORAL at 08:36

## 2019-11-23 RX ADMIN — HYDRALAZINE HYDROCHLORIDE 75 MG: 50 TABLET ORAL at 00:29

## 2019-11-23 RX ADMIN — LISINOPRIL 40 MG: 20 TABLET ORAL at 08:36

## 2019-11-23 RX ADMIN — HYDRALAZINE HYDROCHLORIDE 75 MG: 50 TABLET ORAL at 08:36

## 2019-11-23 RX ADMIN — ASPIRIN 81 MG 81 MG: 81 TABLET ORAL at 08:36

## 2019-11-23 RX ADMIN — HEPARIN SODIUM 5000 UNITS: 5000 INJECTION INTRAVENOUS; SUBCUTANEOUS at 15:10

## 2019-11-23 RX ADMIN — HEPARIN SODIUM 5000 UNITS: 5000 INJECTION INTRAVENOUS; SUBCUTANEOUS at 05:41

## 2019-11-23 RX ADMIN — AMLODIPINE BESYLATE 10 MG: 10 TABLET ORAL at 08:36

## 2019-11-23 RX ADMIN — DOCUSATE SODIUM 100 MG: 100 CAPSULE, LIQUID FILLED ORAL at 08:36

## 2019-11-23 RX ADMIN — DOCUSATE SODIUM 100 MG: 100 CAPSULE, LIQUID FILLED ORAL at 18:14

## 2019-11-23 RX ADMIN — METOPROLOL TARTRATE 50 MG: 50 TABLET, FILM COATED ORAL at 21:55

## 2019-11-23 RX ADMIN — LEVOTHYROXINE SODIUM 50 MCG: 50 TABLET ORAL at 05:40

## 2019-11-23 NOTE — ASSESSMENT & PLAN NOTE
- No expansion on most recent CT head    - Seen by Neurosurgery who signed off   - Continues to be a GCS of 14  - Baseline exam with right hemiplegia   - will answer and follow command, able to squeeze my hand with his left hand, open his eyes

## 2019-11-23 NOTE — RESPIRATORY THERAPY NOTE
RT Protocol Note  Rosy Alfonso 67 y o  male MRN: [id number]  Unit/Bed#: -01 Encounter: [id number]    Assessment    Active Problems:    * No active hospital problems  *      Home Pulmonary Medications:  none       Past Medical History:   Diagnosis Date    Coronary artery disease     history of CABG    Disease of thyroid gland     Hemorrhoids     last assessed 7/10/17    History of arterial duplex of LE 01/10/2017    Bilateral occlusion of the superficial femoral arteries, severe diminution of the ankle brachial index bilaterally, disease appears worse on left than right   History of echocardiogram 07/20/2011    hypokinesia of the inferior wall  EF 50%      HL (hearing loss)     Hyperlipidemia     Hypertension     PVD (peripheral vascular disease)      Social History     Socioeconomic History    Marital status: Single     Spouse name: Not on file    Number of children: 0    Years of education: Not on file    Highest education level: Not on file   Occupational History    Occupation: retired- factory maintenance   Social Needs    Financial resource strain: Not on file    Food insecurity:     Worry: Not on file     Inability: Not on file   [place] needs:     Medical: Not on file     Non-medical: Not on file   Tobacco Use    Smoking status: Current Every Day Smoker     Packs/day: 0 50     Types: Cigarettes    Smokeless tobacco: Never Used   Substance and Sexual Activity    Alcohol use: Not Currently     Alcohol/week: 1 0 standard drinks     Types: 1 Glasses of wine per week     Frequency: Monthly or less     Drinks per session: 1 or 2     Binge frequency: Less than monthly    Drug use: No    Sexual activity: Not Currently   Lifestyle    Physical activity:     Days per week: Not on file     Minutes per session: Not on file    Stress: Not on file   Relationships    Social connections:     Talks on phone: Not on file     Gets together: Not on file     Attends [employer]ism service: Not on file     Active member of club or organization: Not on file     Attends meetings of clubs or organizations: Not on file     Relationship status: Not on file    Intimate partner violence:     Fear of current or ex partner: Not on file     Emotionally abused: Not on file     Physically abused: Not on file     Forced sexual activity: Not on file   Other Topics Concern    Not on file   Social History Narrative    Has no children    Lives a lone without help available    Previous  service: [place] deployed to [employer]       Subjective         Objective    Physical Exam:        Vitals:  Blood pressure 140/80, pulse 74, temperature 98 2 °F (36 8 °C), temperature source Tympanic, resp  rate 18, height 5' 10" (1 778 m), weight 62 5 kg (137 lb 11 2 oz), SpO2 95 %  Imaging and other studies: I have personally reviewed pertinent reports              Plan Airway Clearance Protocol  Incentive ordered by Doc

## 2019-11-23 NOTE — ASSESSMENT & PLAN NOTE
- Non operative per Orthopedics   - NWB RLE   - continue unlocked right hinged knee brace  - Continue PT/OT  - Will likely require rehab     - Neuro intact

## 2019-11-23 NOTE — PLAN OF CARE
Problem: Potential for Falls  Goal: Patient will remain free of falls  Description  INTERVENTIONS:  - Assess patient frequently for physical needs  -  Identify cognitive and physical deficits and behaviors that affect risk of falls  -  Hamer fall precautions as indicated by assessment   - Educate patient/family on patient safety including physical limitations  - Instruct patient to call for assistance with activity based on assessment  - Modify environment to reduce risk of injury  - Consider OT/PT consult to assist with strengthening/mobility  Outcome: Progressing     Problem: Neurological Deficit  Goal: Neurological status is stable or improving  Description  Interventions:  - Monitor and assess patient's level of consciousness, motor function, sensory function, and level of assistance needed for ADLs  - Monitor and report changes from baseline  Collaborate with interdisciplinary team to initiate plan and implement interventions as ordered  - Provide and maintain a safe environment  - Consider seizure precautions  - Consider fall precautions  - Consider aspiration precautions  - Consider bleeding precautions  Outcome: Progressing     Problem: Activity Intolerance/Impaired Mobility  Goal: Mobility/activity is maintained at optimum level for patient  Description  Interventions:  - Assess and monitor patient  barriers to mobility and need for assistive/adaptive devices  - Assess patient's emotional response to limitations  - Collaborate with interdisciplinary team and initiate plans and interventions as ordered  - Encourage independent activity per ability   - Maintain proper body alignment  - Perform active/passive rom as tolerated/ordered    - Plan activities to conserve energy   - Turn patient as appropriate  Outcome: Progressing     Problem: Potential for Aspiration  Goal: Non-ventilated patient's risk of aspiration is minimized  Description  Assess and monitor vital signs, respiratory status, and labs (WBC)  Monitor for signs of aspiration (tachypnea, cough, rales, wheezing, cyanosis, fever)  - Assess and monitor patient's ability to swallow  - Place patient up in chair to eat if possible  - HOB up at 90 degrees to eat if unable to get patient up into chair   - Supervise patient during oral intake  - Instruct patient/ family to take small bites  - Instruct patient/ family to take small single sips when taking liquids  - Follow patient-specific strategies generated by speech pathologist   Outcome: Progressing  Goal: Ventilated patient's risk of aspiration is minimized  Description  Assess and monitor vital signs, respiratory status, airway cuff pressure, and labs (WBC)  Monitor for signs of aspiration (tachypnea, cough, rales, wheezing, cyanosis, fever)  - Elevate head of bed 30 degrees if patient has tube feeding   - Monitor tube feeding  Outcome: Progressing     Problem: Nutrition  Goal: Nutrition/Hydration status is improving  Description  Monitor and assess patient's nutrition/hydration status for malnutrition (ex- brittle hair, bruises, dry skin, pale skin and conjunctiva, muscle wasting, smooth red tongue, and disorientation)  Collaborate with interdisciplinary team and initiate plan and interventions as ordered  Monitor patient's weight and dietary intake as ordered or per policy  Utilize nutrition screening tool and intervene per policy  Determine patient's food preferences and provide high-protein, high-caloric foods as appropriate  - Assist patient with eating   - Allow adequate time for meals   - Encourage patient to take dietary supplement as ordered  - Collaborate with clinical nutritionist   - Include patient/family/caregiver in decisions related to nutrition    Outcome: Progressing

## 2019-11-23 NOTE — PROGRESS NOTES
Cardiology Progress Note - Hayden Gil 67 y o  male MRN: [id number]    Unit/Bed#: [facility] 601-01 Encounter: [id number]        Subjective:    No significant events overnight  No chest pain  Review of Systems   Constitution: Negative for malaise/fatigue  Cardiovascular: Negative for chest pain  Respiratory: Negative for cough  Objective:   Vitals: Blood pressure 142/77, pulse 71, temperature 97 9 °F (36 6 °C), temperature source Oral, resp  rate 16, height 5' 10" (1 778 m), weight 66 kg (145 lb 8 1 oz), SpO2 95 %  , Body mass index is 20 88 kg/m² ,   Orthostatic Blood Pressures      Most Recent Value   Blood Pressure  142/77 filed at 11/23/2019 6489   Patient Position - Orthostatic VS  Lying filed at 11/23/2019 9266         Systolic (63COQ), JLI:009 , Min:142 , PYI:535     Diastolic (67PGH), TBA:42, Min:61, Max:85      Intake/Output Summary (Last 24 hours) at 11/23/2019 1006  Last data filed at 11/22/2019 2044  Gross per 24 hour   Intake 200 ml   Output 475 ml   Net -275 ml     Weight (last 2 days)     None                Physical Exam   Constitutional: He is oriented to person, place, and time  No distress  HENT:   Mouth/Throat: No oropharyngeal exudate  Eyes: No scleral icterus  Neck: No JVD present  Cardiovascular: Normal rate and regular rhythm  Exam reveals no friction rub  No murmur heard  Pulmonary/Chest: Effort normal and breath sounds normal  No stridor  No respiratory distress  He has no wheezes  Abdominal: Soft  Bowel sounds are normal  He exhibits no distension  There is no guarding  Musculoskeletal: He exhibits no edema  Neurological: He is alert and oriented to person, place, and time  Skin: Skin is warm and dry  He is not diaphoretic  Psychiatric: He has a normal mood and affect   His behavior is normal          Laboratory Results:        CBC with diff:   Results from last 7 days   Lab Units 11/23/19  0438 11/21/19  0435 11/20/19  0437 11/19/19  0440 11/18/19  6442 11/17/19  0453   WBC Thousand/uL 8 00 10 15 9 50 10 38* 10 90* 13 14*   HEMOGLOBIN g/dL 12 7 12 4 12 7 13 0 13 7 12 8   HEMATOCRIT % 38 0 37 0 38 1 38 1 41 1 38 8   MCV fL 98 98 99* 97 98 99*   PLATELETS Thousands/uL 225 188 178 173 198 182   MCH pg 32 7 32 9 32 9 33 2 32 8 32 7   MCHC g/dL 33 4 33 5 33 3 34 1 33 3 33 0   RDW % 13 0 13 3 13 5 13 7 13 6 13 9   MPV fL 11 2 11 0 11 0 10 8 11 0 10 5   NRBC AUTO /100 WBCs  --  0 0 0 0 0         CMP:  Results from last 7 days   Lab Units 11/23/19 0438 11/21/19 0435 11/20/19 0437 11/19/19  0440 11/18/19  0523 11/17/19  0453   POTASSIUM mmol/L 3 6 3 8 3 7 3 4* 3 4* 3 5   CHLORIDE mmol/L 107 109* 110* 111* 110* 115*   CO2 mmol/L 26 24 25 23 22 20*   BUN mg/dL 27* 29* 22 22 18 17   CREATININE mg/dL 1 06 1 00 0 96 0 86 1 02 0 87   CALCIUM mg/dL 8 8 8 3 8 2* 8 3 8 5 8 0*   EGFR ml/min/1 73sq m 67 72 75 83 70 83         BMP:  Results from last 7 days   Lab Units 11/23/19 0438 11/21/19 0435 11/20/19 0437 11/19/19 0440 11/18/19  0523 11/17/19  0453   POTASSIUM mmol/L 3 6 3 8 3 7 3 4* 3 4* 3 5   CHLORIDE mmol/L 107 109* 110* 111* 110* 115*   CO2 mmol/L 26 24 25 23 22 20*   BUN mg/dL 27* 29* 22 22 18 17   CREATININE mg/dL 1 06 1 00 0 96 0 86 1 02 0 87   CALCIUM mg/dL 8 8 8 3 8 2* 8 3 8 5 8 0*       BNP: No results for input(s): BNP in the last 72 hours      Magnesium:   Results from last 7 days   Lab Units 11/23/19  0438 11/20/19 0437 11/19/19 0440 11/18/19  0523   MAGNESIUM mg/dL 2 2 1 9 2 0 1 9       Coags:       TSH: No results found for: TSH    Hemoglobin A1C       Lipid Profile:       Cardiac testing:   Results for orders placed during the hospital encounter of 11/15/19   Echo complete with contrast if indicated    Narrative [address]  [phone]    Transthoracic Echocardiogram  2D, M-mode, Doppler, and Color Doppler    Study date:  17-Nov-2019    Patient: Lester Carmona  MR number: [id number]  Account number: [de-identified]  : 1940  Age: 67 years  Gender: Male  Status: Inpatient  Location: Bedside  Height: 70 in  Weight: 144 8 lb  BP: 139/ 60 mmHg    Indications: Assess left ventricular function  Diagnoses: I25 10 - Atherosclerotic heart disease of native coronary artery without angina pectoris    Sonographer:  MODE Rubio  Referring Physician:  Sony Condon PA-C  Group:  [facility]  Interpreting Physician:  Cleotha Koyanagi, MD    SUMMARY    LEFT VENTRICLE:  Systolic function was normal  Ejection fraction was estimated to be 60 %  Although no diagnostic regional wall motion abnormality was identified, this possibility cannot be completely excluded on the basis of this study  Wall thickness was increased  Features were consistent with a pseudonormal left ventricular filling pattern, with concomitant abnormal relaxation and increased filling pressure (grade 2 diastolic dysfunction)  VENTRICULAR SEPTUM:  There was dyssynergic motion  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  RIGHT VENTRICLE:  The size was at the upper limits of normal   Systolic function was normal     AORTIC VALVE:  There was mild regurgitation  TRICUSPID VALVE:  There was moderate regurgitation  Estimated peak PA pressure was 45 mmHg  The findings suggest mild pulmonary hypertension  HISTORY: PRIOR HISTORY: Elevated troponin, CAD s/p CABG, Hypertension, Tobacco abuse, SAH    PROCEDURE: The procedure was performed at the bedside  This was a routine study  The transthoracic approach was used  The study included complete 2D imaging, M-mode, complete spectral Doppler, and color Doppler  The heart rate was 68 bpm,  at the start of the study  Images were obtained from the parasternal, apical, subcostal, and suprasternal notch acoustic windows  Image quality was adequate      LEFT VENTRICLE: Size was normal  Systolic function was normal  Ejection fraction was estimated to be 60 %  Although no diagnostic regional wall motion abnormality was identified, this possibility cannot be completely excluded on the basis  of this study  Wall thickness was increased  DOPPLER: The ratio of early ventricular filling to atrial contraction velocities was within the normal range  Features were consistent with a pseudonormal left ventricular filling pattern, with  concomitant abnormal relaxation and increased filling pressure (grade 2 diastolic dysfunction)  VENTRICULAR SEPTUM: There was dyssynergic motion  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  RIGHT VENTRICLE: The size was at the upper limits of normal  Systolic function was normal     LEFT ATRIUM: The atrium was dilated  RIGHT ATRIUM: The atrium was dilated  MITRAL VALVE: There was moderate annular calcification  Valve structure was normal  There was normal leaflet separation  DOPPLER: There was possible mild stenosis  There was no significant regurgitation  AORTIC VALVE: The valve was trileaflet  Leaflets exhibited mildly increased thickness, mild calcification, and sclerosis  DOPPLER: There was no evidence for stenosis  There was mild regurgitation  TRICUSPID VALVE: The valve structure was normal  There was normal leaflet separation  DOPPLER: There was no evidence for stenosis  There was moderate regurgitation  Estimated peak PA pressure was 45 mmHg  The findings suggest mild  pulmonary hypertension  PULMONIC VALVE: Leaflets exhibited normal thickness, no calcification, and normal cuspal separation  DOPPLER: The transpulmonic velocity was within the normal range  There was mild to moderate regurgitation  PERICARDIUM: There was no pericardial effusion  The pericardium was normal in appearance  AORTA: The root exhibited normal size and fibrocalcific change      SYSTEM MEASUREMENT TABLES    2D  %FS: 27 33 %  Ao Diam: 3 06 cm  EDV(Teich): 126 06 ml  EF Biplane: 59 72 %  EF(Teich): 52 84 %  ESV(Teich): 59 45 ml  IVSd: 1 12 cm  LA Area: 26 18 cm2  LA Diam: 3 97 cm  LVEDV MOD A2C: 73 63 ml  LVEDV MOD A4C: 93 73 ml  LVEDV MOD BP: 88 94 ml  LVEF MOD A2C: 57 13 %  LVEF MOD A4C: 57 65 %  LVESV MOD A2C: 31 57 ml  LVESV MOD A4C: 39 7 ml  LVESV MOD BP: 35 83 ml  LVIDd: 5 14 cm  LVIDs: 3 74 cm  LVLd A2C: 8 17 cm  LVLd A4C: 8 91 cm  LVLs A2C: 7 86 cm  LVLs A4C: 7 69 cm  LVOT Diam: 2 13 cm  LVPWd: 1 13 cm  RA Area: 20 91 cm2  RVIDd: 3 98 cm  SV MOD A2C: 42 06 ml  SV MOD A4C: 54 03 ml  SV(Teich): 66 61 ml    CW  TR Vmax: 3 21 m/s  TR maxP 16 mmHg    MM  TAPSE: 2 14 cm    PW  AVC: 373 39 ms  E': 0 07 m/s  E/E': 20 45  LVOT Env  Ti: 304 5 ms  LVOT VTI: 20 24 cm  LVOT Vmax: 0 95 m/s  LVOT Vmean: 0 66 m/s  LVOT maxPG: 3 63 mmHg  LVOT meanP 01 mmHg  LVSI Dopp: 39 64 ml/m2  LVSV Dopp: 72 15 ml  MV A Jimbo: 0 99 m/s  MV Dec [place]: 6 3 m/s2  MV DecT: 238 88 ms  MV E Jimbo: 1 5 m/s  MV E/A Ratio: 1 52  MV PHT: 69 27 ms  MVA By PHT: 3 18 cm2    [facility] Accredited Echocardiography Laboratory    Prepared and electronically signed by    Aaron Quinones MD  Signed [id number] 12:59:19       No results found for this or any previous visit  No results found for this or any previous visit  No results found for this or any previous visit      Meds/Allergies   current meds:   Current Facility-Administered Medications   Medication Dose Route Frequency    amLODIPine (NORVASC) tablet 10 mg  10 mg Oral Daily    aspirin chewable tablet 81 mg  81 mg Oral Daily    atorvastatin (LIPITOR) tablet 40 mg  40 mg Oral Daily With Dinner    carvedilol (COREG) tablet 18 75 mg  18 75 mg Oral BID With Meals    docusate sodium (COLACE) capsule 100 mg  100 mg Oral BID    heparin (porcine) subcutaneous injection 5,000 Units  5,000 Units Subcutaneous Q8H [facility]    hydrALAZINE (APRESOLINE) injection 10 mg  10 mg Intravenous Q6H PRN    hydrALAZINE (APRESOLINE) tablet 75 mg  75 mg Oral Q8H [facility]    Labetalol HCl (NORMODYNE) injection 10 mg  10 mg Intravenous Q6H PRN    levothyroxine tablet 50 mcg  50 mcg Oral Early Morning    lisinopril (ZESTRIL) tablet 40 mg  40 mg Oral Daily    nicotine (NICODERM CQ) 14 mg/24hr TD 24 hr patch 1 patch  1 patch Transdermal Daily    senna-docusate sodium (SENOKOT S) 8 6-50 mg per tablet 1 tablet  1 tablet Oral HS     Medications Prior to Admission   Medication    aspirin (ECOTRIN LOW STRENGTH) 81 mg EC tablet    atorvastatin (LIPITOR) 40 mg tablet    levothyroxine 50 mcg tablet    lisinopril (ZESTRIL) 40 mg tablet    metoprolol tartrate (LOPRESSOR) 50 mg tablet    NIFEdipine (PROCARDIA XL) 60 mg 24 hr tablet          Assessment:  Principal Problem:    Cerebrovascular accident (CVA) due to embolism of left middle cerebral artery (HCC)  Active Problems:    Hypertension    Subarachnoid hemorrhage (HCC)    Closed fracture of right tibial plateau with routine healing    Plan:    Hypertension: BP under reasonable control  Secondary workup labs sent  Continue amlodipine, coreg hydralazine and lisinopril  Outpatient followup  Will sign off  Counseling / Coordination of Care  Total floor / unit time spent today 25 minutes  Greater than 50% of total time was spent with the patient and / or family counseling and / or coordination of care  A description of the counseling / coordination of care

## 2019-11-23 NOTE — ASSESSMENT & PLAN NOTE
- Remains with right hemiplegia and expressive aphasia  - May be some component of receptive aphasia  - Being followed by Neurology  - Goal systolic blood pressure 850-384    - BPs have been > 160 and is back on home antihypertensives  - avg -190s/90s  - Cardiology following the patient to assess BPs; will continue to follow their recommendation  - Continue frequent neurologic checks  - Patient currently tolerating p o   Intake   - dysphagia diet 2, mechanical soft/thin liquid   - am labs ordered  And reviewed

## 2019-11-23 NOTE — PROGRESS NOTES
Progress Note - Mary Lights 1940, 67 y o  male MRN: [id number]    Unit/Bed#: [facility] 601-01 Encounter: [id number]    Primary Care Provider: Rowan Lopez DO   Date and time admitted to hospital: 11/15/2019  1:51 PM        Closed fracture of right tibial plateau with routine healing  Assessment & Plan  - Non operative per Orthopedics   - NWB RLE   - continue unlocked right hinged knee brace  - Continue PT/OT  - Will likely require rehab  - Neuro intact    Subarachnoid hemorrhage (HCC)  Assessment & Plan  - No expansion on most recent CT head  - Seen by Neurosurgery who signed off   - Continues to be a GCS of 14  - Baseline exam with right hemiplegia   - will answer and follow command, able to squeeze my hand with his left hand, open his eyes    Hypertension  Assessment & Plan  - Reportedly noncompliant with medications prior to admission   - Had hypertensive urgency on admission   - Currently back on home medications, however still elevated this a m   - Cardiology continues to follow the patient   - cardio ordered renin, aldosterone, and plasma free metanephrines - pending    - will continue to follow up with recs regarding both long-term and current treatment plant    - elevated BP this am and overnight - will follow up cardio recs   - PRN Medications are ordered  - Goal systolic blood pressure [phone] per Neurology  - Cardiology following and managing medications, labs stable this morning    * Cerebrovascular accident (CVA) due to embolism of left middle cerebral artery ([facility] [address] )  [address] [facility] with right hemiplegia and expressive aphasia  - May be some component of receptive aphasia  - Being followed by Neurology  - Goal systolic blood pressure 196-682    - BPs have been > 160 and is back on home antihypertensives  - avg -190s/90s  - Cardiology following the patient to assess BPs; will continue to follow their recommendation  - Continue frequent neurologic checks    - Patient currently tolerating p o  Intake   - dysphagia diet 2, mechanical soft/thin liquid   - am labs ordered  And reviewed          Bedside rounds completed with nurse Louis Moody       Disposition: probable rehab      SUBJECTIVE:  Chief Complaint: none offered this morning    Subjective: Morning      OBJECTIVE:     Meds/Allergies     Current Facility-Administered Medications:     amLODIPine (NORVASC) tablet 10 mg, 10 mg, Oral, Daily, Malcom Peng MD, 10 mg at 11/22/19 0858    aspirin chewable tablet 81 mg, 81 mg, Oral, Daily, Ana Rosa Tabor PA-C, 81 mg at 11/22/19 0859    atorvastatin (LIPITOR) tablet 40 mg, 40 mg, Oral, Daily With Uziel Woodruff PA-C, 40 mg at 11/22/19 1755    carvedilol (COREG) tablet 18 75 mg, 18 75 mg, Oral, BID With Meals, Nelida Melton MD, 18 75 mg at 11/22/19 1754    docusate sodium (COLACE) capsule 100 mg, 100 mg, Oral, BID, Ana Rosa Tabor PA-C, 100 mg at 11/22/19 0857    heparin (porcine) subcutaneous injection 5,000 Units, 5,000 Units, Subcutaneous, Q8H [facility], Ana Rosa Tabor PA-C, 5,000 Units at 11/23/19 0541    hydrALAZINE (APRESOLINE) injection 10 mg, 10 mg, Intravenous, Q6H PRN, Ana Rosa Tabor PA-C, 10 mg at 11/20/19 0153    hydrALAZINE (APRESOLINE) tablet 75 mg, 75 mg, Oral, Q8H [facility], Malcom Peng MD, 75 mg at 11/23/19 0029    Labetalol HCl (NORMODYNE) injection 10 mg, 10 mg, Intravenous, Q6H PRN, Ana Rosa Tabor PA-C, 10 mg at 11/22/19 0206    levothyroxine tablet 50 mcg, 50 mcg, Oral, Early Morning, Ana Rosa Tabor PA-C, 50 mcg at 11/23/19 0540    lisinopril (ZESTRIL) tablet 40 mg, 40 mg, Oral, Daily, Ana Rosa Tabor PA-C, 40 mg at 11/22/19 0856    nicotine (NICODERM CQ) 14 mg/24hr TD 24 hr patch 1 patch, 1 patch, Transdermal, Daily, Ana Rosa Tabor PA-C, 1 patch at 11/22/19 0900    senna-docusate sodium (SENOKOT S) 8 6-50 mg per tablet 1 tablet, 1 tablet, Oral, HS, Ana Rosa Tabor PA-C, 1 tablet at 11/22/19 2230     Vitals:   Vitals:    11/23/19 0123   BP: 151/76   Pulse: 74   Resp:    Temp:    SpO2: 94%       Intake/Output:  I/O       11/21 0701 - 11/22 0700 11/22 0701 - 11/23 0700 11/23 0701 - 11/24 0700    P  O  620 320     Total Intake(mL/kg) 620 (9 4) 320 (4 8)     Urine (mL/kg/hr) 1500 (0 9) 475 (0 3)     Stool 0      Total Output 1500 475     Net -880 -155            Unmeasured Urine Occurrence  1 x     Unmeasured Stool Occurrence 1 x 1 x            Nutrition/GI Proph/Bowel Reg: Diet Dysphagia/Modified Consistency; Dysphagia 1-Pureed; Thin Liquid     Physical Exam:   GENERAL APPEARANCE: resting comfortably  NEURO: one word answers this morning, GCS - 14  HEENT: EOM's intact  CV: RRR< no complaints of chest discomfort, Cardiology managing B/P medications  LUNGS: Bascially CTA bilaterally, questionable slightly decreased in bases vs respiratory effort, O2 sats stable  GI: tolerating diet  : voiding  MSK: Moves left side, RLE in a brace  SKIN: warm and dry    Invasive Devices     Peripheral Intravenous Line            Peripheral IV 11/22/19 Left Forearm 1 day          Drain            External Urinary Catheter Small 4 days                 Lab Results: Results for Madhavi Oneill (MRN [id number]) as of 11/23/2019 07:24   Ref   Range 11/23/2019 04:38   Sodium Latest Ref Range: 136 - 145 mmol/L 140   Potassium Latest Ref Range: 3 5 - 5 3 mmol/L 3 6   Chloride Latest Ref Range: 100 - 108 mmol/L 107   CO2 Latest Ref Range: 21 - 32 mmol/L 26   Anion Gap Latest Ref Range: 4 - 13 mmol/L 7   BUN Latest Ref Range: 5 - 25 mg/dL 27 (H)   Creatinine Latest Ref Range: 0 60 - 1 30 mg/dL 1 06   Glucose, Random Latest Ref Range: 65 - 140 mg/dL 93   Calcium Latest Ref Range: 8 3 - 10 1 mg/dL 8 8   eGFR Latest Units: ml/min/1 73sq m 67   Phosphorus Latest Ref Range: 2 3 - 4 1 mg/dL 3 4   Magnesium Latest Ref Range: 1 6 - 2 6 mg/dL 2 2   WBC Latest Ref Range: 4 31 - 10 16 Thousand/uL 8 00   Red Blood Cell Count Latest Ref Range:  3 88 - 5 62 Million/uL 3 88   Hemoglobin Latest Ref Range: 12 0 - 17 0 g/dL 12 7   HCT Latest Ref Range: 36 5 - 49 3 % 38 0   MCV Latest Ref Range: 82 - 98 fL 98   MCH Latest Ref Range: 26 8 - 34 3 pg 32 7   MCHC Latest Ref Range: 31 4 - 37 4 g/dL 33 4   RDW Latest Ref Range: 11 6 - 15 1 % 13 0   Platelet Count Latest Ref Range: 149 - 390 Thousands/uL 225   MPV Latest Ref Range: 8 9 - 12 7 fL 11 2     Imaging/EKG Studies: none  Other Studies: none  VTE Prophylaxis: Sequential compression device (Venodyne)  and Heparin

## 2019-11-23 NOTE — H&P
PHYSICAL MEDICINE AND REHABILITATION H&P/ADMISSION NOTE  Haroon Edwards 67 y o  male MRN: [id number]  Unit/Bed#: -01 Encounter: [id number]     Rehab Diagnosis: Stroke:  01 2  Right Body Involvement (Left Brain)    History of Present Illness:   Haroon Edwards is a 67 y o  male with history of CAD, hypothyroidism, HTN, HLD, PVD, who presented to [address] [place] [address] on 11/14/19 after fall on stairs  He was carrying groceries up the stairs when he had a mechanical fall  He was admitted and found to have right tibial plateau fracture and hypertensive emergency  During his hospitalization, he was noted to have right hemiparesis, dysarthria  CTH revealed left frontal hemorrhage  CTA head/neck revealed 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, flow restriction at left vertebral, and 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       Neurosurgery was consulted, and recommended medical management  Orthopedics was consulted for right tibial plateau fracture, recommended NWB, hinged knee brace  He was admitted to [facility] on 11/23/19  Subjective: "I feel miserable", but cannot specify further  Denies overt pain  Review of Systems: Limited ROS due to confusion  Denies pain  Denies nausea       Restrictions include:  NWB RLE, with unlocked right hinged knee brace      Functional History - Prior to Admission:    Independent ADLs and mobility     Functional History - Currently:     11/22/19 3369    Pain Assessment   Pain Assessment FLACC   Diversional Activities Television   Pain Rating: FLACC (Rest) - Face 0   Pain Rating: FLACC (Rest) - Legs 0   Pain Rating: FLACC (Rest) - Activity 0   Pain Rating: FLACC (Rest) - Cry 0   Pain Rating: FLACC (Rest) - Consolability 0   Score: FLACC (Rest) 0   Pain Rating: FLACC (Activity) - Face 0   Pain Rating: FLACC (Activity) - Legs 0   Pain Rating: FLACC (Activity) - Activity 0   Pain Rating: FLACC (Activity) - Cry 0   Pain Rating: FLACC (Activity) - Consolability 0   Score: FLACC (Activity) 0   Restrictions/Precautions   Weight Bearing Precautions Per Order Yes   RLE Weight Bearing Per Order NWB   Braces or Orthoses    (RLE HKB unlocked)   Other Precautions Cognitive; Bed Alarm; Fall Risk   General   Chart Reviewed Yes   Response to Previous Treatment Patient with no complaints from previous session  Family/Caregiver Present No   Cognition   Overall Cognitive Status Impaired   Arousal/Participation Alert   Attention Attends with cues to redirect   Orientation Level Oriented to person   Memory Unable to assess   Following Commands Follows one step commands with increased time or repetition   Bed Mobility   Supine to Sit 2  Maximal assistance   Additional items Assist x 2   Sit to Supine 2  Maximal assistance   Additional items Assist x 2       11/21/19 0935   Restrictions/Precautions   RLE Weight Bearing Per Order NWB   Braces or Orthoses    (hinged knee brace (unlocked))   Other Precautions Cognitive; Bed Alarm; Fall Risk   Pain Assessment   Pain Assessment FLACC   Pain Rating: FLACC (Rest) - Face 0   Pain Rating: FLACC (Rest) - Legs 0   Pain Rating: FLACC (Rest) - Activity 0   Pain Rating: FLACC (Rest) - Cry 0   Pain Rating: FLACC (Rest) - Consolability 0   Score: FLACC (Rest) 0   Pain Rating: FLACC (Activity) - Face 0   Pain Rating: FLACC (Activity) - Legs 0   Pain Rating: FLACC (Activity) - Activity 0   Pain Rating: FLACC (Activity) - Cry 0   Pain Rating: FLACC (Activity) - Consolability 0   Score: FLACC (Activity) 0   ADL   Where Assessed Edge of bed  (supine for lb)   Grooming Assistance 4  Minimal Assistance   Grooming Comments asst to intiaite motor tasks ie face washing and hair combing   UB Bathing Assistance 2  Maximal Assistance   LB Bathing Assistance 1  Total Assistance   UB Dressing Assistance 2  Maximal Assistance   LB Dressing Assistance 1  Total Assistance   Toileting Assistance  1  Total Assistance   Toileting Comments [place] catheter   Bed Mobility   Rolling R 3  Moderate assistance   Additional items Assist x 1   Rolling L 2  Maximal assistance   Supine to Sit 2  Maximal assistance   Additional items Assist x 2   Sit to Supine 2  Maximal assistance   Additional items Assist x 2   Additional Comments pt sat eob 15 min with poor sitting balance  pt requries diversion for l ue as not to push slef towards right while sitting eob with l ue  poor body positioning awareness   Transfers   Sit to Stand    (n/a)   Cognition   Overall Cognitive Status Impaired   Arousal/Participation Alert   Activity Tolerance   Activity Tolerance Patient limited by fatigue         Past Medical History:   Diagnosis Date    Coronary artery disease     history of CABG    Disease of thyroid gland     Hemorrhoids     last assessed 7/10/17    History of arterial duplex of LE 01/10/2017    Bilateral occlusion of the superficial femoral arteries, severe diminution of the ankle brachial index bilaterally, disease appears worse on left than right   History of echocardiogram 07/20/2011    hypokinesia of the inferior wall  EF 50%   HL (hearing loss)     Hyperlipidemia     Hypertension     PVD (peripheral vascular disease)      Past Surgical History:   Procedure Laterality Date    COLONOSCOPY      Last assessed 7/10/17    CORONARY ARTERY BYPASS GRAFT  08/23/2010    3V CABG:  LIMA to LAD, VG to RI, VG to OM1      DENTAL SURGERY      Last assessed  7/10/17    KNEE SURGERY Left     Last assessed 7/10/17    TONSILLECTOMY AND ADENOIDECTOMY      Last assessed 7/10/17    TOOTH EXTRACTION       Family History   Problem Relation Age of Onset    Alzheimer's disease Mother     Heart disease Father         cardiac disorder    Heart disease Brother         cardiac disorder     Social History     Socioeconomic History    Marital status: Single     Spouse name: Not on file    Number of children: 0    Years of education: Not on file    Highest education level: Not on file Occupational History    Occupation: retired- factory maintenance   Social Needs    Financial resource strain: Not on file    Food insecurity:     Worry: Not on file     Inability: Not on file   [employer] needs:     Medical: Not on file     Non-medical: Not on file   Tobacco Use    Smoking status: Current Every Day Smoker     Packs/day: 0 50     Types: Cigarettes    Smokeless tobacco: Never Used   Substance and Sexual Activity    Alcohol use: Not Currently     Alcohol/week: 1 0 standard drinks     Types: 1 Glasses of wine per week     Frequency: Monthly or less     Drinks per session: 1 or 2     Binge frequency: Less than monthly    Drug use: No    Sexual activity: Not Currently   Lifestyle    Physical activity:     Days per week: Not on file     Minutes per session: Not on file    Stress: Not on file   Relationships    Social connections:     Talks on phone: Not on file     Gets together: Not on file     Attends [employer]ism service: Not on file     Active member of club or organization: Not on file     Attends meetings of clubs or organizations: Not on file     Relationship status: Not on file    Intimate partner violence:     Fear of current or ex partner: Not on file     Emotionally abused: Not on file     Physically abused: Not on file     Forced sexual activity: Not on file   Other Topics Concern    Not on file   Social History Narrative    Has no children    Lives a lone without help available    Previous  service: Louise deployed to [employer]       Current Facility-Administered Medications:  acetaminophen 650 mg Oral Q6H PRN Oswaldo Bunch MD   [START ON 11/24/2019] atorvastatin 40 mg Oral Daily Oswaldo Bunch MD   docusate sodium 100 mg Oral BID Oswaldo Bunch MD   [START ON 11/24/2019] levothyroxine 50 mcg Oral Early Morning Oswaldo uBnch MD   [START ON 11/24/2019] lisinopril 40 mg Oral Daily Oswaldo Bunch MD   metoprolol tartrate 50 mg Oral Q12H [facility] Oswaldo Bunch MD   [START ON 11/24/2019] nicotine 1 patch Transdermal Daily Bessie Pike MD   Dulce Blinks ON 11/24/2019] NIFEdipine 60 mg Oral Daily Bessie Pike MD   ondansetron 4 mg Intravenous Q6H PRN Bessie Pike MD   ondansetron 4 mg Oral Q6H PRN Bessie Pike MD        No Known Allergies    Drug regimen reviewed, all potential adverse effects identified and addressed    Home environment:   2nd floor apartment  Lives alone  24/7 supervision not available on discharge  Physical Exam:  Temp:  [97 9 °F (36 6 °C)-98 2 °F (36 8 °C)] 98 2 °F (36 8 °C)  HR:  [70-83] 74  Resp:  [16-18] 18  BP: (140-151)/(74-80) 140/80  SpO2:  [94 %-96 %] 95 %    GEN: lying on right side in bed, attempting to get out of bed  Head: NCAT, no gross lesions  Eyes: PERRL, EOMI  Throat: clear, no thrush, MMM  Pulm: CTAB, no rales/wheezes  CV: RRR, normal s1/s2  Abd: soft, NTND  Ext: no pedal edema bilaterally, distal extremities warm and well perfused  Psych: appearing confused, no agitation  Skin: no observable rashes  Neuro: alert, oriented to person only; follows simple commands inconsistently; limited manual motor testing due to inconsistent command following, but observed to move LUE and LLE spontaneously and antigravity; no spontaneous movement observed RUE and RLE   Negative babinski and hsu on right     Laboratory:    Results from last 7 days   Lab Units 11/23/19  0438 11/21/19 0435 11/20/19 0437   HEMOGLOBIN g/dL 12 7 12 4 12 7   HEMATOCRIT % 38 0 37 0 38 1   WBC Thousand/uL 8 00 10 15 9 50     Results from last 7 days   Lab Units 11/23/19  0438 11/21/19 0435 11/20/19 0437   BUN mg/dL 27* 29* 22   POTASSIUM mmol/L 3 6 3 8 3 7   CHLORIDE mmol/L 107 109* 110*   CREATININE mg/dL 1 06 1 00 0 96            Wt Readings from Last 1 Encounters:   11/23/19 62 5 kg (137 lb 11 2 oz)     Estimated body mass index is 19 76 kg/m² as calculated from the following:    Height as of this encounter: 5' 10" (1 778 m)  Weight as of this encounter: 62 5 kg (137 lb 11 2 oz)      Imaging: reviewed  MRI brain 11/16/19:  BRAIN PARENCHYMA:  There are multiple small scattered foci of acute infarction in the left cerebral hemisphere such as that seen in the left caudate head, left frontal periventricular white matter, left frontal cortical, subcortical and deep white   matter, left anterior parietal cortex and deep white matter and numerous foci within the left centrum semiovale  No right cerebral, brainstem or cerebellar infarction is noted  There are scattered punctate foci of gradient susceptibility artifact in   the left basal ganglia and right thalamus likely related to prior hypertensive hemorrhagic insult      There is curvilinear hypointensity in the left frontal sulci consistent with the CT defined area of subarachnoid hemorrhage        Periventricular and deep cerebral white matter chronic microangiopathic disease is noted  There is cortical and subcortical gliosis in the right temporal and parietal lobes from remote MCA distribution infarction  There are punctate chronic infarctions   within both cerebellar hemispheres  Plan:   1  Subarachnoid hemorrhage  - nonsurgical mamnement per neurosurgery  - monitor for decline in neuro status     2  HTN  - goal SBP < 160  - continue lisinopril, metoprolol, nifedipine  - cardiology follow up as outpatient     3  Left MCA CVA   - dysphagia - modified diet, SLP following   - comprehensive inpatient rehab with PT/OT/SLP, physiatrist, rehab nursing, CM  - continue statin  - seen by neurology, last note recommend ASA - but not on currently, will discuss with neurology     4  Hypothyroidism  - on synthroid     5  Bowel/bladder  - check bladder scan to rule out urinary retention  - daily upright in commode to promote bowel continence     6  DVT ppx   - SCDs  - holding chemoprophylaxis for now in setting of [facility]    7   Tibial plateau fx  - NWB RLE per ortho  - unlocked hinged knee brace             Rehabilitation Prognosis: fair   Tolerance for three hours of therapy a day: good     Family/Patient Goals:  Patient/family's goals: SNF      Patient will receive PT, OT and ST 60 minutes each per day, five days per week to achieve rehab goals  Mobility Goals:   Min A in wheelchair     Activities of Daily Living (ADLs) Goals:  Min A    Cognition / Communication:  Decreased carry over    Discharge Planning:  Rehabilitation and discharge goals discussed with the patient and/or family  Case Managment and Social Work to review patient/family resources and to coordinate Discharge Planning  Estimated length of stay: 10 to 14 days    Patient and Family Education and Training:  Rehabilitation and discharge goals discussed with the patient and/or family  Patient/family education/training needs to be discussed in weekly team meeting  Other equipment:  Wheelchair, tub/shower bench      Medical Necessity Criteria for [facility] Admission: Acute Kidney Injury, Hypertension, Bowel/Bladder Management and Delirium/Agitation/Encephalopathy   In addition, the preadmission screen, post-admission physical evaluation, overall plan of care and admissions order demonstrate a reasonable expectation that the following criteria were met at the time of admission to the [facility]  1  The patient requires active and ongoing therapeutic intervention of multiple therapy disciplines (physical therapy, occupational therapy, speech-language pathology, or prosthetics/orthotics), one of which is physical or occupational therapy  2  Patient requires an intensive rehabilitation therapy program, as defined in Chapter 1, section 110 2 2 of the CMS Medicare Policy Manual  This intensive rehabilitation therapy program will consist of at least 3 hours of therapy per day at least 5 days per week or at least 15 hours of intensive rehabilitation therapy within a 7 consecutive day period, beginning with the date of admission to the [facility]      3  The patient is reasonably expected to actively participate in, and benefit significantly from, the intensive rehabilitation therapy program as defined in Chapter 1, section 110 2 2 of the CMS Medicare Policy Manual at this time of admission to the [facility]  He can reasonably be expected to make measurable improvement (that will be of practical value to improve the patients functional capacity or adaptation to impairments) as a result of the rehabilitation treatment, as defined in section 110 3, and such improvement can be expected to be made within the prescribed period of time  As noted in the CMS Medicare Policy Manual, the patient need not be expected to achieve complete independence in the domain of self-care nor be expected to return to his or her prior level of functioning in order to meet this standard  4  The patient must require physician supervision by a rehabilitation physician  As such, a rehabilitation physician will conduct face-to-face visits with the patient at least 3 days per week throughout the patients stay in the [facility] to assess the patient both medically and functionally, as well as to modify the course of treatment as needed to maximize the patients capacity to benefit from the rehabilitation process  5  The patient requires an intensive and coordinated interdisciplinary approach to providing rehabilitation, as defined in Chapter 1, section 110 2 5 of the CMS Medicare Policy Manual  This will be achieved through periodic team conferences, conducted at least once in a 7-day period, and comprising of an interdisciplinary team of medical professionals consisting of: a rehabilitation physician, registered nurse,  and/or , and a licensed/certified therapist from each therapy discipline involved in treating the patient  Changes Since Pre-admission Assessment: None -This patient's participation in rehab continues to be reasonable, necessary and appropriate      CMS Required Post-Admission Physician Evaluation Elements  History and Physical, including medical history, functional history and active comorbidities as in above text      PostAdmission Physician Evaluation:  The patient has the potential to make improvement and is in need of physical, occupational, and/or therapy services  The patient may also need nutritional services  Given the patient's complex medical condition and risk of further medical complications, rehabilitative services cannot be safely provided at a lower level of care, such as a skilled nursing facility  I have reviewed the patient's functional and medical status at the time of the preadmission screening and they are the same as on the day of this admission  I acknowledge that I have personally performed a full physical examination on this patient within 24 hours of admission  The patient and/or family demonstrated understanding the rehabilitation program and the discharge process after we discussed them      Agree in entirety: yes  Minor adaptions: none    Major changes: none     Michael Rosa MD [facility]  Physical Medicine and Rehabilitation    ** Please Note: Fluency Direct voice to text software may have been used in the creation of this document   **

## 2019-11-23 NOTE — PLAN OF CARE
Problem: Prexisting or High Potential for Compromised Skin Integrity  Goal: Skin integrity is maintained or improved  Description  INTERVENTIONS:  - Identify patients at risk for skin breakdown  - Assess and monitor skin integrity  - Assess and monitor nutrition and hydration status  - Monitor labs   - Assess for incontinence   - Turn and reposition patient  - Assist with mobility/ambulation  - Relieve pressure over bony prominences  - Avoid friction and shearing  - Provide appropriate hygiene as needed including keeping skin clean and dry  - Evaluate need for skin moisturizer/barrier cream  - Collaborate with interdisciplinary team   - Patient/family teaching  - Consider wound care consult   Outcome: Adequate for Discharge     Problem: Potential for Falls  Goal: Patient will remain free of falls  Description  INTERVENTIONS:  - Assess patient frequently for physical needs  -  Identify cognitive and physical deficits and behaviors that affect risk of falls  -  [place] fall precautions as indicated by assessment   - Educate patient/family on patient safety including physical limitations  - Instruct patient to call for assistance with activity based on assessment  - Modify environment to reduce risk of injury  - Consider OT/PT consult to assist with strengthening/mobility  Outcome: Adequate for Discharge     Problem: Nutrition/Hydration-ADULT  Goal: Nutrient/Hydration intake appropriate for improving, restoring or maintaining nutritional needs  Description  Monitor and assess patient's nutrition/hydration status for malnutrition  Collaborate with interdisciplinary team and initiate plan and interventions as ordered  Monitor patient's weight and dietary intake as ordered or per policy  Utilize nutrition screening tool and intervene as necessary  Determine patient's food preferences and provide high-protein, high-caloric foods as appropriate       INTERVENTIONS:  - Monitor oral intake, urinary output, labs, and treatment plans  - Assess nutrition and hydration status and recommend course of action  - Evaluate amount of meals eaten  - Assist patient with eating if necessary   - Allow adequate time for meals  - Recommend/ encourage appropriate diets, oral nutritional supplements, and vitamin/mineral supplements  - Order, calculate, and assess calorie counts as needed  - Recommend, monitor, and adjust tube feedings and TPN/PPN based on assessed needs  - Assess need for intravenous fluids  - Provide specific nutrition/hydration education as appropriate  - Include patient/family/caregiver in decisions related to nutrition  Outcome: Adequate for Discharge     Problem: CONFUSION/THOUGHT DISTURBANCE  Goal: Thought disturbances (confusion, delirium, depression, dementia or psychosis) are managed to maintain or return to baseline mental status and functional level  Description  INTERVENTIONS:  - Assess for possible contributors to  thought disturbance, including but not limited to medications, infection, impaired vision or hearing, underlying metabolic abnormalities, dehydration, respiratory compromise,  psychiatric diagnoses and notify attending PHYSICAN/AP  - Monitor and intervene to maintain adequate nutrition, hydration, elimination, sleep and activity  - Decrease environmental stimuli, including noise as appropriate  - Provide frequent contacts to provide refocusing, direction and reassurance as needed  Approach patient calmly with eye contact and at their level    - [place] high risk fall precautions, aspiration precautions and other safety measures, as indicated  - If delirium suspected, notify physician/AP of change in condition and request immediate in-person evaluation  - Pursue consults as appropriate including Geriatric (campus dependent), OT for cognitive evaluation/activity planning, psychiatric, pastoral care, etc   Outcome: Adequate for Discharge     Problem: BEHAVIOR  Goal: Pt/Family maintain appropriate behavior and adhere to behavioral management agreement, if implemented  Description  INTERVENTIONS:  - Assess the family dynamic   - Encourage verbalization of thoughts and concerns in a socially appropriate manner  - Assess patient/family's coping skills and non-compliant behavior (including use of illegal substances)  - Utilize positive, consistent limit setting strategies supporting safety of patient, staff and others  - Initiate consult with Case Management, Spiritual Care or other ancillary services as appropriate  - If a patient's/visitor's behavior jeopardizes the safety of the patient, staff, or others, refer to organization procedure  - Notify Security of behavior or suspected illegal substances which indicate the need for search of the patient and/or belongings  - Encourage participation in the decision making process about a behavioral management agreement; implement if patient meets criteria  Outcome: Adequate for Discharge     Problem: NEUROSENSORY - ADULT  Goal: Achieves stable or improved neurological status  Description  INTERVENTIONS  - Monitor and report changes in neurological status  - Monitor vital signs such as temperature, blood pressure, glucose, and any other labs ordered   - Initiate measures to prevent increased intracranial pressure  - Monitor for seizure activity and implement precautions if appropriate      Outcome: Adequate for Discharge  Goal: Achieves maximal functionality and self care  Description  INTERVENTIONS  - Monitor swallowing and airway patency with patient fatigue and changes in neurological status  - Encourage and assist patient to increase activity and self care     - Encourage visually impaired, hearing impaired and aphasic patients to use assistive/communication devices  Outcome: Adequate for Discharge     Problem: MUSCULOSKELETAL - ADULT  Goal: Maintain or return mobility to safest level of function  Description  INTERVENTIONS:  - Assess patient's ability to carry out ADLs; assess patient's baseline for ADL function and identify physical deficits which impact ability to perform ADLs (bathing, care of mouth/teeth, toileting, grooming, dressing, etc )  - Assess/evaluate cause of self-care deficits   - Assess range of motion  - Assess patient's mobility  - Assess patient's need for assistive devices and provide as appropriate  - Encourage maximum independence but intervene and supervise when necessary  - Involve family in performance of ADLs  - Assess for home care needs following discharge   - Consider OT consult to assist with ADL evaluation and planning for discharge  - Provide patient education as appropriate  Outcome: Adequate for Discharge  Goal: Maintain proper alignment of affected body part  Description  INTERVENTIONS:  - Support, maintain and protect limb and body alignment  - Provide patient/ family with appropriate education  Outcome: Adequate for Discharge

## 2019-11-23 NOTE — ASSESSMENT & PLAN NOTE
- Reportedly noncompliant with medications prior to admission   - Had hypertensive urgency on admission   - Currently back on home medications, however still elevated this a m   - Cardiology continues to follow the patient   - cardio ordered renin, aldosterone, and plasma free metanephrines - pending    - will continue to follow up with recs regarding both long-term and current treatment plant    - elevated BP this am and overnight - will follow up cardio recs   - PRN Medications are ordered  - Goal systolic blood pressure 130-146 per Neurology    - Cardiology following and managing medications, labs stable this morning

## 2019-11-23 NOTE — DISCHARGE SUMMARY
Discharge Summary - Spencer White 67 y o  male MRN: [id number]    [facility] Encounter: [id number]    Admission Date:   Admission Orders (From admission, onward)     Ordered        11/15/19 1448  Inpatient Admission  Once                     Admitting Diagnosis: Subarachnoid hemorrhage ([facility] [address] ) [I60 9]  Hypertension [I10]  Tibial plateau fracture [Q57 199W]  Right arm weakness [R29 898]  Bleeding in head following injury with loss of consciousness ([facility] [address] ) [Q98 496U]    HPI: Per DESIREE Ernst, "Spencer White is a 67 y o  male who presents with small SAH noticed on CT head during stroke alert at [facility]  He presented to [facility] after sustaining a fall down stairs, reportedly carrying heavy groceries  Pt states he lost his balance, denies any syncope or pre-syncope symptoms  He was being treated for a right tibial plateau fracture and HTN emergency with mildly elevated troponins  He is known to be non-compliant with his medications, s/p remote history of CABG and HTN  He was restarted on his prescribed medications including aspirin  He was noted to have right sided hemiplegia/weakness and slurred speach at some point while hospitalized and a stroke alert was called  Exact start of symptoms is unclear  CT revealed small SAH but this does not explain his symptoms  He was given DDAVP and transferred for further evaluation  Upon arrival, he is GCS 14, noted to be confused and continues to have right sided weakness  He will dorsiflex his right foot with noxious stimuli to the top of his foot and will move his right index finger with noxious stimuli  Otherwise, he does not move the right side  He has no gaze or optic deficits and his speech is clear, though he is confused  He denies having any family and gets agitated with persistent inquiry  He refuses to provide any contact information for his brother whom is noted by his PCP as his emergency contact but has no contact number provided   He adamantly states he would not want CPR or intubated  He reports that he stopped taking his medications "months ago  I decided I know just as well as the doctors  I'm old anyway  What do I need those for anymore " He denies any other injuries or complaints  His Right knee remains in a locked knee brace "    Procedures Performed: No orders of the defined types were placed in this encounter  Summary of Hospital Course:     ICU summary per Pio Person MD, "Patient was seen by Neurology and Neurosurgery  Neurosurgery felt that the subarachnoid hemorrhage required no intervention and was unlikely to be contributing to his symptoms  Patient subsequently underwent MRI per Neurology recommendations and was found to have a left MCA territory infarct  Patient was transferred to the ICU due to worsening neurologic status  He was started on aspirin and a statin  Patient was admitted to the ICU to maintain a systolic blood pressure of 140-160 which was accomplished without the need for pressors  Patient's symptoms remained relatively unchanged throughout his stay in the ICU "    Patient was transferred out of ICU, work with physical and occupational therapy, and was discharged to rehab    Significant Findings, Care, Treatment and Services Provided:       Complications:       Discharge Diagnosis:   Right tibial plateau fracture  Subarachnoid hemorrhage  Hypertension  CVA      Resolved Problems  Date Reviewed: 11/23/2019          Resolved    Encephalopathy 11/18/2019     Resolved by  Pio Person PA-C          Condition at Discharge: good         Discharge instructions/Information to patient and family:   See after visit summary for information provided to patient and family  Provisions for Follow-Up Care:  See after visit summary for information related to follow-up care and any pertinent home health orders        PCP: Jakob Castelan DO    Disposition: [id number] [facility]    Planned Readmission: Yes ARC      Discharge Statement   I spent 23 minutes discharging the patient  This time was spent on the day of discharge  I had direct contact with the patient on the day of discharge  Additional documentation is required if more than 30 minutes were spent on discharge  Discharge Medications:  See after visit summary for reconciled discharge medications provided to patient and family

## 2019-11-24 PROBLEM — F17.200 TOBACCO USE DISORDER: Chronic | Status: ACTIVE | Noted: 2017-07-10

## 2019-11-24 PROBLEM — I25.10 CORONARY ARTERY DISEASE INVOLVING NATIVE HEART WITHOUT ANGINA PECTORIS: Chronic | Status: ACTIVE | Noted: 2017-07-10

## 2019-11-24 PROBLEM — S82.141D CLOSED FRACTURE OF RIGHT TIBIAL PLATEAU WITH ROUTINE HEALING: Chronic | Status: ACTIVE | Noted: 2019-11-15

## 2019-11-24 PROBLEM — Z86.73 HISTORY OF ISCHEMIC LEFT MCA STROKE: Chronic | Status: ACTIVE | Noted: 2019-11-24

## 2019-11-24 PROBLEM — E78.2 MIXED HYPERLIPIDEMIA: Chronic | Status: ACTIVE | Noted: 2017-07-10

## 2019-11-24 PROBLEM — I10 ESSENTIAL HYPERTENSION: Chronic | Status: ACTIVE | Noted: 2017-07-10

## 2019-11-24 PROBLEM — E03.8 OTHER SPECIFIED HYPOTHYROIDISM: Chronic | Status: ACTIVE | Noted: 2017-07-10

## 2019-11-24 PROBLEM — I60.9 SUBARACHNOID HEMORRHAGE (HCC): Chronic | Status: ACTIVE | Noted: 2019-11-15

## 2019-11-24 PROCEDURE — 99255 IP/OBS CONSLTJ NEW/EST HI 80: CPT | Performed by: NURSE PRACTITIONER

## 2019-11-24 RX ORDER — ATORVASTATIN CALCIUM 40 MG/1
40 TABLET, FILM COATED ORAL
Status: DISCONTINUED | OUTPATIENT
Start: 2019-11-25 | End: 2019-12-10 | Stop reason: HOSPADM

## 2019-11-24 RX ADMIN — LEVOTHYROXINE SODIUM 50 MCG: 25 TABLET ORAL at 06:07

## 2019-11-24 RX ADMIN — DOCUSATE SODIUM 100 MG: 100 CAPSULE, LIQUID FILLED ORAL at 17:12

## 2019-11-24 RX ADMIN — ATORVASTATIN CALCIUM 40 MG: 40 TABLET, FILM COATED ORAL at 08:05

## 2019-11-24 RX ADMIN — DOCUSATE SODIUM 100 MG: 100 CAPSULE, LIQUID FILLED ORAL at 08:05

## 2019-11-24 RX ADMIN — NICOTINE 1 PATCH: 21 PATCH, EXTENDED RELEASE TRANSDERMAL at 08:06

## 2019-11-24 RX ADMIN — LISINOPRIL 40 MG: 20 TABLET ORAL at 08:05

## 2019-11-24 RX ADMIN — METOPROLOL TARTRATE 50 MG: 50 TABLET, FILM COATED ORAL at 21:49

## 2019-11-24 RX ADMIN — NIFEDIPINE 60 MG: 30 TABLET, FILM COATED, EXTENDED RELEASE ORAL at 08:05

## 2019-11-24 RX ADMIN — METOPROLOL TARTRATE 50 MG: 50 TABLET, FILM COATED ORAL at 08:05

## 2019-11-24 NOTE — PROGRESS NOTES
Patient continues to disconnect SCD from pump,redirection ineffective  Will continue to educate and apply SCDs

## 2019-11-24 NOTE — NURSING NOTE
Pt awake resting in bed watching TV  Flat affect, inappropriate at times and laughs when talking to  Confused  Slept majority of shift, repo Q2HR for pressure relief R side flaccid  Knee immobilizer in place to RLE NWB  [address] cath removed this AM, 500 ml malodorous urine and PVR of 128  Incont or urine, no BM during shift  SCD's for DVT prophylaxis pt would frequently unplugged SCD from sleeve, education provided regarding importance of wearing to prevent blood clots  Assessment unchanged  All items within reach  Continuing to monitor and follow plan of care  Bed alarm in place for safety

## 2019-11-24 NOTE — ASSESSMENT & PLAN NOTE
· Secondary to fall on 11/14/2019  · Patient is to be nonweightbearing and continue the hinged knee brace  · PT OT per primary services

## 2019-11-24 NOTE — UTILIZATION REVIEW
Notification of Discharge  This is a Notification of Discharge from our facility [address]  Please be advised that this patient has been discharge from our facility  Below you will find the admission and discharge date and time including the patients disposition  PRESENTATION DATE: 11/15/2019  1:51 PM  OBS ADMISSION DATE:   IP ADMISSION DATE: 11/15/19 1448   DISCHARGE DATE: 11/23/2019  4:34 PM  DISPOSITION: [address]   Admission Orders listed below:  Admission Orders (From admission, onward)     Ordered        11/15/19 1448  Inpatient Admission  Once                   Please contact the UR Department if additional information is required to close this patient's authorization/case  [facility] Utilization Review Department  Main: [phone] x carefully listen to the prompts  All voicemails are confidential   [email or web address]  Send all requests for admission clinical reviews, approved or denied determinations and any other requests to dedicated fax number below belonging to the campus where the patient is receiving treatment    List of dedicated fax numbers:  [address] DENIALS (Administrative/Medical Necessity) [phone]   [address] (Maternity/NICU/Pediatrics) [phone]   Senthil Flores [phone]   True Constant [phone]   Abidrahman Standing [phone]   145 [facility] 1525 [facility] [phone]   Edwardo Evans [address] [phone]

## 2019-11-24 NOTE — PLAN OF CARE
Problem: Potential for Falls  Goal: Patient will remain free of falls  Description  INTERVENTIONS:  - Assess patient frequently for physical needs  -  Identify cognitive and physical deficits and behaviors that affect risk of falls  -  [place] fall precautions as indicated by assessment   - Educate patient/family on patient safety including physical limitations  - Instruct patient to call for assistance with activity based on assessment  - Modify environment to reduce risk of injury  - Consider OT/PT consult to assist with strengthening/mobility  Outcome: Progressing     Problem: Neurological Deficit  Goal: Neurological status is stable or improving  Description  Interventions:  - Monitor and assess patient's level of consciousness, motor function, sensory function, and level of assistance needed for ADLs  - Monitor and report changes from baseline  Collaborate with interdisciplinary team to initiate plan and implement interventions as ordered  - Provide and maintain a safe environment  - Consider seizure precautions  - Consider fall precautions  - Consider aspiration precautions  - Consider bleeding precautions  Outcome: Progressing     Problem: Activity Intolerance/Impaired Mobility  Goal: Mobility/activity is maintained at optimum level for patient  Description  Interventions:  - Assess and monitor patient  barriers to mobility and need for assistive/adaptive devices  - Assess patient's emotional response to limitations  - Collaborate with interdisciplinary team and initiate plans and interventions as ordered  - Encourage independent activity per ability   - Maintain proper body alignment  - Perform active/passive rom as tolerated/ordered    - Plan activities to conserve energy   - Turn patient as appropriate  Outcome: Progressing     Problem: Potential for Aspiration  Goal: Non-ventilated patient's risk of aspiration is minimized  Description  Assess and monitor vital signs, respiratory status, and labs (WBC)  Monitor for signs of aspiration (tachypnea, cough, rales, wheezing, cyanosis, fever)  - Assess and monitor patient's ability to swallow  - Place patient up in chair to eat if possible  - HOB up at 90 degrees to eat if unable to get patient up into chair   - Supervise patient during oral intake  - Instruct patient/ family to take small bites  - Instruct patient/ family to take small single sips when taking liquids  - Follow patient-specific strategies generated by speech pathologist   Outcome: Progressing  Goal: Ventilated patient's risk of aspiration is minimized  Description  Assess and monitor vital signs, respiratory status, airway cuff pressure, and labs (WBC)  Monitor for signs of aspiration (tachypnea, cough, rales, wheezing, cyanosis, fever)  - Elevate head of bed 30 degrees if patient has tube feeding   - Monitor tube feeding  Outcome: Progressing     Problem: Nutrition  Goal: Nutrition/Hydration status is improving  Description  Monitor and assess patient's nutrition/hydration status for malnutrition (ex- brittle hair, bruises, dry skin, pale skin and conjunctiva, muscle wasting, smooth red tongue, and disorientation)  Collaborate with interdisciplinary team and initiate plan and interventions as ordered  Monitor patient's weight and dietary intake as ordered or per policy  Utilize nutrition screening tool and intervene per policy  Determine patient's food preferences and provide high-protein, high-caloric foods as appropriate  - Assist patient with eating   - Allow adequate time for meals   - Encourage patient to take dietary supplement as ordered  - Collaborate with clinical nutritionist   - Include patient/family/caregiver in decisions related to nutrition    Outcome: Progressing     Problem: Prexisting or High Potential for Compromised Skin Integrity  Goal: Skin integrity is maintained or improved  Description  INTERVENTIONS:  - Identify patients at risk for skin breakdown  - Assess and monitor skin integrity  - Assess and monitor nutrition and hydration status  - Monitor labs   - Assess for incontinence   - Turn and reposition patient  - Assist with mobility/ambulation  - Relieve pressure over bony prominences  - Avoid friction and shearing  - Provide appropriate hygiene as needed including keeping skin clean and dry  - Evaluate need for skin moisturizer/barrier cream  - Collaborate with interdisciplinary team   - Patient/family teaching  - Consider wound care consult   Outcome: Progressing     Problem: Nutrition/Hydration-ADULT  Goal: Nutrient/Hydration intake appropriate for improving, restoring or maintaining nutritional needs  Description  Monitor and assess patient's nutrition/hydration status for malnutrition  Collaborate with interdisciplinary team and initiate plan and interventions as ordered  Monitor patient's weight and dietary intake as ordered or per policy  Utilize nutrition screening tool and intervene as necessary  Determine patient's food preferences and provide high-protein, high-caloric foods as appropriate       INTERVENTIONS:  - Monitor oral intake, urinary output, labs, and treatment plans  - Assess nutrition and hydration status and recommend course of action  - Evaluate amount of meals eaten  - Assist patient with eating if necessary   - Allow adequate time for meals  - Recommend/ encourage appropriate diets, oral nutritional supplements, and vitamin/mineral supplements  - Order, calculate, and assess calorie counts as needed  - Recommend, monitor, and adjust tube feedings and TPN/PPN based on assessed needs  - Assess need for intravenous fluids  - Provide specific nutrition/hydration education as appropriate  - Include patient/family/caregiver in decisions related to nutrition  Outcome: Progressing

## 2019-11-24 NOTE — PROGRESS NOTES
11/24/19 0818   Charting Type   Charting Type Shift assessment   Neurological   Neuro (WDL) X   Facial Symmetry Right facial drooping   Speech Delayed responses   Muscle Function/Sensation Assessment Muscle strength;   R Hand  Absent   RUE Motor Response Flaccid   RUE Muscle Strength 0- No movement   LUE Muscle Strength 4- Movement against gravity and limited resistance   RLE Motor Response Flaccid   RLE Muscle Strength 0- No movement   LLE Muscle Strength 4- Movement against gravity and limited resistance   Neuro Symptoms Irritable; Forgetful   Relieved by Rest   [place] Coma Scale   Eye Opening 4   Best Verbal Response 4   Best Motor Response 6   [place] Coma Scale Score 14   HEENT   HEENT (WDL) X   Head and Face Asymmetrical   R Eye Mildly impaired vision   L Eye Mildly impaired vision   R Ear Mildly impaired hearing   L Ear Mildly impaired hearing   Teeth Missing teeth;Poor dental hygiene   Respiratory   Respiratory (WDL) WDL   Cardiac   Cardiac (WDL) WDL   Pain Assessment   Pain Assessment No/denies pain   Pain Score No Pain   Peripheral Vascular   Peripheral Vascular (WDL) WDL   Integumentary   Integumentary (WDL) X   Skin Color Pale   Skin Condition/Temp Warm;Dry   Skin Integrity Bruising; Abrasion   Skin Location R knee scab   Skin Turgor Non-tenting   Сергей Scale   Sensory Perceptions 3   Moisture 3   Activity 1   Mobility 2   Nutrition 2   Friction and Shear 2   Сергей Scale Score 13   Musculoskeletal   Musculoskeletal (WDL) X   RUE Limited movement   RLE Limited movement   LLE Limited movement   Gastrointestinal   Gastrointestinal (WDL) WDL   Genitourinary   Genitourinary (WDL) WDL   Urine Assessment   Urinary Incontinence Yes   Genitalia   Male Genitalia Intact   Genitourinary Additional Assessments   Genitourinary Additional Assessments No   Anal/Rectal   Anal/Rectal (WDL) WDL   Psychosocial   Psychosocial (WDL) X   Patient Behaviors/Mood Flat affect   Needs Expressed Denies   Ability to Express Feelings Unable to express   Ability to Express Needs Unable to express   Ability to Express Thoughts Unable to express   Ability to Understand Others Sometimes understands

## 2019-11-24 NOTE — CONSULTS
Consult- Christian Ramirez 1940, 67 y o  male MRN: [id number]    Unit/Bed#: [facility] 214-01 Encounter: [id number]    Primary Care Provider: Sabina Klein DO   Date and time admitted to hospital: 11/23/2019  5:53 PM      Inpatient consult to Internal Medicine  Consult performed by: DESIREE Maynard  Consult ordered by: Cachorro Sexton MD          * Coronary artery disease involving native heart without angina pectoris  Assessment & Plan  · Continue metoprolol, lisinopril  · Will need to discuss with Neurology if able to start aspirin    Essential hypertension  Assessment & Plan  · Continue metoprolol, lisinopril, nifedipine    Mixed hyperlipidemia  Assessment & Plan  · Continue statin    Other specified hypothyroidism  Assessment & Plan  · Continue Synthroid    Subarachnoid hemorrhage ([facility] [address] )  Assessment & Plan  · Left frontal hemorrhage found on CT scan on 11/15/2019  · Will provide nonsurgical management    Tobacco use disorder  Assessment & Plan  · Nicotine patch    Closed fracture of right tibial plateau with routine healing  Assessment & Plan  · Secondary to fall on 11/14/2019  · Patient is to be nonweightbearing and continue the hinged knee brace  · PT OT per primary services    History of ischemic left MCA stroke  Assessment & Plan  · Patient with right-sided hemiplegia  · Dysphagia diet          Recommendations for Discharge:  · Per Primary Service    Counseling / Coordination of Care Time: 45 minutes  Greater than 50% of total time spent on patient counseling and coordination of care  History of Present Illness:  Christian Ramirez is a 67 y o  male who is originally admitted to the acute rehab unit service due to deconditioning  We are consulted for coronary artery disease and hypertension  Patient does have some confusion, which is thought to be his baseline  He is not particularly cooperative this morning with question answers, however he is pleasant to me    Staff report that he is not always pleasant  Review of Systems:  Review of Systems   Unable to perform ROS: Acuity of condition (Patient does not answer questions )       Past Medical and Surgical History:   Past Medical History:   Diagnosis Date    Coronary artery disease     history of CABG    Disease of thyroid gland     Hemorrhoids     last assessed 7/10/17    History of arterial duplex of LE 01/10/2017    Bilateral occlusion of the superficial femoral arteries, severe diminution of the ankle brachial index bilaterally, disease appears worse on left than right   History of echocardiogram 07/20/2011    hypokinesia of the inferior wall  EF 50%   HL (hearing loss)     Hyperlipidemia     Hypertension     PVD (peripheral vascular disease)        Past Surgical History:   Procedure Laterality Date    COLONOSCOPY      Last assessed 7/10/17    CORONARY ARTERY BYPASS GRAFT  08/23/2010    3V CABG:  LIMA to LAD, VG to RI, VG to OM1      DENTAL SURGERY      Last assessed  7/10/17    KNEE SURGERY Left     Last assessed 7/10/17    TONSILLECTOMY AND ADENOIDECTOMY      Last assessed 7/10/17    TOOTH EXTRACTION         Meds/Allergies:  all medications and allergies reviewed    Allergies: No Known Allergies    Social History:     Marital Status: Single    Substance Use History:   Social History     Substance and Sexual Activity   Alcohol Use Not Currently    Alcohol/week: 1 0 standard drinks    Types: 1 Glasses of wine per week    Frequency: Monthly or less    Drinks per session: 1 or 2    Binge frequency: Less than monthly     Social History     Tobacco Use   Smoking Status Current Every Day Smoker    Packs/day: 0 50    Types: Cigarettes   Smokeless Tobacco Never Used     Social History     Substance and Sexual Activity   Drug Use No       Family History:  I have reviewed the patients family history    Physical Exam:   Vitals:   Blood Pressure: 140/70 (11/23/19 2155)  Pulse: 64 (11/23/19 2155)  Temperature: 98 2 °F (36 8 °C) (11/23/19 1802)  Temp Source: Tympanic (11/23/19 1802)  Respirations: 18 (11/23/19 1802)  Height: 5' 10" (177 8 cm) (11/23/19 1802)  Weight - Scale: 62 5 kg (137 lb 11 2 oz) (11/23/19 1802)  SpO2: 93 % (11/23/19 2143)    Physical Exam   Constitutional: Vital signs are normal  He appears well-developed and well-nourished  He is cooperative  HENT:   Head: Normocephalic and atraumatic  Nose: Nose normal    Mouth/Throat: Oropharynx is clear and moist and mucous membranes are normal    Eyes: Conjunctivae and EOM are normal    Neck: Full passive range of motion without pain  Cardiovascular: Normal rate, regular rhythm, normal heart sounds and normal pulses  Pulmonary/Chest: Effort normal and breath sounds normal    Abdominal: Soft  Normal appearance and bowel sounds are normal    Musculoskeletal:        Right knee: He exhibits decreased range of motion  Tenderness found  Legs:  Neurological: He is alert  Periods of confusion   Skin: Skin is warm  Psychiatric:   Unable to fully assess  Additional Data:   Lab Results: I have personally reviewed pertinent reports  Results from last 7 days   Lab Units 11/23/19  0438 11/21/19  0435   WBC Thousand/uL 8 00 10 15   HEMOGLOBIN g/dL 12 7 12 4   HEMATOCRIT % 38 0 37 0   PLATELETS Thousands/uL 225 188   NEUTROS PCT %  --  69   LYMPHS PCT %  --  13*   MONOS PCT %  --  12   EOS PCT %  --  3     Results from last 7 days   Lab Units 11/23/19  0438   POTASSIUM mmol/L 3 6   CHLORIDE mmol/L 107   CO2 mmol/L 26   BUN mg/dL 27*   CREATININE mg/dL 1 06   CALCIUM mg/dL 8 8             Lab Results   Component Value Date/Time    HGBA1C 5 1 11/15/2019 04:57 AM    HGBA1C 5 5 02/13/2019 02:00 PM           Imaging: I have personally reviewed pertinent reports  No orders to display       EKG, Pathology, and Other Studies Reviewed on Admission:   · EKG:  Not performed    ** Please Note: This note has been constructed using a voice recognition system   **

## 2019-11-25 ENCOUNTER — APPOINTMENT (INPATIENT)
Dept: CT IMAGING | Facility: HOSPITAL | Age: 79
DRG: 057 | End: 2019-11-25
Payer: COMMERCIAL

## 2019-11-25 PROBLEM — R13.10 DYSPHAGIA: Status: ACTIVE | Noted: 2019-11-25

## 2019-11-25 PROBLEM — I63.9 CVA (CEREBRAL VASCULAR ACCIDENT) (HCC): Status: ACTIVE | Noted: 2019-11-24

## 2019-11-25 PROCEDURE — 97530 THERAPEUTIC ACTIVITIES: CPT

## 2019-11-25 PROCEDURE — 92610 EVALUATE SWALLOWING FUNCTION: CPT

## 2019-11-25 PROCEDURE — 97535 SELF CARE MNGMENT TRAINING: CPT

## 2019-11-25 PROCEDURE — 97163 PT EVAL HIGH COMPLEX 45 MIN: CPT

## 2019-11-25 PROCEDURE — 99232 SBSQ HOSP IP/OBS MODERATE 35: CPT | Performed by: PHYSICAL MEDICINE & REHABILITATION

## 2019-11-25 PROCEDURE — 96125 COGNITIVE TEST BY HC PRO: CPT

## 2019-11-25 PROCEDURE — 70450 CT HEAD/BRAIN W/O DYE: CPT

## 2019-11-25 PROCEDURE — 97542 WHEELCHAIR MNGMENT TRAINING: CPT

## 2019-11-25 PROCEDURE — 97167 OT EVAL HIGH COMPLEX 60 MIN: CPT

## 2019-11-25 PROCEDURE — 94760 N-INVAS EAR/PLS OXIMETRY 1: CPT

## 2019-11-25 RX ORDER — ASPIRIN 81 MG/1
81 TABLET ORAL DAILY
Status: DISCONTINUED | OUTPATIENT
Start: 2019-11-26 | End: 2019-11-25

## 2019-11-25 RX ORDER — ASPIRIN 81 MG/1
81 TABLET ORAL DAILY
Status: DISCONTINUED | OUTPATIENT
Start: 2019-11-25 | End: 2019-12-10 | Stop reason: HOSPADM

## 2019-11-25 RX ADMIN — ASPIRIN 81 MG: 81 TABLET, COATED ORAL at 14:24

## 2019-11-25 RX ADMIN — NIFEDIPINE 60 MG: 30 TABLET, FILM COATED, EXTENDED RELEASE ORAL at 09:41

## 2019-11-25 RX ADMIN — ATORVASTATIN CALCIUM 40 MG: 40 TABLET, FILM COATED ORAL at 16:51

## 2019-11-25 RX ADMIN — NICOTINE 1 PATCH: 21 PATCH, EXTENDED RELEASE TRANSDERMAL at 09:35

## 2019-11-25 RX ADMIN — METOPROLOL TARTRATE 50 MG: 50 TABLET, FILM COATED ORAL at 09:41

## 2019-11-25 RX ADMIN — DOCUSATE SODIUM 100 MG: 100 CAPSULE, LIQUID FILLED ORAL at 09:40

## 2019-11-25 RX ADMIN — DOCUSATE SODIUM 100 MG: 100 CAPSULE, LIQUID FILLED ORAL at 17:27

## 2019-11-25 RX ADMIN — LEVOTHYROXINE SODIUM 50 MCG: 25 TABLET ORAL at 05:50

## 2019-11-25 RX ADMIN — METOPROLOL TARTRATE 50 MG: 50 TABLET, FILM COATED ORAL at 20:24

## 2019-11-25 RX ADMIN — LISINOPRIL 40 MG: 20 TABLET ORAL at 09:43

## 2019-11-25 NOTE — ASSESSMENT & PLAN NOTE
- L carotid dz, L vertebral artery dz, B/L LE arterial dz  - cleared by neurology to re-start home ASA 81 mg qd (ischemic CVA)  - per neuro on home lipitor 40 mg qpm  - follows with Dr Giovanni Joseph as OP and per Dr Gilberto Gaming OP note patient refused to see vascular surgeon

## 2019-11-25 NOTE — TREATMENT PLAN
Individualized Plan of Cristiano Callaway 67 y o  male MRN: [id number]  Unit/Bed#: -01 Encounter: [id number]     PATIENT INFORMATION  ADMISSION DATE: 11/23/2019  5:53 PM SHUN CATEGORY: CVA   ADMISSION DIAGNOSIS: CVA (cerebral vascular accident) ([facility] [address] ) [I63 9]  EXPECTED LOS: 1-2 wks     MEDICAL/FUNCTIONAL PROGNOSIS  Based on my assessment of the patient's medical conditions and current functional status, the prognosis for attaining medical and functional goals or the IRF stay is:  Fair     Medical Goals: risk factor modification     ANTICIPATED DISCHARGE DISPOSITION AND SERVICES  COMMUNITY SETTING: home     ANTICIPATED FOLLOW-UP SERVICE:   Outpatient Therapy Services: PT/OT/ST     DISCIPLINE SPECIFIC PLANS:  Required Disciplines & Services: PT/OT/ST    REQUIRED THERAPY:  Therapy Hours per Day Days per Week Total Days   Physical Therapy 1 5 10   Occupational Therapy 1 5 10   Speech/Language Therapy 1 5 10   NOTE: Additional therapy time(s) may be added as appropriate to meet patient needs and to achieve functional goals          ANTICIPATED FUNCTIONAL OUTCOMES:  ADL:   Patient will reach supervision level for ADLs upon completion of rehab program   Bladder/Bowel: Patient will reach supervision level for bladder/bowel management upon completion of rehab program   Transfers:   Patient will reach supervision level for transfers upon completion of rehab program   Locomotion:   Patient will reach supervision level for locomotion upon completion of rehab program   Cognitive:  Patient will require supervision for simple cognitive tasks upon completion of the rehab program      DISCHARGE PLANNING NEEDS  Equipment needs: tbd       REHAB ANTICIPATED PARTICIPATION RESTRICTIONS:  None

## 2019-11-25 NOTE — MALNUTRITION/BMI
This medical record reflects one or more clinical indicators suggestive of malnutrition and/or morbid obesity  Malnutrition Findings:   Malnutrition type: Chronic illness  Degree of Malnutrition: Malnutrition of moderate degree(related to inadequate protein energy intake as evidenced by moderate  muscle wasting in the shoulder, clavicle, temporalis and quadriceps and moderate orbital, tricep and ribcage fat loss )  Malnutrition Characteristics: Fat loss, Muscle loss    BMI Findings:  BMI Classifications: (BMI 20 79)     Body mass index is 20 79 kg/m²  See Nutrition note dated 11/25/19 for additional details  Completed nutrition assessment is viewable in the nutrition documentation

## 2019-11-25 NOTE — CASE MANAGEMENT
Initial assessment & orientation to [facility] with Pt & phone contact with Pt's brother/emergency contact  Discussed [address]  Pt was very quiet & poor historian  Pt's brother provided someinformation  Pt lives alone in an apt  Pt's brother has expressed that he and family members anticipate the need for SNF placement; reported that they prefer a facility in [place]  SW will continue to monitor & assist as needed with [address]  [employer]; policy [id number], Bebeto Bishop [id number], with clinical  update due on 11/26/19 to fax [phone]

## 2019-11-25 NOTE — ASSESSMENT & PLAN NOTE
- modified diet of mechanical soft and thin liquids per speech therapy  - continued speech therapy and modified diet at [facility]

## 2019-11-25 NOTE — ASSESSMENT & PLAN NOTE
- h/o CABG  - cleared by neurology to resume home ASA 81 mg qd   - on home on lopressor 50 mg q12 (IM recommends this be continued upon dc to SNF)   - on home lipitor 40 mg qpm   - mildly elevated troponins in acute care which peaked at 0 06  - seen by cards in acute care and did have an echo and no further KEBEDE/intevention recommended by cards  - follows with Dr Jarrett Correa as OP

## 2019-11-25 NOTE — PROGRESS NOTES
11/25/19 1012   Charting Type   Charting Type Shift assessment   Neurological   Neuro (WDL) X   Level of Consciousness Alert/awake   Orientation Level Oriented to person;Disoriented to place; Disoriented to time;Disoriented to situation   Cognition Follows commands; Short term memory loss   Extraocular Movements Full   Facial Symmetry Right facial drooping   Swallow Difficulty swallowing   R Pupil Size (mm) 3   L Pupil Size (mm) 3   R Hand  Absent   R Foot Dorsiflexion Absent   R Foot Plantar Flexion Absent   RUE Motor Response Flaccid   RUE Sensation No sensation;Numbness   RUE Muscle Strength 0- No movement   RLE Motor Response Flaccid   RLE Sensation Numbness   RLE Muscle Strength 0- No movement   Neuro Symptoms Fatigue; Forgetful   Relieved by Rest   Neuro Additional Assessments No   Delirium Assessment- CAM    Acute Onset and Fluctuating Course (1) No   Inattention (2) No   Disorganized Thinking (3) No   Rate Patient's Level of Consciousness (4) Alert (Normal), No   Delirium Present No   Seizure Activity   Symptoms None   Reena Coma Scale   Eye Opening 4   Best Verbal Response 4   Best Motor Response 6   Reena Coma Scale Score 14   HEENT   HEENT (WDL) X   Head and Face Asymmetrical   R Eye Mildly impaired vision   L Eye Mildly impaired vision   R Ear Mildly impaired hearing   L Ear Mildly impaired hearing   Lips Asymmetrical   Teeth Missing teeth   Respiratory   Respiratory (WDL) WDL   Respiratory Pattern Normal   Chest Assessment Chest expansion symmetrical   Bilateral Breath Sounds Clear   R Breath Sounds Clear;Diminished   L Breath Sounds   (left mid and base diminished)   Respiratory Additional Assessments No   Respiratory Interventions   Respiratory Interventions Cough and deep breathe;Incentive spirometry   Cough and Deep Breathe   Cough and Deep Breathe Yes   Pain Assessment   Pain Assessment 0-10   Pain Score No Pain   Peripheral Vascular   Peripheral Vascular (WDL) X   Edema Right upper extremity   RUE Edema Trace   PVS Additional Assessments No   RUE Neurovascular Assessment   RUE Temperature/Moisture Warm;Dry   R Radial Pulse +2   LUE Neurovascular Assessment   L Radial Pulse +2   RLE Neurovascular Assessment   R Pedal Pulse +1   LLE Neurovascular Assessment   L Pedal Pulse +1   Integumentary   Skin Condition/Temp Warm;Dry   Skin Integrity Bruising; Abrasion   Skin Location rt knee scab   Skin Turgor Non-tenting   Integumentary Additional Assessments No   Tattoos/Piercings   Does patient have tattoos? No   Сергей Scale   Sensory Perceptions 2   Moisture 2   Activity 2   Mobility 2   Nutrition 2   Friction and Shear 2   Сергей Scale Score 12   Musculoskeletal   Musculoskeletal (WDL) X   RUE Sling;Paralysis   RLE Paralysis   Musculoskeletal Additional Assessments No   Gastrointestinal   Gastrointestinal (WDL) WDL   Abdomen Inspection Soft;Nondistended   Gastrointestinal Additional Assessments No   Bowel Shift Assessment   Assistance Needed Stool softener   Bowel Incontinence No   Genitourinary   Genitourinary (WDL) WDL   Urine Assessment   Urinary Incontinence Yes   Genitalia   Male Genitalia Intact   Genitourinary Additional Assessments   Genitourinary Additional Assessments No   Anal/Rectal   Anal/Rectal (WDL) WDL   Psychosocial   Psychosocial (WDL) X   Needs Expressed Denies   Psychosocial Additional Assessments No   Ability to Express Feelings Needs assistance   Ability to Express Needs Unable to express   Ability to Express Thoughts Unable to express   Ability to Understand Others Usually understands   Hang Suicide Severity Rating Scale   1   Wish to be Dead No   Cough   Cough None

## 2019-11-25 NOTE — PROGRESS NOTES
OT eval and ADL note     11/25/19 6011   Patient Data   Rehab Impairment CVA and right tibial plateau fracture   Etiologic Diagnosis CVA RUE flaccid, NWB RLE with immobolizer   Home Setup   Type of Home Apartment  (2nd floor per MD note)   Method of Entry Stairs   Baseline Information   Transportation    Prior IADL Participation   Money Management Identify Money;Estimate Costs;Estimate Change;Combine Bills;Manage Checkbook   Meal Preparation Full Participation   Laundry Full Participation   Home Cleaning Full Participation   Prior Level of Function   Self-Care 3  Independent - Patient completed the activities by him/herself, with or without an assistive device, with no assistance from a helper  Functional Cognition 3  Independent - Patient completed the activities by him/herself, with or without an assistive device, with no assistance from a helper  Falls in the Last Year   Number of falls in the past 12 months 1   Type of Injury Associated with Fall   (during this current episode)   Patient Preference   Nickname (Patient Preference) Roderick Cruz   Restrictions/Precautions   Precautions Fall Risk;Cognitive;Bed/chair alarms;Limb alert   RLE Weight Bearing Per Order NWB   Braces or Orthoses LE Braces  (Right leg, unlocked)   Pain Assessment   Pain Assessment Schneider-Baker FACES   Schneider-Baker FACES Pain Rating 8   Pain Type Acute pain   Pain Location   (pt unable to express)   Eating Assessment   Meal Assessed Breakfast   Current Diet [place]ys[place] I   Opens Packages No   Type of Assistance Needed Physical assistance   Amount of Physical Assistance Provided Less than 25%   Eating CARE Score 3   Grooming   Able To Initiate Tasks; Wash/Dry Face;Comb/Brush Hair   Limitation Noted In Coordination;Problem Solving; Safety; Sequencing;Strength   Findings max A, oral hygiene NT during OT time with nursing notified   Tub/Shower Transfer   Reason Not Assessed Sponge Bath;Safety   Shower/Bathe Self   Type of Assistance Needed Physical assistance   Amount of Physical Assistance Provided Total assistance   Comment bathing completed in supine and seated EOB max A of 2   Shower/Bathe Self CARE Score 1   Bathing   Assessed Bath Style Sponge Bath   Anticipated D/C Bath Style Sponge Bath   Able to Gather/Transport No   Able to [employer] Temperature No   Able to Wash/Rinse/Dry (body part) Chest;Abdomen   Limitations Noted in Coordination;Balance; Endurance;Problem Solving;ROM;Safety; Sequencing;Strength   Dressing/Undressing Clothing   Remove UB Clothes Pullover Shirt   Don UB Clothes Pullover Shirt   Type of Assistance Needed Physical assistance   Amount of Physical Assistance Provided Total assistance   Comment sitting EOB due to R lateral lean   Upper Body Dressing CARE Score 1   Remove LB Clothes Undergarment   Don LB Clothes Pants; Undergarment;Socks   Type of Assistance Needed Physical assistance   Amount of Physical Assistance Provided Total assistance   Lower Body Dressing CARE Score 1   Limitations Noted In Balance; Coordination; Endurance;Problem Solving; Safety; Sequencing;Strength;ROM   Putting On/Taking Off Footwear   Type of Assistance Needed Physical assistance   Amount of Physical Assistance Provided 75% or more   Putting On/Taking Off Footwear CARE Score 2   Toileting Hygiene   Type of Assistance Needed Physical assistance   Amount of Physical Assistance Provided Total assistance   Toileting Hygiene CARE Score 1   Toilet Transfer   Reason if not Attempted Safety concerns   Toilet Transfer CARE Score 88   Toileting   Able to Pull Clothing down no, up no  Limitations Noted In Balance; Coordination;Problem Solving;ROM;Safety;LE Strength   Findings brief changed while supine after inc urine   Transfer Bed/Chair/Wheelchair   Positioning Concerns Hemiplegia; Other  (NWB RLE with inability to maintain)   Limitations Noted In Balance; Coordination; Endurance;Pain Management;Problem Solving;LE Strength;UE Strength; Sequencing   Type of Assistance Needed Physical assistance   Amount of Physical Assistance Provided Total assistance   Comment assist of 2   Chair/Bed-to-Chair Transfer CARE Score 1   Roll Left and Right   Type of Assistance Needed Physical assistance   Amount of Physical Assistance Provided 75% or more   Roll Left and Right CARE Score 2   Lying to Sitting on Side of Bed   Type of Assistance Needed Physical assistance   Amount of Physical Assistance Provided 75% or more   Lying to Sitting on Side of Bed CARE Score 2   Sit to Stand   Type of Assistance Needed Physical assistance   Amount of Physical Assistance Provided Total assistance   Comment assist of 2, sling RUE for support   Sit to Stand CARE Score 1   Picking Up Object   Reason if not Attempted Safety concerns   Picking Up Object CARE Score 88   RUE Assessment   RUE Assessment X  (PROM WFL, no AROM and one finger sublux sh noted sitting)   LUE Assessment   LUE Assessment WFL  (MMT 4-/5 grossly although diff to understand directions)   Coordination   Movements are Fluid and Coordinated 0   Coordination and Movement Description decreased coordination L and no AROM RUE   Sensation   Light Touch Partial deficits in the RUE;Severe deficits in the RUE   Propioception Severe deficits in the RUE   Cognition   Overall Cognitive Status Impaired   Arousal/Participation Alert; Cooperative   Attention Difficulty attending to directions   Orientation Level Oriented to person   Vision   Vision Comments decreased awareness R side noted   Discharge Information   Vocational Plan Retired/not working   Patient's Rehab Expectations pt unable to express   Impressions Pt presents following hospitalization due to CVA with R side involvement and right tibial plateau fracture with knee immoblizer and NWB  Pt requires significant assist due to decerased balance with R lateral lean, safety, endurance, RUE functional use and RLE in brace and NWB and decreased cognition   Pt will benefit from continued skilled oT services to increase independence with daily tasks  SLing applied during transfers due to sublux R shoulder and half tray placed for support when sitting  Pt is unable to maintain NWB RLE during transfers and OT seeking clarification reagrding use of brace because it was not locked in extension when recieved this morning and nursing unable to clarify brace use and MD Caldwell reports unhinged use       OT Therapy Minutes   OT Time In 2661   OT Time Out 0935   OT Total Time (minutes) 74   OT Mode of treatment - Individual (minutes) 74

## 2019-11-25 NOTE — PLAN OF CARE
Problem: Potential for Falls  Goal: Patient will remain free of falls  Description  INTERVENTIONS:  - Assess patient frequently for physical needs  -  Identify cognitive and physical deficits and behaviors that affect risk of falls  -  Norris fall precautions as indicated by assessment   - Educate patient/family on patient safety including physical limitations  - Instruct patient to call for assistance with activity based on assessment  - Modify environment to reduce risk of injury  - Consider OT/PT consult to assist with strengthening/mobility  Outcome: Progressing     Problem: Neurological Deficit  Goal: Neurological status is stable or improving  Description  Interventions:  - Monitor and assess patient's level of consciousness, motor function, sensory function, and level of assistance needed for ADLs  - Monitor and report changes from baseline  Collaborate with interdisciplinary team to initiate plan and implement interventions as ordered  - Provide and maintain a safe environment  - Consider seizure precautions  - Consider fall precautions  - Consider aspiration precautions  - Consider bleeding precautions  Outcome: Progressing     Problem: Activity Intolerance/Impaired Mobility  Goal: Mobility/activity is maintained at optimum level for patient  Description  Interventions:  - Assess and monitor patient  barriers to mobility and need for assistive/adaptive devices  - Assess patient's emotional response to limitations  - Collaborate with interdisciplinary team and initiate plans and interventions as ordered  - Encourage independent activity per ability   - Maintain proper body alignment  - Perform active/passive rom as tolerated/ordered    - Plan activities to conserve energy   - Turn patient as appropriate  Outcome: Progressing     Problem: Potential for Aspiration  Goal: Non-ventilated patient's risk of aspiration is minimized  Description  Assess and monitor vital signs, respiratory status, and labs (WBC)  Monitor for signs of aspiration (tachypnea, cough, rales, wheezing, cyanosis, fever)  - Assess and monitor patient's ability to swallow  - Place patient up in chair to eat if possible  - HOB up at 90 degrees to eat if unable to get patient up into chair   - Supervise patient during oral intake  - Instruct patient/ family to take small bites  - Instruct patient/ family to take small single sips when taking liquids  - Follow patient-specific strategies generated by speech pathologist   Outcome: Progressing  Goal: Ventilated patient's risk of aspiration is minimized  Description  Assess and monitor vital signs, respiratory status, airway cuff pressure, and labs (WBC)  Monitor for signs of aspiration (tachypnea, cough, rales, wheezing, cyanosis, fever)  - Elevate head of bed 30 degrees if patient has tube feeding   - Monitor tube feeding  Outcome: Progressing     Problem: Nutrition  Goal: Nutrition/Hydration status is improving  Description  Monitor and assess patient's nutrition/hydration status for malnutrition (ex- brittle hair, bruises, dry skin, pale skin and conjunctiva, muscle wasting, smooth red tongue, and disorientation)  Collaborate with interdisciplinary team and initiate plan and interventions as ordered  Monitor patient's weight and dietary intake as ordered or per policy  Utilize nutrition screening tool and intervene per policy  Determine patient's food preferences and provide high-protein, high-caloric foods as appropriate  - Assist patient with eating   - Allow adequate time for meals   - Encourage patient to take dietary supplement as ordered  - Collaborate with clinical nutritionist   - Include patient/family/caregiver in decisions related to nutrition    Outcome: Progressing     Problem: Prexisting or High Potential for Compromised Skin Integrity  Goal: Skin integrity is maintained or improved  Description  INTERVENTIONS:  - Identify patients at risk for skin breakdown  - Assess and monitor skin integrity  - Assess and monitor nutrition and hydration status  - Monitor labs   - Assess for incontinence   - Turn and reposition patient  - Assist with mobility/ambulation  - Relieve pressure over bony prominences  - Avoid friction and shearing  - Provide appropriate hygiene as needed including keeping skin clean and dry  - Evaluate need for skin moisturizer/barrier cream  - Collaborate with interdisciplinary team   - Patient/family teaching  - Consider wound care consult   Outcome: Progressing     Problem: Nutrition/Hydration-ADULT  Goal: Nutrient/Hydration intake appropriate for improving, restoring or maintaining nutritional needs  Description  Monitor and assess patient's nutrition/hydration status for malnutrition  Collaborate with interdisciplinary team and initiate plan and interventions as ordered  Monitor patient's weight and dietary intake as ordered or per policy  Utilize nutrition screening tool and intervene as necessary  Determine patient's food preferences and provide high-protein, high-caloric foods as appropriate       INTERVENTIONS:  - Monitor oral intake, urinary output, labs, and treatment plans  - Assess nutrition and hydration status and recommend course of action  - Evaluate amount of meals eaten  - Assist patient with eating if necessary   - Allow adequate time for meals  - Recommend/ encourage appropriate diets, oral nutritional supplements, and vitamin/mineral supplements  - Order, calculate, and assess calorie counts as needed  - Recommend, monitor, and adjust tube feedings and TPN/PPN based on assessed needs  - Assess need for intravenous fluids  - Provide specific nutrition/hydration education as appropriate  - Include patient/family/caregiver in decisions related to nutrition  Outcome: Progressing

## 2019-11-25 NOTE — ASSESSMENT & PLAN NOTE
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

## 2019-11-25 NOTE — NURSING NOTE
Pt resting in bed no s/s of pain or distress  Confused at baseline  Impulsive  Pt frequently repositioned to right side during shift  Right side remains flaccid  RLE immobolizer in place, neuro vasc intact  Tolerating NTL  Incont urine during shift  Assessment unchanged  Continuing to monitor  Bed alarm on for safety

## 2019-11-25 NOTE — PROGRESS NOTES
11/25/19 1401   Patient Data   Rehab Impairment CVA and R tibial plateua fx    Etiologic Diagnosis   (CVA R UE flaccid, NWB R LE with brace)   Support System   Name Nico Mitchell    Relationship brother   Home Setup   Type of Home Apartment   Method of Entry Stairs   Available Equipment   (Pt unable to express )   Prior Level of Function   Indoor-Mobility (Ambulation) 3  Independent - Patient completed the activities by him/herself, with or without an assistive device, with no assistance from a helper  Stairs 3  Independent - Patient completed the activities by him/herself, with or without an assistive device, with no assistance from a helper  Falls in the Last Year   Number of falls in the past 12 months 1   Type of Injury Associated with Fall Major injury   Patient Preference   Nickname (Patient Preference) Ramandeep Youssef   Psychosocial   Psychosocial (WDL) X   Patient Behaviors/Mood Flat affect   Restrictions/Precautions   Precautions Fall Risk;Cognitive;Bed/chair alarms;Limb alert;Aspiration   Weight Bearing Restrictions Yes   RLE Weight Bearing Per Order NWB   ROM Restrictions   (Per MD Knee brace to be locked in extension )   Braces or Orthoses Sling  (R Leg immobilizer; sling R UE )   Pain Assessment   Pain Assessment No/denies pain   Pain Score No Pain   Transfer Bed/Chair/Wheelchair   Positioning Concerns Hemiplegia  (NWB R LE locked in extension)   Limitations Noted In Balance; Coordination; Endurance;Problem Solving; Sequencing;UE Strength;LE Strength   Adaptive Equipment Transfer Board   Type of Assistance Needed Physical assistance   Amount of Physical Assistance Provided Total assistance   Comment   (A x 2 slide board transfer)   Chair/Bed-to-Chair Transfer CARE Score 1   Roll Left and Right   Type of Assistance Needed Physical assistance   Amount of Physical Assistance Provided Total assistance   Comment   (A x 2 )   Roll Left and Right CARE Score 1   Sit to Lying   Type of Assistance Needed Physical assistance   Amount of Physical Assistance Provided Total assistance   Comment   (A x 2 )   Sit to Lying CARE Score 1   Lying to Sitting on Side of Bed   Type of Assistance Needed Physical assistance   Amount of Physical Assistance Provided Total assistance   Comment   (A x 2 )   Lying to Sitting on Side of Bed CARE Score 1   Sit to Stand   Type of Assistance Needed Physical assistance   Amount of Physical Assistance Provided Total assistance   Comment   (A x 2 with poor ability to maintain NWB R LE )   Sit to Stand CARE Score 1   Walk 10 Feet   Reason if not Attempted Safety concerns   Walk 10 Feet CARE Score 88   Walk 50 Feet with Two Turns   Reason if not Attempted Safety concerns   Walk 50 Feet with Two Turns CARE Score 88   Walk 150 Feet   Reason if not Attempted Safety concerns   Walk 150 Feet CARE Score 88   Walking 10 Feet on Uneven Surfaces   Reason if not Attempted Safety concerns   Walking 10 Feet on Uneven Surfaces CARE Score 88   Wheelchair mobility   Type of Wheelchair Used 1  Manual   Method Left upper extremity   Assistance Provided For Locking Brakes;Obstacles;Remove armrests   Distance Level Surface (feet)   (100')   Findings   (Max A x 1 with cueing for technique/attention to task)   Wheel 50 Feet with Two Turns   Type of Assistance Needed Physical assistance   Amount of Physical Assistance Provided 75% or more   Wheel 50 Feet with Two Turns CARE Score 2   Wheel 150 Feet   Reason if not Attempted Safety concerns   Wheel 150 Feet CARE Score 88   Curb or Single Stair   Reason if not Attempted Safety concerns   1 Step (Curb) CARE Score 88   4 Steps   Reason if not Attempted Safety concerns   4 Steps CARE Score 88   12 Steps   Reason if not Attempted Safety concerns   12 Steps CARE Score 88   Comprehension   QI: Comprehension 2  Sometimes Understands: Understands only basic conversations or simple, direct phrases   Frequently requires cues to understand   Comprehension (FIM) 2 - Understands basic info/conversation 25-49% of time   Expression   QI: Expression 2  Frequently exhibits difficulty with expressing needs and ideas   Expression (FIM) 2 - Understands basic info/conversation 25-49% of time   Social Interaction   Social Interaction (FIM) 2 - Interacts appropriately 25-49% of time   Problem Solving   Problem solving (FIM) 1 - Needs bed alarm at all times   Memory   Memory (FIM) 1 - Recognizes, recalls/performs less than 25%   RLE Assessment   RLE Assessment X  (immobilizer donned locked in extension; R foot flaccid)   Strength LLE   L Hip Flexion 3-/5   L Knee Extension 3+/5   L Ankle Dorsiflexion 3-/5   L Ankle Plantar Flexion 3-/5   RUE Assessment   RUE Assessment X  (R shoulder 1 finger subluxed, positioned in sling )   LUE Assessment   LUE Assessment WFL   Coordination   Movements are Fluid and Coordinated 0  (flaccid R UE/LE)   Coordination and Movement Description   (decreased coordination L UE/LE during mobiliizations; )   Sensation   Light Touch Partial deficits in the RUE;Partial deficits in the RLE   Propioception Severe deficits in the RUE;Severe deficits in the RLE   Cognition   Overall Cognitive Status Impaired   Arousal/Participation Alert; Cooperative   Attention Difficulty attending to directions   Orientation Level Disoriented to person;Disoriented to place; Disoriented to time;Disoriented to situation   Following Commands Follows one step commands inconsistently   Vision   Vision Comments decreased awareness R side   Perception   Inattention/Neglect Cues to attend to right side of body   Motor Planning Hand over hand to sequence tasks   Discharge [address] Retired/not working  (unable to express intrests )   Patient's Rehab Expectations   (Pt unable to express )   Impressions 67year old Pt presents to [facility] following fall at home resulting in R tibial plateau fx and L frontal SAH complicated by ischemic L sided cerebral infarct   Pt is NWB on the R LE with knee immobilizer jennifer  Spoke with MD regarding knee brace which Pt presented with unlocked at the start of the evaluation  MD communicated the knee brace should be locked in extension  Adjusted knee brace and w/c leg rest to allow for knee to be placed in extension  Pt was a poor historian, therefore a limited background and history was obtained  Pt requires assistance with functional mobility tasks secondary to impaired cognition, impaired attention, increased R lateral lean and poor awareness of R side of the body, subluxed R shoulder donned in sling,  Flaccid R UE/LE, impaired strength, decreased endurance, poor sitting/standing balance, decreased ROM, and poor safety with all mobility tasks  During sit/stand transfer Pt was A x 2 with poor compliance of NWB R LE  Completed slide board transfer from w/c to bed with better tolerance with A x 2  Pt requires max cueing for attention to his R side during transfers and sitting posture, as well as increased processing time for all directions  Pt will benefit from skilled Physical Therapy to address the deficits listed above to maximize safety and LOF      PT Therapy Minutes   PT Time In 1401   PT Time Out 1505   PT Total Time (minutes) 64   PT Mode of treatment - Individual (minutes) 64   PT Mode of treatment - Concurrent (minutes) 0   PT Mode of treatment - Group (minutes) 0   PT Mode of treatment - Co-treat (minutes) 0   PT Mode of Treatment - Total time(minutes) 64 minutes   PT Cumulative Minutes 64   Cumulative Minutes   Cumulative therapy minutes 64

## 2019-11-25 NOTE — ASSESSMENT & PLAN NOTE
- non-op management per ortho  - per d/w ortho-->NWB in unlocked hinged knee brace, PROM only to patient's tolerance    - per ortho FU XR in 6 wks (imaging/injury occurred on 11/14)   - OP FU with Dr Veronica Jones

## 2019-11-25 NOTE — ASSESSMENT & PLAN NOTE
- L frontal SAH  - evaluated by neurosx and no surgical intervention was taken  - completed 7 day keppra sx ppx course in acute care as recommended by neurosx   - cleared by neurology in acute care to re-start home ASA 81 mg qd for ischemic CVA (re-started on 11/17) with FU CTH on 11/25 which no longer revealed L frontal SAH;  d/w Dr Lucero Fournier of neuro who recommended starting DAPT x 3 wks then ASA monotherapy (patient was on ASA 81 mg qd prior to CVA however with questionable compliance with medications, plavix 75 mg qd added to asa 81 mg qd on 11/28)    - OP FU with neuro

## 2019-11-25 NOTE — ASSESSMENT & PLAN NOTE
- at home on lopressor 50 mg q12, lisinopril 40 mg qd, and procardia XL (24 hr) 60 mg qd   - d/w Dr Mynor Rush of neuro and cleared for normotensive BP goals   - metanephrines, renin, aldosterone labs ordered by cards --> aldosterone level WNL, free metanephrine WNL, normetanephrine mildly above ULN of 145 at 163, renin level WNL; notified cardiology of results and cards recommend OP FU with [facility]   - IM managing as inpt and recommend patient be dc'd on current inpt regimen of lopressor 50 mg q12, lisinopril 20 mg BID, hydralazine 50 mg q8, and nifedipine XL (24 hr) 60 mg qd with close monitoring of BP by SNF physicians as BP has been elevated at times but improved the last 24-48 hours   - OP FU with [facility]

## 2019-11-25 NOTE — PROGRESS NOTES
Just returned via bed from CT head, back to room in satisfactory condition  Multipodis boot on right lower leg, brace intact  Continue to monitor

## 2019-11-25 NOTE — ASSESSMENT & PLAN NOTE
- at home on levothyroxine 50 mcg qd however TSH was elevated therefore IM increased levothyroxine to 75 mcg qd and recommend repeat TSH in 4-6 wks (dose increased to 75 mcg per IM on 11/28)

## 2019-11-25 NOTE — PCC SPEECH THERAPY
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, flow restriction at left vertebral, and 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  Neurosurgery was consulted, and recommended medical management  Orthopedics was consulted for right tibial plateau fracture, recommended NWB, hinged knee brace   He was admitted to the [facility] for inpatient rehabilitation on 11/23/19  Prior to incident patient resided alone in an apartment with stairs to enter  Skilled speech assessment was completed  Patient presents with moderate oral pharyngeal dysphagia with poor initiation of feed task resulting in aspiration and malnutrition risk  Recommended diet texture is puree with nectar thick liquids and assist with meals for adequate PO intake  Speech comprehension within familiar tasks is max assist due to initiation deficit  Expression is max assist for conveying wants and needs  Cognitively, patient presents as max assist with memory  He was able to identify his brother but total assist needed for response to questions beyond this level  Reasoning for awareness of his deficits and participation in activities of daily living is total assist to initiate tasks  12/2/19  Patient is participating in skilled ST focusing onn swallow oral function and speech cognitive communication skills  Recommended diet texture is puree and nectar with SLP completing trials of Dysphagia #2, Mech Soft with thin by cup sip once up to 90 degrees out of bed and alert to task    Speech comprehension and expression at the multi step level is max assist which is affected by need for redirection and resistance to instruction at times  Cognitively, patient presents as max assist for memory and reasoning levels of assistance in the rehab setting  12/9/19  Patient is participating in skilled ST focusing onn swallow oral function and speech cognitive communication skills  Recommended diet texture is Dysphagia #2, Mechanical Soft with Thin liquids  SLP recommends 90 degrees out of bed and alert to task  Speech comprehension and expression at the multi step level is mod assist which is affected by need for redirection and resistance to instruction at times  Cognitively, patient presents as max assist for memory and reasoning levels of assistance in the rehab setting

## 2019-11-25 NOTE — PROGRESS NOTES
11/25/19 1220   Patient Data   Rehab Impairment L CVA and R tibial plateau Fx   Insurance Information   Primary Payer [employer]   Support System   Name Breanne Sheth   Relationship brother   [facility] [address] to provide 24 hour supervision No   Home Setup   Type of Home Apartment   Method of Entry Stairs   Baseline Information   Transportation    Prior IADL Participation   Money Management Identify Money   Meal Preparation Full Participation   Laundry Full Participation   Home Cleaning Full Participation   Prior Level of Function   Self-Care 3  Independent - Patient completed the activities by him/herself, with or without an assistive device, with no assistance from a helper  Indoor-Mobility (Ambulation) 3  Independent - Patient completed the activities by him/herself, with or without an assistive device, with no assistance from a helper  Stairs 3  Independent - Patient completed the activities by him/herself, with or without an assistive device, with no assistance from a helper  Functional Cognition 3  Independent - Patient completed the activities by him/herself, with or without an assistive device, with no assistance from a helper     Falls in the Last Year   Number of falls in the past 12 months 1   Type of Injury Associated with Fall Major injury   Patient Preference   Roberto (Patient Preference) Osvaldo   Patient Behaviors/Mood Flat affect   Needs Expressed Denies   Psychosocial Additional Assessments No   Ability to Express Feelings Needs assistance   Ability to Express Needs Unable to express   Ability to Express Thoughts Unable to express   Ability to Understand Others Sometimes understands   Restrictions/Precautions   Precautions Fall Risk;Cognitive;Aspiration   Weight Bearing Restrictions Yes   RLE Weight Bearing Per Order NWB   Braces or Orthoses Sling   Pain Assessment   Pain Assessment No/denies pain   Pain Score No Pain   Eating Assessment   Swallow Precautions Yes   Food To Mouth   (requires cues and placement of utensil)   Able To Cut No   Positioning Upright;Out of Bed   Safety Needs Increase Time;Cues   Meal Assessed Lunch   QI: Swallowing/Nutritional Status Modified food consistency; Supervision during eating   Current Diet Dysphia I;Nectar Thick   Intake Mode PO   Finishes Timely No   Opens Packages No   Type of Assistance Needed Physical assistance   Amount of Physical Assistance Provided 25%-49%   Eating CARE Score 3   Comprehension   QI: Comprehension 2  Sometimes Understands: Understands only basic conversations or simple, direct phrases  Frequently requires cues to understand   Comprehension (FIM) 2 - Understands basic info/conversation 25-49% of time   Expression   QI: Expression 2  Frequently exhibits difficulty with expressing needs and ideas   Expression (FIM) 2 - Uses only simple expressions or gestures (waves, hello)   Social Interaction   Social Interaction (FIM) 2 - Interacts appropriately 25-49% of time   Problem Solving   Problem solving (FIM) 1 - Needs direction nearly all the time   Memory   Memory (FIM) 2 - Recalls 1 of 2 steps   Cognition   Overall Cognitive Status Impaired   Arousal/Participation Alert   Attention Difficulty attending to directions   Orientation Level Oriented to person   Memory Decreased short term memory;Decreased recall of precautions   Following Commands Follows multistep commands inconsistently   Vision   Vision Comments decreased attention R   Perception   Inattention/Neglect Cues to attend to right side of body   Motor Planning Hand over hand to sequence tasks   Objective Measure   ST Measure(s) bedside swallow assessment, informal speech language assessment and portions Ross     ST Findings moderate oral pharyngeal dysphagia, severe speech and cognitive deficits post CVA, poor awareness of deficits and severe initiation for task completion   Therapeutic Exercise   Therapeutic Exercise/Activity oral pharyngeal exercises to be performed    Discharge Information   Vocational Plan Retired/not working   Impressions Rosy Alfonso is a 67 y o  male with history of CAD, hypothyroidism, HTN, HLD, PVD, who presented to [address] [place] [address] on 11/14/19 post mechanical fall on the stairs while carrying groceries  He was admitted and found to have right tibial plateau fracture and hypertensive emergency  During hospitalization, he was noted to have right hemiparesis, dysarthria  CTH revealed left frontal hemorrhage  CTA head/neck revealed 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, flow restriction at left vertebral, and 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  Neurosurgery was consulted, and recommended medical management  Orthopedics was consulted for right tibial plateau fracture, recommended NWB, hinged knee brace   He was admitted to the [facility] for inpatient rehabilitation on 11/23/19  Prior to incident patient resided alone in an apartment with stairs to enter  Skilled speech assessment was completed  Patient presents with moderate oral pharyngeal dysphagia with poor initiation of feed task resulting in aspiration and malnutrition risk  Recommended diet texture is puree with nectar thick liquids and assist with meals for adequate PO intake  Speech comprehension within familiar tasks is max assist due to initiation deficit  Expression is max assist for conveying wants and needs  Cognitively, patient presents as max assist with memory  He was able to identify his brother but total assist needed for response to questions beyond this level  Reasoning for awareness of his deficits and participation in activities of daily living is total assist to initiate tasks     SLP Therapy Minutes   SLP Time In 1261   SLP Time Out 1325   SLP Total Time (minutes) 65   SLP Mode of treatment - Individual (minutes) 65

## 2019-11-25 NOTE — PCC CARE MANAGEMENT
Initial assessment & orientation to [facility] with Pt & phone contact with Pt's brother/emergency contact  Discussed [address]  Pt was very quiet & poor historian  Pt's brother provided someinformation  Pt lives alone in an apt  Pt's brother has expressed that he and family members anticipate the need for SNF placement; reported that they prefer a facility in [facility]  SW will continue to monitor & assist as needed with [address]  [employer]; policy [phone], Lurena Back [phone], with clinical  update due on 11/26/19 to fax [phone]     11/26/19 - Tx team recommendations reviewed with patient & Pt's brother, who expressed understanding & agreement  Target DC date is 12/17/19; Pt may need SNF placement  SW will continue to monitor & assist as needed with Tx & DC planning with Tx & DC planning  12/3/19 - Tx team recommendations reviewed with patient & message left for Pt's brother, who expressed understanding & agreement  Target DC date is 12/17/19  It is anticipated that Pt may need additional rehab at SNF upon DC from [facility]; a list of providers was provided to Pt's family & referrals to be made to their preferences  SW will continue to monitor & assist as needed with Tx & DC planning with Tx & DC planning

## 2019-11-25 NOTE — NUTRITION
11/25/19 1038   Assessment   Timepoint Initial  (consult)   Labs   List Completed Labs Other (Comment)  (11/23/19 BUN 27 meds reviewed lipitor colace)   Feeding Route   PO Other (Comment)  (patient was fed by nursing over weekend)   Swallow Other (Comment)  (speech to evaluate/treat patient)   Adequacy of Intake   Nutrition Modality PO  (Level 1 dysphagia NTL )   Intake Meals 25-50%;%   Estimated Calorie Intake 50-75%   Estimated Protein Intake  50-75%   Estimated Fluid Intake 25-50%   Nutrition Prognosis   Nutrition Concerns Dysphagia; Elderly; Other (Comment)  (skin WDL per nursing documentation)   Comorbid Concerns CVA; Other (Comment)  (HTN HLD PVD CAD)   Nutrition Precautions Aspiration   Nutrition Considerations Other (Comment)  (patient for potential participation in Moving Forward from Stroke Cooking Right for Me class)   PES Statement   Problem Intake   Energy Balance (1) Predicted suboptimal energy intake NI-1 4   Related to Swallowing difficulty   As evidenced by: Intake < estimated needs; Loss of muscle mass;Temporal Wasting   Additional PES needed? Yes   PES Statement 2   Problem Clinical   Related to Other (comment)  (Dx CVA )   As evidenced by Need for Modified consistency   Functional (1) Swallowing difficulty NC-1 1   Patient Nutrition Goals   Goal meet PO needs   Goal Status initiated   Timeframe to complete goal by next f/u   Recommendations/Interventions   Summary Patient receiving Level 1 dysphagia diet NTL  Speech to assess patient  Appetite is variable %, staff noted to feed patient at some meals  Patient has moderate muscle wasting and fat loss  Recommend continue diet as ordered, provide magic cup vanilla BID at lunch and dinner      Malnutrition/BMI Present Yes   Malnutrition type Chronic illness   Degree of Malnutrition Malnutrition of moderate degree  (related to inadequate protein energy intake as evidenced by moderate  muscle wasting in the shoulder, clavicle, temporalis and quadriceps and moderate orbital, tricep and ribcage fat loss )   Malnutrition Characteristics Fat loss;Muscle loss   BMI Classifications   (BMI 20 79)   Interventions Diet: continued as ordered; Supplement initiate   Nutrition Recommendations Other (specify)  (initiate supplement)   Nutrition Complexity Risk   Nutrition complexity level High risk   Follow up date 11/29/19

## 2019-11-25 NOTE — PROGRESS NOTES
SBT into bed assist of 2, multipodis boot ordered and applied over locked hinge brace right leg  Right side flaccid with right facial droop  Continue same plan of care

## 2019-11-26 LAB
BILIRUB SERPL-MCNC: 0.7 MG/DL (ref 0.2–1)
TSH SERPL DL<=0.05 MIU/L-ACNC: 6.34 UIU/ML (ref 0.45–5.33)

## 2019-11-26 PROCEDURE — 97112 NEUROMUSCULAR REEDUCATION: CPT

## 2019-11-26 PROCEDURE — 94760 N-INVAS EAR/PLS OXIMETRY 1: CPT

## 2019-11-26 PROCEDURE — 92526 ORAL FUNCTION THERAPY: CPT

## 2019-11-26 PROCEDURE — 97542 WHEELCHAIR MNGMENT TRAINING: CPT

## 2019-11-26 PROCEDURE — 82247 BILIRUBIN TOTAL: CPT | Performed by: PHYSICAL MEDICINE & REHABILITATION

## 2019-11-26 PROCEDURE — 94640 AIRWAY INHALATION TREATMENT: CPT

## 2019-11-26 PROCEDURE — 97535 SELF CARE MNGMENT TRAINING: CPT

## 2019-11-26 PROCEDURE — 97110 THERAPEUTIC EXERCISES: CPT

## 2019-11-26 PROCEDURE — 97530 THERAPEUTIC ACTIVITIES: CPT

## 2019-11-26 PROCEDURE — G0515 COGNITIVE SKILLS DEVELOPMENT: HCPCS

## 2019-11-26 PROCEDURE — 84443 ASSAY THYROID STIM HORMONE: CPT | Performed by: PHYSICAL MEDICINE & REHABILITATION

## 2019-11-26 PROCEDURE — 99233 SBSQ HOSP IP/OBS HIGH 50: CPT | Performed by: PHYSICAL MEDICINE & REHABILITATION

## 2019-11-26 RX ADMIN — ASPIRIN 81 MG: 81 TABLET, COATED ORAL at 10:32

## 2019-11-26 RX ADMIN — NICOTINE 1 PATCH: 21 PATCH, EXTENDED RELEASE TRANSDERMAL at 10:36

## 2019-11-26 RX ADMIN — METOPROLOL TARTRATE 50 MG: 50 TABLET, FILM COATED ORAL at 10:33

## 2019-11-26 RX ADMIN — DOCUSATE SODIUM 100 MG: 100 CAPSULE, LIQUID FILLED ORAL at 10:33

## 2019-11-26 RX ADMIN — LEVOTHYROXINE SODIUM 50 MCG: 25 TABLET ORAL at 05:07

## 2019-11-26 RX ADMIN — NIFEDIPINE 60 MG: 30 TABLET, FILM COATED, EXTENDED RELEASE ORAL at 10:33

## 2019-11-26 RX ADMIN — LISINOPRIL 40 MG: 20 TABLET ORAL at 10:32

## 2019-11-26 RX ADMIN — DOCUSATE SODIUM 100 MG: 100 CAPSULE, LIQUID FILLED ORAL at 17:29

## 2019-11-26 RX ADMIN — ATORVASTATIN CALCIUM 40 MG: 40 TABLET, FILM COATED ORAL at 17:29

## 2019-11-26 RX ADMIN — METOPROLOL TARTRATE 50 MG: 50 TABLET, FILM COATED ORAL at 21:19

## 2019-11-26 NOTE — PCC PHYSICAL THERAPY
11/26/19: Pt just recently evaluated  Current levels of function include max A x 2 for bed mobility, max A x 2 for transfers including sit/stand and sit pivot transfer using slide board, and max A x 1 for w/c mobility  Pt remains limited by NWB R LE with hinged knee brace, flaccid R UE/LE's, impaired cognition, impaired balance/coordination, decreased strength/ROM, and poor overall safety awareness  Pt will benefit from skilled Physical Therapy services to maximize safety and independence with functional mobility tasks  12/3/19: Pt is making slow progress towards therapy goals due to impaired cognition and poor activity tolerance  Current level of function includes max A x 1 for sit pivot slide board transfer and A x 2 needed for set up, max x 2 for sit/stand transfer with poor maintence of NWB R LE, and mod/max A for w/c mobility  Pt remains limited by NWB R LE with hinged knee brace, decreased use of R UE/LE's, impaired cognition, impaired balance/coordination, decreased strength/ROM, and poor overall safety awareness  Pt will benefit from skilled Physical Therapy services to maximize safety and independence with functional mobility tasks

## 2019-11-26 NOTE — PCC OCCUPATIONAL THERAPY
11/26/19: Pt evaluated yesterday following hospitalization due to CVA with R side involvement and NWB RLE with brace due to R tibial plateau fracture  Pt requires assist due to decreased balance, safety, endurance, RUE/RLE ROM/ functional use with NWB RLE  Pt will benefit from continued skilled OT services to increase independence with daily tasks  12/2/19: Pt participates in ADLs, transfers/ w/c mobility and nuero grant with ROM, WB and ice stim RUE  Pt's current LOF was listed above  Pt requires assist due to decreased balance, safety, endurance, RUE/RLE ROM/ functional use with NWB RLE and decreased cognition/ attention and visual attention to the Right  Pt is making slow progress towards goals and will benefit from continued skilled OT services to increase independence with daily tasks

## 2019-11-26 NOTE — PROGRESS NOTES
11/26/19 9759   Pain Assessment   Pain Assessment No/denies pain   Pain Score No Pain   Restrictions/Precautions   Precautions Fall Risk;Cognitive;Bed/chair alarms;Limb alert;Aspiration   RLE Weight Bearing Per Order NWB   ROM Restrictions   (R knee PROM to tolerance )   Braces or Orthoses Sling  (R leg knee brace hinged unlocked ; sling R UE )   Cognition   Overall Cognitive Status Impaired   Arousal/Participation Alert   Attention Difficulty attending to directions   Memory Decreased short term memory;Decreased recall of recent events;Decreased recall of precautions   Following Commands Follows one step commands inconsistently   Sit to Stand   Type of Assistance Needed Physical assistance   Amount of Physical Assistance Provided Total assistance   Comment   (max A x 2 )   Sit to Stand CARE Score 1   Bed-Chair Transfer   Type of Assistance Needed Physical assistance   Amount of Physical Assistance Provided Total assistance   Chair/Bed-to-Chair Transfer CARE Score 1   Transfer Bed/Chair/Wheelchair   Limitations Noted In Balance; Endurance; Coordination;Problem Solving;Sensation; Sequencing;UE Strength;LE Strength   Adaptive Equipment Roller Walker   Stand Pivot Assist x 2;Maximum Assist   Sit to Stand Assist x 2;Maximum Assist   All Transfer   (poor maintence of NWB R LE )   Wheel 50 Feet with Two Turns   Type of Assistance Needed Physical assistance   Amount of Physical Assistance Provided 50%-74%   Wheel 50 Feet with Two Turns CARE Score 2   Wheel 150 Feet   Type of Assistance Needed Physical assistance   Amount of Physical Assistance Provided 50%-74%   Wheel 150 Feet CARE Score 2   Wheelchair mobility   Does the patient use a wheelchair? 1  Yes   Type of Wheelchair Used 1   Manual   Method Left upper extremity   Assistance Provided For Locking Brakes;Obstacles  (navigation )   Distance Level Surface (feet)   (160' 85')   Findings   (mod/max A )   Therapeutic Interventions   Strengthening Seated TE performed to tolerance    Balance transfer training to dress LB with static standing component working on maintaining NWB R LE    Other w/c mobility    Assessment   Treatment Assessment Pt presents seated in w/c at start of therapy  Completed w/c mobility with Pt propelling with L UE with tactile cueing needed for correct position and encouraged use of L UE  Completed 1 partial stand and 1 full sit to stand to don pants both with max A x 2  First partial stand Pt used RW in front for balance and support, attempted second stand using half wall with Pt pulling to stand with L UE  Pt was not able to maintain NWB R LE despite multiple attempts to educate prior to the transfer and cueing during the transfers  Additionally, Pt requires max cueing for awareness about poor upright posture with significant R sided lean  Completed seated TE for general LE strengthening and conditioning with the L LE  Pt is easily fatigued and exhausted and required very frequent and long rest breaks between sets  Pt will continue to benefit from skilled PT services to maximize safety and LOF  PT Barriers   Physical Impairment Decreased strength; Impaired balance;Decreased range of motion;Decreased endurance;Decreased mobility; Decreased coordination;Decreased cognition; Impaired judgement;Decreased safety awareness; Impaired sensation;Orthopedic restrictions   Functional Limitation Standing;Transfers; Wheelchair management   Plan   Treatment/Interventions Functional transfer training;LE strengthening/ROM; Therapeutic exercise; Endurance training;Patient/family training;Equipment eval/education;Cognitive reorientation; Bed mobility; Compensatory technique education   Progress Slow progress, cognitive deficits   PT Therapy Minutes   PT Time In 0930   PT Time Out 1030   PT Total Time (minutes) 60   PT Mode of treatment - Individual (minutes) 50   PT Mode of treatment - Concurrent (minutes) 10   PT Mode of treatment - Group (minutes) 0   PT Mode of treatment - Co-treat (minutes) 0   PT Mode of Treatment - Total time(minutes) 60 minutes   PT Cumulative Minutes 124   Therapy Time missed   Time missed?  No

## 2019-11-26 NOTE — PCC NURSING
11/26/19 Patient recent admission to [facility] following a stroke with a fall that resulted in a right knee fracture  Right knee with immobilizer in place at all times  Patient continues with right side as flaccid  Patient is slow to respond at times  Patient is confused and forgetful impulsive at times bed alarm and close supervision in place at all times  Patient is incontinent of bladder  Bladder scans post void for 48 hours as ordered  Multipodas boot to the right foot when in bed to prevent foot drop  Allevyn dressings to the bilateral heels for protection  Sling to the right arm for support  No reports of pain  Patient requires two assist for transfers and self care activities  12/2/19 Remains confused, with delayed responses to verbal conversation, speech clear  Inc of B&B  Immobilizer RLE/multipodus boot R foot  Max assist for transfers OOB   AW

## 2019-11-26 NOTE — PROGRESS NOTES
11/26/19 1245   Pain Assessment   Pain Assessment Schneider-Eldridge FACES  (pt unable to express pain)   Schneider-Eldridge FACES Pain Rating 4  (when assisted to bed after session, unable to express where)   Restrictions/Precautions   Precautions Aspiration;Bed/chair alarms;Cognitive; Fall Risk;Limb alert;Pain  (nectar liquids, pureed diet)   RLE Weight Bearing Per Order NWB   Braces or Orthoses LE Immobilizer  (unlocked)   Eating   Type of Assistance Needed Physical assistance   Amount of Physical Assistance Provided 25%-49%   Comment increased assist for intake due to distractibility in OT room with others using L hand with utensil and R hand as passive assist to hold container of ice cream   Eating CARE Score 3   Exercise Tools   Exercise Tools Yes   Hand Gripper attempted however patient unable to stay focused for task completion   Cognition   Overall Cognitive Status Impaired   Arousal/Participation Alert; Responsive; Cooperative   Attention Difficulty attending to directions   Orientation Level Oriented to person   Memory Decreased recall of recent events;Decreased long term memory;Decreased short term memory   Additional Activities   Additional Activities Other (Comment)   Additional Activities Comments w/c mobility with max using LUE and LLE in hallway to return to room   Assessment   Treatment Assessment Pt presents resting in the w/ c in the room  Positioning with half tray supporting RUE with sling removed to allow for functional use and increased awareness of RUE  Pt able to eat lunch with mod verbal cues and mod A requiring increased assist today due to distractions of the dining room and limited attention to task  Pt participates in small amount of attnetion task and LUE functional use with R hand use as passive assist during lunch  Pt requests returning to bed after session with max A of 2 for transfer into bed with transfer board  Pt positioined with RUE supported and NWB RLE maintained with use of board   Pt is unable to sit unsupported without assist and requires assist of 2 to return to supine  Pt tolerates session well and will benefit from continued skilled OT services to increase independence with daily tasks  Problem List Decreased strength;Decreased range of motion;Decreased endurance; Impaired balance;Decreased coordination;Decreased cognition;Decreased safety awareness;Orthopedic restrictions   Plan   Treatment/Interventions ADL retraining;Functional transfer training; Therapeutic exercise; Endurance training;Cognitive reorientation;Patient/family training; Compensatory technique education   Progress Improving as expected   OT Therapy Minutes   OT Time In 1245   OT Time Out 1345   OT Total Time (minutes) 60   OT Mode of treatment - Individual (minutes) 60   Therapy Time missed   Time missed?  No

## 2019-11-26 NOTE — TEAM CONFERENCE
Acute RehabilitationTeam Conference Note  Date: 11/26/2019   Time: 11:05 AM       Patient Name:  Cortez Tellez       Medical Record Number: [id number]   YOB: 1940  Sex: Male          Room/Bed:  /-01  Payor Info:  Payor: Ariel Bernard  REP / Plan: Shun Maldonado [facility] REP / Product Type: Medicare PPO /      Admitting Diagnosis: CVA [facility]cerebral vascular accident) ([facility] ) [I63 9]   Admit Date/Time:  11/23/2019  5:53 PM  Admission Comments: No comment available     Primary Diagnosis:  CAD (coronary artery disease)  Principal Problem: CAD (coronary artery disease)    Patient Active Problem List    Diagnosis Date Noted    Dysphagia 11/25/2019    CVA (cerebral vascular accident) ([facility] ) 11/24/2019    [facility] hemorrhage [facility] ) 11/15/2019    Closed fracture of right tibial plateau with routine healing 11/15/2019    Peripheral vascular disease ([facility] ) 06/06/2019    CAD (coronary artery disease) 07/10/2017    Hyperlipidemia 07/10/2017    Essential hypertension 07/10/2017    Hypothyroidism 07/10/2017       Physical Therapy:    Weight Bearing Status: Non-weight bearing(R LE with hinged knee brace )  Transfers: Assist of 2, Maximum Assistance  Bed Mobility: Maximum Assistance, Assist of 2  Amulation Distance (ft): (TBA)  Wheelchair Mobility Distance: 100 ft  Wheelchair Mobility: Maximum Assistance  Discharge Recommendations: Home with:  [facility] with[de-identified] 24 Hour Assisteance, First Floor Setup, Home Physical Therapy, Family Support    11/26/19: Pt just recently evaluated  Current levels of function include max A x 2 for bed mobility, max A x 2 for transfers including sit/stand and sit pivot transfer using slide board, and max A x 1 for w/c mobility  Pt remains limited by NWB R LE with hinged knee brace, flaccid R UE/LE's, impaired cognition, impaired balance/coordination, decreased strength/ROM, and poor overall safety awareness   Pt will benefit from skilled Physical Therapy services to maximize safety and independence with functional mobility tasks  Occupational Therapy:          No notes on file    Speech Therapy:  Mode of Communication: Verbal  Speech/Language: Expressive Aphasia  Cognition: Exceptions to WNL  Cognition: Decreased Memory, Decreased Executive Functions, Decreased Comprehension, Decreased Attention, Decreased Safety  Orientation: Person  Swallowing: Exceptions to WNL  Swallowing: Pharyngeal Dysphagia, Oral Dysphagia, Aspiration Risk  Diet Recommendations: Level 1/Puree, [place] Thick  Discharge Recommendations: Silvio Gonzalez is a 67 y o  male with history of CAD, hypothyroidism, HTN, HLD, PVD, who presented to [address] [place] [address] on 11/14/19 post mechanical fall on the stairs while carrying groceries  He was admitted and found to have right tibial plateau fracture and hypertensive emergency  During hospitalization, he was noted to have right hemiparesis, dysarthria  CTH revealed left frontal hemorrhage  CTA head/neck revealed 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, flow restriction at left vertebral, and 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  Neurosurgery was consulted, and recommended medical management  Orthopedics was consulted for right tibial plateau fracture, recommended NWB, hinged knee brace   He was [facility] to the [facility] for inpatient rehabilitation on 11/23/19  Prior to incident patient resided alone in an apartment with stairs to enter  Skilled speech assessment was completed  Patient presents with moderate oral pharyngeal dysphagia with poor initiation of feed task resulting in aspiration and malnutrition risk  Recommended diet texture is puree with nectar thick liquids and assist with meals for adequate PO intake  Speech comprehension within familiar tasks is max assist due to initiation deficit  Expression is max assist for conveying wants and needs  Cognitively, patient presents as max assist with memory    He was able to identify his brother but total assist needed for response to questions beyond this level  Reasoning for awareness of his deficits and participation in activities of daily living is total assist to initiate tasks  Nursing Notes:  Appetite: Fair  Diet Type: Dysphagia I, Nectar thick liquids                      Diet Patient/Family Education Complete: Yes                            Bladder: 3 - Moderate Assistance     Bladder Patient/Family Education: Yes        Bowel Patient/Family Education: Yes  Pain Location: Other (Comment)(unable to express)     Pain Score: 0                       Hospital Pain Intervention(s): Repositioned, Declines  Pain Patient/Family Education: Yes  Medication Management/Safety  Medication Patient/Family Education Complete: Yes    11/26/19 Patient recent admission to [facility] following a stroke with a fall that resulted in a right knee fracture  Right knee with immobilizer in place at all times  Patient continues with right side as flaccid  Patient is slow to respond at times  Patient is confused and forgetful impulsive at times bed alarm and close supervision in place at all times  Patient is incontinent of bladder  Bladder scans post void for 48 hours as ordered  Multipodas boot to the right foot when in bed to prevent foot drop  Allevyn dressings to the bilateral heels for protection  Sling to the right arm for support  No reports of pain  Patient requires two assist for transfers and self care activities  Case Management:     Discharge Planning  Living Arrangements: Alone  Support Systems: None(per pt)  Assistance Needed: ADLs, medication supervision, meal prep  Type of Current Residence: Private residence  Current Home Care Services: No  Initial assessment & orientation to [place] with Pt & phone contact with Pt's brother/emergency contact  Discussed [address]  Pt was very quiet & poor historian  Pt's brother provided someinformation   Pt lives alone in an apt  Pt's brother has expressed that he and family members anticipate the need for SNF placement; reported that they prefer a facility in [place]  SW will continue to monitor & assist as needed with [address] [place] [address] [employer]; policy [id number], Malina Fuller [id number], with clinical  update due on 11/26/19 to fax [phone]  Is the patient actively participating in therapies? yes  List any modifications to the treatment plan: na    Barriers Interventions   Right sided weakness Therapeutic exercise, therapeutic activity, NMR   Decreased balance, pusher ADL, transfer,pre-gait training   NWB with immobilizer right LE Cues, ADL, transfer, pre-gait training   Decreased cog, decreased awareness of right side ST strategies, ADL/transfer/pre-gait training   dysphagia Puree with nectar, ST strategies     Is the patient making expected progress toward goals? yes  List any update or changes to goals: na    Medical Goals: Patient will be able to manage medical conditions and comorbid conditions with medications and follow up upon completion of rehab program    Weekly Team Goals:   Rehab Team Goals  ADL Team Goal: Patient will require assist with ADLs with least restrictive device upon completion of rehab program  Bowel/Bladder Team Goal: Patient will return to premorbid level for bladder/bowel management upon completion of rehab program  Transfer Team Goal: Patient will maximize level for transfers and educate family/caregiver to decrease burden of care  Locomotion Team Goal: Patient will maximize level for locomotion and educate family/caregiver to decrease burden of care  Cognitive Team Goal: Patient will require assist for basic cognitive tasks upon completion of rehab program     Max bed mobility, transfers  Mod-max assist self care  Mod memory, expression  Max problem solving    Health and wellness:   To be able return to assisting with homemaking tasks    Discussion: Patient lives alone and currently NWB status of right LE due to fracture    Team feels that patient may not be able to return home alone and may require alt placement for continued nursing care and therapy    Anticipated Discharge Date:  Dec 17, 2019

## 2019-11-26 NOTE — PROGRESS NOTES
Physical Medicine and Rehabilitation Progress Note  Del See 67 y o  male MRN: [id number]  Unit/Bed#: -01 Encounter: [id number]    HPI: Patient is a 68 yo male who presented after a fall leading to rt tibial plateau fx and L frontal SAH both of which were managed non-operatively however course was complicated by ischemic CVA which was likely vessel to vessel in origin    Chief Complaint: CVA    Interval/subjective: patient seen in therapy and tolerating without issue     ROS: A 10 point ROS was performed; negative except as noted above       Assessment/Plan:      Dysphagia  Assessment & Plan  - modified diet per ST      CVA (cerebral vascular accident) [facility])  Assessment & Plan  - L sided cerebral infarcts in the setting of traumatic L frontal SAH  - likely vessel to vessel origin in the setting of occluded L carotid (also with L vertebral artery stenosis)   - neuro recommended to re-starting home ASA 81 mg qd while in acute care which was resumed on 11/17/19 and a FU CTH on 11/25 no longer showed L frontal SAH, will d/w neuro if they wish to start patient on DAPT  - on home lipitor 40 mg qpm per neuro     Closed fracture of right tibial plateau with routine healing  Assessment & Plan  - non-op management per ortho  - per d/w ortho-->NWB in unlocked hinged knee brace, PROM only to patient's tolerance    - per ortho FU XR in 6 wks     Subarachnoid hemorrhage (HCC)  Assessment & Plan  - L frontal SAH  - evaluated by neurosx and no surgical intervention was taken  - completed 7 day keppra sx ppx course in acute care as recommended by neurosx   - cleared by neurology in acute care to re-start home ASA 81 mg qd for ischemic CVA (re-started on 11/17) with FU CTH on 11/25 which no longer revealed L frontal SAH; will d/w neuro if they wish to start DAPT     Peripheral vascular disease ([facility] )  Assessment & Plan  - L carotid dz, L vertebral artery dz, B/L LE arterial dz  - cleared by neurology to re-start home ASA 81 mg qd (ischemic CVA)  - per neuro on home lipitor 40 mg qpm  - follows with Dr Lesli Johnson as OP and per Dr Rony Gibbs OP note patient refused to see vascular surgeon     Hypothyroidism  Assessment & Plan  - on home levothyroxine 50 mcg qd  - free T4 WNL however 2 TSH levels drawn on 11/14 and 11/15 with 1 level elevated and the other WNL; the repeat TSH of 11/26 was elevated therefore d/w IM who will likely increase levothyroxine dose     Essential hypertension  Assessment & Plan  - at home on lopressor 50 mg q12, lisinopril 40 mg qd, and procardia XL (24 hr) 60 mg qd   - goal SBP 150s-160s per neuro due to stroke will need to clarify how long this is to be the goal SBP  - metanephrines, renin, aldosterone labs ordered by cards and are pending   - IM managing     Hyperlipidemia  Assessment & Plan  - on home lipitor 40 mg qpm per neuro in acute care     * CAD (coronary artery disease)  Assessment & Plan  - h/o CABG  - cleared by neurology to resume home ASA 81 mg qd   - at home on lopressor 50 mg q12 (IM managing)  - on home lipitor 40 mg qpm   - mildly elevated troponins in acute care which peaked at 0 06  - seen by cards in acute care and did have an echo and no further KEBEDE/intevention recommended by cards  - follows with Dr Lesli Johnson as OP      Scheduled Meds:    Current Facility-Administered Medications:  acetaminophen 650 mg Oral Q6H PRN Haley Snyder MD   aspirin 81 mg Oral Daily Jessica Rae MD   atorvastatin 40 mg Oral Daily With Shoshana Mcclelland MD   docusate sodium 100 mg Oral BID Haley Snyder MD   levothyroxine 50 mcg Oral Early Morning Haley Snyder MD   lisinopril 40 mg Oral Daily Haley Snyder MD   metoprolol tartrate 50 mg Oral Q12H [facility] Haley Snyder MD   nicotine 1 patch Transdermal Daily Haley Snyder MD   NIFEdipine 60 mg Oral Daily Haley Snyder MD   ondansetron 4 mg Oral Q6H PRN Haley Snyder MD          Incidental findings:    1) EKG abnormalities (LAD, PACs): OP FU with Dr Lesli Johnson  2) elevated PA pressure with moderate TR: OP FU with Dr Osmany Perez  3) grade 2 DD: OP FU with Dr Osmany Perez   4) elevated total bilirubin with alk phos WNL: repeat level pending     DVT ppx: SCDs in the setting of [address]  Code: per acute care notes patient insisted on being DNR/DNI when he was more cognitively intact, d/w patient's brother who also wishes for patient to be DNR/DNI and patient's brother verbalized his understanding of what this means        Objective:    Functional Update:  Mobility: max   Transfers: max   ADLs: max       Physical Exam:  Vitals:    11/26/19 0723   BP: 140/72   Pulse: 78   Resp: 18   Temp: 97 5 °F (36 4 °C)   SpO2: 95%           General: no apparent distress and comfortable  CARDIAC:  +S1/2  LUNGS:  respirations unlabored   ABDOMEN:  soft NT   EXTREMITIES:  volume status currently stable   NEURO:   inconsistently follows commands  PSYCH:  patient currently calm     Laboratory:   Results from last 7 days   Lab Units 11/23/19 0438 11/21/19 0435 11/20/19 0437   HEMOGLOBIN g/dL 12 7 12 4 12 7   HEMATOCRIT % 38 0 37 0 38 1   WBC Thousand/uL 8 00 10 15 9 50     Results from last 7 days   Lab Units 11/23/19 0438 11/21/19 0435 11/20/19 0437   BUN mg/dL 27* 29* 22   SODIUM mmol/L 140 143 142   POTASSIUM mmol/L 3 6 3 8 3 7   CHLORIDE mmol/L 107 109* 110*   CREATININE mg/dL 1 06 1 00 0 96            This patient was discussed by the Interdisciplinary Team in weekly case conference today  The care of the patient was extensively discussed with all care providers and an appropriate rehabilitation plan was formulated unique for this patient  Barriers were identified preventing progression of therapy and appropriate interventions were discussed with each discipline  Please see the team note for input from all disciplines regarding barriers, intervention, and discharge planning  [ x ] Total time spent: 35 Mins, and greater than 50% of this time was spent counseling/coordinating care

## 2019-11-26 NOTE — NURSING NOTE
Patient resting comfortably in bed at this time  No signs of distress noted  Patient continues with right arm and leg flaccid  Patient remains confused and forgetful slow to respond at times bed alarm and close supervision in place to maintain patient safety  Patient was incontinent of urine overnight two assist required for toileting and lower body dressing  Right lower leg hinged knee brace in use as ordered  Multipodas boot to the right lower leg to prevent foot drop  Sling to the right shoulder  Allevyn to the heels for protection  Patient was assisted to reposition frequently overnight to promote skin integrity  Call bell within reach  Will continue to monitor patient and follow plan of care

## 2019-11-26 NOTE — PROGRESS NOTES
11/26/19 0723   Charting Type   Charting Type Shift assessment   Neurological   Neuro (WDL) X   Level of Consciousness Alert/awake   Orientation Level Oriented to person   Cognition Impulsive;Poor judgement;Poor safety awareness   Pupil Assessment Yes   R Pupil Size (mm) 3   R Pupil Shape Round   R Pupil Reaction Brisk   L Pupil Size (mm) 3   L Pupil Shape Round   L Pupil Reaction Brisk   Muscle Function/Sensation Assessment ;Motor response;Muscle strength   R Hand  Absent   L Hand  Moderate   RUE Motor Response Flaccid   RUE Muscle Strength 0- No movement   LUE Motor Response Responds to commands   LUE Muscle Strength 4- Movement against gravity and limited resistance   RLE Motor Response Flaccid   RLE Muscle Strength 0- No movement   LLE Motor Response Responds to commands   LLE Muscle Strength 4- Movement against gravity and limited resistance   Neuro Symptoms Forgetful;Irritable   HEENT   HEENT (WDL) X   R Eye Mildly impaired vision   L Eye Mildly impaired vision   R Ear Mildly impaired hearing   L Ear Mildly impaired hearing   Mucous Membrane(s) Pink; Intact;Dry   Teeth Missing teeth   Respiratory   Respiratory (WDL) X   Bilateral Breath Sounds Clear;Diminished   R Breath Sounds Clear;Diminished   L Breath Sounds Clear;Diminished   Cardiac   Cardiac (WDL) WDL   Cardiac Regularity Regular   Peripheral Vascular   Peripheral Vascular (WDL) X   Cyanosis None   Capillary Refill Less than/equal to 2 seconds (All extremities)   Pulses L pedal;R pedal;L radial;R radial   Edema Right upper extremity   RUE Edema Trace   RUE Neurovascular Assessment   RUE Capillary Refill Less than/equal to 2 seconds   RUE Color Ecchymosis   RUE Temperature/Moisture Warm;Dry   R Radial Pulse +2   LUE Neurovascular Assessment   L Radial Pulse +2   RLE Neurovascular Assessment   RLE Capillary Refill Less than/equal to 2 seconds   RLE Color Appropriate for ethnicity   RLE Temperature/Moisture Warm;Dry   R Pedal Pulse +1   LLE Neurovascular Assessment   L Pedal Pulse +1   Integumentary   Integumentary (WDL) X   Skin Color Pale   Skin Condition/Temp Warm;Dry   Skin Integrity Bruising; Abrasion   Skin Location rt knee scab   Сергей Scale   Sensory Perceptions 2   Moisture 2   Activity 2   Mobility 2   Nutrition 2   Friction and Shear 2   Сергей Scale Score 12   Musculoskeletal   Musculoskeletal (WDL) X   Level of Assistance Maximum assist, patient does 25-49%   Assistive Device Wheelchair;Sling; Other (Comment)  (RUE sling, RLE TSLO and podis boot)   RUE Sling;Paralysis   LUE Full movement   RLE Paralysis   LLE Limited movement   Gastrointestinal   Gastrointestinal (WDL) WDL   Bowel Sounds (All Quadrants) Normoactive   Last BM Date 11/22/19   Genitourinary   Genitourinary (WDL) WDL   Genitalia   Male Genitalia Intact   Anal/Rectal   Anal/Rectal (WDL) WDL   Psychosocial   Psychosocial (WDL) X   Patient Behaviors/Mood Flat affect;Irritable

## 2019-11-27 LAB
ALDOST SERPL-MCNC: 1.6 NG/DL (ref 0–30)
METANEPH FREE SERPL-MCNC: 37 PG/ML (ref 0–62)
NORMETANEPHRINE SERPL-MCNC: 163 PG/ML (ref 0–145)

## 2019-11-27 PROCEDURE — 97530 THERAPEUTIC ACTIVITIES: CPT

## 2019-11-27 PROCEDURE — 97110 THERAPEUTIC EXERCISES: CPT

## 2019-11-27 PROCEDURE — 99232 SBSQ HOSP IP/OBS MODERATE 35: CPT | Performed by: PHYSICAL MEDICINE & REHABILITATION

## 2019-11-27 PROCEDURE — 92507 TX SP LANG VOICE COMM INDIV: CPT

## 2019-11-27 PROCEDURE — 97535 SELF CARE MNGMENT TRAINING: CPT

## 2019-11-27 PROCEDURE — 92526 ORAL FUNCTION THERAPY: CPT

## 2019-11-27 PROCEDURE — 97542 WHEELCHAIR MNGMENT TRAINING: CPT

## 2019-11-27 PROCEDURE — 97112 NEUROMUSCULAR REEDUCATION: CPT

## 2019-11-27 RX ORDER — LISINOPRIL 20 MG/1
20 TABLET ORAL 2 TIMES DAILY
Status: DISCONTINUED | OUTPATIENT
Start: 2019-11-28 | End: 2019-12-10 | Stop reason: HOSPADM

## 2019-11-27 RX ORDER — LEVOTHYROXINE SODIUM 0.07 MG/1
75 TABLET ORAL
Status: DISCONTINUED | OUTPATIENT
Start: 2019-11-28 | End: 2019-12-10 | Stop reason: HOSPADM

## 2019-11-27 RX ORDER — CLOPIDOGREL BISULFATE 75 MG/1
75 TABLET ORAL DAILY
Status: DISCONTINUED | OUTPATIENT
Start: 2019-11-28 | End: 2019-12-10 | Stop reason: HOSPADM

## 2019-11-27 RX ADMIN — ASPIRIN 81 MG: 81 TABLET, COATED ORAL at 08:00

## 2019-11-27 RX ADMIN — ATORVASTATIN CALCIUM 40 MG: 40 TABLET, FILM COATED ORAL at 16:31

## 2019-11-27 RX ADMIN — DOCUSATE SODIUM 100 MG: 100 CAPSULE, LIQUID FILLED ORAL at 17:44

## 2019-11-27 RX ADMIN — LEVOTHYROXINE SODIUM 50 MCG: 25 TABLET ORAL at 05:48

## 2019-11-27 RX ADMIN — DOCUSATE SODIUM 100 MG: 100 CAPSULE, LIQUID FILLED ORAL at 08:00

## 2019-11-27 RX ADMIN — NICOTINE 1 PATCH: 21 PATCH, EXTENDED RELEASE TRANSDERMAL at 08:04

## 2019-11-27 RX ADMIN — METOPROLOL TARTRATE 50 MG: 50 TABLET, FILM COATED ORAL at 07:56

## 2019-11-27 RX ADMIN — NIFEDIPINE 60 MG: 30 TABLET, FILM COATED, EXTENDED RELEASE ORAL at 07:58

## 2019-11-27 RX ADMIN — METOPROLOL TARTRATE 50 MG: 50 TABLET, FILM COATED ORAL at 20:07

## 2019-11-27 RX ADMIN — LISINOPRIL 40 MG: 20 TABLET ORAL at 07:57

## 2019-11-27 NOTE — PROGRESS NOTES
Denies dizziness, lightheadedness  /70  Offers no complaints  Continue to monitor   Callbell within reach, safety tab maintained

## 2019-11-27 NOTE — PROGRESS NOTES
11/27/19 1300   Pain Assessment   Pain Assessment No/denies pain   Restrictions/Precautions   Precautions Aspiration;Bed/chair alarms;Cognitive; Fall Risk;Limb alert  (nectar liquids, hinged brace RLE unlocked, RUE sling w/ txfr)   RLE Weight Bearing Per Order NWB   Braces or Orthoses LE Braces  (unlocked)   Eating   Type of Assistance Needed Physical assistance   Amount of Physical Assistance Provided 25%-49%   Eating CARE Score 3   Eating Assessment   Food To Mouth No   Positioning Upright;Out of Bed   Meal Assessed Lunch   Current Diet Dysphia I   Opens Packages No   Findings encouragement and assist to increase intake   Sit to Lying   Type of Assistance Needed Physical assistance   Amount of Physical Assistance Provided Total assistance   Sit to Lying CARE Score 1   Bed-Chair Transfer   Type of Assistance Needed Physical assistance   Amount of Physical Assistance Provided Total assistance   Comment transfer board   Chair/Bed-to-Chair Transfer CARE Score 1   Exercise Tools   Other Exercise Tool 1 PROM RUE all joints and planes with 50% shoulder flexion/ abduction and elbow to hand within functional limits, tactile stim to RUE and occ non volitional movements of hand and shoulder noted with education provided to pt and his Brother "Z"  Neuromuscular Education   Weight Bearing Technique Yes   RUE Weight Bearing Forearm seated  (to increase awareness RUE and decreased subluxation)   Cognition   Overall Cognitive Status Impaired   Arousal/Participation Alert; Responsive   Attention Difficulty attending to directions   Orientation Level Oriented to person   Comments pt attempted participation in [employer] group however unabale to complete due to overstimulation adn pt returned to room   Additional Activities   Additional Activities Other (Comment)   Additional Activities Comments w/c mobility with LUE and LLE max A when attempting to leave gorup and retrun to room   Assessment   Treatment Assessment Pt presents sitting in w/c in dining room  Completed 20 minutes concurrent treatment during meal focusing on similar goals as another to increase po intake during lunch and encourage indpendence with task  Pt tolerates PROM and WB RUE while sitting with occ episodes of tone noted in R hand and shoulder with elevation and adduction  Pt attempted participation in MF cooking group however requests laying down shortly aftre class began and attempts to wheel chair out of room [facility] bed room  Pt requests return to bed with Assist of 2 for transfer using the board for NWB RLE  Pt will benefit from continued skilled OT due to barriers including decreased balance, safety, enduracne, cognition and RUE/RLE ROM/ strength and functional use with NWB RLE  Problem List Decreased strength;Decreased range of motion;Decreased endurance; Impaired balance;Decreased coordination;Decreased cognition;Decreased safety awareness;Orthopedic restrictions   Plan   Treatment/Interventions ADL retraining;Functional transfer training; Therapeutic exercise; Endurance training;Patient/family training;Cognitive reorientation; Compensatory technique education   Progress Improving as expected   OT Therapy Minutes   OT Time In 1300   OT Time Out 1425   OT Total Time (minutes) 85   OT Mode of treatment - Individual (minutes) 65   OT Mode of treatment - Concurrent (minutes) 20   Therapy Time missed   Time missed?  No

## 2019-11-27 NOTE — CASE MANAGEMENT
Tx team recommendations reviewed with patient & Pt's brother  Target DC date is 12/17/19; Pt may need SNF placement  SW will continue to monitor & assist as needed with Tx & DC planning with Tx & DC planning

## 2019-11-27 NOTE — PROGRESS NOTES
11/27/19 1130   Pain Assessment   Pain Assessment No/denies pain   Pain Score No Pain   Restrictions/Precautions   Precautions Aspiration; Aphasia;Cognitive; Fall Risk;Bed/chair alarms;Limb alert  (nectar liquids, pureed diet)   RLE Weight Bearing Per Order NWB   ROM Restrictions   (PROM only R knee)   Braces or Orthoses LE Immobilizer;Sling  (unlocked )   Cognition   Overall Cognitive Status Impaired   Arousal/Participation Alert; Cooperative   Attention Difficulty attending to directions   Memory Decreased recall of precautions;Decreased recall of recent events;Decreased short term memory   Following Commands Follows one step commands inconsistently   Sit to Lying   Type of Assistance Needed Physical assistance   Amount of Physical Assistance Provided Total assistance   Comment   (max x 2 )   Sit to Lying CARE Score 1   Lying to Sitting on Side of Bed   Type of Assistance Needed Physical assistance   Amount of Physical Assistance Provided Total assistance   Comment   (max x 2 )   Lying to Sitting on Side of Bed CARE Score 1   Bed-Chair Transfer   Type of Assistance Needed Physical assistance   Amount of Physical Assistance Provided Total assistance   Comment   (max x 2; slide board )   Chair/Bed-to-Chair Transfer CARE Score 1   Transfer Bed/Chair/Wheelchair   Limitations Noted In Balance; Endurance; Coordination; Sequencing;UE Strength;LE Strength;Sensation;Problem Solving   Adaptive Equipment Transfer Board   Sit Pivot Maximum Assist;Assist x 2   Supine to Sit Maximum Assist;Assist x 2   Sit to Supine Maximum Assist;Assist x 2   All Transfer Maximum Assist;Assist x 2   Wheel 50 Feet with Two Turns   Type of Assistance Needed Physical assistance   Amount of Physical Assistance Provided 50%-74%   Wheel 50 Feet with Two Turns CARE Score 2   Wheel 150 Feet   Type of Assistance Needed Physical assistance   Amount of Physical Assistance Provided 50%-74%   Wheel 150 Feet CARE Score 2   Wheelchair mobility   Does the patient use a wheelchair? 1  Yes   Type of Wheelchair Used 1  Manual   Method Left lower extremity   Assistance Provided For Obstacles; Locking Brakes   Distance Level Surface (feet)   (160')   Distance Wheeled 3% Grade   (30')   Findings   (mod A x 1 with max cueing )   Therapeutic Interventions   Strengthening Seated/supine TE performed to tolerance   Flexibility R knee PROM to tolerance; R DF stretch 3 x 30s   Other w/c mobility, transfer training    Assessment   Treatment Assessment Pt presented seated in w/c at start of therapy with no c/o of pain  Completed w/c mobility with mod A needed to navigate the chair and consistent cueing to maintain attention to the task  Worked on general LE strengthening and conditioning with fair tolerance  Pt needs frequent and long rest breaks due to impaired endurance and activity tolerance with tactile and visual cueing needed to consistently participate in each exercise due to impaired cognition  Completed slide board transfer with max A x 2 to the mat table  Completed R knee PROM to tolerance and R DF stretch to tolerance to prevent contracture and improve ROM  Pt will continue to benefit from skilled PT services to maximize safety and LOF  PT Barriers   Physical Impairment Decreased strength;Decreased range of motion;Decreased endurance; Impaired balance;Decreased coordination;Decreased mobility; Decreased cognition; Impaired judgement;Decreased safety awareness;Orthopedic restrictions   Functional Limitation Standing;Transfers; Wheelchair management   Plan   Treatment/Interventions Functional transfer training;LE strengthening/ROM; Therapeutic exercise; Endurance training;Patient/family training;Bed mobility; Compensatory technique education   Progress Slow progress, cognitive deficits   PT Therapy Minutes   PT Time In 1130   PT Time Out 1230   PT Total Time (minutes) 60   PT Mode of treatment - Individual (minutes) 60   PT Mode of treatment - Concurrent (minutes) 0   PT Mode of treatment - Group (minutes) 0   PT Mode of treatment - Co-treat (minutes) 0   PT Mode of Treatment - Total time(minutes) 60 minutes   PT Cumulative Minutes 184   Therapy Time missed   Time missed?  No

## 2019-11-27 NOTE — NURSING NOTE
Patient bp 210/88 in am during vs check  Rechecked bp by RN, 198/88  Patient denied pain or headache  Internal medicine notified  AM bp meds given  BP rechecked after meds given, decreased to 138/78  Patient denies feeling lighthead/dizzy  Discussed BP's with IM  Meds to be adjusted  Will follow

## 2019-11-27 NOTE — PROGRESS NOTES
11/26/19 1840   Pain Assessment   Pain Assessment No/denies pain   Restrictions/Precautions   Precautions Aspiration; Aphasia;Cognitive; Fall Risk   Braces or Orthoses LE Immobilizer   Comprehension   QI: Comprehension 2  Sometimes Understands: Understands only basic conversations or simple, direct phrases  Frequently requires cues to understand   Comprehension (FIM) 2 - Understands basic info/conversation 25-49% of time   Expression   QI: Expression 2  Frequently exhibits difficulty with expressing needs and ideas   Expression (FIM) 2 - Understands basic info/conversation 25-49% of time   Social Interaction   Social Interaction (FIM) 2 - Interacts appropriately 25-49% of time   Problem Solving   Problem solving (FIM) 1 - Does not effectively solve problems   Memory   Memory (FIM) 2 - Recalls 1 of 2 steps   Executive Function Skills   Task Initiation Delayed initiation   Flexibility of Thought Reduced flexibility   Planning No planning skills   Memory Skills   Orientation Level Oriented to person   Short Term Working Recall Severe Impairment   Auditory Comprehension   Comphrehends Conversation Simple   Interfering Components Attention - selective;Processing speed   Speech/Swallow Mechanism Exam   Volitional Cough Weak   Volitional Swallow Delayed   Speech/Language/Cognition Assessmetn   Treatment Assessment Speech Pathology Daily Treatment Note - Speech/Cognitive Communication Skills - SLP focused on memory (immediate, recent and remote)  Patient's meal tray was utilized for immediate memory of items eaten  The menu was utilized as a visual aid to check accuracy and aid retention  Patien'ts daily schedule was utilized as an aid for recent memory to recall the day's events and/or expectations of procedures performed during therapy sessions across disciplines    Remote memory was targeted using the sequence of events since initial onset of the event which brought them to the rehabilitation unit, as well as family dynamics and visits since admission  Visual aids were used for each task as available (ie pictures, schedule, etc ) to aid recall  Patient current level is mod to max assist to redirect to task and comprehension of verbal directions   Swallow Information   Current Risks for Dysphagia & Aspiration Weak cough; Dysarthria;General debilitation;New Neuro event;Cognitive deficit   Current Symptoms/Concerns Cough;Clear throat; With food; With liquids;During meals   Current Diet Dysphagia pureed; Nectar thick liquid   Consistencies Assessed and Performance   Oral Stage Moderate impaired   Phargngeal Stage Moderate impaired   Recommendations   Risk for Aspiration Mild  (with puree and NTL)   Diet Solid Recommendation Level 1 Dysphagia/pureed   Diet Liquid Recommendation Nectar thick liquid   General Precautions Aspiration precautions; Feed only when alert;Upright as possible for all oral intake;Supervision with meals   Compensatory Swallowing Strategies Alternate solids and liquids; External pacing   Swallow Assessment   Swallow Treatment Assessment Speech Pathology Daily Treatment Note - Swallow Oral Function - Patient seen 1:1 plus concurrent as benefit is gained through receiving multisensory cues to improve comprehension, demonstration and retention of techniques and precautions  Compensatory safe swallow education provided which included appropriate 90 degree head/neck/trunk posture, prep to small bites, small and single sips of liquid,complete oral   Patient is making good initial gains in texture tolerance and PO intake with use of the above     Swallow Assessment Prognosis   Prognosis Good   Prognosis Considerations Participation level;Previous level of function   SLP Therapy Minutes   SLP Time In 3949 [employer]   SLP Total Time (minutes) 60   SLP Mode of treatment - Individual (minutes) 30   SLP Mode of treatment - Concurrent (minutes) 30   Daily FIM Score   Eating (FIM) 3 - [place] scoops food, patient brings to mouth

## 2019-11-27 NOTE — PROGRESS NOTES
Physical Medicine and Rehabilitation Progress Note  Christian [place] 67 y o  male MRN: [id number]  Unit/Bed#: [facility] 214-01 Encounter: [id number]    HPI: Patient is a 68 yo male who presented after a fall leading to rt tibial plateau fx and L frontal SAH both of which were managed non-operatively however course was complicated by ischemic CVA which was likely vessel to vessel in origin    Chief Complaint: CVA    Interval/subjective: patient currently comfortable     ROS: A 10 point ROS was performed; negative except as noted above       Assessment/Plan:      Dysphagia  Assessment & Plan  - modified diet per ST      CVA (cerebral vascular accident) [facility])  Assessment & Plan  - L sided cerebral infarcts in the setting of traumatic L frontal SAH  - likely vessel to vessel origin in the setting of occluded L carotid (also with L vertebral artery stenosis)   - neuro recommended to re-starting home ASA 81 mg qd while in acute care which was resumed on 11/17/19 and a FU CTH on 11/25 no longer showed L frontal SAH, d/w Dr Guero Jennings of neuro who recommended starting DAPT x 3 wks then ASA monotherapy (patient was on ASA 81 mg qd prior to CVA however with questionable compliance with medications)    - on home lipitor 40 mg qpm per neuro     Closed fracture of right tibial plateau with routine healing  Assessment & Plan  - non-op management per ortho  - per d/w ortho-->NWB in unlocked hinged knee brace, PROM only to patient's tolerance    - per ortho FU XR in 6 wks     Subarachnoid hemorrhage (HCC)  Assessment & Plan  - L frontal SAH  - evaluated by neurosx and no surgical intervention was taken  - completed 7 day keppra sx ppx course in acute care as recommended by neurosx   - cleared by neurology in acute care to re-start home ASA 81 mg qd for ischemic CVA (re-started on 11/17) with FU CTH on 11/25 which no longer revealed L frontal SAH;  d/w Dr Guero Jennings of neuro who recommended starting DAPT x 3 wks then ASA monotherapy (patient was on ASA 81 mg qd prior to CVA however with questionable compliance with medications)      Peripheral vascular disease (HCC)  Assessment & Plan  - L carotid dz, L vertebral artery dz, B/L LE arterial dz  - cleared by neurology to re-start home ASA 81 mg qd (ischemic CVA)  - per neuro on home lipitor 40 mg qpm  - follows with Dr Eulalia Nelson as OP and per Dr Stephy Rene OP note patient refused to see vascular surgeon     Hypothyroidism  Assessment & Plan  - at home on levothyroxine 50 mcg qd however TSH was elevated therefore IM increased levothyroxine to 75 mcg qd and recommend repeat TSH in 4-6 wks     Essential hypertension  Assessment & Plan  - at home on lopressor 50 mg q12, lisinopril 40 mg qd, and procardia XL (24 hr) 60 mg qd   - d/w Dr Denice Nash of neuro and cleared for normotensive BP goals   - metanephrines, renin, aldosterone labs ordered by cards --> aldosterone level WNL, free metanephrine WNL, normetanephrine mildly above ULN of 145 at 163, renin level pending; will d/w cards upon completion of results   - IM managing     Hyperlipidemia  Assessment & Plan  - on home lipitor 40 mg qpm per neuro in acute care     * CAD (coronary artery disease)  Assessment & Plan  - h/o CABG  - cleared by neurology to resume home ASA 81 mg qd   - at home on lopressor 50 mg q12 (IM managing)  - on home lipitor 40 mg qpm   - mildly elevated troponins in acute care which peaked at 0 06  - seen by cards in acute care and did have an echo and no further KEBEDE/intevention recommended by cards  - follows with Dr Eulalia Nelson as OP      Scheduled Meds:    Current Facility-Administered Medications:  acetaminophen 650 mg Oral Q6H PRN Christian Bryson MD   aspirin 81 mg Oral Daily Nghia Will MD   atorvastatin 40 mg Oral Daily With Abraham Rush MD   [START ON 11/28/2019] clopidogrel 75 mg Oral Daily Nghia Will MD   docusate sodium 100 mg Oral BID Christian Bryson MD   [START ON 11/28/2019] levothyroxine 75 mcg Oral Early Morning Jose Pompa Ester Enrique   [START ON 11/28/2019] lisinopril 20 mg Oral BID DESIREE Leonardo   metoprolol tartrate 50 mg Oral Q12H [address] Austin Segura MD   nicotine 1 patch Transdermal Daily Austin Segura MD   NIFEdipine 60 mg Oral Daily Austin Segura MD   ondansetron 4 mg Oral Q6H PRN Austin Segura MD          Incidental findings:    1) EKG abnormalities (LAD, PACs): OP FU with Dr Lloyd Pedroza  2) elevated PA pressure with moderate TR: OP FU with Dr Lloyd Pedroza  3) grade 2 DD: OP FU with Dr Lolyd Pedroza   4) elevated total bilirubin: now WNL     DVT ppx: SCDs in the setting of [address]  Code: per acute care notes patient insisted on being DNR/DNI when he was more cognitively intact, d/w patient's brother who also wishes for patient to be DNR/DNI and patient's brother verbalized his understanding of what this means        Objective:    Functional Update:  Mobility: max   Transfers: max   ADLs: max       Physical Exam:  Vitals:    11/27/19 1100   BP: 138/78   Pulse: 65   Resp: 18   Temp: 98 0   SpO2: 97%           General: no apparent distress and comfortable  CARDIAC:  +S1/2  LUNGS:  respirations unlabored   ABDOMEN:  soft NT   EXTREMITIES:  volume status currently stable   NEURO:   inconsistently follows commands  PSYCH:  patient currently calm        Laboratory:   Results from last 7 days   Lab Units 11/23/19  0438 11/21/19  0435   HEMOGLOBIN g/dL 12 7 12 4   HEMATOCRIT % 38 0 37 0   WBC Thousand/uL 8 00 10 15     Results from last 7 days   Lab Units 11/23/19  0438 11/21/19  0435   BUN mg/dL 27* 29*   SODIUM mmol/L 140 143   POTASSIUM mmol/L 3 6 3 8   CHLORIDE mmol/L 107 109*   CREATININE mg/dL 1 06 1 00

## 2019-11-27 NOTE — NURSING NOTE
Patient and son given discharge instructions for home  Son and patient verbalized understanding of scripts for blood work and medications, medications for home, follow-up appointments, and Evangelista Merritt  Interdisciplinary care team determined safest means of transport is via car  Patient discharged to car via  with assist of PCA

## 2019-11-27 NOTE — SPEECH THERAPY NOTE
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, flow restriction at left vertebral, and 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  Neurosurgery was consulted, and recommended medical management  Orthopedics was consulted for right tibial plateau fracture, recommended NWB, hinged knee brace   He was admitted to the [facility] for inpatient rehabilitation on 11/23/19  Prior to incident patient resided alone in an apartment with stairs to enter  Skilled speech assessment was completed  Patient presents with moderate oral pharyngeal dysphagia with poor initiation of feed task resulting in aspiration and malnutrition risk  Recommended diet texture is puree with nectar thick liquids and assist with meals for adequate PO intake  Speech comprehension within familiar tasks is max assist due to initiation deficit  Expression is max assist for conveying wants and needs  Cognitively, patient presents as max assist with memory  He was able to identify his brother but total assist needed for response to questions beyond this level  Reasoning for awareness of his deficits and participation in activities of daily living is total assist to initiate tasks  Patient has participated in skilled ST focusing onn swallow oral function and speech cognitive communication skills  Recommended diet texture is Dysphagia #2, Mechanical Soft with Thin liquids  SLP recommends 90 degrees out of bed and alert to task    Speech comprehension and expression at the multi step level is mod assist which is affected by need for redirection and resistance to instruction at times  Cognitively, patient presents as max assist for memory and reasoning levels of assistance in the rehab setting

## 2019-11-27 NOTE — QUICK NOTE
BP appears to be elevated at night and early am  Will change lisinopril dosing to 20 BID  Will monitor response closely

## 2019-11-27 NOTE — PROGRESS NOTES
11/27/19 1025   Pain Assessment   Pain Assessment No/denies pain   Pain Score No Pain   Restrictions/Precautions   Precautions Aspiration;Cognitive; Fall Risk;Bed/chair alarms;Limb alert   Comprehension   QI: Comprehension 2  Sometimes Understands: Understands only basic conversations or simple, direct phrases  Frequently requires cues to understand   Comprehension (FIM) 2 - Understands basic info/conversation 25-49% of time   Expression   QI: Expression 2  Frequently exhibits difficulty with expressing needs and ideas   Expression (FIM) 2 - Understands basic info/conversation 25-49% of time   Social Interaction   Social Interaction (FIM) 2 - Interacts appropriately 25-49% of time   Problem Solving   Problem solving (FIM) 1 - Solves basic problems less than 25% of time   Memory   Memory (FIM) 2 - Recalls 1 of 2 steps   Executive Function Skills   Insight Severe insight   Impulsive Moderately impulsive   Task Initiation Delayed initiation   Flexibility of Thought Reduced flexibility   Planning No planning skills   Memory Skills   Orientation Level Oriented to person   Short Term Working Recall Severe Impairment   Auditory Comprehension   Comphrehends Conversation Simple   Interfering Components Attention - selective   Speech/Swallow Mechanism Exam   Volitional Cough Weak   Volitional Swallow Delayed   Speech/Language/Cognition Assessmetn   Treatment Assessment Speech Pathology Daily Treatment Note - Speech/Cognitive Communication Skills - 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  SLP targeted word finding with use of a technique in which the speaker describes an object based upon its unique properties of form or how it is used  Patient then engaged in a task using useful every day objects and asked to first describe it by form and then demonstrate or describe its use    SLP educated on how objects are categorized in the brain in a variety of ways and retrieval from one category may facilitate the ability to retrieve the name of the object  Patient currently presents as as max assist   Verbal output is at the short phrase level using automatic speech primarily  He is gaining with familiarity of tasks and repetition of words and concepts  Swallow Information   Current Risks for Dysphagia & Aspiration Weak cough   Current Symptoms/Concerns Clear throat; With liquids   Current Diet Dysphagia pureed; Nectar thick liquid   Consistencies Assessed and Performance   Oral Stage Moderate impaired   Phargngeal Stage Moderate impaired   Recommendations   Risk for Aspiration Mild   Diet Solid Recommendation Level 1 Dysphagia/pureed   Diet Liquid Recommendation Nectar thick liquid   General Precautions Aspiration precautions   Compensatory Swallowing Strategies Alternate solids and liquids   Eating   Type of Assistance Needed Physical assistance   Amount of Physical Assistance Provided 25%-49%   Eating CARE Score 3   Swallow Assessment   Swallow Treatment Assessment Speech Pathology Daily Treatment Note - Swallow Oral Function - Oral exercises were completed 1:1 with SLP as a direct model and using a large mirror for visual feedback to promote full ROM of oral articulators  Exercises included musculature of the jaw, cheeks, lips and tongue  Patient completed to a level of 5-7 reps as tolerated  Max encouragement was needed initially to engage and initiate movements     Swallow Assessment Prognosis   Prognosis Good   Prognosis Considerations Previous level of function   SLP Therapy Minutes   SLP Time In 1025   SLP Time Out 0370   SLP Total Time (minutes) 61   SLP Mode of treatment - Individual (minutes) 61   Daily FIM Score   Eating (FIM) 3 - [place] scoops food, patient brings to mouth

## 2019-11-27 NOTE — PROGRESS NOTES
11/26/19 1923   Charting Type   Charting Type Shift assessment   Neurological   Neuro (WDL) X   Level of Consciousness Alert/awake   Orientation Level Oriented to person   Cognition Impulsive;Poor judgement;Poor safety awareness   Extraocular Movements Full   Facial Symmetry Right facial drooping   Speech Delayed responses   Swallow Difficulty swallowing   R Hand  Absent   L Hand  Moderate   R Foot Dorsiflexion Absent   L Foot Dorsiflexion Moderate   R Foot Plantar Flexion Absent   L Foot Plantar Flexion Moderate   RUE Motor Response Flaccid   RUE Sensation Numbness   RUE Muscle Strength 0- No movement   LUE Motor Response Responds to commands   LUE Sensation Full sensation   LUE Muscle Strength 4- Movement against gravity and limited resistance   RLE Motor Response Flaccid   RLE Sensation Numbness   RLE Muscle Strength 0- No movement   LLE Motor Response Responds to commands   LLE Sensation Unable to assess; Full sensation   LLE Muscle Strength 4- Movement against gravity and limited resistance   Neuro Symptoms Forgetful;Irritable   Relieved by Rest   Delirium Assessment- CAM    Acute Onset and Fluctuating Course (1) No   Inattention (2) No   Disorganized Thinking (3) No   Rate Patient's Level of Consciousness (4) Alert (Normal), No   Delirium Present No   Delirium Interventions Ensure adequate nutrition/hydration;Monitor urine and bowel function; Review lab results; Ensure adequate oxygenation;Frequent reorientation;Frequent direction/reassurance; Review/adjust fall precaution interventions   Seizure Activity   Symptoms None   Reflexes   Gag Present   Cough Weak   Reena Coma Scale   Eye Opening 4   Best Verbal Response 4   Best Motor Response 6   Reena Coma Scale Score 14   HEENT   HEENT (WDL) X   Head and Face Asymmetrical   R Eye Mildly impaired vision   L Eye Mildly impaired vision   Respiratory   Respiratory (WDL) X   Respiratory Pattern Normal   Chest Assessment Chest expansion symmetrical Bilateral Breath Sounds Clear;Diminished   R Breath Sounds Clear;Diminished   L Breath Sounds Clear;Diminished   Respiratory Additional Assessments No   Cough and Deep Breathe   Cough and Deep Breathe Yes   Incentive Spirometry   IS level of assistance Assisted by nursing   Frequency q1hr W/A   Respiratory Effort Normal   Treatment Tolerance Tolerated fairly well   Incentive Spirometry Goal (mL) 1000 mL   Incentive Spirometry Achieved (mL) 750 mL   Cardiac   Cardiac (WDL) WDL   Cardiac Regularity Regular   Pain Assessment   Pain Assessment No/denies pain   Pain Score No Pain   Peripheral Vascular   Peripheral Vascular (WDL) X   Cyanosis None   Capillary Refill Less than/equal to 2 seconds (All extremities)   Pulses L pedal;R pedal;L radial;R radial   Edema Right upper extremity   RUE Edema Trace   LUE Edema None   RLE Edema None   LLE Edema None   Integumentary   Integumentary (WDL) X   Skin Color Pale   Skin Condition/Temp Warm;Dry   Skin Integrity Bruising; Abrasion   Skin Location rt knee scab   Skin Turgor Non-tenting   Integumentary Additional Assessments No   Сергей Scale   Sensory Perceptions 2   Moisture 2   Activity 2   Mobility 2   Nutrition 2   Friction and Shear 2   Сергей Scale Score 12   Musculoskeletal   Musculoskeletal (WDL) X   Level of Assistance Maximum assist, patient does 25-49%   Assistive Device Wheelchair; Other (Comment)  (sling RUE when OOB, Hinged unlocked brace right knee,rigtht )   RUE Sling;Paralysis   LUE Full movement   RLE Paralysis  (podus boot)   LLE Limited movement   Gastrointestinal   Gastrointestinal (WDL) WDL   Abdomen Inspection Soft;Nondistended   Bowel Sounds (All Quadrants) Normoactive   Tenderness No guarding   Last BM Date 11/26/19   Genitourinary   Genitourinary (WDL) WDL   Urine Assessment   Urinary Incontinence Yes   Urine Color Kellee   Psychosocial   Psychosocial (WDL) X   Patient Behaviors/Mood Flat affect;Irritable   Needs Expressed Denies   Cough   Cough None

## 2019-11-28 PROCEDURE — 97530 THERAPEUTIC ACTIVITIES: CPT

## 2019-11-28 PROCEDURE — 97542 WHEELCHAIR MNGMENT TRAINING: CPT

## 2019-11-28 PROCEDURE — 97110 THERAPEUTIC EXERCISES: CPT

## 2019-11-28 PROCEDURE — 97535 SELF CARE MNGMENT TRAINING: CPT

## 2019-11-28 RX ORDER — HYDRALAZINE HYDROCHLORIDE 10 MG/1
10 TABLET, FILM COATED ORAL EVERY 8 HOURS SCHEDULED
Status: DISCONTINUED | OUTPATIENT
Start: 2019-11-28 | End: 2019-11-30

## 2019-11-28 RX ADMIN — LISINOPRIL 20 MG: 20 TABLET ORAL at 08:20

## 2019-11-28 RX ADMIN — CLOPIDOGREL BISULFATE 75 MG: 75 TABLET ORAL at 08:20

## 2019-11-28 RX ADMIN — NIFEDIPINE 60 MG: 30 TABLET, FILM COATED, EXTENDED RELEASE ORAL at 08:20

## 2019-11-28 RX ADMIN — HYDRALAZINE HYDROCHLORIDE 10 MG: 10 TABLET, FILM COATED ORAL at 13:45

## 2019-11-28 RX ADMIN — LISINOPRIL 20 MG: 20 TABLET ORAL at 17:00

## 2019-11-28 RX ADMIN — ASPIRIN 81 MG: 81 TABLET, COATED ORAL at 08:20

## 2019-11-28 RX ADMIN — DOCUSATE SODIUM 100 MG: 100 CAPSULE, LIQUID FILLED ORAL at 08:20

## 2019-11-28 RX ADMIN — NICOTINE 1 PATCH: 21 PATCH, EXTENDED RELEASE TRANSDERMAL at 08:24

## 2019-11-28 RX ADMIN — ATORVASTATIN CALCIUM 40 MG: 40 TABLET, FILM COATED ORAL at 17:00

## 2019-11-28 RX ADMIN — METOPROLOL TARTRATE 50 MG: 50 TABLET, FILM COATED ORAL at 08:20

## 2019-11-28 RX ADMIN — DOCUSATE SODIUM 100 MG: 100 CAPSULE, LIQUID FILLED ORAL at 17:00

## 2019-11-28 RX ADMIN — METOPROLOL TARTRATE 50 MG: 50 TABLET, FILM COATED ORAL at 21:15

## 2019-11-28 RX ADMIN — HYDRALAZINE HYDROCHLORIDE 10 MG: 10 TABLET, FILM COATED ORAL at 21:15

## 2019-11-28 RX ADMIN — LEVOTHYROXINE SODIUM 75 MCG: 75 TABLET ORAL at 06:08

## 2019-11-28 NOTE — PROGRESS NOTES
11/28/19 1031   Pain Assessment   Pain Assessment No/denies pain   Pain Score No Pain   Restrictions/Precautions   Precautions Aspiration;Bed/chair alarms;Cognitive; Fall Risk   RLE Weight Bearing Per Order NWB   Cognition   Overall Cognitive Status Impaired   Arousal/Participation Alert; Responsive; Cooperative   Attention Difficulty attending to directions   Orientation Level Oriented to person   Memory Decreased short term memory;Decreased recall of recent events;Decreased recall of precautions   Following Commands Follows one step commands inconsistently   Roll Left and Right   Type of Assistance Needed Physical assistance   Amount of Physical Assistance Provided Total assistance   Roll Left and Right CARE Score 1   Sit to Lying   Type of Assistance Needed Physical assistance   Amount of Physical Assistance Provided Total assistance   Sit to Lying CARE Score 1   Lying to Sitting on Side of Bed   Type of Assistance Needed Physical assistance   Amount of Physical Assistance Provided 75% or more   Comment max assist   Lying to Sitting on Side of Bed CARE Score 2   Bed-Chair Transfer   Type of Assistance Needed Physical assistance   Amount of Physical Assistance Provided Total assistance   Comment slideboard   Chair/Bed-to-Chair Transfer CARE Score 1   Transfer Bed/Chair/Wheelchair   Limitations Noted In Balance; Coordination;Problem Solving;LE Strength   Adaptive Equipment Transfer Board   Sit Pivot Maximum Assist;Assist x 2   Stand Pivot Maximum Assist;Assist x 2   Supine to Sit Maximum Assist   Sit to Supine Maximum Assist;Assist x 2   Wheel 50 Feet with Two Turns   Type of Assistance Needed Physical assistance   Amount of Physical Assistance Provided Total assistance   Wheel 50 Feet with Two Turns CARE Score 1   Wheelchair mobility   Does the patient use a wheelchair? 1  Yes   Type of Wheelchair Used 1   Manual   Method Left upper extremity   Distance Level Surface (feet) 26 ft  (26' 22')   Findings total assist Therapeutic Interventions   Strengthening supine PROM   Balance transfer training   Other wheelchair mobility   Assessment   Treatment Assessment Patient tolerated therapy session with max encouragement  Completed PROM for strengthening as well contracture prevention; transfer training focusing on sequence and technique for improved balance and safety with mobility  Max tactile and verbal cues needed for w/c propulsion  PT Barriers   Physical Impairment Decreased strength;Decreased range of motion;Decreased endurance; Impaired balance;Decreased mobility; Decreased cognition;Decreased safety awareness   Functional Limitation Wheelchair management;Transfers   Plan   Treatment/Interventions Functional transfer training;LE strengthening/ROM; Therapeutic exercise; Bed mobility   Progress Slow progress, decreased activity tolerance   PT Therapy Minutes   PT Time In 1031   PT Time Out 1131   PT Total Time (minutes) 60   PT Mode of treatment - Individual (minutes) 60   PT Mode of treatment - Concurrent (minutes) 0   PT Mode of treatment - Group (minutes) 0   PT Mode of treatment - Co-treat (minutes) 0   PT Mode of Treatment - Total time(minutes) 60 minutes   PT Cumulative Minutes 244   Therapy Time missed   Time missed?  No

## 2019-11-28 NOTE — NURSING NOTE
Patient alert to self only  Irritable and cursing with staff when they try to help him  Unable to stand up with assistance  Needs max assist to get out of bed or stand  Slide board used to put patient back to bed  Brace remains to RLE  Patient pulls SCDs off of left leg throughout shift  Voices no complaints of pain  Incontinant of bowel and bladder  Will continue to monitor and follow plan of care

## 2019-11-28 NOTE — PROGRESS NOTES
11/27/19 2044   Charting Type   Charting Type Shift assessment   Neurological   Neuro (WDL) X   Level of Consciousness Alert/awake   Orientation Level Oriented to person   Cognition Impulsive;Poor judgement;Poor safety awareness   Facial Symmetry Right facial drooping   Speech Delayed responses   R Hand  Absent   L Hand  Moderate   R Foot Dorsiflexion Absent   L Foot Dorsiflexion Moderate   R Foot Plantar Flexion Absent   L Foot Plantar Flexion Moderate   RUE Motor Response Flaccid   RUE Sensation Numbness   RUE Muscle Strength 0- No movement   LUE Motor Response Responds to commands   LUE Sensation Full sensation   LUE Muscle Strength 4- Movement against gravity and limited resistance   RLE Motor Response Flaccid   RLE Sensation Numbness   RLE Muscle Strength 0- No movement   LLE Motor Response Responds to commands   LLE Sensation Unable to assess; Full sensation   LLE Muscle Strength 4- Movement against gravity and limited resistance   Neuro Symptoms Forgetful;Irritable   Reflexes   Gag Present   Cough Weak   Reena Coma Scale   Eye Opening 4   Best Verbal Response 4   Best Motor Response 6   [place] Coma Scale Score 14   HEENT   HEENT (WDL) X   Head and Face Asymmetrical   R Eye Mildly impaired vision   L Eye Mildly impaired vision   Respiratory   Respiratory (WDL) X   Respiratory Pattern Normal   Chest Assessment Chest expansion symmetrical   Bilateral Breath Sounds Clear;Diminished   R Breath Sounds Clear;Diminished   L Breath Sounds Clear;Diminished   Respiratory Additional Assessments No   Incentive Spirometry   IS level of assistance Assisted by nursing   Frequency q2hr W/A   Respiratory Effort Normal   Treatment Tolerance Tolerated fairly well   Incentive Spirometry Goal (mL) 2500 mL   Incentive Spirometry Achieved (mL) 1750 mL   Cardiac   Cardiac (WDL) WDL   Cardiac Regularity Regular   Pain Assessment   Pain Assessment 0-10   Pain Score No Pain   Peripheral Vascular   Peripheral Vascular (WDL) X   Cyanosis None   Capillary Refill Less than/equal to 2 seconds (All extremities)   Pulses L pedal;R pedal;L radial;R radial   Edema Right upper extremity   RUE Edema Trace   LUE Edema None   RLE Edema None   LLE Edema None   Integumentary   Integumentary (WDL) X   Skin Color Pale   Skin Condition/Temp Warm;Dry   Skin Integrity Bruising; Abrasion   Skin Location rt knee scab   Skin Turgor Non-tenting   Integumentary Additional Assessments No   Сергей Scale   Sensory Perceptions 2   Moisture 2   Activity 2   Mobility 2   Nutrition 2   Friction and Shear 2   Сергей Scale Score 12   Musculoskeletal   Musculoskeletal (WDL) X   RUE Sling;Paralysis   LUE Full movement   RLE Paralysis  (podus boot)   LLE Limited movement   Gastrointestinal   Gastrointestinal (WDL) WDL   Abdomen Inspection Soft;Nondistended   Bowel Sounds (All Quadrants) Normoactive   Tenderness No guarding   Genitourinary   Genitourinary (WDL) WDL   Urine Assessment   Urinary Incontinence Yes   Urine Color Kellee   Psychosocial   Psychosocial (WDL) X   Patient Behaviors/Mood Flat affect;Irritable   Needs Expressed Denies   Cough   Cough None

## 2019-11-28 NOTE — PLAN OF CARE
Problem: Potential for Falls  Goal: Patient will remain free of falls  Description  INTERVENTIONS:  - Assess patient frequently for physical needs  -  Identify cognitive and physical deficits and behaviors that affect risk of falls  -  [place] fall precautions as indicated by assessment   - Educate patient/family on patient safety including physical limitations  - Instruct patient to call for assistance with activity based on assessment  - Modify environment to reduce risk of injury  - Consider OT/PT consult to assist with strengthening/mobility  Outcome: Progressing     Problem: Neurological Deficit  Goal: Neurological status is stable or improving  Description  Interventions:  - Monitor and assess patient's level of consciousness, motor function, sensory function, and level of assistance needed for ADLs  - Monitor and report changes from baseline  Collaborate with interdisciplinary team to initiate plan and implement interventions as ordered  - Provide and maintain a safe environment  - Consider seizure precautions  - Consider fall precautions  - Consider aspiration precautions  - Consider bleeding precautions  Outcome: Progressing     Problem: Activity Intolerance/Impaired Mobility  Goal: Mobility/activity is maintained at optimum level for patient  Description  Interventions:  - Assess and monitor patient  barriers to mobility and need for assistive/adaptive devices  - Assess patient's emotional response to limitations  - Collaborate with interdisciplinary team and initiate plans and interventions as ordered  - Encourage independent activity per ability   - Maintain proper body alignment  - Perform active/passive rom as tolerated/ordered    - Plan activities to conserve energy   - Turn patient as appropriate  Outcome: Progressing     Problem: Potential for Aspiration  Goal: Non-ventilated patient's risk of aspiration is minimized  Description  Assess and monitor vital signs, respiratory status, and labs (WBC)  Monitor for signs of aspiration (tachypnea, cough, rales, wheezing, cyanosis, fever)  - Assess and monitor patient's ability to swallow  - Place patient up in chair to eat if possible  - HOB up at 90 degrees to eat if unable to get patient up into chair   - Supervise patient during oral intake  - Instruct patient/ family to take small bites  - Instruct patient/ family to take small single sips when taking liquids  - Follow patient-specific strategies generated by speech pathologist   Outcome: Progressing  Goal: Ventilated patient's risk of aspiration is minimized  Description  Assess and monitor vital signs, respiratory status, airway cuff pressure, and labs (WBC)  Monitor for signs of aspiration (tachypnea, cough, rales, wheezing, cyanosis, fever)  - Elevate head of bed 30 degrees if patient has tube feeding   - Monitor tube feeding  Outcome: Progressing     Problem: Nutrition  Goal: Nutrition/Hydration status is improving  Description  Monitor and assess patient's nutrition/hydration status for malnutrition (ex- brittle hair, bruises, dry skin, pale skin and conjunctiva, muscle wasting, smooth red tongue, and disorientation)  Collaborate with interdisciplinary team and initiate plan and interventions as ordered  Monitor patient's weight and dietary intake as ordered or per policy  Utilize nutrition screening tool and intervene per policy  Determine patient's food preferences and provide high-protein, high-caloric foods as appropriate  - Assist patient with eating   - Allow adequate time for meals   - Encourage patient to take dietary supplement as ordered  - Collaborate with clinical nutritionist   - Include patient/family/caregiver in decisions related to nutrition    Outcome: Progressing

## 2019-11-28 NOTE — PROGRESS NOTES
11/28/19 0800   Pain Assessment   Pain Assessment No/denies pain   Restrictions/Precautions   Precautions Aspiration;Bed/chair alarms;Cognitive; Fall Risk   Eating   Type of Assistance Needed Physical assistance;Verbal cues; Set-up / clean-up   Amount of Physical Assistance Provided 75% or more   Eating CARE Score 2   Eating Assessment   Swallow Precautions Yes   Food To Mouth Yes   Positioning Out of Bed;Upright   Safety Needs Increase Time;Cues   Intake Mode PO   Opens Packages No   Findings Aspiration precautions;no S/S during breakfast;VCs for intake;ate 100% of meal;No initiation with utensil mgmt;Verbal and visual cues;OT had to feed pt 50-75% of meal and p tperformed remaining portion;upright in chair at table    Lower Body Dressing   Type of Assistance Needed Physical assistance   Amount of Physical Assistance Provided Total assistance   Lower Body Dressing CARE Score 1   Dressing/Undressing Clothing   Don LB Clothes Pants   Limitations Noted In Safety;Problem Solving; Endurance;Balance; Sequencing   Findings D with donning pants in bed rolling side to side with SR   Roll Left and Right   Type of Assistance Needed Physical assistance   Amount of Physical Assistance Provided 50%-74%   Roll Left and Right CARE Score 2   Lying to Sitting on Side of Bed   Type of Assistance Needed Physical assistance   Amount of Physical Assistance Provided 75% or more   Lying to Sitting on Side of Bed CARE Score 2   Sit to Stand   Type of Assistance Needed Physical assistance   Sit to Stand CARE Score -   Bed-Chair Transfer   Type of Assistance Needed Physical assistance;Verbal cues; Adaptive equipment   Amount of Physical Assistance Provided Total assistance   Chair/Bed-to-Chair Transfer CARE Score 1   Transfer Bed/Chair/Wheelchair   Findings Ax2 with SBT, pt assisting with L UE on chair and arm rest;followed commands;     Toileting Hygiene   Type of Assistance Needed Physical assistance   Amount of Physical Assistance Provided Total assistance   Toileting Hygiene CARE Score 1   Toileting   Able to Pull Clothing down no, up no  Limitations Noted In Safety;Problem Solving;Balance;UE Strength;LE Strength; Sequencing;ROM   Findings Pt had incontinent episode of bowel; pt reported he needed to go to the  although by the time pt was transferred to the [facility] he had BM everywhere;Pt required D to manage hygiene/Ax2 to 3 people due to position of pt and pt hollaring out during task;pt then transferred to bed for remainder of hygiene   Toilet Transfer   Type of Assistance Needed Physical assistance   Amount of Physical Assistance Provided Total assistance   Toilet Transfer CARE Score 1   Toilet Transfer   Surface Assessed Drop Arm Commode   Transfer Technique Sit Pivot   Limitations Noted In Balance;Problem Solving;ROM;Safety; Sequencing   Findings Pt stood with Ax2 with swap technique assisting pt to maintain NWB  to get to [facility] quickly due to need to have a BM as pt was shouting    Exercise Tools   Other Exercise Tool 1 large foam peg board;VCs for completion of pattern; Pt performed 1/4 of activity without cues then stated"I dont wanna"  OT redirected pt and he contineud to decline    Cognition   Overall Cognitive Status Impaired   Arousal/Participation Alert; Responsive; Cooperative   Attention Difficulty attending to directions   Orientation Level Oriented to person   Memory Decreased short term memory;Decreased recall of recent events;Decreased recall of precautions   Following Commands Follows one step commands inconsistently   Assessment   Treatment Assessment Pt participated in 30 minutes of concurrent tx addressing similar goals with a pt with similar deficits  Initially pt /90 in supine in  bed, assisted pt to dress LB then raised [facility] for nurse to admin meds  Once in w/c pts BP decreased to 169/78 as per staff  OT tx addressed breakfast meal mgmt/txf training/therapeutic activity  Refer to respective sections for details   Ot notes pt pushes when using L UE to support self;sling donned to R UE during txfs;pt needing to be redirected thoughout session due to hollaring out and cursing and difficulty to redirect  Pt returned to bed after completion due to needing to be transferred there for hygiene with incontinent episode  Prognosis Fair   Problem List Decreased strength;Decreased range of motion;Decreased endurance; Impaired balance;Decreased mobility; Impaired judgement;Decreased cognition;Decreased safety awareness;Orthopedic restrictions;Pain   Plan   Treatment/Interventions ADL retraining;Functional transfer training; Endurance training;Cognitive reorientation;Patient/family training;Bed mobility; Compensatory technique education   Progress Improving as expected   OT Therapy Minutes   OT Time In 0800   OT Time Out 1000   OT Total Time (minutes) 120   OT Mode of treatment - Individual (minutes) 90   OT Mode of treatment - Concurrent (minutes) 30   OT Mode of treatment - Group (minutes) 0   OT Mode of treatment - Co-treat (minutes) 0   OT Mode of Treatment - Total time(minutes) 120 minutes   OT Cumulative Minutes 120   Therapy Time missed   Time missed?  No

## 2019-11-29 PROCEDURE — 97542 WHEELCHAIR MNGMENT TRAINING: CPT

## 2019-11-29 PROCEDURE — 97110 THERAPEUTIC EXERCISES: CPT

## 2019-11-29 PROCEDURE — 99232 SBSQ HOSP IP/OBS MODERATE 35: CPT | Performed by: PHYSICAL MEDICINE & REHABILITATION

## 2019-11-29 PROCEDURE — 97112 NEUROMUSCULAR REEDUCATION: CPT

## 2019-11-29 PROCEDURE — 97530 THERAPEUTIC ACTIVITIES: CPT

## 2019-11-29 PROCEDURE — G0515 COGNITIVE SKILLS DEVELOPMENT: HCPCS

## 2019-11-29 PROCEDURE — 92526 ORAL FUNCTION THERAPY: CPT

## 2019-11-29 PROCEDURE — 97535 SELF CARE MNGMENT TRAINING: CPT

## 2019-11-29 RX ADMIN — NICOTINE 1 PATCH: 21 PATCH, EXTENDED RELEASE TRANSDERMAL at 08:44

## 2019-11-29 RX ADMIN — LEVOTHYROXINE SODIUM 75 MCG: 75 TABLET ORAL at 05:26

## 2019-11-29 RX ADMIN — HYDRALAZINE HYDROCHLORIDE 10 MG: 10 TABLET, FILM COATED ORAL at 21:18

## 2019-11-29 RX ADMIN — DOCUSATE SODIUM 100 MG: 100 CAPSULE, LIQUID FILLED ORAL at 08:48

## 2019-11-29 RX ADMIN — DOCUSATE SODIUM 100 MG: 100 CAPSULE, LIQUID FILLED ORAL at 17:25

## 2019-11-29 RX ADMIN — HYDRALAZINE HYDROCHLORIDE 10 MG: 10 TABLET, FILM COATED ORAL at 14:26

## 2019-11-29 RX ADMIN — HYDRALAZINE HYDROCHLORIDE 10 MG: 10 TABLET, FILM COATED ORAL at 05:25

## 2019-11-29 RX ADMIN — ASPIRIN 81 MG: 81 TABLET, COATED ORAL at 08:53

## 2019-11-29 RX ADMIN — NIFEDIPINE 60 MG: 30 TABLET, FILM COATED, EXTENDED RELEASE ORAL at 08:47

## 2019-11-29 RX ADMIN — LISINOPRIL 20 MG: 20 TABLET ORAL at 08:47

## 2019-11-29 RX ADMIN — LISINOPRIL 20 MG: 20 TABLET ORAL at 17:26

## 2019-11-29 RX ADMIN — METOPROLOL TARTRATE 50 MG: 50 TABLET, FILM COATED ORAL at 20:07

## 2019-11-29 RX ADMIN — CLOPIDOGREL BISULFATE 75 MG: 75 TABLET ORAL at 08:47

## 2019-11-29 RX ADMIN — METOPROLOL TARTRATE 50 MG: 50 TABLET, FILM COATED ORAL at 08:45

## 2019-11-29 RX ADMIN — ATORVASTATIN CALCIUM 40 MG: 40 TABLET, FILM COATED ORAL at 17:25

## 2019-11-29 NOTE — PROGRESS NOTES
11/29/19 0205   Pain Assessment   Pain Assessment No/denies pain   Pain Score No Pain   Restrictions/Precautions   Precautions Bed/chair alarms;Cognitive;Aspiration;Limb alert   RLE Weight Bearing Per Order NWB   Braces or Orthoses   (R LE immobilizer unlocked )   Cognition   Overall Cognitive Status Impaired   Arousal/Participation Alert; Cooperative   Attention Difficulty attending to directions   Memory Decreased recall of precautions;Decreased recall of recent events;Decreased short term memory   Following Commands Follows one step commands with increased time or repetition   Sit to Lying   Type of Assistance Needed Physical assistance   Amount of Physical Assistance Provided 75% or more   Sit to Lying CARE Score 2   Lying to Sitting on Side of Bed   Type of Assistance Needed Physical assistance   Amount of Physical Assistance Provided 75% or more   Lying to Sitting on Side of Bed CARE Score 2   Bed-Chair Transfer   Type of Assistance Needed Physical assistance   Amount of Physical Assistance Provided Total assistance   Comment   (slide board max A x 1, mod A x 1 )   Chair/Bed-to-Chair Transfer CARE Score 1   Transfer Bed/Chair/Wheelchair   Limitations Noted In Balance; Endurance; Coordination;Problem Solving; Sequencing;UE Strength;LE Strength   Adaptive Equipment Transfer Board   Sit Pivot Moderate Assist;Maximum Assist;Assist x 2   Supine to Sit Maximum Assist;Assist x 1   Sit to Supine Maximum Assist;Assist x 1   Wheel 50 Feet with Two Turns   Type of Assistance Needed Physical assistance   Amount of Physical Assistance Provided 50%-74%   Comment   (with max verbal encouragement to complete)   Wheel 50 Feet with Two Turns CARE Score 2   Wheelchair mobility   Does the patient use a wheelchair? 1  Yes   Type of Wheelchair Used 1  Manual   Method Left upper extremity; Left lower extremity   Distance Level Surface (feet)   (50' 25' 75' )   Findings   (mod A )   Therapeutic Interventions   Strengthening Supine TE performed to tolerance    Balance seated static/dynamic balance training    Other w/c mobility, transfer training    Assessment   Treatment Assessment Pt completes functional mobility training working on slide board transfer to and from w/c to bed and mat table, w/c mobility with focus on using L UE and L LE for improved independence and mobility, and static/dyamic sitting balance at edge of mat table for improved trunk control with transfers and mobility tasks  Pt has fair tolerance to therapy due to impaired attention/cognition, and increased fatigue with mobility tasks requiring frequent and long rest breaks  Completed w/c mobility today incorporating L LE with max VC to consistently use both L upper and lower extremities  Pt was max A x 1 plus mod A x 1 for both slide board transfers with cueing for safety and sequencing  Pt will continue to benefit from skilled Physical Therapy to address remaining limitations and maximize LOF  PT Barriers   Physical Impairment Decreased strength;Decreased mobility; Impaired balance;Decreased endurance;Decreased range of motion;Decreased coordination;Decreased cognition; Impaired judgement;Decreased safety awareness;Orthopedic restrictions   Functional Limitation Wheelchair management;Transfers   Plan   Treatment/Interventions Functional transfer training;LE strengthening/ROM; Therapeutic exercise; Endurance training;Cognitive reorientation;Patient/family training;Equipment eval/education; Bed mobility; Compensatory technique education   Progress Slow progress, cognitive deficits   PT Therapy Minutes   PT Time In 1405  (time entered at 2:05 in error)   PT Time Out 1505   PT Total Time (minutes) 60   PT Mode of treatment - Individual (minutes) 60   PT Mode of treatment - Concurrent (minutes) 0   PT Mode of treatment - Group (minutes) 0   PT Mode of treatment - Co-treat (minutes) 0   PT Mode of Treatment - Total time(minutes) 60 minutes   PT Cumulative Minutes 304   Therapy Time missed   Time missed?  No

## 2019-11-29 NOTE — PROGRESS NOTES
Initially yelling out this a m , no specific complaint  Denies pain  Immobilizer and multipodis boot on RLE  Needs verbal cues and supervision with eating  Trying to drink his cream of wheat  Appetite good  Follows commands but needs a lot of cueing  Reviewed and reinforced medications and side effects along with safety and call bell use

## 2019-11-29 NOTE — PLAN OF CARE
Problem: Nutrition/Hydration-ADULT  Goal: Nutrient/Hydration intake appropriate for improving, restoring or maintaining nutritional needs  Description  Monitor and assess patient's nutrition/hydration status for malnutrition  Collaborate with interdisciplinary team and initiate plan and interventions as ordered  Monitor patient's weight and dietary intake as ordered or per policy  Utilize nutrition screening tool and intervene as necessary  Determine patient's food preferences and provide high-protein, high-caloric foods as appropriate  INTERVENTIONS:  - Monitor oral intake, urinary output, labs, and treatment plans  - Assess nutrition and hydration status and recommend course of action  - Evaluate amount of meals eaten  - Assist patient with eating if necessary   - Allow adequate time for meals  - Recommend/ encourage appropriate diets, oral nutritional supplements, and vitamin/mineral supplements  - Order, calculate, and assess calorie counts as needed  - Recommend, monitor, and adjust tube feedings and TPN/PPN based on assessed needs  - Assess need for intravenous fluids  - Provide specific nutrition/hydration education as appropriate  - Include patient/family/caregiver in decisions related to nutrition  Outcome: Progressing   Patient continues on level 1 dysphagia diet NTL magic cup BID lunch and dinner  Appetite is improving 50-75%, some 100%  Patient wt  noted 141lb 11/29, was 137lb 11/23/19 increase of 4lb/2 9% in one week, patient noted with trace edema per RN documentation  Recommend continue diet and supplement as ordered

## 2019-11-29 NOTE — NURSING NOTE
Pt calls out intermittently throughout shift  Incontinent of bowel and bladder overnight  Pt pulled BM out of brief and smeared it on bed rails and turning wedge  Pt removed SCD from L leg multiple times overnight  Brace and multipodis boot in place to RLE   Pt bathed with assistance this am

## 2019-11-29 NOTE — PROGRESS NOTES
Physical Medicine and Rehabilitation Progress Note  Nina Perezr 67 y o  male MRN: [id number]  Unit/Bed#: -01 Encounter: [id number]    HPI: Patient is a 68 yo male who presented after a fall leading to rt tibial plateau fx and L frontal SAH both of which were managed non-operatively however course was complicated by ischemic CVA which was likely vessel to vessel in origin    Chief Complaint: CVA    Interval/subjective: patient seen in therapy and tolerating therapy without issue     ROS: A 10 point ROS was performed; negative except as noted above       Assessment/Plan:      Dysphagia  Assessment & Plan  - modified diet per ST      CVA (cerebral vascular accident) [facility])  Assessment & Plan  - L sided cerebral infarcts in the setting of traumatic L frontal SAH  - likely vessel to vessel origin in the setting of occluded L carotid (also with L vertebral artery stenosis)   - neuro recommended to re-starting home ASA 81 mg qd while in acute care which was resumed on 11/17/19 and a FU CTH on 11/25 no longer showed L frontal SAH, d/w Dr Betty Richard of neuro who recommended starting DAPT x 3 wks then ASA monotherapy (patient was on ASA 81 mg qd prior to CVA however with questionable compliance with medications)    - on home lipitor 40 mg qpm per neuro     Closed fracture of right tibial plateau with routine healing  Assessment & Plan  - non-op management per ortho  - per d/w ortho-->NWB in unlocked hinged knee brace, PROM only to patient's tolerance    - per ortho FU XR in 6 wks     Subarachnoid hemorrhage (HCC)  Assessment & Plan  - L frontal SAH  - evaluated by neurosx and no surgical intervention was taken  - completed 7 day keppra sx ppx course in acute care as recommended by neurosx   - cleared by neurology in acute care to re-start home ASA 81 mg qd for ischemic CVA (re-started on 11/17) with FU CTH on 11/25 which no longer revealed L frontal SAH;  d/w Dr Betty Richard of neuro who recommended starting DAPT x 3 wks then ASA monotherapy (patient was on ASA 81 mg qd prior to CVA however with questionable compliance with medications)      Peripheral vascular disease (HCC)  Assessment & Plan  - L carotid dz, L vertebral artery dz, B/L LE arterial dz  - cleared by neurology to re-start home ASA 81 mg qd (ischemic CVA)  - per neuro on home lipitor 40 mg qpm  - follows with Dr Ji Queen as OP and per Dr Narendra German OP note patient refused to see vascular surgeon     Hypothyroidism  Assessment & Plan  - at home on levothyroxine 50 mcg qd however TSH was elevated therefore IM increased levothyroxine to 75 mcg qd and recommend repeat TSH in 4-6 wks     Essential hypertension  Assessment & Plan  - at home on lopressor 50 mg q12, lisinopril 40 mg qd, and procardia XL (24 hr) 60 mg qd   - d/w Dr Elena Al of neuro and cleared for normotensive BP goals   - metanephrines, renin, aldosterone labs ordered by cards --> aldosterone level WNL, free metanephrine WNL, normetanephrine mildly above ULN of 145 at 163, renin level pending; will d/w cards upon completion of results   - IM managing     Hyperlipidemia  Assessment & Plan  - on home lipitor 40 mg qpm per neuro in acute care     * CAD (coronary artery disease)  Assessment & Plan  - h/o CABG  - cleared by neurology to resume home ASA 81 mg qd   - at home on lopressor 50 mg q12 (IM managing)  - on home lipitor 40 mg qpm   - mildly elevated troponins in acute care which peaked at 0 06  - seen by cards in acute care and did have an echo and no further KEBEDE/intevention recommended by cards  - follows with Dr Ji Queen as OP      Scheduled Meds:    Current Facility-Administered Medications:  acetaminophen 650 mg Oral Q6H PRN Bessie Pike MD   aspirin 81 mg Oral Daily Tim Tello MD   atorvastatin 40 mg Oral Daily With Chente Haro MD   clopidogrel 75 mg Oral Daily Tim Tello MD   docusate sodium 100 mg Oral BID Bessie Pike MD   hydrALAZINE 10 mg Oral [facility] DESIREE King levothyroxine 75 mcg Oral [facility] DESIREE Alvarenga   lisinopril 20 mg Oral BID DESIREE Alvarenga   metoprolol tartrate 50 mg Oral Q12H [address] Christian Bryson MD   nicotine 1 patch Transdermal Daily Christian Bryson MD   NIFEdipine 60 mg Oral Daily Christian Bryson MD   ondansetron 4 mg Oral Q6H PRN Christian Bryson MD          Incidental findings:    1) EKG abnormalities (LAD, PACs): OP FU with Dr Eulalia Nelson  2) elevated PA pressure with moderate TR: OP FU with Dr Eulalia Nelson  3) grade 2 DD: OP FU with Dr Eulalia Nelson        DVT ppx: SCDs in the setting of [facility]  Code: per acute care notes patient insisted on being DNR/DNI when he was more cognitively intact, d/w patient's brother who also wishes for patient to be DNR/DNI and patient's brother verbalized his understanding of what this means        Objective:    Functional Update:  Mobility: max   Transfers: max   ADLs: max       Physical Exam:  T: 97 7  HR: 72  BP: 148/80  RR: 18  POx: 99%     General: no apparent distress and comfortable  CARDIAC:  +S1/2  LUNGS:  respirations unlabored   ABDOMEN:  soft NT   EXTREMITIES:  volume status currently stable   NEURO:   inconsistently follows commands  PSYCH:  patient currently calm        Laboratory:   Results from last 7 days   Lab Units 11/23/19  0438   HEMOGLOBIN g/dL 12 7   HEMATOCRIT % 38 0   WBC Thousand/uL 8 00     Results from last 7 days   Lab Units 11/23/19  0438   BUN mg/dL 27*   SODIUM mmol/L 140   POTASSIUM mmol/L 3 6   CHLORIDE mmol/L 107   CREATININE mg/dL 1 06

## 2019-11-29 NOTE — PROGRESS NOTES
11/29/19 1215   Pain Assessment   Pain Assessment No/denies pain   Restrictions/Precautions   Precautions Aspiration;Bed/chair alarms;Cognitive; Fall Risk;Limb alert  (nectar liquids, pureed food and decreased ROM RUE)   RLE Weight Bearing Per Order NWB   Braces or Orthoses LE Immobilizer  (unlocked hinged brace)   Eating   Type of Assistance Needed Physical assistance   Amount of Physical Assistance Provided Less than 25%   Comment min A required for increased intake   Eating CARE Score 3   Eating Assessment   Food To Mouth   (min A required to initiate task if stops)   Meal Assessed Lunch   Opens Packages No   ROM- Right Upper Extremities   RUE ROM Comment PROM shoulder to hand with 50% full range at shoulder due to sublux noted   Pt able to move fingers and thumb R hand with 2 attempts minimally although no other active ROM noted without ice stim    Exercise Tools   Hand Gripper pegs out with gripper L hand 2/3 board with min A and verbal cueing   Neuromuscular Education   Weight Bearing Technique Yes   RUE Weight Bearing Forearm seated   Taping tapping attempted to stimulate RUE with little results, ice stim attempted with up to 3 movements elicited for sh ext, add and elbow flexion and pronation  Assessment   Treatment Assessment Pt presents in w /c prior to lunch starting session with a gross motor activity with LUE  Pt completes lunch with supervision and assist with multiple cues for use of spoon with dessert as patient often attempts to drink dessert by pouring or sucking on spoon as a straw  Pt tolerates PROM and ice stim with movements to RUE for a short time before stating , "I am bored " with benefits and importance of activities expressed  Pt returned to room to spend time with his brother before PT session  Pt will benefit from continued [facility] OT services to increase independence with daily tasks  Problem List Decreased strength;Decreased range of motion;Decreased endurance; Impaired balance;Decreased coordination;Decreased cognition;Decreased safety awareness;Orthopedic restrictions   Plan   Treatment/Interventions ADL retraining;Functional transfer training; Therapeutic exercise; Endurance training;Cognitive reorientation;Patient/family training; Compensatory technique education   Progress Slow progress, decreased activity tolerance   OT Therapy Minutes   OT Time In 1215   OT Time Out 1334   OT Total Time (minutes) 79   OT Mode of treatment - Individual (minutes) 79   Therapy Time missed   Time missed?  No

## 2019-11-29 NOTE — PROGRESS NOTES
11/29/19 0711   Charting Type   Charting Type Shift assessment   Neurological   Neuro (WDL) X   Level of Consciousness Alert/awake   Orientation Level Disoriented to place; Disoriented to time;Disoriented to situation   Cognition Impulsive;Poor safety awareness   Extraocular Movements Full   Facial Symmetry Right facial drooping   Speech Delayed responses   R Pupil Size (mm) 3   L Pupil Size (mm) 3   RUE Muscle Strength 0- No movement   LUE Muscle Strength 4- Movement against gravity and limited resistance   RLE Muscle Strength 0- No movement   LLE Muscle Strength 4- Movement against gravity and limited resistance   Neuro Symptoms Anxiety; Forgetful   Relieved by Rest   Neuro Additional Assessments No   Delirium Assessment- CAM    Acute Onset and Fluctuating Course (1) No   Inattention (2) No   Disorganized Thinking (3) No   Rate Patient's Level of Consciousness (4) Alert (Normal), No   Delirium Present No   Seizure Activity   Symptoms None   [place] Coma Scale   Eye Opening 4   Best Verbal Response 5   Best Motor Response 6   [place] Coma Scale Score 15   HEENT   HEENT (WDL) X   Head and Face Asymmetrical   R Eye Mildly impaired vision   L Eye Mildly impaired vision   R Ear Mildly impaired hearing   L Ear Mildly impaired hearing   Teeth Missing teeth   Respiratory   Respiratory (WDL) X   Respiratory Pattern Normal   Chest Assessment Chest expansion asymmetrical   Bilateral Breath Sounds Clear;Diminished   Respiratory Interventions   Respiratory Interventions Cough and deep breathe;Incentive spirometry   Cough and Deep Breathe   Cough and Deep Breathe Yes   Cardiac   Cardiac (WDL) WDL   Heart Sounds No adventitious heart sounds   Jugular Venous Distention (JVD) No   Pacemaker/ICD No   Pain Assessment   Pain Assessment 0-10   Pain Score No Pain   Peripheral Vascular   Peripheral Vascular (WDL) X   RUE Edema Trace   RLE Edema Trace   LLE Edema Trace   PVS Additional Assessments No   RUE Neurovascular Assessment   RUE Color Ecchymosis   R Radial Pulse +2   LUE Neurovascular Assessment   L Radial Pulse +2   RLE Neurovascular Assessment   R Pedal Pulse +1   LLE Neurovascular Assessment   L Pedal Pulse +1   Integumentary   Integumentary (WDL) X   Skin Condition/Temp Dry; Warm   Skin Integrity Abrasion;Bruising   Skin Location rt knee scab   Skin Turgor Non-tenting   Integumentary Additional Assessments No   Tattoos/Piercings   Does patient have tattoos? No   Сергей Scale   Sensory Perceptions 2   Moisture 2   Activity 2   Mobility 2   Nutrition 2   Friction and Shear 2   Сергей Scale Score 12   Musculoskeletal   Musculoskeletal (WDL) X   Level of Assistance Maximum assist, patient does 25-49%   Assistive Device Wheelchair   RUE Paralysis   RLE Paralysis   LLE Limited movement   Musculoskeletal Additional Assessments No   Gastrointestinal   Gastrointestinal (WDL) WDL   Gastrointestinal Additional Assessments No   Genitourinary   Genitourinary (WDL) WDL   Urine Assessment   Urinary Incontinence Yes   Genitalia   Male Genitalia Intact   Genitourinary Additional Assessments   Genitourinary Additional Assessments No   Anal/Rectal   Anal/Rectal (WDL) WDL   Psychosocial   Psychosocial (WDL) X   Patient Behaviors/Mood Angry   Needs Expressed Denies   Ability to Express Feelings Needs assistance   Ability to Express Needs Unable to express   Ability to Express Thoughts Unable to express   Ability to Understand Others Sometimes understands   Hang Suicide Severity Rating Scale   1  Wish to be Dead No   2   Suicidal Thoughts Never   *Risk Level* Low Risk   Cough   Cough None

## 2019-11-29 NOTE — PLAN OF CARE
Problem: Potential for Falls  Goal: Patient will remain free of falls  Description  INTERVENTIONS:  - Assess patient frequently for physical needs  -  Identify cognitive and physical deficits and behaviors that affect risk of falls  -  [place] fall precautions as indicated by assessment   - Educate patient/family on patient safety including physical limitations  - Instruct patient to call for assistance with activity based on assessment  - Modify environment to reduce risk of injury  - Consider OT/PT consult to assist with strengthening/mobility  Outcome: Progressing     Problem: Neurological Deficit  Goal: Neurological status is stable or improving  Description  Interventions:  - Monitor and assess patient's level of consciousness, motor function, sensory function, and level of assistance needed for ADLs  - Monitor and report changes from baseline  Collaborate with interdisciplinary team to initiate plan and implement interventions as ordered  - Provide and maintain a safe environment  - Consider seizure precautions  - Consider fall precautions  - Consider aspiration precautions  - Consider bleeding precautions  Outcome: Progressing     Problem: Activity Intolerance/Impaired Mobility  Goal: Mobility/activity is maintained at optimum level for patient  Description  Interventions:  - Assess and monitor patient  barriers to mobility and need for assistive/adaptive devices  - Assess patient's emotional response to limitations  - Collaborate with interdisciplinary team and initiate plans and interventions as ordered  - Encourage independent activity per ability   - Maintain proper body alignment  - Perform active/passive rom as tolerated/ordered    - Plan activities to conserve energy   - Turn patient as appropriate  Outcome: Progressing     Problem: Potential for Aspiration  Goal: Non-ventilated patient's risk of aspiration is minimized  Description  Assess and monitor vital signs, respiratory status, and labs (WBC)  Monitor for signs of aspiration (tachypnea, cough, rales, wheezing, cyanosis, fever)  - Assess and monitor patient's ability to swallow  - Place patient up in chair to eat if possible  - HOB up at 90 degrees to eat if unable to get patient up into chair   - Supervise patient during oral intake  - Instruct patient/ family to take small bites  - Instruct patient/ family to take small single sips when taking liquids  - Follow patient-specific strategies generated by speech pathologist   Outcome: Progressing  Goal: Ventilated patient's risk of aspiration is minimized  Description  Assess and monitor vital signs, respiratory status, airway cuff pressure, and labs (WBC)  Monitor for signs of aspiration (tachypnea, cough, rales, wheezing, cyanosis, fever)  - Elevate head of bed 30 degrees if patient has tube feeding   - Monitor tube feeding  Outcome: Progressing     Problem: Nutrition  Goal: Nutrition/Hydration status is improving  Description  Monitor and assess patient's nutrition/hydration status for malnutrition (ex- brittle hair, bruises, dry skin, pale skin and conjunctiva, muscle wasting, smooth red tongue, and disorientation)  Collaborate with interdisciplinary team and initiate plan and interventions as ordered  Monitor patient's weight and dietary intake as ordered or per policy  Utilize nutrition screening tool and intervene per policy  Determine patient's food preferences and provide high-protein, high-caloric foods as appropriate  - Assist patient with eating   - Allow adequate time for meals   - Encourage patient to take dietary supplement as ordered  - Collaborate with clinical nutritionist   - Include patient/family/caregiver in decisions related to nutrition    Outcome: Progressing     Problem: Prexisting or High Potential for Compromised Skin Integrity  Goal: Skin integrity is maintained or improved  Description  INTERVENTIONS:  - Identify patients at risk for skin breakdown  - Assess and monitor skin integrity  - Assess and monitor nutrition and hydration status  - Monitor labs   - Assess for incontinence   - Turn and reposition patient  - Assist with mobility/ambulation  - Relieve pressure over bony prominences  - Avoid friction and shearing  - Provide appropriate hygiene as needed including keeping skin clean and dry  - Evaluate need for skin moisturizer/barrier cream  - Collaborate with interdisciplinary team   - Patient/family teaching  - Consider wound care consult   Outcome: Progressing     Problem: Nutrition/Hydration-ADULT  Goal: Nutrient/Hydration intake appropriate for improving, restoring or maintaining nutritional needs  Description  Monitor and assess patient's nutrition/hydration status for malnutrition  Collaborate with interdisciplinary team and initiate plan and interventions as ordered  Monitor patient's weight and dietary intake as ordered or per policy  Utilize nutrition screening tool and intervene as necessary  Determine patient's food preferences and provide high-protein, high-caloric foods as appropriate       INTERVENTIONS:  - Monitor oral intake, urinary output, labs, and treatment plans  - Assess nutrition and hydration status and recommend course of action  - Evaluate amount of meals eaten  - Assist patient with eating if necessary   - Allow adequate time for meals  - Recommend/ encourage appropriate diets, oral nutritional supplements, and vitamin/mineral supplements  - Order, calculate, and assess calorie counts as needed  - Recommend, monitor, and adjust tube feedings and TPN/PPN based on assessed needs  - Assess need for intravenous fluids  - Provide specific nutrition/hydration education as appropriate  - Include patient/family/caregiver in decisions related to nutrition  Outcome: Progressing

## 2019-11-30 PROCEDURE — G0515 COGNITIVE SKILLS DEVELOPMENT: HCPCS

## 2019-11-30 PROCEDURE — 97530 THERAPEUTIC ACTIVITIES: CPT

## 2019-11-30 PROCEDURE — 97112 NEUROMUSCULAR REEDUCATION: CPT

## 2019-11-30 PROCEDURE — 92526 ORAL FUNCTION THERAPY: CPT

## 2019-11-30 PROCEDURE — 97535 SELF CARE MNGMENT TRAINING: CPT

## 2019-11-30 RX ORDER — HYDRALAZINE HYDROCHLORIDE 25 MG/1
25 TABLET, FILM COATED ORAL EVERY 8 HOURS SCHEDULED
Status: DISCONTINUED | OUTPATIENT
Start: 2019-11-30 | End: 2019-12-02

## 2019-11-30 RX ADMIN — ATORVASTATIN CALCIUM 40 MG: 40 TABLET, FILM COATED ORAL at 16:08

## 2019-11-30 RX ADMIN — ASPIRIN 81 MG: 81 TABLET, COATED ORAL at 08:53

## 2019-11-30 RX ADMIN — DOCUSATE SODIUM 100 MG: 100 CAPSULE, LIQUID FILLED ORAL at 17:17

## 2019-11-30 RX ADMIN — LEVOTHYROXINE SODIUM 75 MCG: 75 TABLET ORAL at 05:33

## 2019-11-30 RX ADMIN — LISINOPRIL 20 MG: 20 TABLET ORAL at 08:53

## 2019-11-30 RX ADMIN — METOPROLOL TARTRATE 50 MG: 50 TABLET, FILM COATED ORAL at 08:53

## 2019-11-30 RX ADMIN — LISINOPRIL 20 MG: 20 TABLET ORAL at 17:17

## 2019-11-30 RX ADMIN — NICOTINE 1 PATCH: 21 PATCH, EXTENDED RELEASE TRANSDERMAL at 08:51

## 2019-11-30 RX ADMIN — DOCUSATE SODIUM 100 MG: 100 CAPSULE, LIQUID FILLED ORAL at 08:53

## 2019-11-30 RX ADMIN — HYDRALAZINE HYDROCHLORIDE 10 MG: 10 TABLET, FILM COATED ORAL at 13:40

## 2019-11-30 RX ADMIN — HYDRALAZINE HYDROCHLORIDE 25 MG: 25 TABLET ORAL at 21:19

## 2019-11-30 RX ADMIN — HYDRALAZINE HYDROCHLORIDE 10 MG: 10 TABLET, FILM COATED ORAL at 05:33

## 2019-11-30 RX ADMIN — CLOPIDOGREL BISULFATE 75 MG: 75 TABLET ORAL at 08:53

## 2019-11-30 RX ADMIN — METOPROLOL TARTRATE 50 MG: 50 TABLET, FILM COATED ORAL at 21:19

## 2019-11-30 RX ADMIN — NIFEDIPINE 60 MG: 30 TABLET, FILM COATED, EXTENDED RELEASE ORAL at 08:53

## 2019-11-30 NOTE — PROGRESS NOTES
11/30/19 1101   Pain Assessment   Pain Assessment No/denies pain   Pain Score No Pain   Restrictions/Precautions   Precautions Aspiration;Bed/chair alarms;Cognitive; Fall Risk;Limb alert  (decreased strength/ROM R UE/LE's )   RLE Weight Bearing Per Order NWB   Braces or Orthoses LE Immobilizer  (unlocked hinged brace )   Cognition   Overall Cognitive Status Impaired   Arousal/Participation Alert; Cooperative   Attention Difficulty attending to directions   Orientation Level Disoriented to person;Disoriented to place; Disoriented to time;Disoriented to situation   Memory Decreased recall of precautions;Decreased recall of recent events;Decreased short term memory   Following Commands Follows one step commands inconsistently   Sit to Stand   Type of Assistance Needed Physical assistance   Amount of Physical Assistance Provided Total assistance   Comment   (max A x 2 with poor ability to maintain NWB R LE )   Sit to Stand CARE Score 1   Bed-Chair Transfer   Type of Assistance Needed Physical assistance   Amount of Physical Assistance Provided Total assistance   Comment   (max x 1 min x 1 )   Chair/Bed-to-Chair Transfer CARE Score 1   Transfer Bed/Chair/Wheelchair   Limitations Noted In Balance; Coordination; Endurance;Problem Solving;Sensation; Sequencing;UE Strength;LE Strength   Adaptive Equipment Transfer Board   Sit Pivot Maximum Assist;Assist x 1;Minimal Assist  (max x 1 & min x 1 )   Sit to Stand Maximum Assist;Assist x 2   Stand to Sit Maximum Assist;Assist x 2   All Transfer Maximum Assist;Assist x 2   Wheel 50 Feet with Two Turns   Type of Assistance Needed Physical assistance   Amount of Physical Assistance Provided 50%-74%   Wheel 50 Feet with Two Turns CARE Score 2   Wheel 150 Feet   Type of Assistance Needed Physical assistance   Amount of Physical Assistance Provided 50%-74%   Wheel 150 Feet CARE Score 2   Wheelchair mobility   Does the patient use a wheelchair? 1  Yes   Type of Wheelchair Used 1   Manual Method Left lower extremity; Left upper extremity   Assistance Provided For Locking Brakes;Obstacles   Distance Level Surface (feet)   (60' 20')   Findings   (mod/max x 1 )   Toilet Transfer   Type of Assistance Needed Physical assistance   Amount of Physical Assistance Provided Total assistance   Toilet Transfer CARE Score 1   Toilet Transfer   Surface Assessed Bedside Commode   Limitations Noted In Balance;Problem Solving;ROM;Safety; Sensation; Sequencing;UE Strength;LE Strength   Positioning Concerns   (R UE sling; NWB R LE )   Findings Max A x 2 for transfer A x 1 to clean   Therapeutic Interventions   Balance Seated static/dynamic sitting balance using mirror to facilitate visual repositioning    Other w/c mobility, transfer training, comode transfer    Assessment   Treatment Assessment Pt presents seated on commode at the start therapy  Assisted aids and nursing with toilet transfer needing A x 2 to stand and A x 1 to clean  Pt has poor maintenance  of  NWB R LE during all sit/stand transfers  Following toilet transfer Pt completes functional mobility training working on slide board transfer to and from w/c to mat table, w/c mobility with focus on using L UE and L LE for improved independence and mobility, and static/dyamic sitting balance at edge of mat table for improved trunk control with transfers and mobility tasks  Incorporated mirror during sitting balance to facilitate visual repositioning  Pt ranged from max A x 1 to maintain balance to CGA depending on focus and fatigue  Pt was challenged with using the mirror to correct poor posture and needed a lot of cueing to look towards the mirror for visual feedback  Pt will continue to benefit from skilled Physical Therapy to maximize safety and LOF  PT Barriers   Physical Impairment Decreased strength;Decreased range of motion;Decreased endurance; Impaired balance;Decreased coordination;Decreased mobility; Decreased cognition; Impaired judgement;Decreased safety awareness; Impaired sensation;Orthopedic restrictions   Functional Limitation Wheelchair management;Transfers;Standing   Plan   Treatment/Interventions Functional transfer training;LE strengthening/ROM; Therapeutic exercise; Endurance training;Patient/family training;Cognitive reorientation;Equipment eval/education; Bed mobility; Compensatory technique education   Progress Slow progress, cognitive deficits   PT Therapy Minutes   PT Time In 1101   PT Time Out 1138   PT Total Time (minutes) 37   PT Mode of treatment - Individual (minutes) 37   PT Mode of treatment - Concurrent (minutes) 0   PT Mode of treatment - Group (minutes) 0   PT Mode of treatment - Co-treat (minutes) 0   PT Mode of Treatment - Total time(minutes) 37 minutes   PT Cumulative Minutes 341   Therapy Time missed   Time missed?  No

## 2019-11-30 NOTE — PROGRESS NOTES
11/30/19 0718   Charting Type   Charting Type Shift assessment   Neurological   Neuro (WDL) X   Level of Consciousness Alert/awake   Orientation Level Disoriented to place; Disoriented to time;Disoriented to situation   Cognition Poor judgement; Short term memory loss   Extraocular Movements Full   Facial Symmetry Right facial drooping   R Pupil Size (mm) 3   L Pupil Size (mm) 3   RUE Muscle Strength 0- No movement   RLE Muscle Strength 0- No movement   Neuro Symptoms Forgetful   Relieved by Rest   Neuro Additional Assessments No   Delirium Assessment- CAM    Acute Onset and Fluctuating Course (1) No   Inattention (2) No   Disorganized Thinking (3) No   Rate Patient's Level of Consciousness (4) Alert (Normal), No   Delirium Present No   Seizure Activity   Symptoms None   [place] Coma Scale   Eye Opening 4   Best Verbal Response 5   Best Motor Response 6   [place] Coma Scale Score 15   HEENT   HEENT (WDL) X   Head and Face Asymmetrical   R Eye Mildly impaired vision   L Eye Mildly impaired vision   R Ear Mildly impaired hearing   L Ear Mildly impaired hearing   Teeth Missing teeth   Respiratory   Respiratory (WDL) X   Respiratory Pattern Normal   Chest Assessment Chest expansion symmetrical   Respiratory Additional Assessments Yes   Localized Breath Sounds   R Basilar Clear;Diminished   L Basilar Clear;Diminished   Respiratory Interventions   Respiratory Interventions Cough and deep breathe   Cough and Deep Breathe   Cough and Deep Breathe Yes   Cardiac   Cardiac (WDL) WDL   Cardiac Regularity Regular   Jugular Venous Distention (JVD) No   Pacemaker/ICD No   Pain Assessment   Pain Assessment No/denies pain   Pain Score No Pain   Peripheral Vascular   Peripheral Vascular (WDL) X   RUE Edema Trace   RLE Edema Trace   PVS Additional Assessments No   RUE Neurovascular Assessment   R Radial Pulse +2   LUE Neurovascular Assessment   L Radial Pulse +2   RLE Neurovascular Assessment   R Pedal Pulse +1   LLE Neurovascular Assessment   L Pedal Pulse +1   Integumentary   Integumentary (WDL) X   Skin Integrity Abrasion;Bruising   Skin Location rt knee scab   Skin Turgor Non-tenting   Integumentary Additional Assessments No   Tattoos/Piercings   Does patient have tattoos? No   Piercings Remaining No   Сергей Scale   Sensory Perceptions 2   Moisture 2   Activity 2   Mobility 2   Nutrition 2   Friction and Shear 2   Сергей Scale Score 12   Musculoskeletal   Musculoskeletal (WDL) X   Level of Assistance Maximum assist, patient does 25-49%   RUE Paralysis   RLE Paralysis; Injury/trauma   LLE Limited movement   Musculoskeletal Additional Assessments No   Gastrointestinal   Gastrointestinal (WDL) WDL   Bowel Sounds (All Quadrants) Normoactive   GI Symptoms None   Gastrointestinal Additional Assessments No   Genitourinary   Genitourinary (WDL) WDL   Urine Assessment   Urinary Incontinence Yes   Genitalia   Male Genitalia Intact   Genitourinary Additional Assessments   Genitourinary Additional Assessments No   Anal/Rectal   Anal/Rectal (WDL) WDL   Psychosocial   Psychosocial (WDL) X   Needs Expressed Denies   Ability to Express Feelings Needs assistance   Ability to Express Needs Unable to express   Ability to Express Thoughts Unable to express   Ability to Understand Others Sometimes understands   Hang Suicide Severity Rating Scale   1  Wish to be Dead No   2   Suicidal Thoughts Never   *Risk Level* Low Risk   Cough   Cough None

## 2019-11-30 NOTE — PROGRESS NOTES
11/30/19 1636   Pain Assessment   Pain Assessment No/denies pain   Pain Score No Pain   Restrictions/Precautions   Precautions Aspiration;Cognitive; Fall Risk;Limb alert   Weight Bearing Restrictions Yes   RLE Weight Bearing Per Order NWB   Braces or Orthoses LE Immobilizer   Comprehension   QI: Comprehension 2  Sometimes Understands: Understands only basic conversations or simple, direct phrases  Frequently requires cues to understand   Comprehension (FIM) 2 - Understands basic info/conversation 25-49% of time   Expression   QI: Expression 2  Frequently exhibits difficulty with expressing needs and ideas   Expression (FIM) 2 - Understands basic info/conversation 25-49% of time   Social Interaction   Social Interaction (FIM) 2 - Interacts appropriately 25-49% of time   Problem Solving   Problem solving (FIM) 2 - Bed alarm for safety issues more than half the time   Memory   Memory (FIM) 2 - Recognizes, recalls/performs 25-49%   Executive Function Skills   Insight Severe insight   Impulsive Moderately impulsive   Task Initiation Delayed initiation   Flexibility of Thought Reduced flexibility   Planning No planning skills   Memory Skills   Orientation Level Oriented to person   Short Term Working Recall Severe Impairment   Auditory Comprehension   Comphrehends Conversation Simple   Interfering Components Attention - selective   Speech/Swallow Mechanism Exam   Volitional Cough Weak   Volitional Swallow Delayed   Speech/Language/Cognition Assessmetn   Treatment Assessment Speech Pathology Daily Treatment Note - Speech/Cognitive Communication Skills - SLP focused on memory (immediate, recent and remote)  Patient's meal tray was utilized for immediate memory of items eaten  The menu was utilized as a visual aid to check accuracy and aid retention    Patien'ts daily schedule was utilized as an aid for recent memory to recall the day's events and/or expectations of procedures performed during therapy sessions across disciplines  Remote memory was targeted using the sequence of events since initial onset of the event which brought them to the rehabilitation unit, as well as family dynamics and visits since admission  Visual aids were used for each task as available (ie pictures, schedule, etc ) to aid recall  Patient presented as max assist for comprehension of task, use of visual aids to improve function and reasoning for time calculation  Swallow Information   Current Risks for Dysphagia & Aspiration Weak cough   Current Symptoms/Concerns Clear throat; With liquids   Consistencies Assessed and Performance   Oral Stage Moderate impaired   Phargngeal Stage Moderate impaired   Recommendations   Risk for Aspiration Mild   Diet Solid Recommendation Level 1 Dysphagia/pureed   Diet Liquid Recommendation Nectar thick liquid   General Precautions Aspiration precautions   Compensatory Swallowing Strategies Alternate solids and liquids   Eating   Type of Assistance Needed Physical assistance   Amount of Physical Assistance Provided Less than 25%   Eating CARE Score 3   Swallow Assessment   Swallow Treatment Assessment Speech Pathology Daily Treatment Note - Swallow Oral Function - Compensatory safe swallow techniques provided which included appropriate 90 degree head/neck/trunk posture, prep to small bites, small and single sips of liquid,complete oral breakdown of solids with formation of a cohesive bolus, oral closure with spoon retrieval, decreased speaking on oral phase, oral clearance prior to reintroduction of bolus, alternate solids with liquids, and rate control  Using the above, intake improved with puree solids to 100%  Swallow Assessment Prognosis   Prognosis Good   Prognosis Considerations Previous level of function   SLP Therapy Minutes   SLP Time In 3763   SLP Time Out 1736   SLP Total Time (minutes) 60   SLP Mode of treatment - Individual (minutes) 60   Therapy Time missed   Time missed?  No   Daily FIM Score   Eating (FIM) 3 - Felicity scoops food, patient brings to mouth

## 2019-11-30 NOTE — PROGRESS NOTES
Assist of 2 OOB to chair,  Brace and multipodis boot maintained to right leg  Denies pain or discomfort  NV check stable to right leg  Vs by niece for approx  An hour, now brother visiting  Needs a lot of verbal cues when feeding self  Rt side flaccid, sling to right arm with transfers  Medications revewed along with side effects and safety callbell within reach and return demonstration of use  Safety tab maintained

## 2019-11-30 NOTE — NURSING NOTE
Pt confused and calling out at times  Immobilizer and multipodis boot intact to RLE  Pt removed SCD from LLE multiple times this shift  Incontinent of urine, pt was offered toileting routinely

## 2019-11-30 NOTE — PROGRESS NOTES
Tiger text sent to [facility] just to make her aware of elevated BP's  Also spoke with her via phone regarding same  Pt asymptomatic  See Dr Genoveva Porter  Changed into gown and assist of 2 SBT back into bed  Had to be fed for dinner  Repositioned on side and made comfortable with call bell within reach  Immobilizer and  multipodis   boot maintained to right lower leg  Continue same plan of care

## 2019-11-30 NOTE — PLAN OF CARE
Problem: Potential for Falls  Goal: Patient will remain free of falls  Description  INTERVENTIONS:  - Assess patient frequently for physical needs  -  Identify cognitive and physical deficits and behaviors that affect risk of falls  -  [place] fall precautions as indicated by assessment   - Educate patient/family on patient safety including physical limitations  - Instruct patient to call for assistance with activity based on assessment  - Modify environment to reduce risk of injury  - Consider OT/PT consult to assist with strengthening/mobility  Outcome: Progressing     Problem: Neurological Deficit  Goal: Neurological status is stable or improving  Description  Interventions:  - Monitor and assess patient's level of consciousness, motor function, sensory function, and level of assistance needed for ADLs  - Monitor and report changes from baseline  Collaborate with interdisciplinary team to initiate plan and implement interventions as ordered  - Provide and maintain a safe environment  - Consider seizure precautions  - Consider fall precautions  - Consider aspiration precautions  - Consider bleeding precautions  Outcome: Progressing     Problem: Activity Intolerance/Impaired Mobility  Goal: Mobility/activity is maintained at optimum level for patient  Description  Interventions:  - Assess and monitor patient  barriers to mobility and need for assistive/adaptive devices  - Assess patient's emotional response to limitations  - Collaborate with interdisciplinary team and initiate plans and interventions as ordered  - Encourage independent activity per ability   - Maintain proper body alignment  - Perform active/passive rom as tolerated/ordered    - Plan activities to conserve energy   - Turn patient as appropriate  Outcome: Progressing     Problem: Potential for Aspiration  Goal: Non-ventilated patient's risk of aspiration is minimized  Description  Assess and monitor vital signs, respiratory status, and labs (WBC)  Monitor for signs of aspiration (tachypnea, cough, rales, wheezing, cyanosis, fever)  - Assess and monitor patient's ability to swallow  - Place patient up in chair to eat if possible  - HOB up at 90 degrees to eat if unable to get patient up into chair   - Supervise patient during oral intake  - Instruct patient/ family to take small bites  - Instruct patient/ family to take small single sips when taking liquids  - Follow patient-specific strategies generated by speech pathologist   Outcome: Progressing  Goal: Ventilated patient's risk of aspiration is minimized  Description  Assess and monitor vital signs, respiratory status, airway cuff pressure, and labs (WBC)  Monitor for signs of aspiration (tachypnea, cough, rales, wheezing, cyanosis, fever)  - Elevate head of bed 30 degrees if patient has tube feeding   - Monitor tube feeding  Outcome: Progressing     Problem: Nutrition  Goal: Nutrition/Hydration status is improving  Description  Monitor and assess patient's nutrition/hydration status for malnutrition (ex- brittle hair, bruises, dry skin, pale skin and conjunctiva, muscle wasting, smooth red tongue, and disorientation)  Collaborate with interdisciplinary team and initiate plan and interventions as ordered  Monitor patient's weight and dietary intake as ordered or per policy  Utilize nutrition screening tool and intervene per policy  Determine patient's food preferences and provide high-protein, high-caloric foods as appropriate  - Assist patient with eating   - Allow adequate time for meals   - Encourage patient to take dietary supplement as ordered  - Collaborate with clinical nutritionist   - Include patient/family/caregiver in decisions related to nutrition    Outcome: Progressing     Problem: Prexisting or High Potential for Compromised Skin Integrity  Goal: Skin integrity is maintained or improved  Description  INTERVENTIONS:  - Identify patients at risk for skin breakdown  - Assess and monitor skin integrity  - Assess and monitor nutrition and hydration status  - Monitor labs   - Assess for incontinence   - Turn and reposition patient  - Assist with mobility/ambulation  - Relieve pressure over bony prominences  - Avoid friction and shearing  - Provide appropriate hygiene as needed including keeping skin clean and dry  - Evaluate need for skin moisturizer/barrier cream  - Collaborate with interdisciplinary team   - Patient/family teaching  - Consider wound care consult   Outcome: Progressing     Problem: Nutrition/Hydration-ADULT  Goal: Nutrient/Hydration intake appropriate for improving, restoring or maintaining nutritional needs  Description  Monitor and assess patient's nutrition/hydration status for malnutrition  Collaborate with interdisciplinary team and initiate plan and interventions as ordered  Monitor patient's weight and dietary intake as ordered or per policy  Utilize nutrition screening tool and intervene as necessary  Determine patient's food preferences and provide high-protein, high-caloric foods as appropriate       INTERVENTIONS:  - Monitor oral intake, urinary output, labs, and treatment plans  - Assess nutrition and hydration status and recommend course of action  - Evaluate amount of meals eaten  - Assist patient with eating if necessary   - Allow adequate time for meals  - Recommend/ encourage appropriate diets, oral nutritional supplements, and vitamin/mineral supplements  - Order, calculate, and assess calorie counts as needed  - Recommend, monitor, and adjust tube feedings and TPN/PPN based on assessed needs  - Assess need for intravenous fluids  - Provide specific nutrition/hydration education as appropriate  - Include patient/family/caregiver in decisions related to nutrition  Outcome: Progressing

## 2019-11-30 NOTE — PROGRESS NOTES
11/30/19 1222   Pain Assessment   Pain Assessment No/denies pain   Restrictions/Precautions   Precautions Aspiration;Bed/chair alarms;Cognitive; Fall Risk;Limb alert   RLE Weight Bearing Per Order NWB   Braces or Orthoses LE Immobilizer  (unlocked at hinge)   Eating   Type of Assistance Needed Physical assistance   Amount of Physical Assistance Provided Less than 25%   Eating CARE Score 3   Eating Assessment   Positioning Upright;Out of Bed   Current Diet Dysphia I;Nectar Thick   Opens Packages No   Findings cues for task completion   Neuromuscular Education   RUE Weight Bearing Forearm seated  (WB on half tray while sitting in w/c for lunch)   Comments PROM 1 set 8 to all joints and planes RUE  with ice stim and contractions elicited for shoulder and elbow extension only today  Pt able to slightly  with fingers after fingers flexed and OTs hand in pt's hand   Cognition   Overall Cognitive Status Impaired   Arousal/Participation Alert; Responsive; Cooperative   Attention Attends with cues to redirect   Orientation Level Oriented to person   Memory Decreased long term memory;Decreased recall of biographical information;Decreased short term memory   Comments pt able to continue with eating with min verbal cues to continue task   Assessment   Treatment Assessment Pt participates in neuro grant with PROM and ice stim to RUE with WB on half tray and meal completion during lunch  Pt tolerates ROM without pain and continues with lunch with less verbal cues for half of meal before cues and assist required  Pt tolerates session well and left sitting in the w/c visiting with brother with needs in reach  Pt will benefit from continued skilled OT services to increase independence with daily tasks  Problem List Decreased strength;Decreased range of motion;Decreased endurance; Impaired balance;Decreased coordination;Decreased cognition;Decreased safety awareness;Orthopedic restrictions   Plan   Treatment/Interventions ADL retraining;Functional transfer training; Therapeutic exercise; Endurance training;Cognitive reorientation;Patient/family training; Compensatory technique education   Progress Slow progress, decreased activity tolerance   OT Therapy Minutes   OT Time In 1222   OT Time Out 1300   OT Total Time (minutes) 38   OT Mode of treatment - Individual (minutes) 38   Therapy Time missed   Time missed?  No

## 2019-12-01 RX ADMIN — NIFEDIPINE 60 MG: 30 TABLET, FILM COATED, EXTENDED RELEASE ORAL at 08:52

## 2019-12-01 RX ADMIN — ASPIRIN 81 MG: 81 TABLET, COATED ORAL at 08:52

## 2019-12-01 RX ADMIN — METOPROLOL TARTRATE 50 MG: 50 TABLET, FILM COATED ORAL at 08:52

## 2019-12-01 RX ADMIN — DOCUSATE SODIUM 100 MG: 100 CAPSULE, LIQUID FILLED ORAL at 17:42

## 2019-12-01 RX ADMIN — ATORVASTATIN CALCIUM 40 MG: 40 TABLET, FILM COATED ORAL at 16:13

## 2019-12-01 RX ADMIN — HYDRALAZINE HYDROCHLORIDE 25 MG: 25 TABLET ORAL at 21:30

## 2019-12-01 RX ADMIN — LISINOPRIL 20 MG: 20 TABLET ORAL at 08:52

## 2019-12-01 RX ADMIN — HYDRALAZINE HYDROCHLORIDE 25 MG: 25 TABLET ORAL at 14:14

## 2019-12-01 RX ADMIN — HYDRALAZINE HYDROCHLORIDE 25 MG: 25 TABLET ORAL at 05:16

## 2019-12-01 RX ADMIN — METOPROLOL TARTRATE 50 MG: 50 TABLET, FILM COATED ORAL at 21:30

## 2019-12-01 RX ADMIN — CLOPIDOGREL BISULFATE 75 MG: 75 TABLET ORAL at 08:52

## 2019-12-01 RX ADMIN — LISINOPRIL 20 MG: 20 TABLET ORAL at 17:42

## 2019-12-01 RX ADMIN — NICOTINE 1 PATCH: 21 PATCH, EXTENDED RELEASE TRANSDERMAL at 08:51

## 2019-12-01 RX ADMIN — LEVOTHYROXINE SODIUM 75 MCG: 75 TABLET ORAL at 05:16

## 2019-12-01 RX ADMIN — DOCUSATE SODIUM 100 MG: 100 CAPSULE, LIQUID FILLED ORAL at 08:57

## 2019-12-01 NOTE — PROGRESS NOTES
12/01/19 0901   Charting Type   Charting Type Shift assessment   Neurological   Neuro (WDL) X   Level of Consciousness Alert/awake   Orientation Level Oriented to person;Disoriented to place; Disoriented to time;Disoriented to situation   Cognition Follows commands   Extraocular Movements Full   Facial Symmetry Right facial drooping   R Pupil Size (mm) 3   L Pupil Size (mm) 3   R Foot Dorsiflexion Absent   R Foot Plantar Flexion Absent   RUE Motor Response Flaccid   RUE Muscle Strength 0- No movement   RLE Motor Response Flaccid   RLE Muscle Strength 0- No movement   Neuro Symptoms Agitation; Forgetful   Relieved by Rest   Neuro Additional Assessments No   Delirium Assessment- CAM    Acute Onset and Fluctuating Course (1) No   Inattention (2) No   Disorganized Thinking (3) Yes   Rate Patient's Level of Consciousness (4) Alert (Normal), No   Delirium Present No   Seizure Activity   Symptoms None   Reena Coma Scale   Eye Opening 4   Best Verbal Response 5   Best Motor Response 6   Reena Coma Scale Score 15   HEENT   HEENT (WDL) X   Head and Face Asymmetrical   R Eye Mildly impaired vision   L Eye Mildly impaired vision   R Ear Mildly impaired hearing   L Ear Mildly impaired hearing   Lips Asymmetrical   Teeth Missing teeth   Respiratory   Respiratory (WDL) X   Respiratory Pattern Normal   Chest Assessment Chest expansion symmetrical   Respiratory Additional Assessments Yes   Localized Breath Sounds   R Basilar Diminished;Clear   L Basilar Clear;Diminished   Respiratory Interventions   Respiratory Interventions Cough and deep breathe;Incentive spirometry   Cough and Deep Breathe   Cough and Deep Breathe Yes   Cardiac   Cardiac (WDL) WDL   Cardiac Regularity Regular   Jugular Venous Distention (JVD) No   Pacemaker/ICD No   Pain Assessment   Pain Assessment No/denies pain   Pain Score No Pain   Peripheral Vascular   Peripheral Vascular (WDL) X   RLE Edema Trace   PVS Additional Assessments No   RUE Neurovascular Assessment   RUE Color Ecchymosis   R Radial Pulse +2   LUE Neurovascular Assessment   L Radial Pulse +2   RLE Neurovascular Assessment   R Pedal Pulse +1   LLE Neurovascular Assessment   L Pedal Pulse +1   Integumentary   Integumentary (WDL) X   Skin Location scab rt knee   Skin Turgor Non-tenting   Integumentary Additional Assessments No   Tattoos/Piercings   Does patient have tattoos? No   Piercings Remaining No   Сергей Scale   Sensory Perceptions 2   Moisture 2   Activity 2   Mobility 2   Nutrition 2   Friction and Shear 2   Сергей Scale Score 12   Musculoskeletal   Musculoskeletal (WDL) X   Level of Assistance Maximum assist, patient does 25-49%   RUE Paralysis   RLE Paralysis   LLE Limited movement   Musculoskeletal Additional Assessments No   Gastrointestinal   Gastrointestinal (WDL) WDL   Bowel Sounds (All Quadrants) Normoactive   GI Symptoms None   Gastrointestinal Additional Assessments No   Bowel Shift Assessment   Assistance Needed Stool softener   Bowel Incontinence No   Genitourinary   Genitourinary (WDL) WDL   Genitalia   Male Genitalia Intact   Genitourinary Additional Assessments   Genitourinary Additional Assessments No   Anal/Rectal   Anal/Rectal (WDL) WDL   Psychosocial   Psychosocial (WDL) X   Patient Behaviors/Mood Flat affect   Needs Expressed Denies   Ability to Express Feelings Needs assistance   Ability to Express Needs Unable to express   Ability to Express Thoughts Unable to express   Ability to Understand Others Usually understands   Hang Suicide Severity Rating Scale   1  Wish to be Dead No   2   Suicidal Thoughts Never   *Risk Level* Low Risk   Cough   Cough None

## 2019-12-01 NOTE — NURSING NOTE
Pt bathed and dressed with assistance this am  Pt turns self frequently while in bed  Immobilizer and multipodis boot in place to RLE  Pt continuously removes SCD from LLE

## 2019-12-01 NOTE — PROGRESS NOTES
Immobilizer and multipodis boot on RLE  Rt side flaccid  Rt facial droop  Refuses to feed self  Total feed for breakfast   Needs a lot of verbal cues and encouragement for self care  Continue same plan of care

## 2019-12-01 NOTE — PLAN OF CARE
Problem: Potential for Falls  Goal: Patient will remain free of falls  Description  INTERVENTIONS:  - Assess patient frequently for physical needs  -  Identify cognitive and physical deficits and behaviors that affect risk of falls  -  [place] fall precautions as indicated by assessment   - Educate patient/family on patient safety including physical limitations  - Instruct patient to call for assistance with activity based on assessment  - Modify environment to reduce risk of injury  - Consider OT/PT consult to assist with strengthening/mobility  Outcome: Progressing     Problem: Neurological Deficit  Goal: Neurological status is stable or improving  Description  Interventions:  - Monitor and assess patient's level of consciousness, motor function, sensory function, and level of assistance needed for ADLs  - Monitor and report changes from baseline  Collaborate with interdisciplinary team to initiate plan and implement interventions as ordered  - Provide and maintain a safe environment  - Consider seizure precautions  - Consider fall precautions  - Consider aspiration precautions  - Consider bleeding precautions  Outcome: Progressing     Problem: Activity Intolerance/Impaired Mobility  Goal: Mobility/activity is maintained at optimum level for patient  Description  Interventions:  - Assess and monitor patient  barriers to mobility and need for assistive/adaptive devices  - Assess patient's emotional response to limitations  - Collaborate with interdisciplinary team and initiate plans and interventions as ordered  - Encourage independent activity per ability   - Maintain proper body alignment  - Perform active/passive rom as tolerated/ordered    - Plan activities to conserve energy   - Turn patient as appropriate  Outcome: Progressing     Problem: Potential for Aspiration  Goal: Non-ventilated patient's risk of aspiration is minimized  Description  Assess and monitor vital signs, respiratory status, and labs (WBC)  Monitor for signs of aspiration (tachypnea, cough, rales, wheezing, cyanosis, fever)  - Assess and monitor patient's ability to swallow  - Place patient up in chair to eat if possible  - HOB up at 90 degrees to eat if unable to get patient up into chair   - Supervise patient during oral intake  - Instruct patient/ family to take small bites  - Instruct patient/ family to take small single sips when taking liquids  - Follow patient-specific strategies generated by speech pathologist   Outcome: Progressing  Goal: Ventilated patient's risk of aspiration is minimized  Description  Assess and monitor vital signs, respiratory status, airway cuff pressure, and labs (WBC)  Monitor for signs of aspiration (tachypnea, cough, rales, wheezing, cyanosis, fever)  - Elevate head of bed 30 degrees if patient has tube feeding   - Monitor tube feeding  Outcome: Progressing     Problem: Nutrition  Goal: Nutrition/Hydration status is improving  Description  Monitor and assess patient's nutrition/hydration status for malnutrition (ex- brittle hair, bruises, dry skin, pale skin and conjunctiva, muscle wasting, smooth red tongue, and disorientation)  Collaborate with interdisciplinary team and initiate plan and interventions as ordered  Monitor patient's weight and dietary intake as ordered or per policy  Utilize nutrition screening tool and intervene per policy  Determine patient's food preferences and provide high-protein, high-caloric foods as appropriate  - Assist patient with eating   - Allow adequate time for meals   - Encourage patient to take dietary supplement as ordered  - Collaborate with clinical nutritionist   - Include patient/family/caregiver in decisions related to nutrition    Outcome: Progressing     Problem: Prexisting or High Potential for Compromised Skin Integrity  Goal: Skin integrity is maintained or improved  Description  INTERVENTIONS:  - Identify patients at risk for skin breakdown  - Assess and monitor skin integrity  - Assess and monitor nutrition and hydration status  - Monitor labs   - Assess for incontinence   - Turn and reposition patient  - Assist with mobility/ambulation  - Relieve pressure over bony prominences  - Avoid friction and shearing  - Provide appropriate hygiene as needed including keeping skin clean and dry  - Evaluate need for skin moisturizer/barrier cream  - Collaborate with interdisciplinary team   - Patient/family teaching  - Consider wound care consult   Outcome: Progressing     Problem: Nutrition/Hydration-ADULT  Goal: Nutrient/Hydration intake appropriate for improving, restoring or maintaining nutritional needs  Description  Monitor and assess patient's nutrition/hydration status for malnutrition  Collaborate with interdisciplinary team and initiate plan and interventions as ordered  Monitor patient's weight and dietary intake as ordered or per policy  Utilize nutrition screening tool and intervene as necessary  Determine patient's food preferences and provide high-protein, high-caloric foods as appropriate       INTERVENTIONS:  - Monitor oral intake, urinary output, labs, and treatment plans  - Assess nutrition and hydration status and recommend course of action  - Evaluate amount of meals eaten  - Assist patient with eating if necessary   - Allow adequate time for meals  - Recommend/ encourage appropriate diets, oral nutritional supplements, and vitamin/mineral supplements  - Order, calculate, and assess calorie counts as needed  - Recommend, monitor, and adjust tube feedings and TPN/PPN based on assessed needs  - Assess need for intravenous fluids  - Provide specific nutrition/hydration education as appropriate  - Include patient/family/caregiver in decisions related to nutrition  Outcome: Progressing

## 2019-12-02 LAB — RENIN PLAS-CCNC: 0.48 NG/ML/HR (ref 0.17–5.38)

## 2019-12-02 PROCEDURE — G0515 COGNITIVE SKILLS DEVELOPMENT: HCPCS

## 2019-12-02 PROCEDURE — 97530 THERAPEUTIC ACTIVITIES: CPT

## 2019-12-02 PROCEDURE — 99232 SBSQ HOSP IP/OBS MODERATE 35: CPT | Performed by: PHYSICAL MEDICINE & REHABILITATION

## 2019-12-02 PROCEDURE — 92526 ORAL FUNCTION THERAPY: CPT

## 2019-12-02 PROCEDURE — 97535 SELF CARE MNGMENT TRAINING: CPT

## 2019-12-02 PROCEDURE — 97110 THERAPEUTIC EXERCISES: CPT

## 2019-12-02 PROCEDURE — 97112 NEUROMUSCULAR REEDUCATION: CPT

## 2019-12-02 RX ORDER — HYDRALAZINE HYDROCHLORIDE 25 MG/1
25 TABLET, FILM COATED ORAL ONCE
Status: COMPLETED | OUTPATIENT
Start: 2019-12-02 | End: 2019-12-02

## 2019-12-02 RX ORDER — HYDRALAZINE HYDROCHLORIDE 25 MG/1
50 TABLET, FILM COATED ORAL EVERY 8 HOURS SCHEDULED
Status: DISCONTINUED | OUTPATIENT
Start: 2019-12-02 | End: 2019-12-10 | Stop reason: HOSPADM

## 2019-12-02 RX ADMIN — CLOPIDOGREL BISULFATE 75 MG: 75 TABLET ORAL at 08:27

## 2019-12-02 RX ADMIN — HYDRALAZINE HYDROCHLORIDE 50 MG: 25 TABLET ORAL at 13:37

## 2019-12-02 RX ADMIN — HYDRALAZINE HYDROCHLORIDE 50 MG: 25 TABLET ORAL at 21:20

## 2019-12-02 RX ADMIN — LEVOTHYROXINE SODIUM 75 MCG: 75 TABLET ORAL at 05:34

## 2019-12-02 RX ADMIN — LISINOPRIL 20 MG: 20 TABLET ORAL at 17:45

## 2019-12-02 RX ADMIN — METOPROLOL TARTRATE 50 MG: 50 TABLET, FILM COATED ORAL at 07:32

## 2019-12-02 RX ADMIN — NIFEDIPINE 60 MG: 30 TABLET, FILM COATED, EXTENDED RELEASE ORAL at 07:32

## 2019-12-02 RX ADMIN — ATORVASTATIN CALCIUM 40 MG: 40 TABLET, FILM COATED ORAL at 17:30

## 2019-12-02 RX ADMIN — HYDRALAZINE HYDROCHLORIDE 25 MG: 25 TABLET ORAL at 05:34

## 2019-12-02 RX ADMIN — HYDRALAZINE HYDROCHLORIDE 25 MG: 25 TABLET ORAL at 08:54

## 2019-12-02 RX ADMIN — LISINOPRIL 20 MG: 20 TABLET ORAL at 07:32

## 2019-12-02 RX ADMIN — DOCUSATE SODIUM 100 MG: 100 CAPSULE, LIQUID FILLED ORAL at 17:45

## 2019-12-02 RX ADMIN — METOPROLOL TARTRATE 50 MG: 50 TABLET, FILM COATED ORAL at 21:20

## 2019-12-02 RX ADMIN — NICOTINE 1 PATCH: 21 PATCH, EXTENDED RELEASE TRANSDERMAL at 08:28

## 2019-12-02 RX ADMIN — DOCUSATE SODIUM 100 MG: 100 CAPSULE, LIQUID FILLED ORAL at 08:27

## 2019-12-02 RX ADMIN — ASPIRIN 81 MG: 81 TABLET, COATED ORAL at 08:27

## 2019-12-02 NOTE — PROGRESS NOTES
12/01/19 2047   Charting Type   Charting Type Shift assessment   Neurological   Level of Consciousness Alert/awake   Orientation Level Disoriented to person;Disoriented to place; Disoriented to time;Disoriented to situation   Cognition Poor judgement;Poor safety awareness;Poor attention/concentration; Short term memory loss   Speech Delayed responses;Clear   R Hand  Absent   L Hand  Strong   RUE Muscle Strength 0- No movement   LUE Muscle Strength 5- Normal strength   RLE Muscle Strength 0- No movement   LLE Muscle Strength 5- Normal strength   Neuro Symptoms Agitation; Forgetful   Reena Coma Scale   Eye Opening 4   Best Verbal Response 5   Best Motor Response 6   [place] Coma Scale Score 15   HEENT   Teeth Missing teeth   Respiratory   Bilateral Breath Sounds Clear;Diminished   Cardiac   Cardiac (WDL) WDL   Pain Assessment   Pain Assessment No/denies pain   Pain Score No Pain   Peripheral Vascular   Edema Right upper extremity   RUE Edema Trace   RLE Edema Trace   LLE Edema Trace   Integumentary   Skin Color Appropriate for ethnicity   Musculoskeletal   Level of Assistance Maximum assist, patient does 25-49%   RUE Paralysis   LUE Full movement   RLE Paralysis   LLE Limited movement   Gastrointestinal   Gastrointestinal (WDL) WDL   Psychosocial   Patient Behaviors/Mood Non-compliant;Flat affect   Needs Expressed Denies   Ability to Express Feelings Needs assistance   Ability to Express Needs Unable to express   Ability to Express Thoughts Unable to express   Ability to Understand Others Usually understands

## 2019-12-02 NOTE — PROGRESS NOTES
12/02/19 1300   Pain Assessment   Pain Assessment No/denies pain   Pain Score No Pain   Restrictions/Precautions   Precautions Aspiration;Cognitive; Fall Risk;Bed/chair alarms;Limb alert   RLE Weight Bearing Per Order NWB   Braces or Orthoses LE Immobilizer  (sling for R UE )   Cognition   Overall Cognitive Status Impaired   Arousal/Participation Alert; Cooperative   Attention Difficulty attending to directions   Following Commands Follows one step commands inconsistently   Roll Left and Right   Type of Assistance Needed Physical assistance   Amount of Physical Assistance Provided 50%-74%   Roll Left and Right CARE Score 2   Sit to Lying   Type of Assistance Needed Physical assistance   Amount of Physical Assistance Provided 75% or more   Sit to Lying CARE Score 2   Lying to Sitting on Side of Bed   Type of Assistance Needed Physical assistance   Amount of Physical Assistance Provided 75% or more   Lying to Sitting on Side of Bed CARE Score 2   Bed-Chair Transfer   Type of Assistance Needed Physical assistance   Amount of Physical Assistance Provided 75% or more   Chair/Bed-to-Chair Transfer CARE Score 2   Transfer Bed/Chair/Wheelchair   Limitations Noted In Balance; Endurance; Coordination;Problem Solving;Sensation; Sequencing;LE Strength;UE Strength   Adaptive Equipment Transfer Board   Sit Pivot Maximum Assist;Assist x 1  (plus 1 to hold transfer board )   Supine to Sit Maximum Assist;Assist x 1   Sit to Supine Maximum Assist;Assist x 1   Wheel 50 Feet with Two Turns   Reason if not Attempted Refused to perform   Wheel 50 Feet with Two Turns CARE Score 7   Wheel 150 Feet   Reason if not Attempted Refused to perform   Wheel 150 Feet CARE Score 7   Wheelchair mobility   Does the patient use a wheelchair? 1  Yes   Type of Wheelchair Used 1   Manual   Therapeutic Interventions   Strengthening Supine TE performed to tolerance    Flexibility R knee PROM to tolerance    Balance static/dynamic sitting balance   Other transfer training    Assessment   Treatment Assessment Pt presents seated in w/c at start of therapy session and is very upset, however unable to verbalize why  Pt refused to complete w/c mobility or leave his room, but was agreeable to complete w/c to bed transfer  Pt was max A x 1 for bed transfers with 1 extra person stabilizing the transfer board  Following transfer training worked on trunk control sitting at the EOB  Pt was max-min A to maintain upright sitting posture with poor awareness and ability to self correct  Completed R knee PROM to tolerance and supine TE and for the remaining time with poor tolerance and max verbal cueing for encouragement to participate in the exercises  Missed 10 minutes of therapy due to Pt refusing to continue despite verbal education about the importance of therapy  Pt left supine in bed with call bell in reach and nursing informed  Problem List Decreased strength;Decreased range of motion;Decreased endurance; Impaired balance;Decreased mobility; Decreased coordination;Decreased cognition; Impaired judgement;Decreased safety awareness; Impaired sensation;Orthopedic restrictions   PT Barriers   Functional Limitation Standing;Transfers; Wheelchair management   Plan   Treatment/Interventions Functional transfer training;LE strengthening/ROM; Therapeutic exercise; Endurance training;Patient/family training;Bed mobility;Gait training; Compensatory technique education;Cognitive reorientation   Progress Slow progress, cognitive deficits   PT Therapy Minutes   PT Time In 1300   PT Time Out 1350   PT Total Time (minutes) 50   PT Mode of treatment - Individual (minutes) 50   PT Mode of treatment - Concurrent (minutes) 0   PT Mode of treatment - Group (minutes) 0   PT Mode of treatment - Co-treat (minutes) 0   PT Mode of Treatment - Total time(minutes) 50 minutes   PT Cumulative Minutes 391   Therapy Time missed   Time missed?  Yes   Amount of time missed   (10)   Reason for time missed   (Pt refusing to continue therapy)   Time(s) multiple attempts made   (Time made up by OT )

## 2019-12-02 NOTE — NURSING NOTE
Patient continues with confusion all shift  Needs cueing and direction to complete tasks  Irritable and yelling at staff but then laughing afterwards  Reports no pain  Continues with right sided weakness and RLE remains in brace and NWB  Appetite fair for all meals  Doing well on nectar thick liquids  BP remained elevated all shift and meds given as ordered  Patient reports no headache, chest pain, or blurred vision  Will continue to monitor and follow plan of care

## 2019-12-02 NOTE — NURSING NOTE
BP taken with AM vitals  Patient BP elevated to 200/102  Patient given 0900 BP meds now and message sent to hospitalist  Will continue to monitor BP  Patient denies no headache, blurred vision or nausea

## 2019-12-02 NOTE — QUICK NOTE
BP continues to be elevated  Will give one dose 25 mg hydralazine now  Will increase hydralazine to 50 mg TID  Will continue to monitor BP closely

## 2019-12-02 NOTE — PROGRESS NOTES
Physical Medicine and Rehabilitation Progress Note  Rosy Alfonso 67 y o  male MRN: [id number]  Unit/Bed#: [facility] 214-01 Encounter: [id number]    HPI: Patient is a 66 yo male who presented after a fall leading to rt tibial plateau fx and L frontal SAH both of which were managed non-operatively however course was complicated by ischemic CVA which was likely vessel to vessel in origin    Chief Complaint: CVA    Interval/subjective: patient without complaint this AM     ROS: A 10 point ROS was performed; negative except as noted above       Assessment/Plan:      Dysphagia  Assessment & Plan  - modified diet per ST      CVA (cerebral vascular accident) [facility])  Assessment & Plan  - L sided cerebral infarcts in the setting of traumatic L frontal SAH  - likely vessel to vessel origin in the setting of occluded L carotid (also with L vertebral artery stenosis)   - neuro recommended to re-starting home ASA 81 mg qd while in acute care which was resumed on 11/17/19 and a FU CTH on 11/25 no longer showed L frontal SAH, d/w Dr Pepe Taylor of neuro who recommended starting DAPT x 3 wks then ASA monotherapy (patient was on ASA 81 mg qd prior to CVA however with questionable compliance with medications)    - on home lipitor 40 mg qpm per neuro     Closed fracture of right tibial plateau with routine healing  Assessment & Plan  - non-op management per ortho  - per d/w ortho-->NWB in unlocked hinged knee brace, PROM only to patient's tolerance    - per ortho FU XR in 6 wks     Subarachnoid hemorrhage (HCC)  Assessment & Plan  - L frontal SAH  - evaluated by neurosx and no surgical intervention was taken  - completed 7 day keppra sx ppx course in acute care as recommended by neurosx   - cleared by neurology in acute care to re-start home ASA 81 mg qd for ischemic CVA (re-started on 11/17) with FU CTH on 11/25 which no longer revealed L frontal SAH;  d/w Dr Pepe Taylor of neuro who recommended starting DAPT x 3 wks then ASA monotherapy (patient was on ASA 81 mg qd prior to CVA however with questionable compliance with medications)      Peripheral vascular disease (HCC)  Assessment & Plan  - L carotid dz, L vertebral artery dz, B/L LE arterial dz  - cleared by neurology to re-start home ASA 81 mg qd (ischemic CVA)  - per neuro on home lipitor 40 mg qpm  - follows with Dr Arnetta Apley as OP and per Dr Ilana Martin OP note patient refused to see vascular surgeon     Hypothyroidism  Assessment & Plan  - at home on levothyroxine 50 mcg qd however TSH was elevated therefore IM increased levothyroxine to 75 mcg qd and recommend repeat TSH in 4-6 wks     Essential hypertension  Assessment & Plan  - at home on lopressor 50 mg q12, lisinopril 40 mg qd, and procardia XL (24 hr) 60 mg qd   - d/w Dr Concepcion Rodriguez of neuro and cleared for normotensive BP goals   - metanephrines, renin, aldosterone labs ordered by cards --> aldosterone level WNL, free metanephrine WNL, normetanephrine mildly above ULN of 145 at 163, renin level pending; will d/w cards upon completion of results   - IM managing     Hyperlipidemia  Assessment & Plan  - on home lipitor 40 mg qpm per neuro in acute care     * CAD (coronary artery disease)  Assessment & Plan  - h/o CABG  - cleared by neurology to resume home ASA 81 mg qd   - at home on lopressor 50 mg q12 (IM managing)  - on home lipitor 40 mg qpm   - mildly elevated troponins in acute care which peaked at 0 06  - seen by cards in acute care and did have an echo and no further KEBEDE/intevention recommended by cards  - follows with Dr Arnetta Apley as OP      Scheduled Meds:    Current Facility-Administered Medications:  acetaminophen 650 mg Oral Q6H PRN Oswaldo Bunch MD   aspirin 81 mg Oral Daily Shannan Ansari MD   atorvastatin 40 mg Oral Daily With Fabricio Ramos MD   clopidogrel 75 mg Oral Daily Shannan Ansari MD   docusate sodium 100 mg Oral BID Oswaldo Bunch MD   hydrALAZINE 50 mg Oral [facility] DESIREE Slater   levothyroxine 75 mcg Oral Early Morning DESIREE Paulino   lisinopril 20 mg Oral BID DESIREE Paulino   metoprolol tartrate 50 mg Oral Q12H [address] Florence Restrepo MD   nicotine 1 patch Transdermal Daily Florence Restrepo MD   NIFEdipine 60 mg Oral Daily Florence Restrepo MD   ondansetron 4 mg Oral Q6H PRN Florence Restrepo MD          Incidental findings:    1) EKG abnormalities (LAD, PACs): OP FU with Dr Nisha Briseno  2) elevated PA pressure with moderate TR: OP FU with Dr Nisha Briseno  3) grade 2 DD: OP FU with Dr Nisha Briseno        DVT ppx: SCDs in the setting of [address]  Code: per acute care notes patient insisted on being DNR/DNI when he was more cognitively intact, d/w patient's brother who also wishes for patient to be DNR/DNI and patient's brother verbalized his understanding of what this means        Objective:    Functional Update:  Mobility: max   Transfers: max   ADLs: max       Physical Exam:        General: no apparent distress and comfortable  CARDIAC:  +S1/2  LUNGS:  respirations unlabored   ABDOMEN:  soft NT   EXTREMITIES:  volume status currently stable   NEURO:   inconsistently follows commands  PSYCH:  patient currently calm        Laboratory:         Invalid input(s): PLTCT        Invalid input(s): CA

## 2019-12-02 NOTE — PLAN OF CARE
Problem: Potential for Falls  Goal: Patient will remain free of falls  Description  INTERVENTIONS:  - Assess patient frequently for physical needs  -  Identify cognitive and physical deficits and behaviors that affect risk of falls  -  [place] fall precautions as indicated by assessment   - Educate patient/family on patient safety including physical limitations  - Instruct patient to call for assistance with activity based on assessment  - Modify environment to reduce risk of injury  - Consider OT/PT consult to assist with strengthening/mobility  Outcome: Progressing     Problem: Neurological Deficit  Goal: Neurological status is stable or improving  Description  Interventions:  - Monitor and assess patient's level of consciousness, motor function, sensory function, and level of assistance needed for ADLs  - Monitor and report changes from baseline  Collaborate with interdisciplinary team to initiate plan and implement interventions as ordered  - Provide and maintain a safe environment  - Consider seizure precautions  - Consider fall precautions  - Consider aspiration precautions  - Consider bleeding precautions  Outcome: Progressing     Problem: Activity Intolerance/Impaired Mobility  Goal: Mobility/activity is maintained at optimum level for patient  Description  Interventions:  - Assess and monitor patient  barriers to mobility and need for assistive/adaptive devices  - Assess patient's emotional response to limitations  - Collaborate with interdisciplinary team and initiate plans and interventions as ordered  - Encourage independent activity per ability   - Maintain proper body alignment  - Perform active/passive rom as tolerated/ordered    - Plan activities to conserve energy   - Turn patient as appropriate  Outcome: Progressing     Problem: Potential for Aspiration  Goal: Non-ventilated patient's risk of aspiration is minimized  Description  Assess and monitor vital signs, respiratory status, and labs (WBC)  Monitor for signs of aspiration (tachypnea, cough, rales, wheezing, cyanosis, fever)  - Assess and monitor patient's ability to swallow  - Place patient up in chair to eat if possible  - HOB up at 90 degrees to eat if unable to get patient up into chair   - Supervise patient during oral intake  - Instruct patient/ family to take small bites  - Instruct patient/ family to take small single sips when taking liquids  - Follow patient-specific strategies generated by speech pathologist   Outcome: Progressing  Goal: Ventilated patient's risk of aspiration is minimized  Description  Assess and monitor vital signs, respiratory status, airway cuff pressure, and labs (WBC)  Monitor for signs of aspiration (tachypnea, cough, rales, wheezing, cyanosis, fever)  - Elevate head of bed 30 degrees if patient has tube feeding   - Monitor tube feeding  Outcome: Progressing     Problem: Nutrition  Goal: Nutrition/Hydration status is improving  Description  Monitor and assess patient's nutrition/hydration status for malnutrition (ex- brittle hair, bruises, dry skin, pale skin and conjunctiva, muscle wasting, smooth red tongue, and disorientation)  Collaborate with interdisciplinary team and initiate plan and interventions as ordered  Monitor patient's weight and dietary intake as ordered or per policy  Utilize nutrition screening tool and intervene per policy  Determine patient's food preferences and provide high-protein, high-caloric foods as appropriate  - Assist patient with eating   - Allow adequate time for meals   - Encourage patient to take dietary supplement as ordered  - Collaborate with clinical nutritionist   - Include patient/family/caregiver in decisions related to nutrition    Outcome: Progressing     Problem: Prexisting or High Potential for Compromised Skin Integrity  Goal: Skin integrity is maintained or improved  Description  INTERVENTIONS:  - Identify patients at risk for skin breakdown  - Assess and monitor skin integrity  - Assess and monitor nutrition and hydration status  - Monitor labs   - Assess for incontinence   - Turn and reposition patient  - Assist with mobility/ambulation  - Relieve pressure over bony prominences  - Avoid friction and shearing  - Provide appropriate hygiene as needed including keeping skin clean and dry  - Evaluate need for skin moisturizer/barrier cream  - Collaborate with interdisciplinary team   - Patient/family teaching  - Consider wound care consult   Outcome: Progressing     Problem: Nutrition/Hydration-ADULT  Goal: Nutrient/Hydration intake appropriate for improving, restoring or maintaining nutritional needs  Description  Monitor and assess patient's nutrition/hydration status for malnutrition  Collaborate with interdisciplinary team and initiate plan and interventions as ordered  Monitor patient's weight and dietary intake as ordered or per policy  Utilize nutrition screening tool and intervene as necessary  Determine patient's food preferences and provide high-protein, high-caloric foods as appropriate       INTERVENTIONS:  - Monitor oral intake, urinary output, labs, and treatment plans  - Assess nutrition and hydration status and recommend course of action  - Evaluate amount of meals eaten  - Assist patient with eating if necessary   - Allow adequate time for meals  - Recommend/ encourage appropriate diets, oral nutritional supplements, and vitamin/mineral supplements  - Order, calculate, and assess calorie counts as needed  - Recommend, monitor, and adjust tube feedings and TPN/PPN based on assessed needs  - Assess need for intravenous fluids  - Provide specific nutrition/hydration education as appropriate  - Include patient/family/caregiver in decisions related to nutrition  Outcome: Progressing

## 2019-12-02 NOTE — PROGRESS NOTES
12/02/19 1121   Pain Assessment   Pain Assessment No/denies pain   Restrictions/Precautions   Precautions Aspiration;Cognitive; Fall Risk;Bed/chair alarms;Limb alert   RLE Weight Bearing Per Order NWB   Braces or Orthoses LE Immobilizer  (sling for RUE transfers)   Eating   Type of Assistance Needed Supervision   Eating CARE Score 4   Eating Assessment   Opens Packages Yes   Findings pt able to drink nectar cranberry juice when prompted   Oral Hygiene   Type of Assistance Needed Physical assistance   Amount of Physical Assistance Provided 75% or more   Oral Hygiene CARE Score 2   Grooming   Able To Initiate Tasks; Shave;Wash/Dry Face;Brush/Clean Teeth;Wash/Dry Hands   Limitation Noted In Coordination;Neglect;Problem Solving; Safety; Sequencing;Strength   Findings max a for shaving, washing face/ hands, combing hair and brushing teeth   Putting On/Taking Off Footwear   Type of Assistance Needed Physical assistance   Amount of Physical Assistance Provided Total assistance   Comment assist of 2 supine rolling R and L   Putting On/Taking Off Footwear CARE Score 1   Lying to Sitting on Side of Bed   Type of Assistance Needed Physical assistance   Amount of Physical Assistance Provided 75% or more   Lying to Sitting on Side of Bed CARE Score 2   Bed-Chair Transfer   Type of Assistance Needed Physical assistance   Amount of Physical Assistance Provided Total assistance   Comment max a of one and min A of one for transfer board transfer to w/c   Chair/Bed-to-Chair Transfer CARE Score 1   Toileting Hygiene   Type of Assistance Needed Physical assistance   Amount of Physical Assistance Provided 75% or more   Comment urinal use with prompting   Toileting Hygiene CARE Score 2   Toileting   Able to [address] down no, up no  Manage Hygiene Bladder   Limitations Noted In Coordination;Problem Solving;ROM;Safety; Sequencing   Findings pt able to assist with urinal use without inc noted   Neuromuscular Education   RUE Weight Bearing Forearm seated  (on half tray)   Trunk Control pt demonstrates positive R lateral lean pushing with LUE while sitting EOB to prepare for transfer while sling donned to RUE   Comments Ice stim to RUE avoiding anterior deltoid where nicotine patch in place for stimulation and muscle activation  Pt tolerates 3 trials at each joint with shoulder extension, add and elbow flexion/ supination elicited 3 times  Pt tolerates PROM RUE all planes/ joints 8 times with no pain noted and 75% ROM at shoulder, rest RUE WFL   Cognition   Overall Cognitive Status Impaired   Arousal/Participation Alert; Responsive; Cooperative   Attention Difficulty attending to directions   Orientation Level Oriented to person   Comments pt is very distractible in common areas focusing more frequently when quite in the room   Vision   Vision Comments decreased visual awareness to the R   Assessment   Treatment Assessment Pt presents supine with RN reporting elevated BP this morning and normalizing now to [facility]  Pt assisted with LB dressing supine and then transfers to the w/ with assist of 2  Sling in place for transfer but removed for WB through elbow on half tray  Pt tolerates PROM with ice stim to RUE al joints/ planes  Pt agreeable to asissting with grooming although max A required  Pt tolerates session well and will benefit from continued skilled OT services to increase independence with daily tasks  OT Family training done with: educaiton provided to pt regarding continuing to stimulate RUE and attempt to elicit movements due to small amounts of movement noted with ice stim and occ movement of digits as requested   Problem List Decreased strength;Decreased range of motion;Decreased endurance; Impaired balance;Decreased coordination;Decreased cognition;Decreased safety awareness;Orthopedic restrictions   Plan   Treatment/Interventions ADL retraining;Functional transfer training; Therapeutic exercise; Endurance training;Patient/family training; Compensatory technique education   Progress Slow progress, decreased activity tolerance   OT Therapy Minutes   OT Time In 1121   OT Time Out 1231   OT Total Time (minutes) 70   OT Mode of treatment - Individual (minutes) 70   Therapy Time missed   Time missed?  No

## 2019-12-02 NOTE — NURSING NOTE
Patient sitting upright in bed  BP retake and noted to be 168/78  Reports feeling dizzy to Dr Barnett Patches  Will continue to monitor

## 2019-12-02 NOTE — NURSING NOTE
BP taken pre medication , 180/100, denies HA, blurred vision, speech clear, able to make needs known, no weakness L side extremities   Will cont to monitor

## 2019-12-03 PROCEDURE — 99233 SBSQ HOSP IP/OBS HIGH 50: CPT | Performed by: PHYSICAL MEDICINE & REHABILITATION

## 2019-12-03 PROCEDURE — 97110 THERAPEUTIC EXERCISES: CPT

## 2019-12-03 PROCEDURE — 97112 NEUROMUSCULAR REEDUCATION: CPT

## 2019-12-03 PROCEDURE — 97530 THERAPEUTIC ACTIVITIES: CPT

## 2019-12-03 PROCEDURE — G0515 COGNITIVE SKILLS DEVELOPMENT: HCPCS

## 2019-12-03 PROCEDURE — 97535 SELF CARE MNGMENT TRAINING: CPT

## 2019-12-03 PROCEDURE — 92526 ORAL FUNCTION THERAPY: CPT

## 2019-12-03 PROCEDURE — 97542 WHEELCHAIR MNGMENT TRAINING: CPT

## 2019-12-03 RX ORDER — METOPROLOL TARTRATE 50 MG/1
50 TABLET, FILM COATED ORAL EVERY 12 HOURS SCHEDULED
Status: DISCONTINUED | OUTPATIENT
Start: 2019-12-03 | End: 2019-12-10 | Stop reason: HOSPADM

## 2019-12-03 RX ADMIN — METOPROLOL TARTRATE 50 MG: 50 TABLET, FILM COATED ORAL at 21:19

## 2019-12-03 RX ADMIN — HYDRALAZINE HYDROCHLORIDE 50 MG: 25 TABLET ORAL at 21:19

## 2019-12-03 RX ADMIN — DOCUSATE SODIUM 100 MG: 100 CAPSULE, LIQUID FILLED ORAL at 09:26

## 2019-12-03 RX ADMIN — DOCUSATE SODIUM 100 MG: 100 CAPSULE, LIQUID FILLED ORAL at 17:00

## 2019-12-03 RX ADMIN — HYDRALAZINE HYDROCHLORIDE 50 MG: 25 TABLET ORAL at 05:08

## 2019-12-03 RX ADMIN — LISINOPRIL 20 MG: 20 TABLET ORAL at 09:28

## 2019-12-03 RX ADMIN — CLOPIDOGREL BISULFATE 75 MG: 75 TABLET ORAL at 09:28

## 2019-12-03 RX ADMIN — ATORVASTATIN CALCIUM 40 MG: 40 TABLET, FILM COATED ORAL at 16:52

## 2019-12-03 RX ADMIN — LISINOPRIL 20 MG: 20 TABLET ORAL at 17:00

## 2019-12-03 RX ADMIN — NICOTINE 1 PATCH: 21 PATCH, EXTENDED RELEASE TRANSDERMAL at 10:15

## 2019-12-03 RX ADMIN — LEVOTHYROXINE SODIUM 75 MCG: 75 TABLET ORAL at 05:08

## 2019-12-03 RX ADMIN — ASPIRIN 81 MG: 81 TABLET, COATED ORAL at 09:28

## 2019-12-03 RX ADMIN — HYDRALAZINE HYDROCHLORIDE 50 MG: 25 TABLET ORAL at 14:26

## 2019-12-03 RX ADMIN — NIFEDIPINE 60 MG: 30 TABLET, FILM COATED, EXTENDED RELEASE ORAL at 09:27

## 2019-12-03 RX ADMIN — METOPROLOL TARTRATE 50 MG: 50 TABLET, FILM COATED ORAL at 09:27

## 2019-12-03 NOTE — TEAM CONFERENCE
Acute RehabilitationTeam Conference Note  Date: 12/3/2019   Time: 10:50 AM       Patient Name:  Mamadou Carrera       Medical Record Number: [id number]   YOB: 1940  Sex: Male          Room/Bed:  /[facility] 214-01  Payor Info:  Payor: [employer] REP / Plan: Lela Jorge [facility] [place] [place] REP / Product Type: Medicare PPO /      Admitting Diagnosis: CVA (cerebral vascular accident) ([facility] ) [I63 9]   Admit Date/Time:  11/23/2019  5:53 PM  Admission Comments: No comment available     Primary Diagnosis:  CAD (coronary artery disease)  Principal Problem: CAD (coronary artery disease)    Patient Active Problem List    Diagnosis Date Noted    Dysphagia 11/25/2019    CVA (cerebral vascular accident) ([facility] ) 11/24/2019    [facility] 11/15/2019    Closed fracture of right tibial plateau with routine healing 11/15/2019    Peripheral vascular disease ([facility] ) 06/06/2019    CAD (coronary artery disease) 07/10/2017    Hyperlipidemia 07/10/2017    Essential hypertension 07/10/2017    Hypothyroidism 07/10/2017       Physical Therapy:    Weight Bearing Status: Non-weight bearing(R LE )  Transfers: Maximum Assistance, Assist of 2(max x 1 for sit pivot; max x 2 for sit/stand )  Bed Mobility: Maximum Assistance  Amulation Distance (ft): (TBA)  Wheelchair Mobility Distance: 110 ft  Wheelchair Mobility: Maximum Assistance, Moderate Assistance  Discharge Recommendations: (SNF or home with 24hr assistance )  [facility] with[de-identified] 24 Hour Assisteance, First Floor Setup, Home Physical Therapy, Family Support    11/26/19: Pt just recently evaluated  Current levels of function include max A x 2 for bed mobility, max A x 2 for transfers including sit/stand and sit pivot transfer using slide board, and max A x 1 for w/c mobility  Pt remains limited by NWB R LE with hinged knee brace, flaccid R UE/LE's, impaired cognition, impaired balance/coordination, decreased strength/ROM, and poor overall safety awareness   Pt will benefit from skilled Physical Therapy services to maximize safety and independence with functional mobility tasks  12/3/19: Pt is making slow progress towards therapy goals due to impaired cognition and poor activity tolerance  Current level of function includes max A x 1 for sit pivot slide board transfer and A x 2 needed for set up, max x 2 for sit/stand transfer with poor maintence of NWB R LE, and mod/max A for w/c mobility  Pt remains limited by NWB R LE with hinged knee brace, decreased use of R UE/LE's, impaired cognition, impaired balance/coordination, decreased strength/ROM, and poor overall safety awareness  Pt will benefit from skilled Physical Therapy services to maximize safety and independence with functional mobility tasks  Occupational Therapy:  Eating: Minimal Assistance, Supervision  Grooming: Maximum Assistance  Bathing: Assist of 2, Total Assistance  Bathing: Assist of 2, Total Assistance  Upper Body Dressing: Maximum Assistance  Lower Body Dressing: Total Assistance, Assist of 2  Toileting: Total Assistance, Assist of 2(max A urine)  Tub/Shower Transfer: (TBA)  Toilet Transfer: Total Assistance, Assist of 2  Cognition: Exceptions to WNL  Cognition: Decreased Memory, Decreased Safety, Decreased Attention, Decreased Comprehension  Orientation: Person  Discharge Recommendations: Home with:  [address] with[de-identified] 24 Hour Supervision, First Floor Setup, Home Occupational Therapy(versus SNF placement)       11/26/19: Pt evaluated yesterday following hospitalization due to CVA with R side involvement and NWB RLE with brace due to R tibial plateau fracture  Pt requires assist due to decreased balance, safety, endurance, RUE/RLE ROM/ functional use with NWB RLE  Pt will benefit from continued skilled OT services to increase independence with daily tasks  12/2/19: Pt participates in ADLs, transfers/ w/c mobility and nuero grant with ROM, WB and ice stim RUE  Pt's current LOF was listed above    Pt requires assist due to decreased balance, safety, endurance, RUE/RLE ROM/ functional use with NWB RLE and decreased cognition/ attention and visual attention to the Right  Pt is making slow progress towards goals and will benefit from continued skilled OT services to increase independence with daily tasks  Speech Therapy:  Mode of Communication: Verbal  Speech/Language: Expressive Aphasia  Cognition: Exceptions to WNL  Cognition: Decreased Executive Functions, Decreased Memory, Decreased Attention, Decreased Comprehension, Decreased Safety  Orientation: Person, Place  Swallowing: Exceptions to WNL  Swallowing: Oral Dysphagia, Pharyngeal Dysphagia  Diet Recommendations: Level 1/Puree, [place] Thick  Discharge Recommendations: Silvio Dobbs is a 67 y o  male with history of CAD, hypothyroidism, HTN, HLD, PVD, who presented to [address] [place] [address] on 11/14/19 post mechanical fall on the stairs while carrying groceries  He was admitted and found to have right tibial plateau fracture and hypertensive emergency  During hospitalization, he was noted to have right hemiparesis, dysarthria  CTH revealed left frontal hemorrhage  CTA head/neck revealed 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, flow restriction at left vertebral, and 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  Neurosurgery was consulted, and recommended medical management  Orthopedics was consulted for right tibial plateau fracture, recommended NWB, hinged knee brace   He [facility] for [facility] on 11/23/19  Prior to incident patient resided alone in an apartment with stairs to enter  Skilled speech assessment was completed  Patient presents with moderate oral pharyngeal dysphagia with poor initiation of feed task resulting in aspiration and malnutrition risk  Recommended diet texture is puree with nectar thick liquids and assist with meals for adequate PO intake    Speech comprehension within familiar tasks is max assist due to initiation deficit  Expression is max assist for conveying wants and needs  Cognitively, patient presents as max assist with memory  He was able to identify his brother but total assist needed for response to questions beyond this level  Reasoning for awareness of his deficits and participation in activities of daily living is total assist to initiate tasks  12/2/19  Patient is participating in skilled ST focusing onn swallow oral function and speech cognitive communication skills  Recommended diet texture is puree and nectar with SLP completing trials of Dysphagia #2, Mech Soft with thin by cup sip once up to 90 degrees out of bed and alert to task  Speech comprehension and expression at the multi step level is max assist which is affected by need for redirection and resistance to instruction at times  Cognitively, patient presents as max assist for memory and reasoning levels of assistance in the rehab setting  Nursing Notes:  Appetite: Fair  Diet Type: Dysphagia I, Nectar thick liquids                      Diet Patient/Family Education Complete: Yes                            Bladder: 3 - Moderate Assistance     Bladder Patient/Family Education: Yes        Bowel Patient/Family Education: Yes  Pain Location: Other (Comment)(unable to express)     Pain Score: 0                       Hospital Pain Intervention(s): Repositioned, Relaxation technique, Environmental changes  Pain Patient/Family Education: Yes  Medication Management/Safety  Medication Patient/Family Education Complete: Yes    11/26/19 Patient recent admission to [facility] following a stroke with a fall that resulted in a right knee fracture  Right knee with immobilizer in place at all times  Patient continues with right side as flaccid  Patient is slow to respond at times  Patient is confused and forgetful impulsive at times bed alarm and close supervision in place at all times  Patient is incontinent of bladder  Bladder scans post void for 48 hours as ordered  Multipodas boot to the right foot when in bed to prevent foot drop  Allevyn dressings to the bilateral heels for protection  Sling to the right arm for support  No reports of pain  Patient requires two assist for transfers and self care activities  12/2/19 Remains confused, with delayed responses to verbal conversation, speech clear  Inc of B&B  Immobilizer RLE/multipodus boot R foot  Max assist for transfers OOB  AW    Case Management:     Discharge Planning  Living Arrangements: Alone  Support Systems: None(per pt)  Assistance Needed: ADLs, medication supervision, meal prep  Type of Current Residence: Private residence  Current Home Care Services: No  Initial assessment & orientation to [facility] with Pt & phone contact with Pt's brother/emergency contact  Discussed [address]  Pt was very quiet & poor historian  Pt's brother provided someinformation  Pt lives alone in an apt  Pt's brother has expressed that he and family members anticipate the need for SNF placement; reported that they prefer a facility in [place]  SW will continue to monitor & assist as needed with [address] [place]  [employer]; policy [id number], Poonam Espitia [id number], with clinical  update due on 11/26/19 to fax [phone]     11/26/19 - Tx team recommendations reviewed with patient & Pt's brother, who expressed understanding & agreement  Target DC date is 12/17/19; Pt may need SNF placement  SW will continue to monitor & assist as needed with Tx & DC planning with Tx & DC planning  Is the patient actively participating in therapies?  yes  List any modifications to the treatment plan: na    Barriers Interventions   Decreased cog ST strategies, ADL/transfer/pre-gait training   dysphagia ST strategies, puree with nectar   NWB right LE with brace Cues, ADL, transfer, pre-gait training   Decreased ROM/strength right  Therapeutic exercise, therapeutic activity, NMR   Decreased end Therapeutic exercise, therapeutic activity   Decreased balance, trunk control ADL, transfer, pre- gait training     Is the patient making expected progress toward goals? yes  List any update or changes to goals: na    Medical Goals: Patient will be able to manage medical conditions and comorbid conditions with medications and follow up upon completion of rehab program    Weekly Team Goals:   Rehab Team Goals  ADL Team Goal: Patient will require assist with ADLs with least restrictive device upon completion of rehab program  Bowel/Bladder Team Goal: Patient will return to premorbid level for bladder/bowel management upon completion of rehab program  Transfer Team Goal: Patient will maximize level for transfers and educate family/caregiver to decrease burden of care  Locomotion Team Goal: Patient will maximize level for locomotion and educate family/caregiver to decrease burden of care  Cognitive Team Goal: Patient will require assist for basic cognitive tasks upon completion of rehab program     Tolerate mech soft diet without S/S aspiration  Max assist of 1 transfers, self care  Mod assist wc mobility    Health and wellness: to be able to participate in activities at the nursing home    Discussion: Patient lives alone and will be unable to return home at this time    Will required alternate placement for ongoing therapy and nursing needs    Anticipated Discharge Date:  Dec 17, 2019

## 2019-12-03 NOTE — NURSING NOTE
Patient removed plug from SCD sleeve several times this shift , becomes agitation with pumping of sleeve, yells out loud " I cant stand it"  Reapplied with each episode of removal along with education of VTE prevention , poor response, no evidence of learning DT cognitive barrier  Will cont to re-educate

## 2019-12-03 NOTE — PROGRESS NOTES
Physical Medicine and Rehabilitation Progress Note  Jeanine Bonilla 67 y o  male MRN: [id number]  Unit/Bed#: -01 Encounter: [id number]    HPI: Patient is a 68 yo male who presented after a fall leading to rt tibial plateau fx and L frontal SAH both of which were managed non-operatively however course was complicated by ischemic CVA which was likely vessel to vessel in origin    Chief Complaint: CVA     Interval/subjective: patient denies HA/blurry vision/CP etc     ROS: A 10 point ROS was performed; negative except as noted above       Assessment/Plan:      Dysphagia  Assessment & Plan  - modified diet per ST      CVA (cerebral vascular accident) [facility])  Assessment & Plan  - L sided cerebral infarcts in the setting of traumatic L frontal SAH  - likely vessel to vessel origin in the setting of occluded L carotid (also with L vertebral artery stenosis)   - neuro recommended to re-starting home ASA 81 mg qd while in acute care which was resumed on 11/17/19 and a FU CTH on 11/25 no longer showed L frontal SAH, d/w Dr Aamir Su of neuro who recommended starting DAPT x 3 wks then ASA monotherapy (patient was on ASA 81 mg qd prior to CVA however with questionable compliance with medications)    - on home lipitor 40 mg qpm per neuro     Closed fracture of right tibial plateau with routine healing  Assessment & Plan  - non-op management per ortho  - per d/w ortho-->NWB in unlocked hinged knee brace, PROM only to patient's tolerance    - per ortho FU XR in 6 wks     Subarachnoid hemorrhage (HCC)  Assessment & Plan  - L frontal SAH  - evaluated by neurosx and no surgical intervention was taken  - completed 7 day keppra sx ppx course in acute care as recommended by neurosx   - cleared by neurology in acute care to re-start home ASA 81 mg qd for ischemic CVA (re-started on 11/17) with FU CTH on 11/25 which no longer revealed L frontal SAH;  d/w Dr Aamir Su of neuro who recommended starting DAPT x 3 wks then ASA monotherapy (patient was on ASA 81 mg qd prior to CVA however with questionable compliance with medications)      Peripheral vascular disease (HCC)  Assessment & Plan  - L carotid dz, L vertebral artery dz, B/L LE arterial dz  - cleared by neurology to re-start home ASA 81 mg qd (ischemic CVA)  - per neuro on home lipitor 40 mg qpm  - follows with Dr Lesli Johnson as OP and per Dr Rony Gibbs OP note patient refused to see vascular surgeon     Hypothyroidism  Assessment & Plan  - at home on levothyroxine 50 mcg qd however TSH was elevated therefore IM increased levothyroxine to 75 mcg qd and recommend repeat TSH in 4-6 wks     Essential hypertension  Assessment & Plan  - at home on lopressor 50 mg q12, lisinopril 40 mg qd, and procardia XL (24 hr) 60 mg qd   - d/w Dr Benedicto Parmar of neuro and cleared for normotensive BP goals   - metanephrines, renin, aldosterone labs ordered by cards --> aldosterone level WNL, free metanephrine WNL, normetanephrine mildly above ULN of 145 at 163, renin level WNL; will notify cards of results   - IM managing     Hyperlipidemia  Assessment & Plan  - on home lipitor 40 mg qpm per neuro in acute care     * CAD (coronary artery disease)  Assessment & Plan  - h/o CABG  - cleared by neurology to resume home ASA 81 mg qd   - at home on lopressor 50 mg q12 (IM managing)  - on home lipitor 40 mg qpm   - mildly elevated troponins in acute care which peaked at 0 06  - seen by cards in acute care and did have an echo and no further KEBEDE/intevention recommended by cards  - follows with Dr Lesli Johnson as OP      Scheduled Meds:    Current Facility-Administered Medications:  acetaminophen 650 mg Oral Q6H PRN Haley Snyder MD   aspirin 81 mg Oral Daily Jessica Rea MD   atorvastatin 40 mg Oral Daily With Shoshana Mcclelland MD   clopidogrel 75 mg Oral Daily Jessica Rea MD   docusate sodium 100 mg Oral BID Haley Snyder MD   hydrALAZINE 50 mg Oral [facility] DESIREE Johnson   levothyroxine 75 mcg Oral Early Morning DESIREE Leonardo   lisinopril 20 mg Oral BID DESIREE Leonardo   metoprolol tartrate 50 mg Oral Q12H [address] Austin Segura MD   nicotine 1 patch Transdermal Daily Austin Segura MD   NIFEdipine 60 mg Oral Daily Austin Segura MD   ondansetron 4 mg Oral Q6H PRN Austin Segura MD          Incidental findings:    1) EKG abnormalities (LAD, PACs): OP FU with Dr Lloyd Pderoza  2) elevated PA pressure with moderate TR: OP FU with Dr Lloyd Pedroza  3) grade 2 DD: OP FU with Dr Lloyd Pedroza        DVT ppx: SCDs in the setting of [facility]  Code: per acute care notes patient insisted on being DNR/DNI when he was more cognitively intact, d/w patient's brother who also wishes for patient to be DNR/DNI and patient's brother verbalized his understanding of what this means        Objective:    Functional Update:  Mobility: max  Transfers: max   ADLs: max       Physical Exam:          General: no apparent distress and comfortable  CARDIAC:  +S1/2  LUNGS:  respirations unlabored   ABDOMEN:  soft NT   EXTREMITIES:  volume status currently stable   NEURO:   inconsistently follows commands  PSYCH:  patient currently calm        Laboratory:         Invalid input(s): PLTCT        Invalid input(s): CA           This patient was discussed by the Interdisciplinary Team in weekly case conference today  The care of the patient was extensively discussed with all care providers and an appropriate rehabilitation plan was formulated unique for this patient  Barriers were identified preventing progression of therapy and appropriate interventions were discussed with each discipline  Please see the team note for input from all disciplines regarding barriers, intervention, and discharge planning  [ x ] Total time spent: 35 Mins, and greater than 50% of this time was spent counseling/coordinating care

## 2019-12-03 NOTE — NURSING NOTE
Patient continues to remove tubing connection from sleeves of SCD's, yells out "I cant stand it"  Educated on VTE prevention , no acknowledgement of understanding of education DT cognitive impairment  Will cont to reinforce/educate SCD application and VTE prevention

## 2019-12-03 NOTE — NURSING NOTE
Pt bp's elevated at times  140/62 prior to hydralazine scheduled given  Dr Enmanuel Barr aware  Hospialist notified  Will monitor

## 2019-12-03 NOTE — PROGRESS NOTES
12/02/19 1745   Pain Assessment   Pain Assessment No/denies pain   Pain Score No Pain   Restrictions/Precautions   Precautions Aspiration;Cognitive; Fall Risk;Bed/chair alarms;Limb alert   Weight Bearing Restrictions Yes   RLE Weight Bearing Per Order NWB   Braces or Orthoses LE Immobilizer   Comprehension   QI: Comprehension 2  Sometimes Understands: Understands only basic conversations or simple, direct phrases  Frequently requires cues to understand   Comprehension (FIM) 2 - Understands basic info/conversation 25-49% of time   Expression   QI: Expression 2  Frequently exhibits difficulty with expressing needs and ideas   Expression (FIM) 2 - Understands basic info/conversation 25-49% of time   Social Interaction   Social Interaction (FIM) 2 - Interacts appropriately 25-49% of time   Problem Solving   Problem solving (FIM) 2 - Bed alarm for safety issues more than half the time   Memory   Memory (FIM) 2 - Recognizes, recalls/performs 25-49%   Executive Function Skills   Insight Severe insight   Impulsive Moderately impulsive   Task Initiation Delayed initiation   Flexibility of Thought Reduced flexibility   Planning No planning skills   Memory Skills   Orientation Level Oriented to person   Short Term Working Recall Severe Impairment   Auditory Comprehension   Comphrehends Conversation Simple   Interfering Components Attention - selective   Speech/Swallow Mechanism Exam   Volitional Swallow Delayed   Speech/Language/Cognition Assessmetn   Treatment Assessment Speech Pathology Daily Treatment Note - Speech/Cognitive Communication Skills - SLP focused on cognitive memory and reasoning using the skilled therapy goals as the stimulus  Patient was asked to recall the procedures performed during therapy across disciplines, how they relate to current goals and how they will facilitate the ability to return to the previous level of function    Patient's daily schedule was utilized as a visual aid to promote recall of the days events and procedures performed during treatment  SLP then targeted setting goals for the week in speech therapy as well as across disciplines  Focus was on reasoning the current level, anticipating the final outcome and planning goals with reasonable outcomes for this week  Patient presented as max assist for this task secondary to repeated claims he was either unable to remember or refusing to answer as a cover for decreased communication skills  He performs best in social exchange using humor as a tool to encourage his participation  Swallow Information   Current Symptoms/Concerns Clear throat; With liquids   Current Diet Dysphagia pureed; Nectar thick liquid   Consistencies Assessed and Performance   Oral Stage Moderate impaired   Phargngeal Stage Mild impaired   Recommendations   Risk for Aspiration Mild   Diet Solid Recommendation Level 1 Dysphagia/pureed   Diet Liquid Recommendation Nectar thick liquid   General Precautions Aspiration precautions   Compensatory Swallowing Strategies Alternate solids and liquids   Eating   Type of Assistance Needed Supervision   Eating CARE Score 4   Swallow Assessment   Swallow Treatment Assessment Speech Pathology Daily Treatment Note - Swallow Oral Function - Focus of dysphagia therapy this session was oral phase management of solids  Therapist targeted identification of appropriate bite and sip size for adequate management and safety  Therapist educated on the benefits of moist solids to ease oral phase breakdown and bolus formation via the use of gravy, broth or taking an initial sip to moisten the oral cavity  SLP targeted oral closure with spoon retrieval and during oral phase chewing to promote full rotary chew  SLP educated on the importance of using the tongue to transfer food anterior to posterior and to fully clear the oral cavity prior to reintroduction of bolus  Using the above technique, patient was 90% successful with puree    SLP has begun trials of Dysphagia #2, mech soft with trials of thin by cup sip  Patient is inconsistent in his posture, at times using the controls to lay back while eating  SLP is encouragin up out of bed for ongoing trials of more complex solids and thin liquids  Swallow Assessment Prognosis   Prognosis Good   Prognosis Considerations Previous level of function   SLP Therapy Minutes   SLP Time In 4892   SLP Time Out 1848   SLP Total Time (minutes) 63   SLP Mode of treatment - Individual (minutes) 63   Therapy Time missed   Time missed?  No   Daily FIM Score   Eating (FIM) 3 - [place] scoops food, patient brings to mouth

## 2019-12-03 NOTE — PROGRESS NOTES
12/03/19 0929   Pain Assessment   Pain Assessment No/denies pain   Pain Score No Pain   Restrictions/Precautions   Precautions Aspiration;Cognitive; Fall Risk;Bed/chair alarms;Limb alert   RLE Weight Bearing Per Order NWB   Braces or Orthoses Sling   Comprehension   QI: Comprehension 2  Sometimes Understands: Understands only basic conversations or simple, direct phrases  Frequently requires cues to understand   Comprehension (FIM) 2 - Understands basic info/conversation 25-49% of time   Expression   QI: Expression 2  Frequently exhibits difficulty with expressing needs and ideas   Expression (FIM) 2 - Understands basic info/conversation 25-49% of time   Social Interaction   Social Interaction (FIM) 2 - Interacts appropriately 25-49% of time   Problem Solving   Problem solving (FIM) 2 - Bed alarm for safety issues more than half the time   Memory   Memory (FIM) 2 - Recognizes, recalls/performs 25-49%   Executive Function Skills   Insight Severe insight   Impulsive Moderately impulsive   Task Initiation Delayed initiation   Flexibility of Thought Reduced flexibility   Planning No planning skills   Memory Skills   Orientation Level Oriented to person   Short Term Working Recall Severe Impairment   Auditory Comprehension   Comphrehends Conversation Simple   Interfering Components Attention - selective   Speech/Swallow Mechanism Exam   Volitional Cough Weak   Volitional Swallow Delayed   Speech/Language/Cognition Assessmetn   Treatment Assessment Speech Pathology Daily Treatment Note - Speech/Cognitive Communication Skills - SLP focused on speech/cognitive communication skills in terms of compehension of daily routine and therapeutic program   SLP utilized visual aids which included the patient's daily schedule, wall clock and visualization of outside scenery via the window    Patient was requested to calculate the current time and determine therapy sessions about to take place, or those which have already occurred and recall or reason the tasks which take place according to the type of therapy  Patient presented as max assist for comprehension of his daily schedule and for reasoning sessions about to take place  He also presented as max assist for recall of procedures he regularly participates in during his therapy sessions  Swallow Information   Current Symptoms/Concerns Clear throat; With liquids   Current Diet Dysphagia pureed  (SLP trials of mech soft at mealtime )   Consistencies Assessed and Performance   Oral Stage Moderate impaired   Phargngeal Stage Mild impaired   Recommendations   Risk for Aspiration Mild   Diet Solid Recommendation Level 1 Dysphagia/pureed  (trials of mech soft at meal time )   Diet Liquid Recommendation Nectar thick liquid  (trials of thin 1:1 with SLP)   General Precautions Aspiration precautions   Compensatory Swallowing Strategies Alternate solids and liquids   Eating   Type of Assistance Needed Supervision   Eating CARE Score 4   Swallow Assessment   Swallow Treatment Assessment Speech Pathology Daily Treatment Note - Swallow Oral Function - Patient was seen 1:1 with his breakfast meal  Patient required mod cues for participation in self feed  Compensatory safe swallow education provided which included appropriate 90 degree head/neck/trunk posture, prep to small bites, small and single sips of liquid,complete oral breakdown of solids with formation of a cohesive bolus, oral closure with spoon retrieval, decreased speaking on oral phase, oral clearance prior to reintroduction of bolus, alternate solids with liquids, and rate control  Using above techniques, patient was 10% successful with puree items and 75% successful with trials of mech soft solids  Continued 1:1 with SLP recommended due to extended oral phase with more complex solids     Swallow Assessment Prognosis   Prognosis Good   Prognosis Considerations Previous level of function   SLP Therapy Minutes   SLP Time In 0929   SLP Time Out 1029   SLP Total Time (minutes) 60   SLP Mode of treatment - Individual (minutes) 60   Daily FIM Score   Eating (FIM) 3 - [place] scoops food, patient brings to mouth

## 2019-12-03 NOTE — PROGRESS NOTES
12/03/19 0837   Pain Assessment   Pain Assessment No/denies pain   Pain Score No Pain   Restrictions/Precautions   Precautions Aspiration;Cognitive; Fall Risk;Bed/chair alarms;Limb alert   Weight Bearing Restrictions Yes   RLE Weight Bearing Per Order NWB   ROM Restrictions   (R knee PROM )   Braces or Orthoses Sling;LE Immobilizer   Cognition   Overall Cognitive Status Impaired   Arousal/Participation Alert; Cooperative   Attention Difficulty attending to directions   Following Commands Follows one step commands inconsistently   Sit to Lying   Type of Assistance Needed Physical assistance   Amount of Physical Assistance Provided 75% or more   Sit to Lying CARE Score 2   Lying to Sitting on Side of Bed   Type of Assistance Needed Physical assistance   Amount of Physical Assistance Provided 75% or more   Lying to Sitting on Side of Bed CARE Score 2   Sit to Stand   Type of Assistance Needed Physical assistance   Amount of Physical Assistance Provided Total assistance   Comment   (max x 2 )   Sit to Stand CARE Score 1   Bed-Chair Transfer   Type of Assistance Needed Physical assistance   Amount of Physical Assistance Provided 75% or more   Comment   (with set up A x 2 )   Chair/Bed-to-Chair Transfer CARE Score 2   Transfer Bed/Chair/Wheelchair   Limitations Noted In Balance; Coordination; Endurance;Problem Solving;Sensation; Sequencing;UE Strength;LE Strength   Adaptive Equipment Transfer Board  (bed railing for sit/stand )   Sit Pivot Maximum Assist;Assist x 1  (w/ transfer board; A x 2 for set up )   Sit to Stand Maximum Assist;Assist x 2   Stand to Sit Maximum Assist;Assist x 2   Supine to Sit Maximum Assist;Assist x 1   Sit to Supine Maximum Assist;Assist x 1   Wheel 50 Feet with Two Turns   Type of Assistance Needed Physical assistance   Amount of Physical Assistance Provided 50%-74%   Wheel 50 Feet with Two Turns CARE Score 2   Wheelchair mobility   Does the patient use a wheelchair? 1   Yes   Type of Wheelchair Used 1  Manual   Method Left upper extremity; Left lower extremity   Assistance Provided For Locking Brakes;Obstacles   Distance Level Surface (feet)   (110')   Therapeutic Interventions   Strengthening supine TE performed to tolerance    Balance seated static/dynamic sitting balance    Other transfer training, w/c mobility    Assessment   Treatment Assessment Pt is agreeable to participate in therapy and presents seated in w/c stating he has to use the bathroom  Completed sit/stand transfer with A x 2 in front of hospital bed railing  Pt had poor tolerance with maintaining NWB R LE  Pt is A x 3 to get from the commode to the w/c to complete sit/stand transfer with chair swap  Completed w/c mobility for improved distance of 110' using L UE and incorporating some L LE with max cueing  Completed sit pivot transfer to and from mat table with max A x 1 and A x 2 needed for set up with the transfer board  Pt is able to lean and minimally assist with set up when focused and motivated  Completed static/dynamic sitting balance with mirror placed in front to assist in visual feedback for repositioning with improved tolerance today  Pt ranged from Max A to [id number] and demonstrated the ability to self correct with cues and assistance  Completed supine TE for remaining time with fair tolerance and frequent rest breaks needed due to increased fatigue and decreased activity tolerance  PT Barriers   Physical Impairment Decreased strength;Decreased range of motion;Decreased endurance; Impaired balance;Decreased mobility; Decreased coordination; Impaired judgement;Decreased cognition;Decreased safety awareness; Impaired sensation;Orthopedic restrictions   Functional Limitation Standing;Transfers; Wheelchair management   Plan   Treatment/Interventions Functional transfer training;LE strengthening/ROM; Therapeutic exercise; Endurance training;Cognitive reorientation;Patient/family training;Bed mobility; Compensatory technique education Progress Slow progress, cognitive deficits   PT Therapy Minutes   PT Time In 0837   PT Time Out 0937   PT Total Time (minutes) 60   PT Mode of treatment - Individual (minutes) 53   PT Mode of treatment - Concurrent (minutes) 7   PT Mode of treatment - Group (minutes) 0   PT Mode of treatment - Co-treat (minutes) 0   PT Mode of Treatment - Total time(minutes) 60 minutes   PT Cumulative Minutes 451   Therapy Time missed   Time missed?  No

## 2019-12-03 NOTE — PROGRESS NOTES
12/03/19 1123   Pain Assessment   Pain Assessment No/denies pain   Restrictions/Precautions   Precautions Aspiration;Bed/chair alarms; Fall Risk;Cognitive;Limb alert   RLE Weight Bearing Per Order NWB   Braces or Orthoses Sling;LE Immobilizer  (sling transfers/ half tray for positioning RUE when sitting)   Eating   Type of Assistance Needed Supervision;Verbal cues   Eating CARE Score 4   Eating Assessment   Food To Mouth Yes   Positioning Upright;Out of Bed   Meal Assessed Lunch   Current Diet Dental Soft   Oral Hygiene   Type of Assistance Needed Physical assistance   Amount of Physical Assistance Provided Less than 25%   Oral Hygiene CARE Score 3   Grooming   Able To Initiate Tasks; Wash/Dry Face;Comb/Brush Hair;Brush/Clean Teeth;Wash/Dry Hands; Shave   Limitation Noted In Coordination;Neglect;Problem Solving; Safety; Sequencing;Strength  (decreased RUE fxl use)   Findings max A overall   Upper Body Dressing   Type of Assistance Needed Physical assistance   Amount of Physical Assistance Provided 75% or more   Upper Body Dressing CARE Score 2   Dressing/Undressing Clothing   Remove UB Clothes Pullover Shirt   Don UB Clothes Pullover Shirt   Limitations Noted In Balance; Coordination; Endurance;Neglect;Problem Solving; Safety; Sequencing;Strength;ROM   ROM- Right Upper Extremities   RUE ROM Comment PROM RUE all joints all planes with 50% PROM shoulder flexion and abduction and WFL elbow flexion/ extension, supination/ pronation and wrist and finger flexion/ extension  Pt tolerates limited use of ice for stimulation RUE with 3 good contractions of should ext and add noted before pt reports"That's too cold " requesting OT stop task for today      Exercise Tools   Other Exercise Tool 1 pegs into board, supervision after setup reaching with LUE and filling 100% board   Neuromuscular Education   RUE Weight Bearing Forearm seated   Trunk Control forward flexion demonstrated allowing for scapular ROM by therapsit with PROM R shoulder and for reaching to place pegs   Cognition   Overall Cognitive Status Impaired   Arousal/Participation Alert; Responsive; Cooperative   Attention Attends with cues to redirect   Orientation Level Oriented to person  (reports 2rd but reports January)   Additional Activities   Additional Activities Other (Comment)   Additional Activities Comments pt attempts to leave therapy room looking to unlock R brake and pushing away form table and away from OT with LUE, pt then assisted back to the room    Assessment   Treatment Assessment Pt presents sitting in the w/c agreeable to beginning treatment in room with grooming  Pt able to complete shaving, brushing teeth, washing face and combing hair requiring max A to complete tasks  Pt has 3 small areas of bleeding from shaving which stop quickly and nursing notified  Pt requires cues for attnetion to task and to atttempt to assist as able  Pt then participates in limited neuro grant to RUE with ice stim and PROM eliciting contractions at shoulder and elbow but patient demonstrates limited tolerance this day  WB to RUE completed through elbow as pt completed tabletop tasks attempting to play the game TROUBLE but loosing attention quickly and completing peg baord with pegs into board with LUE because of familiarity to task  Pt completes lunch with 50% meal completed during 10 minutes concurrent treatment with another patient having similar goals to increase PO intake as independently as possible Pt toelrate session and participates nicely however continues to require cues for attnetion to task and task completion and assist due to decreased balacne  safety, decreased ROM/ fxl use RUE due to CVA and NWB with long leg brace RLE and decreased endurance  Pt will benefit from continued skilled OT services to increase independence with daily tasks  Problem List Decreased strength;Decreased range of motion;Decreased endurance; Impaired balance;Decreased coordination;Decreased cognition;Decreased safety awareness;Orthopedic restrictions; Impaired tone   Plan   Treatment/Interventions ADL retraining;Functional transfer training; Therapeutic exercise; Endurance training;Cognitive reorientation;Patient/family training; Compensatory technique education   Progress Slow progress, decreased activity tolerance   OT Therapy Minutes   OT Time In 1123   OT Time Out 1230   OT Total Time (minutes) 67   OT Mode of treatment - Individual (minutes) 57   OT Mode of treatment - Concurrent (minutes) 10   Therapy Time missed   Time missed?  No

## 2019-12-03 NOTE — PROGRESS NOTES
12/02/19 1920   Charting Type   Charting Type Shift assessment   Neurological   Level of Consciousness Alert/awake   Orientation Level Disoriented to person;Disoriented to place; Disoriented to time;Disoriented to situation   Cognition Poor judgement;Poor safety awareness;Poor attention/concentration; Short term memory loss; Impulsive   Speech Delayed responses;Clear   R Hand  Absent   L Hand  Strong   RUE Muscle Strength 0- No movement   LUE Muscle Strength 5- Normal strength   RLE Muscle Strength 0- No movement   LLE Muscle Strength 5- Normal strength   Neuro Symptoms Agitation; Forgetful   Reena Coma Scale   Eye Opening 4   Best Verbal Response 5   Best Motor Response 6   [place] Coma Scale Score 15   HEENT   Teeth Missing teeth   Respiratory   Bilateral Breath Sounds Clear;Diminished   Cardiac   Cardiac (WDL) WDL   Peripheral Vascular   Edema Right upper extremity   RUE Edema Trace   RLE Edema Trace   LLE Edema Trace   Integumentary   Skin Color Appropriate for ethnicity   Musculoskeletal   RUE Paralysis   LUE Full movement   RLE Paralysis   LLE Limited movement   Gastrointestinal   Gastrointestinal (WDL) WDL   Psychosocial   Patient Behaviors/Mood Non-compliant;Flat affect   Needs Expressed Denies

## 2019-12-04 PROCEDURE — 92526 ORAL FUNCTION THERAPY: CPT

## 2019-12-04 PROCEDURE — 97530 THERAPEUTIC ACTIVITIES: CPT

## 2019-12-04 PROCEDURE — 97112 NEUROMUSCULAR REEDUCATION: CPT

## 2019-12-04 PROCEDURE — 97542 WHEELCHAIR MNGMENT TRAINING: CPT

## 2019-12-04 PROCEDURE — 97535 SELF CARE MNGMENT TRAINING: CPT

## 2019-12-04 PROCEDURE — 97110 THERAPEUTIC EXERCISES: CPT

## 2019-12-04 PROCEDURE — 99232 SBSQ HOSP IP/OBS MODERATE 35: CPT | Performed by: PHYSICAL MEDICINE & REHABILITATION

## 2019-12-04 PROCEDURE — 92507 TX SP LANG VOICE COMM INDIV: CPT

## 2019-12-04 RX ORDER — POLYETHYLENE GLYCOL 3350 17 G/17G
17 POWDER, FOR SOLUTION ORAL DAILY PRN
Status: DISCONTINUED | OUTPATIENT
Start: 2019-12-04 | End: 2019-12-10

## 2019-12-04 RX ADMIN — METOPROLOL TARTRATE 50 MG: 50 TABLET, FILM COATED ORAL at 08:26

## 2019-12-04 RX ADMIN — DOCUSATE SODIUM 100 MG: 100 CAPSULE, LIQUID FILLED ORAL at 17:14

## 2019-12-04 RX ADMIN — DOCUSATE SODIUM 100 MG: 100 CAPSULE, LIQUID FILLED ORAL at 08:25

## 2019-12-04 RX ADMIN — HYDRALAZINE HYDROCHLORIDE 50 MG: 25 TABLET ORAL at 14:13

## 2019-12-04 RX ADMIN — CLOPIDOGREL BISULFATE 75 MG: 75 TABLET ORAL at 08:25

## 2019-12-04 RX ADMIN — HYDRALAZINE HYDROCHLORIDE 50 MG: 25 TABLET ORAL at 21:56

## 2019-12-04 RX ADMIN — NICOTINE 1 PATCH: 21 PATCH, EXTENDED RELEASE TRANSDERMAL at 08:29

## 2019-12-04 RX ADMIN — NIFEDIPINE 60 MG: 30 TABLET, FILM COATED, EXTENDED RELEASE ORAL at 08:25

## 2019-12-04 RX ADMIN — METOPROLOL TARTRATE 50 MG: 50 TABLET, FILM COATED ORAL at 20:18

## 2019-12-04 RX ADMIN — LISINOPRIL 20 MG: 20 TABLET ORAL at 17:14

## 2019-12-04 RX ADMIN — HYDRALAZINE HYDROCHLORIDE 50 MG: 25 TABLET ORAL at 05:14

## 2019-12-04 RX ADMIN — ATORVASTATIN CALCIUM 40 MG: 40 TABLET, FILM COATED ORAL at 17:14

## 2019-12-04 RX ADMIN — LISINOPRIL 20 MG: 20 TABLET ORAL at 08:26

## 2019-12-04 RX ADMIN — LEVOTHYROXINE SODIUM 75 MCG: 75 TABLET ORAL at 05:14

## 2019-12-04 RX ADMIN — ASPIRIN 81 MG: 81 TABLET, COATED ORAL at 08:25

## 2019-12-04 NOTE — CASE MANAGEMENT
Tx team recommendations reviewed with patient & message left for Pt's brother, who expressed understanding & agreement  Target DC date is 12/17/19  It is anticipated that Pt may need additional rehab at SNF upon DC from [facility]; a list of providers was provided to Pt's family & referrals to be made to their preferences  SW will continue to monitor & assist as needed with Tx & DC planning with Tx & DC planning

## 2019-12-04 NOTE — PROGRESS NOTES
12/04/19 1302   Pain Assessment   Pain Assessment No/denies pain   Restrictions/Precautions   Precautions Aspiration;Bed/chair alarms;Cognitive; Fall Risk;Limb alert   RLE Weight Bearing Per Order NWB   Braces or Orthoses LE Immobilizer;Sling  (brace unhinged and sling LUE transfers only)   Eating   Type of Assistance Needed Incidental touching;Verbal cues   Eating CARE Score 4   Eating Assessment   Food To Mouth Yes   Positioning Upright;Out of Bed   Safety Needs Increase Time   Meal Assessed Lunch   Current Diet Dental Soft   Findings increased cues and prompting to complete as much of meal as possible   Sit to Lying   Type of Assistance Needed Physical assistance   Amount of Physical Assistance Provided Total assistance   Comment assist of 2   Sit to Lying CARE Score 1   Sit to Stand   Type of Assistance Needed Physical assistance   Amount of Physical Assistance Provided Total assistance   Sit to Stand CARE Score 1   Bed-Chair Transfer   Type of Assistance Needed Physical assistance   Amount of Physical Assistance Provided Total assistance   Comment transfer board use assist of 2 and chair swap assisrt of 3 for BSV use   Chair/Bed-to-Chair Transfer CARE Score 1   Transfer Bed/Chair/Wheelchair   Positioning Concerns Hemiplegia; Neglect;Cognition   Limitations Noted In Balance; Endurance;Problem Solving;Coordination;LE Strength;UE Strength   Toileting Hygiene   Type of Assistance Needed Physical assistance   Amount of Physical Assistance Provided Total assistance   Comment pt able to lean forward for hygiene and side to side for pants up after toileting to avoid standing due to increased assist and difficulty bearing wt LLE and NWB RLE   Toileting Hygiene CARE Score 1   Toileting   Able to [address] down no, up no  Limitations Noted In Balance; Coordination;Problem Solving;ROM;Safety;UE Strength;LE Strength   Findings cont of bowel and bladder   Toilet Transfer   Type of Assistance Needed Physical assistance Amount of Physical Assistance Provided Total assistance   Comment assist of 3 to stand and transfer switching chairs and assist of 2 for transfer board from the toilet   Toilet Transfer CARE Score 1   Toilet Transfer   Surface Assessed Standard Commode   Limitations Noted In Balance; Endurance;Problem Solving;ROM;Safety;UE Strength;LE Strength   ROM- Right Upper Extremities   RUE ROM Comment I set 8 PROM sh to hand PROM with occ muscle contractions noted of elbow, sh and hand although not functional movement   Neuromuscular Education   Weight Bearing Technique Yes   RUE Weight Bearing Forearm seated   Trunk Control sitting balance attempted with positive R lateral lean and min/ Mod A to right   Functional Movement Patterns ice cream cup placed in R hand to attempt functional use with min A to stabilize during scooping bites with L hand   Comments ice stim to RUE shoulder to hand with movement elicited for shoulder extension and add and elbow pronation   Cognition   Overall Cognitive Status Impaired   Arousal/Participation Alert; Responsive; Cooperative   Attention Attends with cues to redirect   Assessment   Treatment Assessment Pt present sitting in the w/c finishing lunch  Pt agreeable to PROM and ice stim RUE with activities listed above before retruning to the room for toielting and then return to bed  Pt is assist of 3 to hold rails and stand to switch w/c and BSC and then assist of 2 for leaning forward and side to side for hygiene and clothing management while sitting instead of standing due to increased assist  Pt then returned to bed using the transfer board with assist of 2  Pt tolerates session well with increasing contractions of RUE noted and education provided during all activities  Pt will benefit from continued skilled OT services to increase independence with daily tasks  Problem List Decreased strength;Decreased range of motion;Decreased endurance; Impaired balance;Decreased coordination;Decreased cognition;Decreased safety awareness;Orthopedic restrictions   Plan   Treatment/Interventions ADL retraining;Functional transfer training; Endurance training; Therapeutic exercise;Patient/family training;Cognitive reorientation; Compensatory technique education   Progress Slow progress, decreased activity tolerance   OT Therapy Minutes   OT Time In 1302   OT Time Out 1411   OT Total Time (minutes) 69   OT Mode of treatment - Individual (minutes) 69   Therapy Time missed   Time missed?  No

## 2019-12-04 NOTE — PROGRESS NOTES
12/04/19 0610   Pain Assessment   Pain Assessment No/denies pain   Pain Score No Pain   Restrictions/Precautions   Precautions Aspiration;Bed/chair alarms;Cognitive; Fall Risk   RLE Weight Bearing Per Order NWB   Braces or Orthoses LE Immobilizer   Comprehension   QI: Comprehension 2  Sometimes Understands: Understands only basic conversations or simple, direct phrases  Frequently requires cues to understand   Comprehension (FIM) 2 - Understands basic info/conversation 25-49% of time   Expression   QI: Expression 2  Frequently exhibits difficulty with expressing needs and ideas   Expression (FIM) 2 - Uses only simple expressions or gestures (waves, hello)   Social Interaction   Social Interaction (FIM) 2 - Interacts appropriately 25-49% of time   Problem Solving   Problem solving (FIM) 2 - Bed alarm for safety issues more than half the time   Memory   Recognize People Yes   Recalls Precaution No   Memory (FIM) 2 - Recognizes, recalls/performs 25-49%   Executive Function Skills   Insight Severe insight   Impulsive Moderately impulsive   Task Initiation Delayed initiation   Flexibility of Thought Reduced flexibility   Planning No planning skills   Memory Skills   Orientation Level Oriented to person   Short Term Working Recall Severe Impairment   Auditory Comprehension   Comphrehends Conversation Simple   Interfering Components Attention - selective   Speech/Swallow Mechanism Exam   Volitional Cough Weak   Volitional Swallow Delayed   Speech/Language/Cognition Assessmetn   Treatment Assessment Speech Pathology Daily Treatment Note - Speech/Cognitive Communication Skills -SLP targeted comprehension and expression of current safety precautions and safe mobility sequences pre and during positioning for his breakfast meal   Functional transfers were discussed and verbally sequenced into simplified salient steps to aid retention    At each step of the sequence, SLP stressed the level of necessary assistance which is currently required and focused on patient's own awareness of their limitations  Patient required max assist to sequence to a two to three step level stating "I don't know," to questions but then responding with max situational cues and demonstration to aid understanding  Swallow Information   Current Risks for Dysphagia & Aspiration Weak cough   Current Symptoms/Concerns Clear throat; With liquids   Current Diet Dysphagia pureed  (SLP trials [facility] soft )   Consistencies Assessed and Performance   Oral Stage Moderate impaired   Phargngeal Stage Mild impaired   Recommendations   Risk for Aspiration Mild   Diet Solid Recommendation Level 1 Dysphagia/pureed   Diet Liquid Recommendation Nectar thick liquid   General Precautions Aspiration precautions   Compensatory Swallowing Strategies Alternate solids and liquids   Eating   Type of Assistance Needed Incidental touching   Amount of Physical Assistance Provided 50%-74%   Eating CARE Score 2   Swallow Assessment   Swallow Treatment Assessment Speech Pathology Daily Treatment Note - Swallow Oral Function - SLP targeted understanding and demonstration of aspiration precautions  Therapist educated on the importance of 90 degree head / neck/ trunk posture  SLP provided demonstration of safe verses unsafe posturing and how it relates to success with swallowing  SLP also addressed the effects of alert status as well distraction upon the level of safety with swallowing  SLP stressed the need for decreased speaking on oral phase as it contributes to increased attention to the swallow task and improves anterior to poster control within the oral cavity    Compensatory safe swallow techniques of prep to small bites, small and single sips of liquid,complete oral breakdown of solids with formation of a cohesive bolus, oral closure with spoon retrieval, oral clearance prior to reintroduction of bolus, alternate solids with liquids, and rate control were also introduced in this session  Current diet texture is puree with nectar thick liquids and SLP completing [facility] of [facility] soft and thin at mealtime  Good progress with use of swallow strategies and techniques    Continue trials 1:1 with SLP at this time to build safety awareness   Swallow Assessment Prognosis   Prognosis Good   Prognosis Considerations Previous level of function   SLP Therapy Minutes   SLP Time In 0610   SLP Time Out 0715   SLP Total Time (minutes) 65   Daily FIM Score   Eating (FIM) 3 - [place] scoops food, patient brings to mouth

## 2019-12-04 NOTE — NURSING NOTE
Patient pulls sleeves apart from pump, becomes agitated when pumps up, yells out , educated on VTE prevention , no evidence of knowledge of education DT cognitive impairment  ART thigh high stockings on

## 2019-12-04 NOTE — PROGRESS NOTES
Physical Medicine and Rehabilitation Progress Note  Mariela Aldridge 67 y o  male MRN: [id number]  Unit/Bed#: -01 Encounter: [id number]    HPI: Patient is a 68 yo male who presented after a fall leading to rt tibial plateau fx and L frontal SAH both of which were managed non-operatively however course was complicated by ischemic CVA which was likely vessel to vessel in origin    Chief Complaint: CVA     Interval/subjective: patient seen in therapy and tolerating therapy without issue     ROS: A 10 point ROS was performed; negative except as noted above       Assessment/Plan:      Dysphagia  Assessment & Plan  - modified diet per ST      CVA (cerebral vascular accident) [facility])  Assessment & Plan  - L sided cerebral infarcts in the setting of traumatic L frontal SAH  - likely vessel to vessel origin in the setting of occluded L carotid (also with L vertebral artery stenosis)   - neuro recommended to re-starting home ASA 81 mg qd while in acute care which was resumed on 11/17/19 and a FU CTH on 11/25 no longer showed L frontal SAH, d/w Dr Afshan Dixon of neuro who recommended starting DAPT x 3 wks then ASA monotherapy (patient was on ASA 81 mg qd prior to CVA however with questionable compliance with medications)    - on home lipitor 40 mg qpm per neuro     Closed fracture of right tibial plateau with routine healing  Assessment & Plan  - non-op management per ortho  - per d/w ortho-->NWB in unlocked hinged knee brace, PROM only to patient's tolerance    - per ortho FU XR in 6 wks     Subarachnoid hemorrhage (HCC)  Assessment & Plan  - L frontal SAH  - evaluated by neurosx and no surgical intervention was taken  - completed 7 day keppra sx ppx course in acute care as recommended by neurosx   - cleared by neurology in acute care to re-start home ASA 81 mg qd for ischemic CVA (re-started on 11/17) with FU CTH on 11/25 which no longer revealed L frontal SAH;  d/w Dr Afshan Dixon of neuro who recommended starting DAPT x 3 wks then ASA monotherapy (patient was on ASA 81 mg qd prior to CVA however with questionable compliance with medications)      Peripheral vascular disease (HCC)  Assessment & Plan  - L carotid dz, L vertebral artery dz, B/L LE arterial dz  - cleared by neurology to re-start home ASA 81 mg qd (ischemic CVA)  - per neuro on home lipitor 40 mg qpm  - follows with Dr Lloyd Pedroza as OP and per Dr Radu Parson OP note patient refused to see vascular surgeon     Hypothyroidism  Assessment & Plan  - at home on levothyroxine 50 mcg qd however TSH was elevated therefore IM increased levothyroxine to 75 mcg qd and recommend repeat TSH in 4-6 wks     Essential hypertension  Assessment & Plan  - at home on lopressor 50 mg q12, lisinopril 40 mg qd, and procardia XL (24 hr) 60 mg qd   - d/w Dr Herrera Roque of neuro and cleared for normotensive BP goals   - metanephrines, renin, aldosterone labs ordered by cards --> aldosterone level WNL, free metanephrine WNL, normetanephrine mildly above ULN of 145 at 163, renin level WNL; will notify cards of results   - IM managing     Hyperlipidemia  Assessment & Plan  - on home lipitor 40 mg qpm per neuro in acute care     * CAD (coronary artery disease)  Assessment & Plan  - h/o CABG  - cleared by neurology to resume home ASA 81 mg qd   - at home on lopressor 50 mg q12 (IM managing)  - on home lipitor 40 mg qpm   - mildly elevated troponins in acute care which peaked at 0 06  - seen by cards in acute care and did have an echo and no further KEBEDE/intevention recommended by cards  - follows with Dr Lloyd Pedroza as OP      Scheduled Meds:    Current Facility-Administered Medications:  acetaminophen 650 mg Oral Q6H PRN Austin Segura MD   aspirin 81 mg Oral Daily Wilfredo Mccormick MD   atorvastatin 40 mg Oral Daily With Michael Dupree MD   clopidogrel 75 mg Oral Daily Wilfredo Mccormick MD   docusate sodium 100 mg Oral BID Austin Segura MD   hydrALAZINE 50 mg Oral [facility] DESIREE Leonardo   levothyroxine 75 mcg Oral Early Morning DESIREE Cyr   lisinopril 20 mg Oral BID DESIREE Cyr   metoprolol tartrate 50 mg Oral Q12H [facility] DESIREE Whitt   nicotine 1 patch Transdermal Daily Lu Howard MD   NIFEdipine 60 mg Oral Daily Lu Howard MD   ondansetron 4 mg Oral Q6H PRN Lu Howard MD   polyethylene glycol 17 g Oral Daily PRN Kaylie Pineda MD          Incidental findings:    1) EKG abnormalities (LAD, PACs): OP FU with Dr Barbara Erickson  2) elevated PA pressure with moderate TR: OP FU with Dr Barbara Erickson  3) grade 2 DD: OP FU with Dr Barbara Erickson        DVT ppx: SCDs in the setting of [address]  Code: per acute care notes patient insisted on being DNR/DNI when he was more cognitively intact, d/w patient's brother who also wishes for patient to be DNR/DNI and patient's brother verbalized his understanding of what this means        Objective:    Functional Update:  Mobility: max  Transfers: max   ADLs: max       Physical Exam:          General: no apparent distress and comfortable  CARDIAC:  +S1/2  LUNGS:  respirations unlabored   ABDOMEN:  soft NT   EXTREMITIES:  volume status currently stable   NEURO:   inconsistently follows commands  PSYCH:  patient currently calm        Laboratory:         Invalid input(s): PLTCT        Invalid input(s): CA

## 2019-12-04 NOTE — NURSING NOTE
Pt eating dinner with ST present, poor appetite encouraging intake  Confused at baseline  No complaints present  Items within reach, bed alarm in place  Continuing to monitor and follow plan of care

## 2019-12-04 NOTE — PROGRESS NOTES
12/03/19 2211   Charting Type   Charting Type Shift assessment   Neurological   Level of Consciousness Alert/awake   Orientation Level Disoriented to person;Disoriented to place; Disoriented to time;Disoriented to situation   Cognition Poor judgement;Poor safety awareness;Poor attention/concentration; Short term memory loss; Impulsive   Speech Delayed responses;Clear   R Hand  Absent   L Hand  Strong   RUE Muscle Strength 0- No movement   LUE Muscle Strength 5- Normal strength   RLE Muscle Strength 0- No movement   LLE Muscle Strength 5- Normal strength   Neuro Symptoms Agitation; Forgetful   Reena Coma Scale   Eye Opening 4   Best Verbal Response 5   Best Motor Response 6   [employer] Coma Scale Score 15   HEENT   Teeth Missing teeth   Respiratory   Bilateral Breath Sounds Clear;Diminished   Cardiac   Cardiac (WDL) WDL   Peripheral Vascular   Peripheral Vascular (WDL) WDL   Edema Right upper extremity   RUE Edema Non-pitting   LUE Edema None   RLE Edema None   LLE Edema None   Integumentary   Skin Color Appropriate for ethnicity   Skin Integrity Bruising   Musculoskeletal   RUE Paralysis   LUE Full movement   RLE Paralysis   LLE Limited movement   Gastrointestinal   Gastrointestinal (WDL) WDL   Urine Assessment   Urinary Incontinence Yes   Psychosocial   Patient Behaviors/Mood Non-compliant;Flat affect   Needs Expressed Denies   Ability to Express Feelings Able to express   Ability to Express Needs Able to express   Ability to Express Thoughts Able to express   Ability to Understand Others Sometimes understands

## 2019-12-04 NOTE — PROGRESS NOTES
12/04/19 1030   Pain Assessment   Pain Assessment No/denies pain   Pain Score No Pain   Restrictions/Precautions   Precautions Aspiration;Bed/chair alarms; Fall Risk;Limb alert;Cognitive   RLE Weight Bearing Per Order NWB   Braces or Orthoses Splint;LE Immobilizer  (unlocked )   Cognition   Overall Cognitive Status Impaired   Arousal/Participation Alert; Responsive   Attention Attends with cues to redirect   Following Commands Follows one step commands with increased time or repetition   Sit to Lying   Type of Assistance Needed Physical assistance   Amount of Physical Assistance Provided 75% or more   Sit to Lying CARE Score 2   Lying to Sitting on Side of Bed   Type of Assistance Needed Physical assistance   Amount of Physical Assistance Provided 75% or more   Lying to Sitting on Side of Bed CARE Score 2   Bed-Chair Transfer   Type of Assistance Needed Physical assistance   Amount of Physical Assistance Provided 75% or more   Comment   (A x 2 for set up)   Chair/Bed-to-Chair Transfer CARE Score 2   Transfer Bed/Chair/Wheelchair   Limitations Noted In Balance; Endurance; Coordination;Problem Solving;Sensation; Sequencing;UE Strength;LE Strength   Adaptive Equipment Transfer Board   Sit Pivot Maximum Assist;Assist x 1  (a x 2 for set up )   Supine to Sit Maximum Assist;Assist x 1   Sit to Supine Maximum Assist;Assist x 1   All Transfer Maximum Assist;Assist x 1   Wheel 50 Feet with Two Turns   Type of Assistance Needed Physical assistance   Amount of Physical Assistance Provided 50%-74%   Wheel 50 Feet with Two Turns CARE Score 2   Wheelchair mobility   Does the patient use a wheelchair? 1  Yes   Type of Wheelchair Used 1  Manual   Method Left upper extremity; Left lower extremity   Assistance Provided For Locking Brakes;Obstacles   Distance Level Surface (feet)   (100' 75')   Therapeutic Interventions   Strengthening Supine TE performed to tolerance    Flexibility R knee PROM to tolerance    Other Transfer training, w/c mobility    Assessment   Treatment Assessment Pt presents seated in w/c at start of therapy session and was agreeable to participate  Pt completes w/c mobility with focus on using L UE and L LE with moderate cueing needed to complete and mod A to push, and max A to turn and navigate around obstacles  Worked on static/dynamic sitting balance on Mat table with max A x 1 needed for sit pivot transfer and A x 2 for set up  Pt was Max A-CGA for overall sitting balance  He demonstrates the ability to self correct his sitting posture at times when cued and focused  Pt's brother was present for the end of the therapy session  Completed supine TE for general LE strengthening and conditioning with fair tolerance  Pt is limited by decreased activity tolerance, decreased endurance decreased strength/ROM, impaired balance, impaired coordination, and poor safety awareness  PT Barriers   Physical Impairment Decreased strength;Decreased range of motion;Decreased endurance; Impaired balance;Decreased mobility; Decreased coordination;Decreased cognition; Impaired judgement;Decreased safety awareness   Functional Limitation Transfers;Standing; Wheelchair management   Plan   Treatment/Interventions Functional transfer training;LE strengthening/ROM; Therapeutic exercise; Endurance training;Patient/family training;Cognitive reorientation;Equipment eval/education; Bed mobility; Compensatory technique education   Progress Slow progress, decreased activity tolerance   PT Therapy Minutes   PT Time In 1030   PT Time Out 1130   PT Total Time (minutes) 60   PT Mode of treatment - Individual (minutes) 60   PT Mode of treatment - Concurrent (minutes) 0   PT Mode of treatment - Group (minutes) 0   PT Mode of treatment - Co-treat (minutes) 0   PT Mode of Treatment - Total time(minutes) 60 minutes   PT Cumulative Minutes 511   Therapy Time missed   Time missed?  No

## 2019-12-05 PROCEDURE — 97530 THERAPEUTIC ACTIVITIES: CPT

## 2019-12-05 PROCEDURE — 97537 COMMUNITY/WORK REINTEGRATION: CPT

## 2019-12-05 PROCEDURE — 97112 NEUROMUSCULAR REEDUCATION: CPT

## 2019-12-05 PROCEDURE — 92526 ORAL FUNCTION THERAPY: CPT

## 2019-12-05 PROCEDURE — G0515 COGNITIVE SKILLS DEVELOPMENT: HCPCS

## 2019-12-05 PROCEDURE — 97535 SELF CARE MNGMENT TRAINING: CPT

## 2019-12-05 PROCEDURE — 99232 SBSQ HOSP IP/OBS MODERATE 35: CPT | Performed by: PHYSICAL MEDICINE & REHABILITATION

## 2019-12-05 PROCEDURE — 97110 THERAPEUTIC EXERCISES: CPT

## 2019-12-05 RX ADMIN — METOPROLOL TARTRATE 50 MG: 50 TABLET, FILM COATED ORAL at 08:38

## 2019-12-05 RX ADMIN — METOPROLOL TARTRATE 50 MG: 50 TABLET, FILM COATED ORAL at 20:12

## 2019-12-05 RX ADMIN — HYDRALAZINE HYDROCHLORIDE 50 MG: 25 TABLET ORAL at 21:50

## 2019-12-05 RX ADMIN — LISINOPRIL 20 MG: 20 TABLET ORAL at 08:38

## 2019-12-05 RX ADMIN — DOCUSATE SODIUM 100 MG: 100 CAPSULE, LIQUID FILLED ORAL at 17:16

## 2019-12-05 RX ADMIN — CLOPIDOGREL BISULFATE 75 MG: 75 TABLET ORAL at 08:38

## 2019-12-05 RX ADMIN — LISINOPRIL 20 MG: 20 TABLET ORAL at 17:16

## 2019-12-05 RX ADMIN — HYDRALAZINE HYDROCHLORIDE 50 MG: 25 TABLET ORAL at 05:29

## 2019-12-05 RX ADMIN — NICOTINE 1 PATCH: 21 PATCH, EXTENDED RELEASE TRANSDERMAL at 08:40

## 2019-12-05 RX ADMIN — ASPIRIN 81 MG: 81 TABLET, COATED ORAL at 08:38

## 2019-12-05 RX ADMIN — ATORVASTATIN CALCIUM 40 MG: 40 TABLET, FILM COATED ORAL at 17:16

## 2019-12-05 RX ADMIN — DOCUSATE SODIUM 100 MG: 100 CAPSULE, LIQUID FILLED ORAL at 08:38

## 2019-12-05 RX ADMIN — HYDRALAZINE HYDROCHLORIDE 50 MG: 25 TABLET ORAL at 14:04

## 2019-12-05 RX ADMIN — LEVOTHYROXINE SODIUM 75 MCG: 75 TABLET ORAL at 05:30

## 2019-12-05 RX ADMIN — NIFEDIPINE 60 MG: 30 TABLET, FILM COATED, EXTENDED RELEASE ORAL at 08:38

## 2019-12-05 NOTE — PROGRESS NOTES
Physical Medicine and Rehabilitation Progress Note  Mariann Sunshine 67 y o  male MRN: [id number]  Unit/Bed#: -01 Encounter: [id number]    HPI: Patient is a 66 yo male who presented after a fall leading to rt tibial plateau fx and L frontal SAH both of which were managed non-operatively however course was complicated by ischemic CVA which was likely vessel to vessel in origin    Chief Complaint: CVA     Interval/subjective: patient seen during occupational therapy and was able to tolerate OT session     ROS: A 10 point ROS was performed; negative except as noted above       Assessment/Plan:      Dysphagia  Assessment & Plan  - modified diet per ST      CVA (cerebral vascular accident) [facility])  Assessment & Plan  - L sided cerebral infarcts in the setting of traumatic L frontal SAH  - likely vessel to vessel origin in the setting of occluded L carotid (also with L vertebral artery stenosis)   - neuro recommended to re-starting home ASA 81 mg qd while in acute care which was resumed on 11/17/19 and a FU CTH on 11/25 no longer showed L frontal SAH, d/w Dr Danisha Ruelas of neuro who recommended starting DAPT x 3 wks then ASA monotherapy (patient was on ASA 81 mg qd prior to CVA however with questionable compliance with medications)    - on home lipitor 40 mg qpm per neuro     Closed fracture of right tibial plateau with routine healing  Assessment & Plan  - non-op management per ortho  - per d/w ortho-->NWB in unlocked hinged knee brace, PROM only to patient's tolerance    - per ortho FU XR in 6 wks     Subarachnoid hemorrhage (HCC)  Assessment & Plan  - L frontal SAH  - evaluated by neurosx and no surgical intervention was taken  - completed 7 day keppra sx ppx course in acute care as recommended by neurosx   - cleared by neurology in acute care to re-start home ASA 81 mg qd for ischemic CVA (re-started on 11/17) with FU CTH on 11/25 which no longer revealed L frontal SAH;  d/w Dr Danisha Ruelas of neuro who recommended starting DAPT x 3 wks then ASA monotherapy (patient was on ASA 81 mg qd prior to CVA however with questionable compliance with medications)      Peripheral vascular disease (HCC)  Assessment & Plan  - L carotid dz, L vertebral artery dz, B/L LE arterial dz  - cleared by neurology to re-start home ASA 81 mg qd (ischemic CVA)  - per neuro on home lipitor 40 mg qpm  - follows with Dr Angelyn Snellen as OP and per Dr Immanuel Crenshaw OP note patient refused to see vascular surgeon     Hypothyroidism  Assessment & Plan  - at home on levothyroxine 50 mcg qd however TSH was elevated therefore IM increased levothyroxine to 75 mcg qd and recommend repeat TSH in 4-6 wks     Essential hypertension  Assessment & Plan  - at home on lopressor 50 mg q12, lisinopril 40 mg qd, and procardia XL (24 hr) 60 mg qd   - d/w Dr Adelia Fried of neuro and cleared for normotensive BP goals   - metanephrines, renin, aldosterone labs ordered by cards --> aldosterone level WNL, free metanephrine WNL, normetanephrine mildly above ULN of 145 at 163, renin level WNL; notified cardiology of results and cards recommend OP FU with [facility]  - IM managing as inpt  - OP FU with [facility]     Hyperlipidemia  Assessment & Plan  - on home lipitor 40 mg qpm per neuro in acute care     * CAD (coronary artery disease)  Assessment & Plan  - h/o CABG  - cleared by neurology to resume home ASA 81 mg qd   - at home on lopressor 50 mg q12 (IM managing)  - on home lipitor 40 mg qpm   - mildly elevated troponins in acute care which peaked at 0 06  - seen by cards in acute care and did have an echo and no further KEBEDE/intevention recommended by cards  - follows with Dr Angelyn Snellen as OP      Scheduled Meds:    Current Facility-Administered Medications:  acetaminophen 650 mg Oral Q6H PRN Blayne Suarez MD   aspirin 81 mg Oral Daily Dorota Bartlett MD   atorvastatin 40 mg Oral Daily With Praveena Treadwell MD   clopidogrel 75 mg Oral Daily Dorota Bartlett MD   docusate sodium 100 mg Oral BID Blayne Suarez MD   hydrALAZINE 50 mg Oral Q8H [address] DESIREE Tabares   levothyroxine 75 mcg Oral Early Morning DESIREE Tabares   lisinopril 20 mg Oral BID DESIREE Tabares   metoprolol tartrate 50 mg Oral Q12H [address] DESIREE Whitt   nicotine 1 patch Transdermal Daily Mk Alfred MD   NIFEdipine 60 mg Oral Daily Mk Alfred MD   ondansetron 4 mg Oral Q6H PRN Mk Alfred MD   polyethylene glycol 17 g Oral Daily PRN Shandra Ny MD          Incidental findings:    1) EKG abnormalities (LAD, PACs): OP FU with Dr Jaleesa Pittman  2) elevated PA pressure with moderate TR: OP FU with Dr Jaleesa Pittman  3) grade 2 DD: OP FU with Dr Jaleesa Pittman        DVT ppx: SCDs in the setting of [address]  Code: per acute care notes patient insisted on being DNR/DNI when he was more cognitively intact, d/w patient's brother who also wishes for patient to be DNR/DNI and patient's brother verbalized his understanding of what this means        Objective:    Functional Update:  Mobility: max  Transfers: max   ADLs: max       Physical Exam:            General: no apparent distress and comfortable  CARDIAC:  +S1/2  LUNGS:  respirations unlabored   ABDOMEN:  soft NT   EXTREMITIES:  volume status currently stable   NEURO:   inconsistently follows commands  PSYCH:  patient currently calm        Laboratory:         Invalid input(s): PLTCT        Invalid input(s): CA

## 2019-12-05 NOTE — PROGRESS NOTES
12/04/19 2036   Charting Type   Charting Type Shift assessment   Neurological   Neuro (WDL) X   Level of Consciousness Alert/awake   Orientation Level Oriented to person;Disoriented to place; Disoriented to time;Disoriented to situation   Cognition Impulsive;Poor judgement;Poor safety awareness   Facial Symmetry Right facial drooping   Tongue Deviation Midline   Speech Delayed responses;Clear   Swallow Difficulty swallowing   R Hand  Absent   L Hand  Strong   RUE Motor Response Flaccid   RUE Sensation Numbness   RUE Muscle Strength 1- Muscle contraction, but no movement   LUE Motor Response Responds to commands   LUE Sensation Full sensation   LUE Muscle Strength 5- Normal strength   RLE Motor Response Flaccid   RLE Sensation Numbness   RLE Muscle Strength 2- Movement with gravity eliminated   LLE Motor Response Responds to commands   LLE Sensation Unable to assess; Full sensation   LLE Muscle Strength 5- Normal strength   Neuro Symptoms Agitation; Forgetful;Irritable   Relieved by Rest   Delirium Assessment- CAM    Acute Onset and Fluctuating Course (1) No   Inattention (2) No   Disorganized Thinking (3) Yes   Rate Patient's Level of Consciousness (4) Alert (Normal), No   Delirium Present No   Delirium Interventions Monitor urine and bowel function; Ensure adequate nutrition/hydration; Ensure adequate oxygenation; Review lab results;Frequent reorientation   Reena Coma Scale   Eye Opening 4   Best Verbal Response 5   Best Motor Response 6   [place] Coma Scale Score 15   HEENT   HEENT (WDL) X   Head and Face Asymmetrical   Respiratory   Respiratory (WDL) X   Respiratory Pattern Normal   Chest Assessment Chest expansion symmetrical   Bilateral Breath Sounds Clear;Diminished   R Breath Sounds Clear;Diminished   L Breath Sounds Clear;Diminished   Respiratory Additional Assessments No   Incentive Spirometry   IS level of assistance Assisted by nursing;Needs encouragement   Frequency q2hr W/A   Respiratory Effort Normal   Treatment Tolerance Tolerated fairly well   Incentive Spirometry Goal (mL) 1000 mL   Incentive Spirometry Achieved (mL) 250 mL   Cardiac   Cardiac (WDL) WDL   Cardiac Regularity Regular   Pain Assessment   Pain Assessment 0-10   Peripheral Vascular   Peripheral Vascular (WDL) WDL   Cyanosis None   Capillary Refill Less than/equal to 2 seconds (All extremities)   Pulses L pedal;R pedal;L radial;R radial   RUE Edema Non-pitting   LUE Edema None   RLE Edema None   LLE Edema None   RUE Neurovascular Assessment   RUE Color Ecchymosis   RUE Temperature/Moisture Warm;Dry   LUE Neurovascular Assessment   LUE Color Ecchymosis   LUE Temperature/Moisture Warm;Dry   Integumentary   Integumentary (WDL) X   Skin Color Appropriate for ethnicity   Skin Condition/Temp Warm;Dry   Skin Integrity Bruising   Skin Location b/l ue scattered   Skin Turgor Non-tenting   Integumentary Additional Assessments No   Сергей Scale   Sensory Perceptions 2   Moisture 3   Activity 2   Mobility 3   Nutrition 2   Friction and Shear 2   Сергей Scale Score 14   Musculoskeletal   Musculoskeletal (WDL) X   Level of Assistance Maximum assist, patient does 25-49%   RUE Paralysis   LUE Full movement   RLE Paralysis   LLE Limited movement   Gastrointestinal   Gastrointestinal (WDL) WDL   Abdomen Inspection Soft;Nondistended   Stool Assessment   Bowel Incontinence No   Genitourinary   Genitourinary (WDL) WDL   Urine Assessment   Urinary Incontinence Yes   Urine Color Kellee   Psychosocial   Psychosocial (WDL) X   Patient Behaviors/Mood Incongruent;Irritable;Non-compliant   Needs Expressed Denies   Psychosocial Additional Assessments No

## 2019-12-05 NOTE — NURSING NOTE
Patient in and out of bed with slide board  Right side remains weak  Patient voices no complaints of pain  Patient ripping off SCD while in bed and throwing covers on floor  Repositioned for comfort  Will continue to monitor

## 2019-12-05 NOTE — PROGRESS NOTES
12/05/19 1045   Pain Assessment   Pain Assessment No/denies pain   Pain Score No Pain   Restrictions/Precautions   Precautions Aspiration;Bed/chair alarms;Cognitive; Fall Risk;Limb alert   Weight Bearing Restrictions Yes   RLE Weight Bearing Per Order NWB   ROM Restrictions   (unlocked knee brace)   Braces or Orthoses LE Immobilizer   Cognition   Overall Cognitive Status Impaired   Arousal/Participation Alert; Responsive   Attention Difficulty attending to directions   Following Commands Follows one step commands with increased time or repetition   Sit to Lying   Type of Assistance Needed Physical assistance   Amount of Physical Assistance Provided 75% or more   Sit to Lying CARE Score 2   Lying to Sitting on Side of Bed   Type of Assistance Needed Physical assistance   Amount of Physical Assistance Provided 75% or more   Lying to Sitting on Side of Bed CARE Score 2   Bed-Chair Transfer   Type of Assistance Needed Physical assistance   Amount of Physical Assistance Provided 75% or more   Chair/Bed-to-Chair Transfer CARE Score 2   Transfer Bed/Chair/Wheelchair   Limitations Noted In Balance; Endurance; Coordination;Problem Solving;Sensation; Sequencing;UE Strength;LE Strength   Adaptive Equipment Transfer Board   Sit Pivot Maximum Assist;Assist x 1   Supine to Sit Maximum Assist   Sit to Supine Maximum Assist   All Transfer Maximum Assist   Wheel 50 Feet with Two Turns   Type of Assistance Needed Physical assistance   Amount of Physical Assistance Provided 25%-49%   Wheel 50 Feet with Two Turns CARE Score 3   Wheel 150 Feet   Type of Assistance Needed Physical assistance   Amount of Physical Assistance Provided 25%-49%   Wheel 150 Feet CARE Score 3   Wheelchair mobility   Does the patient use a wheelchair? 1  Yes   Type of Wheelchair Used 1  Manual   Method Left upper extremity; Left lower extremity   Assistance Provided For Locking Brakes   Distance Level Surface (feet)   (160')   Findings   (mod A x 1 )   Therapeutic Interventions   Strengthening seated/supine TE performed to tolerance    Flexibility R knee PROM   Balance static/dynamic sitting balance training    Other Transfer training, w/c mobility    Assessment   Treatment Assessment Pt presents seated in w/c at start of therapy session and was agreeable to participate  Pt completes w/c mobility with focus on using L UE and L LE with moderate cueing needed to complete and mod A to push and navigate around obstacles  Completed static/dynamic sitting balance on Mat table for improvements in trunk control and core stabilization with max A x 1 needed for sit pivot transfer including set up  Pt was Max A-CGA for overall sitting balance  He demonstrates the ability to self correct his sitting posture at times when cued and focused  Completed supine/seated TE for general LE strengthening and conditioning with increased time to complete due to poor activity tolerance and attention to tasks requiring frequent and long rest breaks and max cueing to complete the exercises  Pt will continue to benefit from skilled Pt services to maximize safety and LOF  PT Barriers   Physical Impairment Decreased strength;Decreased range of motion;Decreased endurance; Impaired balance;Decreased mobility; Decreased coordination;Decreased cognition; Impaired judgement;Decreased safety awareness;Pain;Orthopedic restrictions; Impaired sensation   Functional Limitation Standing;Transfers; Walking; Wheelchair management   Plan   Treatment/Interventions Functional transfer training;LE strengthening/ROM; Therapeutic exercise; Endurance training;Cognitive reorientation;Patient/family training;Equipment eval/education; Bed mobility;Gait training; Compensatory technique education   Progress Slow progress, decreased activity tolerance   PT Therapy Minutes   PT Time In 1045   PT Time Out 1200   PT Total Time (minutes) 75   PT Mode of treatment - Individual (minutes) 75   PT Mode of treatment - Concurrent (minutes) 0   PT Mode of treatment - Group (minutes) 0   PT Mode of treatment - Co-treat (minutes) 0   PT Mode of Treatment - Total time(minutes) 75 minutes   PT Cumulative Minutes 586   Therapy Time missed   Time missed?  No

## 2019-12-05 NOTE — PROGRESS NOTES
12/05/19 1000   Pain Assessment   Pain Assessment 0-10   Pain Score No Pain   Restrictions/Precautions   Precautions Aspiration;Bed/chair alarms;Cognitive; Fall Risk   RLE Weight Bearing Per Order NWB   Braces or Orthoses LE Immobilizer   Comprehension   QI: Comprehension 2  Sometimes Understands: Understands only basic conversations or simple, direct phrases  Frequently requires cues to understand   Comprehension (FIM) 2 - Understands basic info/conversation 25-49% of time   Expression   QI: Expression 2  Frequently exhibits difficulty with expressing needs and ideas   Expression (FIM) 2 - Understands basic info/conversation 25-49% of time   Social Interaction   Social Interaction (FIM) 2 - Interacts appropriately 25-49% of time   Problem Solving   Problem solving (FIM) 2 - Bed alarm for safety issues more than half the time   Memory   Recognize People Yes   Recalls Precaution No   Memory (FIM) 2 - Recognizes, recalls/performs 25-49%   Executive Function Skills   Insight Severe insight   Impulsive Moderately impulsive   Task Initiation Delayed initiation   Flexibility of Thought Reduced flexibility   Planning No planning skills   Memory Skills   Orientation Level Oriented to person   Short Term Working Recall Severe Impairment   Auditory Comprehension   Comphrehends Conversation Simple   Interfering Components Attention - selective   Speech/Swallow Mechanism Exam   Volitional Cough Weak   Volitional Swallow Delayed   Speech/Language/Cognition Assessmetn   Treatment Assessment Speech Pathology Daily Treatment Note - Speech/Cognitive Communication Skills - SLP focused on orientation and comprehension of verbal directions  SLP supplied directions using left/right while moving about the ARC and locating pertinent areas related to therapy  Recall of therapeutic tasks in each area was encouraged  SLP then targeted memory by repeating the same cycle and recalling what was seen or discussed in each area    Therapist then focused on trail-making to relocate patients room from the dining room location where he frequently moves back and forth from his room  Focus was on retention of visual markers for success  Although patient required mod to max cues and encouragement for adequate participation and expansion beyond the short phrase level for answers and thought organization, he is making significant gains in recall of information and attempts to verbalize his comprehension of situations  Swallow Information   Current Risks for Dysphagia & Aspiration Weak cough   Current Symptoms/Concerns Clear throat; With liquids   Current Diet Dysphagia pureed   Consistencies Assessed and Performance   Oral Stage Moderate impaired   Phargngeal Stage Mild impaired   Recommendations   Risk for Aspiration Mild   Diet Solid Recommendation Level 1 Dysphagia/pureed  (trials of mech soft 1:1 with SLP)   Diet Liquid Recommendation Nectar thick liquid   General Precautions Aspiration precautions   Compensatory Swallowing Strategies Alternate solids and liquids   Eating   Type of Assistance Needed Incidental touching   Eating CARE Score 4   Swallow Assessment   Swallow Treatment Assessment Speech Pathology Daily Treatment Note - Swallow Oral Function - Patient was seen 1:1 for direct treatment for swallow dysfunction  SLP focused on frequency of swallow and effortful swallow for tolerance of thin liquids  SLP utilized ice chips as the stimulus  Success rate was 85% with SLP controlling amount of stimulus which was small chips singularly  Continued focus in this area is recommended secondary to good success   Swallow Assessment Prognosis   Prognosis Good   Prognosis Considerations Previous level of function   SLP Therapy Minutes   SLP Time In 0920   SLP Time Out 1020   SLP Total Time (minutes) 60   SLP Mode of treatment - Individual (minutes) 60   Therapy Time missed   Time missed?  No   Daily FIM Score   Eating (FIM) 3 - [place] scoops food, patient brings to mouth

## 2019-12-05 NOTE — PROGRESS NOTES
12/05/19 1303   Pain Assessment   Pain Assessment No/denies pain   Restrictions/Precautions   Precautions Aspiration;Bed/chair alarms;Cognitive; Fall Risk;Limb alert   RLE Weight Bearing Per Order NWB   Braces or Orthoses LE Immobilizer  (unlocked at knee)   Eating   Type of Assistance Needed Supervision;Verbal cues   Eating CARE Score 4   Eating Assessment   Current Diet Dental Soft   Findings acues required for use of spoon with applesauce and increased intake with meal completion   Sit to Lying   Type of Assistance Needed Physical assistance   Amount of Physical Assistance Provided Total assistance   Sit to Lying CARE Score 1   Bed-Chair Transfer   Type of Assistance Needed Physical assistance   Amount of Physical Assistance Provided Total assistance   Comment with transfer board   Chair/Bed-to-Chair Transfer CARE Score 1   Exercise Tools   Other Exercise Tool 1 card match reaching with LUE and activating trunk frquently to lean forwrard to place cards   Neuromuscular Education   RUE Weight Bearing Forearm seated   Trunk Control forward flexion of trunk for card match activity   Functional Movement Patterns PROM RUE all joints all planes 1 set 8 with 50% ROM at shoulder due to subluxation and WFL elbow to hand, ice stim attempted however pt complains of being cold and no contractions elicited today   Cognition   Overall Cognitive Status Impaired   Arousal/Participation Alert; Responsive; Cooperative   Attention Difficulty attending to directions   Orientation Level Oriented to person   Assessment   Treatment Assessment Pt presents sitting in the w/c finishing lunch  Cue for meal completion required to increase PO intake and use spoon to scoop applesauce  Pt completed card match with rest breaks during activity  PROM 1 set 8 completed RUE with ice stim although no contractions elicited due to poor attnetion to task, R shoulder sublux noted with WB through elbow on the half tray   Pt assisted back to bed at the end of the session with assist fo 2 with the transfer board and RUE positioned on pillow to elevate and support  Pt tolerate ssession and will benefit from continued skilled oT services to increase independence with daily tasks  Problem List Decreased strength;Decreased range of motion;Decreased endurance; Impaired balance;Decreased coordination;Decreased safety awareness;Decreased cognition;Orthopedic restrictions   Plan   Treatment/Interventions ADL retraining;Functional transfer training; Therapeutic exercise; Endurance training;Cognitive reorientation;Patient/family training; Compensatory technique education   Progress Slow progress, cognitive deficits   OT Therapy Minutes   OT Time In 1303   OT Time Out 1405   OT Total Time (minutes) 62   OT Mode of treatment - Individual (minutes) 62   Therapy Time missed   Time missed?  No

## 2019-12-05 NOTE — NURSING NOTE
Pt used urinal once during my shifts other times he was inc  Of urine  Continues to pull apart SCD's though did get 4hrs on for the last 8hrs  Vance stockings remain on

## 2019-12-06 PROCEDURE — 97112 NEUROMUSCULAR REEDUCATION: CPT

## 2019-12-06 PROCEDURE — 92526 ORAL FUNCTION THERAPY: CPT

## 2019-12-06 PROCEDURE — 99232 SBSQ HOSP IP/OBS MODERATE 35: CPT | Performed by: PHYSICAL MEDICINE & REHABILITATION

## 2019-12-06 PROCEDURE — 97530 THERAPEUTIC ACTIVITIES: CPT

## 2019-12-06 PROCEDURE — 97542 WHEELCHAIR MNGMENT TRAINING: CPT

## 2019-12-06 PROCEDURE — G0515 COGNITIVE SKILLS DEVELOPMENT: HCPCS

## 2019-12-06 PROCEDURE — 97535 SELF CARE MNGMENT TRAINING: CPT

## 2019-12-06 PROCEDURE — 97110 THERAPEUTIC EXERCISES: CPT

## 2019-12-06 RX ADMIN — LISINOPRIL 20 MG: 20 TABLET ORAL at 17:25

## 2019-12-06 RX ADMIN — METOPROLOL TARTRATE 50 MG: 50 TABLET, FILM COATED ORAL at 09:00

## 2019-12-06 RX ADMIN — HYDRALAZINE HYDROCHLORIDE 50 MG: 25 TABLET ORAL at 05:16

## 2019-12-06 RX ADMIN — METOPROLOL TARTRATE 50 MG: 50 TABLET, FILM COATED ORAL at 21:21

## 2019-12-06 RX ADMIN — HYDRALAZINE HYDROCHLORIDE 50 MG: 25 TABLET ORAL at 14:12

## 2019-12-06 RX ADMIN — DOCUSATE SODIUM 100 MG: 100 CAPSULE, LIQUID FILLED ORAL at 09:17

## 2019-12-06 RX ADMIN — NICOTINE 1 PATCH: 21 PATCH, EXTENDED RELEASE TRANSDERMAL at 09:19

## 2019-12-06 RX ADMIN — LISINOPRIL 20 MG: 20 TABLET ORAL at 09:17

## 2019-12-06 RX ADMIN — ATORVASTATIN CALCIUM 40 MG: 40 TABLET, FILM COATED ORAL at 17:25

## 2019-12-06 RX ADMIN — CLOPIDOGREL BISULFATE 75 MG: 75 TABLET ORAL at 09:17

## 2019-12-06 RX ADMIN — ASPIRIN 81 MG: 81 TABLET, COATED ORAL at 09:17

## 2019-12-06 RX ADMIN — LEVOTHYROXINE SODIUM 75 MCG: 75 TABLET ORAL at 05:16

## 2019-12-06 RX ADMIN — NIFEDIPINE 60 MG: 30 TABLET, FILM COATED, EXTENDED RELEASE ORAL at 09:16

## 2019-12-06 RX ADMIN — HYDRALAZINE HYDROCHLORIDE 50 MG: 25 TABLET ORAL at 21:21

## 2019-12-06 RX ADMIN — DOCUSATE SODIUM 100 MG: 100 CAPSULE, LIQUID FILLED ORAL at 17:25

## 2019-12-06 NOTE — CASE MANAGEMENT
TRANSPORT AUTH APPROVED FOR [facility] TRANPSORT ON 12/10 AT 12 [facility] # [id number]

## 2019-12-06 NOTE — PROGRESS NOTES
12/06/19 1600   Pain Assessment   Pain Assessment No/denies pain   Pain Score No Pain   Restrictions/Precautions   Precautions Aspiration;Cognitive; Fall Risk   Weight Bearing Restrictions Yes   RLE Weight Bearing Per Order NWB   Braces or Orthoses LE Immobilizer   Comprehension   QI: Comprehension 2  Sometimes Understands: Understands only basic conversations or simple, direct phrases  Frequently requires cues to understand   Comprehension (FIM) 2 - Understands basic info/conversation 25-49% of time   Expression   QI: Expression 2  Frequently exhibits difficulty with expressing needs and ideas   Expression (FIM) 2 - Understands basic info/conversation 25-49% of time   Social Interaction   Social Interaction (FIM) 2 - Interacts appropriately 25-49% of time   Problem Solving   Problem solving (FIM) 2 - Bed alarm for safety issues more than half the time   Memory   Recognize People Yes   Recalls Precaution No   Memory (FIM) 2 - Recognizes, recalls/performs 25-49%   Executive Function Skills   Insight Severe insight   Impulsive Moderately impulsive   Task Initiation Delayed initiation   Flexibility of Thought Reduced flexibility   Planning No planning skills   Memory Skills   Orientation Level Oriented to person   Short Term Working Recall Severe Impairment   Auditory Comprehension   Comphrehends Conversation Simple   Interfering Components Attention - selective   Speech/Swallow Mechanism Exam   Volitional Cough Weak   Volitional Swallow Delayed   Speech/Language/Cognition Assessmetn   Treatment Assessment Speech Pathology Daily Treatment Note - Speech/Cognitive Communication Skills - SLP focused on orientation and comprehension of verbal directions  SLP supplied directions using left/right while moving about the ARC and locating pertinent areas related to therapy  Recall of therapeutic tasks in each area was encouraged  SLP then targeted memory during trail-making to relocate patients room    Focus was on retention of visual markers for success  Patient was able to recall his room number with initial repetition however he claimed nearly each room along the way was his  He was able to recognized his room once within visual field  Swallow Information   Current Risks for Dysphagia & Aspiration Weak cough   Current Symptoms/Concerns Clear throat   Current Diet Dysphagia pureed   Consistencies Assessed and Performance   Oral Stage Moderate impaired   Phargngeal Stage Mild impaired   Recommendations   Risk for Aspiration Mild   Diet Solid Recommendation Level 2 Dysphagia/ mechanical soft/altered   Diet Liquid Recommendation Thin liquid   General Precautions Aspiration precautions   Compensatory Swallowing Strategies Alternate solids and liquids   Eating   Type of Assistance Needed Set-up / clean-up   Eating CARE Score 5   Swallow Assessment   Swallow Treatment Assessment Speech Pathology Daily Treatment Note - Swallow Oral Function - Patient was seen 1:1 during his lunch meal focusing on oral phase management of solids with diet texture trial of mechanical soft solids  Patient was able to demonstrate adequate breakdown, transfer and clear with alternated small sips to aid bolus formation and clearance  During the task, patient reached for his milk carton and attempted to place it toward his mouth to loosen the cap  SLP attempted to retrieve it but patient held fast with the carton cap in his mouth  He managed to loosen the cap with his teeth, the cap coming loose into his mouth  SLP asked his to spit the cap out but he refused with a laugh  Therapist attempted to immediately retrieve the cap but patient held lips tightly closed while swallowing  SLP offered ice cream which he accepted and with a pause, then spit the cap in order to receive icecream   SLP placed order for all liquids in cups with lids (no cartons on trays) for safety       Swallow Assessment Prognosis   Prognosis Good   Prognosis Considerations Previous level of function   SLP Therapy Minutes   SLP Time In 1325   SLP Time Out 1425   SLP Total Time (minutes) 60   SLP Mode of treatment - Individual (minutes) 60   Therapy Time missed   Time missed?  No   Daily FIM Score   Eating (FIM) 3 - [place] scoops food, patient brings to mouth 28-Mar-2018

## 2019-12-06 NOTE — PROGRESS NOTES
12/06/19 1127   Pain Assessment   Pain Assessment No/denies pain   Restrictions/Precautions   Precautions Cognitive;Bed/chair alarms; Fall Risk;Hard of hearing;Limb alert  (nectar liquids and decreased AROM RUE/RLE)   RLE Weight Bearing Per Order NWB   Braces or Orthoses LE Immobilizer  (unlocked hinge)   Bed-Chair Transfer   Type of Assistance Needed Physical assistance   Amount of Physical Assistance Provided Total assistance   Comment assist of 2 for BSC transfer   Chair/Bed-to-Chair Transfer CARE Score 1   Toileting Hygiene   Type of Assistance Needed Physical assistance   Amount of Physical Assistance Provided Total assistance   Comment assist of 2-3 for leaning forward hygiene and side to side for clothing management while sitting, standing attempted but pt unable to assist with activity   Toileting Hygiene CARE Score 1   Toileting   Able to [address] down no, up no  Limitations Noted In Balance; Coordination;Problem Solving;ROM;Safety; Sequencing;UE Strength;LE Strength   Toilet Transfer   Type of Assistance Needed Physical assistance   Amount of Physical Assistance Provided Total assistance   Comment assist of 2 for transfer board to and from the [facility]   Toilet Transfer CARE Score 1   Toilet Transfer   Surface Assessed Drop Arm Commode   Transfer Technique Slide Board   Limitations Noted In Balance; Endurance;Problem Solving;ROM;Safety;UE Strength; Sequencing;LE Strength   Findings pt pushed with LUE during movement making transfer to affected side easier and assist to manage LUE while return to [facility] to avoid him pushing the w/c away   ROM- Right Upper Extremities   RUE ROM Comment PROM sh to elbow 1 set 8 with 50% range sh flexion and abd due to subluxation and muscle wasting from disuse   Exercise Tools   Other Exercise Tool 1 towel slides attempted on tabletop with limited benefit due to limited attention to task although spontneous movements of the R sh and elbow noted while sitting and RUE positioned on the half tray table   Neuromuscular Education   RUE Weight Bearing Forearm seated   Trunk Control forward flexion during ADL tasks with increased upright posture noted while sitting this day  Cognition   Overall Cognitive Status Impaired   Arousal/Participation Alert; Responsive; Cooperative   Attention Difficulty attending to directions   Orientation Level Oriented to person   Additional Activities   Additional Activities Other (Comment)   Additional Activities Comments fxl mobility with L hand mod A in room and hallway   Assessment   Treatment Assessment Pt presents sitting in the w/c agreeable to OT services  Pt assisted to the gym however reports needing to have a BM  Pt assisted with toileting and toilet transfer and then completes neuro grant to RUE with PROM, WB and attempted towel slides  Pt tolerates session well requiring increased cues for attnetion to task and rest breraks in between  Pt will benefit forim continued skilled oT services to increase independence with daily tasks  Problem List Decreased strength;Decreased range of motion;Decreased endurance; Impaired balance;Decreased coordination;Decreased cognition;Decreased safety awareness;Orthopedic restrictions   Plan   Treatment/Interventions ADL retraining;Functional transfer training; Therapeutic exercise; Endurance training;Cognitive reorientation;Patient/family training; Compensatory technique education   Progress Slow progress, decreased activity tolerance   OT Therapy Minutes   OT Time In 1127   OT Time Out 1235   OT Total Time (minutes) 68   OT Mode of treatment - Individual (minutes) 68   Therapy Time missed   Time missed?  No

## 2019-12-06 NOTE — PROGRESS NOTES
12/06/19 1000   Pain Assessment   Pain Assessment No/denies pain   Pain Score No Pain   Restrictions/Precautions   Precautions Cognitive; Fall Risk;Aspiration   RLE Weight Bearing Per Order NWB   Braces or Orthoses LE Immobilizer  (RLE)   Cognition   Overall Cognitive Status Impaired   Arousal/Participation Alert; Responsive; Cooperative   Attention Attends with cues to redirect   Orientation Level Oriented to person;Oriented to place   Memory Decreased short term memory;Decreased recall of recent events;Decreased recall of precautions   Following Commands Follows one step commands inconsistently   Sit to Lying   Type of Assistance Needed Physical assistance   Amount of Physical Assistance Provided 75% or more   Comment max assist   Sit to Lying CARE Score 2   Lying to Sitting on Side of Bed   Type of Assistance Needed Physical assistance   Amount of Physical Assistance Provided 75% or more   Comment max assist   Lying to Sitting on Side of Bed CARE Score 2   Bed-Chair Transfer   Type of Assistance Needed Physical assistance   Amount of Physical Assistance Provided 75% or more   Comment max assist with slideboard   Chair/Bed-to-Chair Transfer CARE Score 2   Transfer Bed/Chair/Wheelchair   Limitations Noted In Balance; Coordination; Endurance;Pain Management;Problem Solving;LE Strength   Adaptive Equipment Transfer Board   Sit Pivot Maximum Assist   Supine to Sit Maximum Assist   Sit to Supine Maximum Assist   Findings max cues for safety and technique   Wheel 50 Feet with Two Turns   Type of Assistance Needed Physical assistance   Amount of Physical Assistance Provided 25%-49%   Comment min assist   Wheel 50 Feet with Two Turns CARE Score 3   Wheel 150 Feet   Type of Assistance Needed Physical assistance   Amount of Physical Assistance Provided 25%-49%   Comment min assist   Wheel 150 Feet CARE Score 3   Wheelchair mobility   Method Left upper extremity   Distance Level Surface (feet) 167 ft  (167' 156')   Findings min assist   Therapeutic Interventions   Strengthening supine AAROM LLE; PROM RLE   Balance transfer training    Other wheelchair mobility  Assessment   Treatment Assessment Patient tolerated therapy session  Continues to need max cues to initiated and complete tasks  Completed ther ex for general LE strengthening; transfer training focusing on sequence and technique for improved balance and safety with functional transfers  PT Barriers   Physical Impairment Decreased strength;Decreased range of motion;Decreased endurance; Impaired balance;Decreased mobility; Decreased cognition;Decreased safety awareness   Functional Limitation Wheelchair management; Walking;Transfers;Standing   Plan   Treatment/Interventions Functional transfer training;LE strengthening/ROM; Therapeutic exercise; Bed mobility   Progress Slow progress, cognitive deficits   PT Therapy Minutes   PT Time In 1000   PT Time Out 1100   PT Total Time (minutes) 60   PT Mode of treatment - Individual (minutes) 60   PT Mode of treatment - Concurrent (minutes) 0   PT Mode of treatment - Group (minutes) 0   PT Mode of treatment - Co-treat (minutes) 0   PT Mode of Treatment - Total time(minutes) 60 minutes   PT Cumulative Minutes 646   Therapy Time missed   Time missed? No        12/06/19 1000   Pain Assessment   Pain Assessment No/denies pain   Pain Score No Pain   Restrictions/Precautions   Precautions Cognitive; Fall Risk;Aspiration   RLE Weight Bearing Per Order NWB   Braces or Orthoses LE Immobilizer  (RLE)   Cognition   Overall Cognitive Status Impaired   Arousal/Participation Alert; Responsive; Cooperative   Attention Attends with cues to redirect   Orientation Level Oriented to person;Oriented to place   Memory Decreased short term memory;Decreased recall of recent events;Decreased recall of precautions   Following Commands Follows one step commands inconsistently   Sit to Lying   Type of Assistance Needed Physical assistance   Amount of Physical Assistance Provided 75% or more   Comment max assist   Sit to Lying CARE Score 2   Lying to Sitting on Side of Bed   Type of Assistance Needed Physical assistance   Amount of Physical Assistance Provided 75% or more   Comment max assist   Lying to Sitting on Side of Bed CARE Score 2   Bed-Chair Transfer   Type of Assistance Needed Physical assistance   Amount of Physical Assistance Provided 75% or more   Comment max assist with slideboard   Chair/Bed-to-Chair Transfer CARE Score 2   Transfer Bed/Chair/Wheelchair   Limitations Noted In Balance; Coordination; Endurance;Pain Management;Problem Solving;LE Strength   Adaptive Equipment Transfer Board   Sit Pivot Maximum Assist   Supine to Sit Maximum Assist   Sit to Supine Maximum Assist   Findings max cues for safety and technique   Wheel 50 Feet with Two Turns   Type of Assistance Needed Physical assistance   Amount of Physical Assistance Provided 25%-49%   Comment min assist   Wheel 50 Feet with Two Turns CARE Score 3   Wheel 150 Feet   Type of Assistance Needed Physical assistance   Amount of Physical Assistance Provided 25%-49%   Comment min assist   Wheel 150 Feet CARE Score 3   Wheelchair mobility   Method Left upper extremity   Distance Level Surface (feet) 167 ft  (167' 156')   Findings min assist   Therapeutic Interventions   Strengthening supine AAROM LLE; PROM RLE   Balance transfer training    Other wheelchair mobility  Assessment   Treatment Assessment Patient tolerated therapy session  Continues to need max cues to initiated and complete tasks  Completed ther ex for general LE strengthening; transfer training focusing on sequence and technique for improved balance and safety with functional transfers  PT Barriers   Physical Impairment Decreased strength;Decreased range of motion;Decreased endurance; Impaired balance;Decreased mobility; Decreased cognition;Decreased safety awareness   Functional Limitation Wheelchair management; Walking;Transfers;Standing Plan   Treatment/Interventions Functional transfer training;LE strengthening/ROM; Therapeutic exercise; Bed mobility   Progress Slow progress, cognitive deficits   PT Therapy Minutes   PT Time In 1000   PT Time Out 1100   PT Total Time (minutes) 60   PT Mode of treatment - Individual (minutes) 60   PT Mode of treatment - Concurrent (minutes) 0   PT Mode of treatment - Group (minutes) 0   PT Mode of treatment - Co-treat (minutes) 0   PT Mode of Treatment - Total time(minutes) 60 minutes   PT Cumulative Minutes 646   Therapy Time missed   Time missed?  No

## 2019-12-06 NOTE — PROGRESS NOTES
Physical Medicine and Rehabilitation Progress Note  Michael Smiht 67 y o  male MRN: [id number]  Unit/Bed#: -01 Encounter: [id number]    HPI: Patient is a 66 yo male who presented after a fall leading to rt tibial plateau fx and L frontal SAH both of which were managed non-operatively however course was complicated by ischemic CVA which was likely vessel to vessel in origin    Chief Complaint: CVA     Interval/subjective: patient seen at bedside and is currently comfortable     ROS: A 10 point ROS was performed; negative except as noted above       Assessment/Plan:      Dysphagia  Assessment & Plan  - modified diet per ST      CVA (cerebral vascular accident) [facility])  Assessment & Plan  - L sided cerebral infarcts in the setting of traumatic L frontal SAH  - likely vessel to vessel origin in the setting of occluded L carotid (also with L vertebral artery stenosis)   - neuro recommended to re-starting home ASA 81 mg qd while in acute care which was resumed on 11/17/19 and a FU CTH on 11/25 no longer showed L frontal SAH, d/w Dr Herrera Roque of neuro who recommended starting DAPT x 3 wks then ASA monotherapy (patient was on ASA 81 mg qd prior to CVA however with questionable compliance with medications)    - on home lipitor 40 mg qpm per neuro     Closed fracture of right tibial plateau with routine healing  Assessment & Plan  - non-op management per ortho  - per d/w ortho-->NWB in unlocked hinged knee brace, PROM only to patient's tolerance    - per ortho FU XR in 6 wks     Subarachnoid hemorrhage (HCC)  Assessment & Plan  - L frontal SAH  - evaluated by neurosx and no surgical intervention was taken  - completed 7 day keppra sx ppx course in acute care as recommended by neurosx   - cleared by neurology in acute care to re-start home ASA 81 mg qd for ischemic CVA (re-started on 11/17) with FU CTH on 11/25 which no longer revealed L frontal SAH;  d/w Dr Herrera Roque of neuro who recommended starting DAPT x 3 wks then ASA monotherapy (patient was on ASA 81 mg qd prior to CVA however with questionable compliance with medications)      Peripheral vascular disease (HCC)  Assessment & Plan  - L carotid dz, L vertebral artery dz, B/L LE arterial dz  - cleared by neurology to re-start home ASA 81 mg qd (ischemic CVA)  - per neuro on home lipitor 40 mg qpm  - follows with Dr Roxie Peguero as OP and per Dr Tavo Anna OP note patient refused to see vascular surgeon     Hypothyroidism  Assessment & Plan  - at home on levothyroxine 50 mcg qd however TSH was elevated therefore IM increased levothyroxine to 75 mcg qd and recommend repeat TSH in 4-6 wks     Essential hypertension  Assessment & Plan  - at home on lopressor 50 mg q12, lisinopril 40 mg qd, and procardia XL (24 hr) 60 mg qd   - d/w Dr Penny Wallace of neuro and cleared for normotensive BP goals   - metanephrines, renin, aldosterone labs ordered by cards --> aldosterone level WNL, free metanephrine WNL, normetanephrine mildly above ULN of 145 at 163, renin level WNL; notified cardiology of results and cards recommend OP FU with [facility]  - IM managing as inpt  - OP FU with [facility]     Hyperlipidemia  Assessment & Plan  - on home lipitor 40 mg qpm per neuro in acute care     * CAD (coronary artery disease)  Assessment & Plan  - h/o CABG  - cleared by neurology to resume home ASA 81 mg qd   - at home on lopressor 50 mg q12 (IM managing)  - on home lipitor 40 mg qpm   - mildly elevated troponins in acute care which peaked at 0 06  - seen by cards in acute care and did have an echo and no further KEBEDE/intevention recommended by cards  - follows with Dr Roxie Peguero as OP      Scheduled Meds:    Current Facility-Administered Medications:  acetaminophen 650 mg Oral Q6H PRN Marisol Ferris MD   aspirin 81 mg Oral Daily Yosvany Hernandez MD   atorvastatin 40 mg Oral Daily With Umair Tyler MD   clopidogrel 75 mg Oral Daily Yosvany Hernandez MD   docusate sodium 100 mg Oral BID Marisol Ferris MD   hydrALAZINE 50 mg Oral Q8H [address] DESIREE Croft   levothyroxine 75 mcg Oral Early Morning DESIREE Croft   lisinopril 20 mg Oral BID DESIREE Croft   metoprolol tartrate 50 mg Oral Q12H [address] DESIREE Whitt   nicotine 1 patch Transdermal Daily Jenae Patterson, MD   NIFEdipine 60 mg Oral Daily Jenae Patterson, MD   ondansetron 4 mg Oral Q6H PRN Jenae Patterson, MD   polyethylene glycol 17 g Oral Daily PRN Isra Carmona MD          Incidental findings:    1) EKG abnormalities (LAD, PACs): OP FU with Dr Giovanni Joseph  2) elevated PA pressure with moderate TR: OP FU with Dr Giovanni Joseph  3) grade 2 DD: OP FU with Dr Giovanni Joseph        DVT ppx: SCDs in the setting of [address]  Code: per acute care notes patient insisted on being DNR/DNI when he was more cognitively intact, d/w patient's brother who also wishes for patient to be DNR/DNI and patient's brother verbalized his understanding of what this means        Objective:    Functional Update:  Mobility: max  Transfers: max   ADLs: max       Physical Exam:      T: 97 9  HR: 72  BP: 138/60  RR: 16  POx: 99%         General: no apparent distress and comfortable  CARDIAC:  +S1/2  LUNGS:  respirations unlabored   ABDOMEN:  soft NT   EXTREMITIES:  volume status currently stable   NEURO:   inconsistently follows commands  PSYCH:  patient currently calm        Laboratory:         Invalid input(s): PLTCT        Invalid input(s): CA

## 2019-12-06 NOTE — NURSING NOTE
Patient resting comfortably in bed at this time  No signs of distress noted  Patient confused and forgetful yelling out and cursing at times overnight bed alarm and close supervision to promote patient safety  Patient with right arm and leg flaccid  Right knee hinged brace in place patient frequently attempting to remove brace multiple reminders provided to keep brace in place  Right foot multipodas boot overnight  Patient with SCDs ordered for DVT prophylaxis patient frequently removes device despite education on the importance of DVT prophylaxis  Patient was incontinent of urine overnight two assist to toilet patient  Patient bathed with two assist this morning  Call bell within reach  Will continue to monitor patient and follow plan of care

## 2019-12-06 NOTE — CASE MANAGEMENT
Yana Archibald [place] [facility] expressed concern that [place] is making very limited progress  She recommended that SNF placement be sought while we are able to submit for SNF auth  SNF Auth approved #[id number]  Per Yana Archibald, Pt's benefits are the same for out of network providers  Pt's family provided with a list of various SNF's; they expressed preference for [address]  Pt was accepted at both facilities for next week  Pt's family chose [facility]  Transport set up for Tuesday at 1:00PM with [employer]; transport [place] [facility]

## 2019-12-06 NOTE — PLAN OF CARE
Problem: Potential for Falls  Goal: Patient will remain free of falls  Description  INTERVENTIONS:  - Assess patient frequently for physical needs  -  Identify cognitive and physical deficits and behaviors that affect risk of falls  -  [place] fall precautions as indicated by assessment   - Educate patient/family on patient safety including physical limitations  - Instruct patient to call for assistance with activity based on assessment  - Modify environment to reduce risk of injury  - Consider OT/PT consult to assist with strengthening/mobility  Outcome: Progressing     Problem: Neurological Deficit  Goal: Neurological status is stable or improving  Description  Interventions:  - Monitor and assess patient's level of consciousness, motor function, sensory function, and level of assistance needed for ADLs  - Monitor and report changes from baseline  Collaborate with interdisciplinary team to initiate plan and implement interventions as ordered  - Provide and maintain a safe environment  - Consider seizure precautions  - Consider fall precautions  - Consider aspiration precautions  - Consider bleeding precautions  Outcome: Progressing     Problem: Activity Intolerance/Impaired Mobility  Goal: Mobility/activity is maintained at optimum level for patient  Description  Interventions:  - Assess and monitor patient  barriers to mobility and need for assistive/adaptive devices  - Assess patient's emotional response to limitations  - Collaborate with interdisciplinary team and initiate plans and interventions as ordered  - Encourage independent activity per ability   - Maintain proper body alignment  - Perform active/passive rom as tolerated/ordered    - Plan activities to conserve energy   - Turn patient as appropriate  Outcome: Progressing     Problem: Potential for Aspiration  Goal: Non-ventilated patient's risk of aspiration is minimized  Description  Assess and monitor vital signs, respiratory status, and labs (WBC)  Monitor for signs of aspiration (tachypnea, cough, rales, wheezing, cyanosis, fever)  - Assess and monitor patient's ability to swallow  - Place patient up in chair to eat if possible  - HOB up at 90 degrees to eat if unable to get patient up into chair   - Supervise patient during oral intake  - Instruct patient/ family to take small bites  - Instruct patient/ family to take small single sips when taking liquids  - Follow patient-specific strategies generated by speech pathologist   Outcome: Progressing  Goal: Ventilated patient's risk of aspiration is minimized  Description  Assess and monitor vital signs, respiratory status, airway cuff pressure, and labs (WBC)  Monitor for signs of aspiration (tachypnea, cough, rales, wheezing, cyanosis, fever)  - Elevate head of bed 30 degrees if patient has tube feeding   - Monitor tube feeding  Outcome: Progressing     Problem: Nutrition  Goal: Nutrition/Hydration status is improving  Description  Monitor and assess patient's nutrition/hydration status for malnutrition (ex- brittle hair, bruises, dry skin, pale skin and conjunctiva, muscle wasting, smooth red tongue, and disorientation)  Collaborate with interdisciplinary team and initiate plan and interventions as ordered  Monitor patient's weight and dietary intake as ordered or per policy  Utilize nutrition screening tool and intervene per policy  Determine patient's food preferences and provide high-protein, high-caloric foods as appropriate  - Assist patient with eating   - Allow adequate time for meals   - Encourage patient to take dietary supplement as ordered  - Collaborate with clinical nutritionist   - Include patient/family/caregiver in decisions related to nutrition    Outcome: Progressing     Problem: Nutrition  Goal: Nutrition/Hydration status is improving  Description  Monitor and assess patient's nutrition/hydration status for malnutrition (ex- brittle hair, bruises, dry skin, pale skin and conjunctiva, muscle wasting, smooth red tongue, and disorientation)  Collaborate with interdisciplinary team and initiate plan and interventions as ordered  Monitor patient's weight and dietary intake as ordered or per policy  Utilize nutrition screening tool and intervene per policy  Determine patient's food preferences and provide high-protein, high-caloric foods as appropriate  - Assist patient with eating   - Allow adequate time for meals   - Encourage patient to take dietary supplement as ordered  - Collaborate with clinical nutritionist   - Include patient/family/caregiver in decisions related to nutrition  Outcome: Progressing     Problem: Prexisting or High Potential for Compromised Skin Integrity  Goal: Skin integrity is maintained or improved  Description  INTERVENTIONS:  - Identify patients at risk for skin breakdown  - Assess and monitor skin integrity  - Assess and monitor nutrition and hydration status  - Monitor labs   - Assess for incontinence   - Turn and reposition patient  - Assist with mobility/ambulation  - Relieve pressure over bony prominences  - Avoid friction and shearing  - Provide appropriate hygiene as needed including keeping skin clean and dry  - Evaluate need for skin moisturizer/barrier cream  - Collaborate with interdisciplinary team   - Patient/family teaching  - Consider wound care consult   Outcome: Progressing     Problem: Nutrition/Hydration-ADULT  Goal: Nutrient/Hydration intake appropriate for improving, restoring or maintaining nutritional needs  Description  Monitor and assess patient's nutrition/hydration status for malnutrition  Collaborate with interdisciplinary team and initiate plan and interventions as ordered  Monitor patient's weight and dietary intake as ordered or per policy  Utilize nutrition screening tool and intervene as necessary  Determine patient's food preferences and provide high-protein, high-caloric foods as appropriate  INTERVENTIONS:  - Monitor oral intake, urinary output, labs, and treatment plans  - Assess nutrition and hydration status and recommend course of action  - Evaluate amount of meals eaten  - Assist patient with eating if necessary   - Allow adequate time for meals  - Recommend/ encourage appropriate diets, oral nutritional supplements, and vitamin/mineral supplements  - Order, calculate, and assess calorie counts as needed  - Recommend, monitor, and adjust tube feedings and TPN/PPN based on assessed needs  - Assess need for intravenous fluids  - Provide specific nutrition/hydration education as appropriate  - Include patient/family/caregiver in decisions related to nutrition  Outcome: Progressing

## 2019-12-06 NOTE — NURSING NOTE
Patient max assist to get in and out of bed with slide board  Right side remains weak and flaccid  Right arm in sling and right lower extremity in unlocked brace  Patient calling out and cursing throughout shift but states nothing is wrong  Agitated with staff at times throughout shift  Voice no complaints of pain  Ripping SCD off throughout shift  Doing well on thin liquids at this time with no episodes of coughing  Will continue to monitor and follow plan of care

## 2019-12-07 RX ADMIN — LISINOPRIL 20 MG: 20 TABLET ORAL at 17:29

## 2019-12-07 RX ADMIN — ASPIRIN 81 MG: 81 TABLET, COATED ORAL at 08:53

## 2019-12-07 RX ADMIN — LISINOPRIL 20 MG: 20 TABLET ORAL at 08:53

## 2019-12-07 RX ADMIN — HYDRALAZINE HYDROCHLORIDE 50 MG: 25 TABLET ORAL at 06:05

## 2019-12-07 RX ADMIN — DOCUSATE SODIUM 100 MG: 100 CAPSULE, LIQUID FILLED ORAL at 08:52

## 2019-12-07 RX ADMIN — LEVOTHYROXINE SODIUM 75 MCG: 75 TABLET ORAL at 06:05

## 2019-12-07 RX ADMIN — NIFEDIPINE 60 MG: 30 TABLET, FILM COATED, EXTENDED RELEASE ORAL at 08:53

## 2019-12-07 RX ADMIN — HYDRALAZINE HYDROCHLORIDE 50 MG: 25 TABLET ORAL at 14:15

## 2019-12-07 RX ADMIN — CLOPIDOGREL BISULFATE 75 MG: 75 TABLET ORAL at 08:53

## 2019-12-07 RX ADMIN — HYDRALAZINE HYDROCHLORIDE 50 MG: 25 TABLET ORAL at 21:50

## 2019-12-07 RX ADMIN — ATORVASTATIN CALCIUM 40 MG: 40 TABLET, FILM COATED ORAL at 16:40

## 2019-12-07 RX ADMIN — METOPROLOL TARTRATE 50 MG: 50 TABLET, FILM COATED ORAL at 20:08

## 2019-12-07 RX ADMIN — METOPROLOL TARTRATE 50 MG: 50 TABLET, FILM COATED ORAL at 07:55

## 2019-12-07 RX ADMIN — NICOTINE 1 PATCH: 21 PATCH, EXTENDED RELEASE TRANSDERMAL at 08:51

## 2019-12-07 NOTE — NURSING NOTE
Pt awake resting in bed no s/s of pain or distress  Turned and repo Q2HR, incont of urine  Max assist x2 to change and repo pt  Confused at baseline  SCD in place to LLE, frequently pt would remove from leg and refused to keep on  Education provided regarding SCD and DVT prophylaxis  Immobilizer on RLE, pt constantly undoing straps educated importance of keeping in place  Boot on place to RLE  Able to turn to right side, staff to turn to left  Assessment otherwise unchanged  Continuing to monitor and follow plan of care  Bed alarm in place for safety

## 2019-12-07 NOTE — PROGRESS NOTES
12/07/19 0900   Charting Type   Charting Type Shift assessment   Neurological   Neuro (WDL) X   Level of Consciousness Alert/awake   Orientation Level Oriented to place;Oriented to situation;Disoriented to time   Cognition Follows commands; Short term memory loss   Extraocular Movements Full   Facial Symmetry Right facial drooping   Speech Delayed responses   R Pupil Size (mm) 3   L Pupil Size (mm) 3   R Foot Dorsiflexion Absent   RUE Motor Response Flaccid   RUE Muscle Strength 0- No movement   RLE Motor Response Abnormal extension (Decerebrate)   RLE Muscle Strength 0- No movement   Neuro Symptoms Forgetful;Irritable   Relieved by Rest   Neuro Additional Assessments No   Seizure Activity   Symptoms None   [place] Coma Scale   Eye Opening 4   Best Verbal Response 5   Best Motor Response 6   [place] Coma Scale Score 15   HEENT   HEENT (WDL) X   R Eye Mildly impaired vision   L Eye Mildly impaired vision   R Ear Mildly impaired hearing   L Ear Mildly impaired hearing   Nose Intact   Lips Asymmetrical   Teeth Missing teeth   Respiratory   Respiratory (WDL) X   Respiratory Pattern Normal   Chest Assessment Chest expansion symmetrical   Localized Breath Sounds   R Basilar Clear;Diminished   L Basilar Clear;Diminished   Respiratory Interventions   Respiratory Interventions Incentive spirometry;Cough and deep breathe   Cough and Deep Breathe   Cough and Deep Breathe Yes   Cardiac   Cardiac (WDL) WDL   Cardiac Regularity Regular   Heart Sounds No adventitious heart sounds   Jugular Venous Distention (JVD) No   Pacemaker/ICD No   Pain Assessment   Pain Assessment No/denies pain   Pain Score No Pain   Peripheral Vascular   Peripheral Vascular (WDL) WDL   RUE Edema Non-pitting   PVS Additional Assessments No   RUE Neurovascular Assessment   RUE Temperature/Moisture Warm;Dry   R Radial Pulse +2   LUE Neurovascular Assessment   L Radial Pulse +2   RLE Neurovascular Assessment   R Pedal Pulse +1   LLE Neurovascular Assessment L Pedal Pulse +1   Integumentary   Integumentary (WDL) X   Skin Integrity Bruising   Skin Location   (scattered)   Skin Turgor Non-tenting   Integumentary Additional Assessments No   Tattoos/Piercings   Does patient have tattoos? No   Piercings Remaining No   Сергей Scale   Sensory Perceptions 2   Moisture 2   Activity 2   Mobility 2   Nutrition 2   Friction and Shear 2   Сергей Scale Score 12   Musculoskeletal   Musculoskeletal (WDL) X   Level of Assistance Dependent, patient does less than 25%   Assistive Device Wheelchair   RUE Paralysis   RLE Paralysis   Musculoskeletal Additional Assessments No   Gastrointestinal   Gastrointestinal (WDL) WDL   Bowel Sounds (All Quadrants) Normoactive   GI Symptoms None   Gastrointestinal Additional Assessments No   Bowel Shift Assessment   Assistance Needed Stool softener   Bowel Incontinence No   Stool Assessment   Bowel Incontinence No   Genitourinary   Genitourinary (WDL) X   Genitourinary Symptoms Other (Comment)  (urinary incontinence)   Bladder Shift Assessment   Bladder Device Diaper   Bladder Incontinence Yes (Comment if >1 time)   Bladder Management Total assistance   Bladder Management Deficit Help pull up; Help pull down;Perineal hygeine;Place diaper;Remove diaper   Urine Assessment   Urinary Incontinence Yes   Urine Color Yellow/straw   Urine Odor No odor   Genitalia   Male Genitalia Intact   Genitourinary Additional Assessments   Genitourinary Additional Assessments No   Anal/Rectal   Anal/Rectal (WDL) WDL   Psychosocial   Psychosocial (WDL) X   Patient Behaviors/Mood Irritable   Needs Expressed Denies   Psychosocial Additional Assessments No   Ability to Express Feelings Needs assistance   Ability to Express Needs Needs assistance   Ability to Express Thoughts Needs assistance   Ability to Understand Others Sometimes understands   Hang Suicide Severity Rating Scale   1  Wish to be Dead No   2  Suicidal Thoughts Never   3   Suicidal Thoughts with Method Without Specific Plan or Intent to Act Never   *Risk Level* Low Risk   Cough   Cough None

## 2019-12-07 NOTE — PLAN OF CARE
Problem: Potential for Falls  Goal: Patient will remain free of falls  Description  INTERVENTIONS:  - Assess patient frequently for physical needs  -  Identify cognitive and physical deficits and behaviors that affect risk of falls  -  [place] fall precautions as indicated by assessment   - Educate patient/family on patient safety including physical limitations  - Instruct patient to call for assistance with activity based on assessment  - Modify environment to reduce risk of injury  - Consider OT/PT consult to assist with strengthening/mobility  Outcome: Progressing     Problem: Neurological Deficit  Goal: Neurological status is stable or improving  Description  Interventions:  - Monitor and assess patient's level of consciousness, motor function, sensory function, and level of assistance needed for ADLs  - Monitor and report changes from baseline  Collaborate with interdisciplinary team to initiate plan and implement interventions as ordered  - Provide and maintain a safe environment  - Consider seizure precautions  - Consider fall precautions  - Consider aspiration precautions  - Consider bleeding precautions    Outcome: Progressing

## 2019-12-07 NOTE — PROGRESS NOTES
Quiet and cooperative this a m  Refused to feed self breakfast, fed by staff  OOB max SBT OOB to chair, brother visiting  Incont urine, checked and toileted every 2 hrs  Fed self lunch after much verbal cues, can become loud and agitated at times  Immobilizer intact to right leg with multipodas boot on RLE  ART stockings on     Back to bed at 2:00, sleeping presently  Reviewed use of call bell, medications and side effects  Sling on right arm with transfers in and out of bed  Right side flaccid, right facial droop

## 2019-12-08 RX ADMIN — ASPIRIN 81 MG: 81 TABLET, COATED ORAL at 09:15

## 2019-12-08 RX ADMIN — ATORVASTATIN CALCIUM 40 MG: 40 TABLET, FILM COATED ORAL at 15:41

## 2019-12-08 RX ADMIN — METOPROLOL TARTRATE 50 MG: 50 TABLET, FILM COATED ORAL at 07:53

## 2019-12-08 RX ADMIN — HYDRALAZINE HYDROCHLORIDE 50 MG: 25 TABLET ORAL at 06:08

## 2019-12-08 RX ADMIN — HYDRALAZINE HYDROCHLORIDE 50 MG: 25 TABLET ORAL at 21:22

## 2019-12-08 RX ADMIN — NICOTINE 1 PATCH: 21 PATCH, EXTENDED RELEASE TRANSDERMAL at 09:14

## 2019-12-08 RX ADMIN — LEVOTHYROXINE SODIUM 75 MCG: 75 TABLET ORAL at 06:08

## 2019-12-08 RX ADMIN — DOCUSATE SODIUM 100 MG: 100 CAPSULE, LIQUID FILLED ORAL at 09:15

## 2019-12-08 RX ADMIN — HYDRALAZINE HYDROCHLORIDE 50 MG: 25 TABLET ORAL at 15:38

## 2019-12-08 RX ADMIN — NIFEDIPINE 60 MG: 30 TABLET, FILM COATED, EXTENDED RELEASE ORAL at 09:15

## 2019-12-08 RX ADMIN — METOPROLOL TARTRATE 50 MG: 50 TABLET, FILM COATED ORAL at 20:05

## 2019-12-08 RX ADMIN — LISINOPRIL 20 MG: 20 TABLET ORAL at 09:15

## 2019-12-08 RX ADMIN — CLOPIDOGREL BISULFATE 75 MG: 75 TABLET ORAL at 09:15

## 2019-12-08 RX ADMIN — LISINOPRIL 20 MG: 20 TABLET ORAL at 17:16

## 2019-12-08 NOTE — PLAN OF CARE
Problem: Potential for Falls  Goal: Patient will remain free of falls  Description  INTERVENTIONS:  - Assess patient frequently for physical needs  -  Identify cognitive and physical deficits and behaviors that affect risk of falls  -  [place] fall precautions as indicated by assessment   - Educate patient/family on patient safety including physical limitations  - Instruct patient to call for assistance with activity based on assessment  - Modify environment to reduce risk of injury  - Consider OT/PT consult to assist with strengthening/mobility  Outcome: Progressing     Problem: Neurological Deficit  Goal: Neurological status is stable or improving  Description  Interventions:  - Monitor and assess patient's level of consciousness, motor function, sensory function, and level of assistance needed for ADLs  - Monitor and report changes from baseline  Collaborate with interdisciplinary team to initiate plan and implement interventions as ordered  - Provide and maintain a safe environment  - Consider seizure precautions  - Consider fall precautions  - Consider aspiration precautions  - Consider bleeding precautions  Outcome: Progressing     Problem: Potential for Aspiration  Goal: Non-ventilated patient's risk of aspiration is minimized  Description  Assess and monitor vital signs, respiratory status, and labs (WBC)  Monitor for signs of aspiration (tachypnea, cough, rales, wheezing, cyanosis, fever)  - Assess and monitor patient's ability to swallow  - Place patient up in chair to eat if possible  - HOB up at 90 degrees to eat if unable to get patient up into chair   - Supervise patient during oral intake  - Instruct patient/ family to take small bites  - Instruct patient/ family to take small single sips when taking liquids    - Follow patient-specific strategies generated by speech pathologist   Outcome: Progressing  Goal: Ventilated patient's risk of aspiration is minimized  Description  Assess and monitor vital signs, respiratory status, airway cuff pressure, and labs (WBC)  Monitor for signs of aspiration (tachypnea, cough, rales, wheezing, cyanosis, fever)  - Elevate head of bed 30 degrees if patient has tube feeding   - Monitor tube feeding  Outcome: Progressing     Problem: Nutrition  Goal: Nutrition/Hydration status is improving  Description  Monitor and assess patient's nutrition/hydration status for malnutrition (ex- brittle hair, bruises, dry skin, pale skin and conjunctiva, muscle wasting, smooth red tongue, and disorientation)  Collaborate with interdisciplinary team and initiate plan and interventions as ordered  Monitor patient's weight and dietary intake as ordered or per policy  Utilize nutrition screening tool and intervene per policy  Determine patient's food preferences and provide high-protein, high-caloric foods as appropriate  - Assist patient with eating   - Allow adequate time for meals   - Encourage patient to take dietary supplement as ordered  - Collaborate with clinical nutritionist   - Include patient/family/caregiver in decisions related to nutrition    Outcome: Progressing     Problem: Prexisting or High Potential for Compromised Skin Integrity  Goal: Skin integrity is maintained or improved  Description  INTERVENTIONS:  - Identify patients at risk for skin breakdown  - Assess and monitor skin integrity  - Assess and monitor nutrition and hydration status  - Monitor labs   - Assess for incontinence   - Turn and reposition patient  - Assist with mobility/ambulation  - Relieve pressure over bony prominences  - Avoid friction and shearing  - Provide appropriate hygiene as needed including keeping skin clean and dry  - Evaluate need for skin moisturizer/barrier cream  - Collaborate with interdisciplinary team   - Patient/family teaching  - Consider wound care consult   Outcome: Progressing     Problem: Nutrition/Hydration-ADULT  Goal: Nutrient/Hydration intake appropriate for improving, restoring or maintaining nutritional needs  Description  Monitor and assess patient's nutrition/hydration status for malnutrition  Collaborate with interdisciplinary team and initiate plan and interventions as ordered  Monitor patient's weight and dietary intake as ordered or per policy  Utilize nutrition screening tool and intervene as necessary  Determine patient's food preferences and provide high-protein, high-caloric foods as appropriate       INTERVENTIONS:  - Monitor oral intake, urinary output, labs, and treatment plans  - Assess nutrition and hydration status and recommend course of action  - Evaluate amount of meals eaten  - Assist patient with eating if necessary   - Allow adequate time for meals  - Recommend/ encourage appropriate diets, oral nutritional supplements, and vitamin/mineral supplements  - Order, calculate, and assess calorie counts as needed  - Recommend, monitor, and adjust tube feedings and TPN/PPN based on assessed needs  - Assess need for intravenous fluids  - Provide specific nutrition/hydration education as appropriate  - Include patient/family/caregiver in decisions related to nutrition  Outcome: Progressing

## 2019-12-08 NOTE — PROGRESS NOTES
12/08/19 1040   Charting Type   Charting Type Shift assessment   Neurological   Neuro (WDL) X   Level of Consciousness Alert/awake   Orientation Level Oriented to person;Disoriented to place; Disoriented to time;Disoriented to situation   Cognition Short term memory loss; Follows commands   Extraocular Movements Full   Facial Symmetry Right facial drooping   R Pupil Size (mm) 2   L Pupil Size (mm) 3   R Hand  Absent   R Foot Dorsiflexion Absent   R Foot Plantar Flexion Absent   RUE Motor Response Flaccid   RUE Muscle Strength 0- No movement   RLE Muscle Strength 0- No movement   Neuro Symptoms Forgetful;Irritable   Relieved by Rest   Neuro Additional Assessments No   [place] Coma Scale   Eye Opening 4   Best Verbal Response 4   Best Motor Response 6   [place] Coma Scale Score 14   HEENT   HEENT (WDL) X   R Eye Mildly impaired vision   L Eye Mildly impaired vision   R Ear Mildly impaired hearing   L Ear Mildly impaired hearing   Teeth Missing teeth   Respiratory   Respiratory (WDL) X   Respiratory Pattern Normal   Chest Assessment Chest expansion symmetrical   Bilateral Breath Sounds Clear;Diminished   Respiratory Additional Assessments No   Respiratory Interventions   Respiratory Interventions Cough and deep breathe;Incentive spirometry   Cough and Deep Breathe   Cough and Deep Breathe Yes   Cardiac   Cardiac (WDL) WDL   Cardiac Regularity Regular   Jugular Venous Distention (JVD) No   Pacemaker/ICD No   Pain Assessment   Pain Assessment No/denies pain   Pain Score No Pain   Peripheral Vascular   Peripheral Vascular (WDL) X   RUE Edema Non-pitting   PVS Additional Assessments No   RUE Neurovascular Assessment   R Radial Pulse +2   LUE Neurovascular Assessment   L Radial Pulse +2   RLE Neurovascular Assessment   R Pedal Pulse +1   LLE Neurovascular Assessment   L Pedal Pulse +1   Integumentary   Integumentary (WDL) X   Skin Integrity Bruising   Skin Location scattered   Skin Turgor Non-tenting   Integumentary Additional Assessments No   Tattoos/Piercings   Does patient have tattoos? No   Piercings Remaining No   Сергей Scale   Sensory Perceptions 2   Moisture 2   Activity 2   Mobility 2   Nutrition 2   Friction and Shear 2   Сергей Scale Score 12   Musculoskeletal   Musculoskeletal (WDL) X   Assistive Device Wheelchair   RUE Paralysis   RLE Paralysis   Musculoskeletal Additional Assessments No   Gastrointestinal   Gastrointestinal (WDL) WDL   Bowel Sounds (All Quadrants) Normoactive   GI Symptoms None   Gastrointestinal Additional Assessments No   Bowel Shift Assessment   Assistance Needed Stool softener   Stool Assessment   Bowel Incontinence No   Genitourinary   Genitourinary (WDL) WDL   Bladder Shift Assessment   Bladder Device Diaper   Bladder Incontinence Yes (Comment if >1 time)   Bladder Management Total assistance   Bladder Management Deficit Place diaper;Remove diaper;Perineal hygeine   Urine Assessment   Urinary Incontinence Yes   Urine Color Yellow/straw   Urine Odor No odor   Genitalia   Male Genitalia Intact   Genitourinary Additional Assessments   Genitourinary Additional Assessments No   Anal/Rectal   Anal/Rectal (WDL) WDL   Psychosocial   Psychosocial (WDL) X   Patient Behaviors/Mood Cooperative;Irritable   Needs Expressed Denies   Ability to Express Feelings Needs assistance   Ability to Express Needs Needs assistance   Ability to Express Thoughts Needs assistance   Ability to Understand Others Sometimes understands   [place] Suicide Severity Rating Scale   1  Wish to be Dead No   2  Suicidal Thoughts Never   3   Suicidal Thoughts with Method Without Specific Plan or Intent to Act Never   *Risk Level* Low Risk   Cough   Cough None

## 2019-12-08 NOTE — PROGRESS NOTES
Brother vs this a m  For approx an hour  Incont urine , checked frequently for same and offer urinal   Right side flaccid, immobilizer and multipodis boot intact  Encouraged to place food and liquids left side of mouth due to right side deficit  Pt fed self breakfast and placed food in left side of mouth after continual verbal cues, supervision with same  Educated on safety and medications  Callbell within reach and reinforced use of same  Safety maintained

## 2019-12-09 PROCEDURE — 97110 THERAPEUTIC EXERCISES: CPT

## 2019-12-09 PROCEDURE — 97112 NEUROMUSCULAR REEDUCATION: CPT

## 2019-12-09 PROCEDURE — 99232 SBSQ HOSP IP/OBS MODERATE 35: CPT | Performed by: PHYSICAL MEDICINE & REHABILITATION

## 2019-12-09 PROCEDURE — 97535 SELF CARE MNGMENT TRAINING: CPT

## 2019-12-09 PROCEDURE — G0515 COGNITIVE SKILLS DEVELOPMENT: HCPCS

## 2019-12-09 PROCEDURE — 97530 THERAPEUTIC ACTIVITIES: CPT

## 2019-12-09 PROCEDURE — 92526 ORAL FUNCTION THERAPY: CPT

## 2019-12-09 PROCEDURE — 97542 WHEELCHAIR MNGMENT TRAINING: CPT

## 2019-12-09 RX ADMIN — CLOPIDOGREL BISULFATE 75 MG: 75 TABLET ORAL at 08:45

## 2019-12-09 RX ADMIN — HYDRALAZINE HYDROCHLORIDE 50 MG: 25 TABLET ORAL at 21:15

## 2019-12-09 RX ADMIN — LISINOPRIL 20 MG: 20 TABLET ORAL at 17:07

## 2019-12-09 RX ADMIN — DOCUSATE SODIUM 100 MG: 100 CAPSULE, LIQUID FILLED ORAL at 17:07

## 2019-12-09 RX ADMIN — DOCUSATE SODIUM 100 MG: 100 CAPSULE, LIQUID FILLED ORAL at 08:45

## 2019-12-09 RX ADMIN — METOPROLOL TARTRATE 50 MG: 50 TABLET, FILM COATED ORAL at 20:42

## 2019-12-09 RX ADMIN — HYDRALAZINE HYDROCHLORIDE 50 MG: 25 TABLET ORAL at 13:56

## 2019-12-09 RX ADMIN — NIFEDIPINE 60 MG: 30 TABLET, FILM COATED, EXTENDED RELEASE ORAL at 08:45

## 2019-12-09 RX ADMIN — ASPIRIN 81 MG: 81 TABLET, COATED ORAL at 08:45

## 2019-12-09 RX ADMIN — LEVOTHYROXINE SODIUM 75 MCG: 75 TABLET ORAL at 05:59

## 2019-12-09 RX ADMIN — ATORVASTATIN CALCIUM 40 MG: 40 TABLET, FILM COATED ORAL at 17:07

## 2019-12-09 RX ADMIN — HYDRALAZINE HYDROCHLORIDE 50 MG: 25 TABLET ORAL at 05:59

## 2019-12-09 RX ADMIN — NICOTINE 1 PATCH: 21 PATCH, EXTENDED RELEASE TRANSDERMAL at 08:48

## 2019-12-09 RX ADMIN — METOPROLOL TARTRATE 50 MG: 50 TABLET, FILM COATED ORAL at 08:45

## 2019-12-09 RX ADMIN — LISINOPRIL 20 MG: 20 TABLET ORAL at 08:45

## 2019-12-09 NOTE — PROGRESS NOTES
12/09/19 0939   Charting Type   Charting Type Shift assessment   Neurological   Neuro (WDL) X   Cognition Poor safety awareness;Poor judgement;Poor attention/concentration; Short term memory loss; Impulsive   Facial Symmetry Right facial drooping   Speech Delayed responses   Muscle Function/Sensation Assessment ;Muscle strength   R Hand  Absent   RUE Muscle Strength 0- No movement   RLE Muscle Strength 0- No movement   Neuro Symptoms Forgetful;Irritable   Relieved by Rest   Delirium Assessment- CAM    Acute Onset and Fluctuating Course (1) No   Inattention (2) No   Disorganized Thinking (3) No   Rate Patient's Level of Consciousness (4) Alert (Normal), No   Delirium Present No   [place] Coma Scale   Eye Opening 4   Best Verbal Response 5   Best Motor Response 6   [place] Coma Scale Score 15   HEENT   HEENT (WDL) X   Head and Face Asymmetrical   R Eye Mildly impaired vision   L Eye Mildly impaired vision   R Ear Mildly impaired hearing   L Ear Mildly impaired hearing   Teeth Poor dental hygiene;Missing teeth   Respiratory   Respiratory (WDL) WDL   Cardiac   Cardiac (WDL) WDL   Pain Assessment   Pain Assessment No/denies pain   Pain Score No Pain   Peripheral Vascular   Peripheral Vascular (WDL) WDL   RUE Neurovascular Assessment   RUE Capillary Refill Less than/equal to 2 seconds   RUE Color Appropriate for ethnicity   RUE Temperature/Moisture Warm;Dry   RLE Neurovascular Assessment   RLE Capillary Refill Less than/equal to 2 seconds   RLE Color Appropriate for ethnicity   RLE Temperature/Moisture Warm;Dry   Integumentary   Integumentary (WDL) X   Skin Condition/Temp Warm;Dry   Skin Integrity Bruising   Skin Location scattered   Skin Turgor Non-tenting   Сергей Scale   Sensory Perceptions 2   Moisture 2   Activity 2   Mobility 2   Nutrition 2   Friction and Shear 2   Сергей Scale Score 12   Musculoskeletal   Musculoskeletal (WDL) X   Level of Assistance Moderate assist, patient does 50-74%   Assistive Device Wheelchair   RUE Paralysis   RLE Paralysis   Gastrointestinal   Gastrointestinal (WDL) WDL   Stool Assessment   Bowel Incontinence No   Genitourinary   Genitourinary (WDL) WDL   Urine Assessment   Urinary Incontinence Yes   Genitalia   Male Genitalia Intact   Anal/Rectal   Anal/Rectal (WDL) WDL   Psychosocial   Psychosocial (WDL) WDL

## 2019-12-09 NOTE — PLAN OF CARE
Problem: Potential for Falls  Goal: Patient will remain free of falls  Description  INTERVENTIONS:  - Assess patient frequently for physical needs  -  Identify cognitive and physical deficits and behaviors that affect risk of falls  -  [place] fall precautions as indicated by assessment   - Educate patient/family on patient safety including physical limitations  - Instruct patient to call for assistance with activity based on assessment  - Modify environment to reduce risk of injury  - Consider OT/PT consult to assist with strengthening/mobility  Outcome: Progressing     Problem: Neurological Deficit  Goal: Neurological status is stable or improving  Description  Interventions:  - Monitor and assess patient's level of consciousness, motor function, sensory function, and level of assistance needed for ADLs  - Monitor and report changes from baseline  Collaborate with interdisciplinary team to initiate plan and implement interventions as ordered  - Provide and maintain a safe environment  - Consider seizure precautions  - Consider fall precautions  - Consider aspiration precautions  - Consider bleeding precautions  Outcome: Progressing     Problem: Activity Intolerance/Impaired Mobility  Goal: Mobility/activity is maintained at optimum level for patient  Description  Interventions:  - Assess and monitor patient  barriers to mobility and need for assistive/adaptive devices  - Assess patient's emotional response to limitations  - Collaborate with interdisciplinary team and initiate plans and interventions as ordered  - Encourage independent activity per ability   - Maintain proper body alignment  - Perform active/passive rom as tolerated/ordered    - Plan activities to conserve energy   - Turn patient as appropriate  Outcome: Progressing     Problem: Potential for Aspiration  Goal: Non-ventilated patient's risk of aspiration is minimized  Description  Assess and monitor vital signs, respiratory status, and labs (WBC)  Monitor for signs of aspiration (tachypnea, cough, rales, wheezing, cyanosis, fever)  - Assess and monitor patient's ability to swallow  - Place patient up in chair to eat if possible  - HOB up at 90 degrees to eat if unable to get patient up into chair   - Supervise patient during oral intake  - Instruct patient/ family to take small bites  - Instruct patient/ family to take small single sips when taking liquids  - Follow patient-specific strategies generated by speech pathologist   Outcome: Progressing  Goal: Ventilated patient's risk of aspiration is minimized  Description  Assess and monitor vital signs, respiratory status, airway cuff pressure, and labs (WBC)  Monitor for signs of aspiration (tachypnea, cough, rales, wheezing, cyanosis, fever)  - Elevate head of bed 30 degrees if patient has tube feeding   - Monitor tube feeding  Outcome: Progressing     Problem: Nutrition  Goal: Nutrition/Hydration status is improving  Description  Monitor and assess patient's nutrition/hydration status for malnutrition (ex- brittle hair, bruises, dry skin, pale skin and conjunctiva, muscle wasting, smooth red tongue, and disorientation)  Collaborate with interdisciplinary team and initiate plan and interventions as ordered  Monitor patient's weight and dietary intake as ordered or per policy  Utilize nutrition screening tool and intervene per policy  Determine patient's food preferences and provide high-protein, high-caloric foods as appropriate  - Assist patient with eating   - Allow adequate time for meals   - Encourage patient to take dietary supplement as ordered  - Collaborate with clinical nutritionist   - Include patient/family/caregiver in decisions related to nutrition    Outcome: Progressing     Problem: Prexisting or High Potential for Compromised Skin Integrity  Goal: Skin integrity is maintained or improved  Description  INTERVENTIONS:  - Identify patients at risk for skin breakdown  - Assess and monitor skin integrity  - Assess and monitor nutrition and hydration status  - Monitor labs   - Assess for incontinence   - Turn and reposition patient  - Assist with mobility/ambulation  - Relieve pressure over bony prominences  - Avoid friction and shearing  - Provide appropriate hygiene as needed including keeping skin clean and dry  - Evaluate need for skin moisturizer/barrier cream  - Collaborate with interdisciplinary team   - Patient/family teaching  - Consider wound care consult   Outcome: Progressing     Problem: Nutrition/Hydration-ADULT  Goal: Nutrient/Hydration intake appropriate for improving, restoring or maintaining nutritional needs  Description  Monitor and assess patient's nutrition/hydration status for malnutrition  Collaborate with interdisciplinary team and initiate plan and interventions as ordered  Monitor patient's weight and dietary intake as ordered or per policy  Utilize nutrition screening tool and intervene as necessary  Determine patient's food preferences and provide high-protein, high-caloric foods as appropriate       INTERVENTIONS:  - Monitor oral intake, urinary output, labs, and treatment plans  - Assess nutrition and hydration status and recommend course of action  - Evaluate amount of meals eaten  - Assist patient with eating if necessary   - Allow adequate time for meals  - Recommend/ encourage appropriate diets, oral nutritional supplements, and vitamin/mineral supplements  - Order, calculate, and assess calorie counts as needed  - Recommend, monitor, and adjust tube feedings and TPN/PPN based on assessed needs  - Assess need for intravenous fluids  - Provide specific nutrition/hydration education as appropriate  - Include patient/family/caregiver in decisions related to nutrition  Outcome: Progressing

## 2019-12-09 NOTE — PROGRESS NOTES
12/09/19 1338   Pain Assessment   Pain Assessment No/denies pain   Pain Score No Pain   Restrictions/Precautions   Precautions Aspiration;Cognitive;Bed/chair alarms;Limb alert   Weight Bearing Restrictions Yes   RLE Weight Bearing Per Order NWB   Braces or Orthoses LE Immobilizer   Comprehension   QI: Comprehension 3  Usually Understands: Understands most conversations, but misses some part/intent of message  Requires cues at times to understand  Comprehension (FIM) 3 - Understands basic info/conversation 50-74% of time   Expression   QI: Expression 3  Exhibits some difficulty with expressing needs and ideas (e g , some words or finishing thoughts) or speech is not clear   Expression (FIM) 3 - Needs to repeat parts of sentences   Social Interaction   Social Interaction (FIM) 3 - Interacts 50-74% of time   Problem Solving   Problem solving (FIM) 2 - Bed alarm for safety issues more than half the time   Memory   Recognize People Yes   Remember Routine No   Recalls Precaution No   Memory (FIM) 2 - Recognizes, recalls/performs 25-49%   Executive Function Skills   Insight Severe insight   Impulsive Moderately impulsive   Task Initiation Delayed initiation   Flexibility of Thought Reduced flexibility   Planning Reduced planning skills   Memory Skills   Orientation Level Oriented to person   Short Term Working Recall Moderate Impairment   Auditory Comprehension   Comphrehends Conversation Simple   Interfering Components Attention - selective   Speech/Swallow Mechanism Exam   Volitional Cough Weak   Volitional Swallow Delayed   Speech/Language/Cognition Assessmetn   Treatment Assessment Speech Pathology Daily Treatment Note - Speech/Cognitive Communication Skills - SLP focused on cognitive memory and reasoning using the skilled therapy goals as the stimulus    Patient was asked to recall the procedures performed during therapy across disciplines, how they relate to current goals and how they will facilitate the ability to return to the previous level of function  Patient's daily schedule was utilized as a visual aid to promote recall of the days events and procedures performed during treatment  SLP then targeted setting goals for the week in speech therapy as well as across disciplines  Focus was on reasoning the current level, anticipating the final outcome and planning goals with reasonable outcomes for this week  Patient demonstrated gains in comprehension and expression with this task to be able to discuss current limitations related to movement of his arm and long term goals of returning to the home setting  Swallow Information   Current Risks for Dysphagia & Aspiration Weak cough   Current Symptoms/Concerns Clear throat   Current Diet Dysphagia mechanical soft; Thin liquid   Consistencies Assessed and Performance   Oral Stage Moderate impaired   Phargngeal Stage Mild impaired   Recommendations   Risk for Aspiration Mild   Diet Solid Recommendation Level 2 Dysphagia/ mechanical soft/altered   Diet Liquid Recommendation Thin liquid   General Precautions Aspiration precautions   Compensatory Swallowing Strategies Alternate solids and liquids   Eating   Type of Assistance Needed Set-up / clean-up   Eating CARE Score 5   Swallow Assessment   Swallow Treatment Assessment Speech Pathology Daily Treatment Note - Swallow Oral Function - Focus of dysphagia therapy this session was pharyngeal phase management of solids and liquids  Therapist initially educated on appropriate 90 degree posture for safety with thin liquids as well as rate control  Therapy then focused on verbalization and demonstration of compensatory safe swallow techniques at mealtime which included:  small and single sips of liquid, use of effortful swallow, use of double swallow as necessary, alternating solids with liquids, ensuring a clear oral cavity during intake of liquids and cough to clear the airway as needed post swallow   Patient is making slow but steady gains in tolerance of more complex solids  SLP focused on use of straw this day with good success using small sips and effortful swallow   Swallow Assessment Prognosis   Prognosis Good   Prognosis Considerations Previous level of function   SLP Therapy Minutes   SLP Time In 1338   SLP Time Out 1438   SLP Total Time (minutes) 60   SLP Mode of treatment - Individual (minutes) 60   Therapy Time missed   Time missed?  No   Daily FIM Score   Eating (FIM) 4 - [place] occasional scoops food

## 2019-12-09 NOTE — PROGRESS NOTES
Discharge Summary and ADL note     12/09/19 0940   Pain Assessment   Pain Assessment No/denies pain   Restrictions/Precautions   Precautions Bed/chair alarms;Cognitive; Fall Risk;Limb alert;Aspiration  (decreased RUE ROM/ functional use)   RLE Weight Bearing Per Order NWB   Braces or Orthoses LE Immobilizer  (unlocked)   Oral Hygiene   Type of Assistance Needed Physical assistance   Amount of Physical Assistance Provided Less than 25%   Oral Hygiene CARE Score 3   Grooming   Able To Initiate Tasks;Comb/Brush Hair;Wash/Dry Face;Brush/Clean Teeth;Wash/Dry Hands   Limitation Noted In Safety   Findings mod A   Shower/Bathe Self   Type of Assistance Needed Physical assistance   Amount of Physical Assistance Provided 75% or more   Shower/Bathe Self CARE Score 2   Bathing   Assessed Bath Style Sponge Bath   Anticipated D/C Bath Style Sponge Bath   Able to Gather/Transport No   Able to [employer] Temperature No   Able to Wash/Rinse/Dry (body part) Chest;Abdomen;Perineal Area;Left Arm   Limitations Noted in Balance; Coordination; Endurance;ROM;Problem Solving; Safety;Strength   Positioning Supine   Upper Body Dressing   Type of Assistance Needed Physical assistance   Amount of Physical Assistance Provided 75% or more   Upper Body Dressing CARE Score 2   Lower Body Dressing   Type of Assistance Needed Physical assistance   Amount of Physical Assistance Provided 75% or more   Lower Body Dressing CARE Score 2   Putting On/Taking Off Footwear   Type of Assistance Needed Physical assistance   Amount of Physical Assistance Provided 75% or more   Putting On/Taking Off Footwear CARE Score 2   Picking Up Object   Reason if not Attempted Safety concerns   Picking Up Object CARE Score 88   Dressing/Undressing Clothing   Remove UB Clothes Pullover [place]   Remove LB Clothes Undergarment;Socks;TEDs   Jaspreet [address]; Undergarment;Socks;TEDs   Limitations Noted In Balance; Coordination; Endurance;Problem Solving; Safety; Sequencing;Strength;ROM;Other  (LLE immobilizer)   Lying to Sitting on Side of Bed   Type of Assistance Needed Physical assistance   Amount of Physical Assistance Provided 75% or more   Lying to Sitting on Side of Bed CARE Score 2   Bed-Chair Transfer   Type of Assistance Needed Physical assistance   Amount of Physical Assistance Provided 75% or more   Chair/Bed-to-Chair Transfer CARE Score 2   ROM- Right Upper Extremities   RUE ROM Comment PROM to RUE shoulder to hand with 5 reps and full ROM noted while supine with no pain   Neuromuscular Education   RUE Weight Bearing Forearm seated  (on half tray while sitting)   Comments sublux RUE noted while sitting unsupported and decreased awareness of R side    Cognition   Overall Cognitive Status Impaired   Arousal/Participation Alert; Responsive; Cooperative   Attention Difficulty attending to directions   Orientation Level Oriented to person   Assessment   Treatment Assessment Pt presents supine agreeable to bathing and dressing with ADL completed in bed for LB and then in w/c for UB and grooming  Pt tolerates session with max A for all tasks and ART stockings removed for bathing and then reapplied  Pt does demonstarte leaning to the R with sitting unsupported pushing with LUE  Pt reuqires assist due tani decreased balance, safety, endurance, RUE?RLE functional use with immobilier to LLE, and decreased cognition  Pt is beign transferred to SNF tomorrow with goals unmet and further OT recommended at SNF level  Problem List Decreased strength;Decreased range of motion;Decreased endurance; Impaired balance;Decreased coordination;Decreased cognition;Decreased safety awareness;Orthopedic restrictions   Plan   Treatment/Interventions ADL retraining;Functional transfer training; Therapeutic exercise; Endurance training;Cognitive reorientation;Patient/family training; Compensatory technique education   Progress Slow progress, decreased activity tolerance Recommendation   OT - OK to Discharge Yes   Discharge Summary Pt participates in ADLs, transfers/ w/c mobility, neuro grant and LUE functional use  Pt has made minimal progree with current LOF land barriers listed above  Pt will benefit from continued skilled OT at SNF level to increase independence with daily tasks and continue with neri burns to RUE  OT Therapy Minutes   OT Time In 0935   OT Time Out 5798   OT Total Time (minutes) 60   OT Mode of treatment - Individual (minutes) 60   Therapy Time missed   Time missed?  No

## 2019-12-09 NOTE — NURSING NOTE
Patient continuously pulls SCD off of L leg , attempted to educate on VTE prevention, unable DT cognitive impairment

## 2019-12-09 NOTE — PROGRESS NOTES
Physical Medicine and Rehabilitation Progress Note  Rosy Alfonso 67 y o  male MRN: [id number]  Unit/Bed#: [facility] 214-01 Encounter: [id number]    HPI: Patient is a 68 yo male who presented after a fall leading to rt tibial plateau fx and L frontal SAH both of which were managed non-operatively however course was complicated by ischemic CVA which was likely vessel to vessel in origin    Chief Complaint: f/u CVA     Interval/subjective: no complaints at this time  Appears confused  ROS: limited ROS due to confusion  Denies overt pain  Appears comfortable       Assessment/Plan:      Dysphagia  Assessment & Plan  - modified diet per ST      CVA (cerebral vascular accident) [facility])  Assessment & Plan  - L sided cerebral infarcts in the setting of traumatic L frontal SAH  - likely vessel to vessel origin in the setting of occluded L carotid (also with L vertebral artery stenosis)   - neuro recommended to re-starting home ASA 81 mg qd while in acute care which was resumed on 11/17/19 and a FU CTH on 11/25 no longer showed L frontal SAH, d/w Dr Pepe Taylor of neuro who recommended starting DAPT x 3 wks then ASA monotherapy (patient was on ASA 81 mg qd prior to CVA however with questionable compliance with medications)    - on home lipitor 40 mg qpm per neuro     Closed fracture of right tibial plateau with routine healing  Assessment & Plan  - non-op management per ortho  - per d/w ortho-->NWB in unlocked hinged knee brace, PROM only to patient's tolerance    - per ortho FU XR in 6 wks     Subarachnoid hemorrhage (HCC)  Assessment & Plan  - L frontal SAH  - evaluated by neurosx and no surgical intervention was taken  - completed 7 day keppra sx ppx course in acute care as recommended by neurosx   - cleared by neurology in acute care to re-start home ASA 81 mg qd for ischemic CVA (re-started on 11/17) with FU CTH on 11/25 which no longer revealed L frontal SAH;  d/w Dr Pepe Taylor of neuro who recommended starting DAPT x 3 wks then ASA monotherapy (patient was on ASA 81 mg qd prior to CVA however with questionable compliance with medications)      Peripheral vascular disease (HCC)  Assessment & Plan  - L carotid dz, L vertebral artery dz, B/L LE arterial dz  - cleared by neurology to re-start home ASA 81 mg qd (ischemic CVA)  - per neuro on home lipitor 40 mg qpm  - follows with Dr Christy Morrison as OP and per Dr Blade Jaime OP note patient refused to see vascular surgeon     Hypothyroidism  Assessment & Plan  - at home on levothyroxine 50 mcg qd however TSH was elevated therefore IM increased levothyroxine to 75 mcg qd and recommend repeat TSH in 4-6 wks     Essential hypertension  Assessment & Plan  - at home on lopressor 50 mg q12, lisinopril 40 mg qd, and procardia XL (24 hr) 60 mg qd   - d/w Dr Elba Castleman of neuro and cleared for normotensive BP goals   - metanephrines, renin, aldosterone labs ordered by cards --> aldosterone level WNL, free metanephrine WNL, normetanephrine mildly above ULN of 145 at 163, renin level WNL; notified cardiology of results and cards recommend OP FU with [facility]  - IM managing as inpt  - OP FU with [facility]     Hyperlipidemia  Assessment & Plan  - on home lipitor 40 mg qpm per neuro in acute care     * CAD (coronary artery disease)  Assessment & Plan  - h/o CABG  - cleared by neurology to resume home ASA 81 mg qd   - at home on lopressor 50 mg q12 (IM managing)  - on home lipitor 40 mg qpm   - mildly elevated troponins in acute care which peaked at 0 06  - seen by cards in acute care and did have an echo and no further KEBEDE/intevention recommended by cards  - follows with Dr Christy Morrison as OP      Scheduled Meds:    Current Facility-Administered Medications:  acetaminophen 650 mg Oral Q6H PRN Christian Anthony MD   aspirin 81 mg Oral Daily Shaista Peters MD   atorvastatin 40 mg Oral Daily With Jaswant Chen MD   clopidogrel 75 mg Oral Daily Shaista Peters MD   docusate sodium 100 mg Oral BID Christian Anthony MD   hydrALAZINE 50 mg Oral Q8H [address] Chandrika Moraes, DESIREE   levothyroxine 75 mcg Oral Early Morning Chandrika Moraes, DESIREE   lisinopril 20 mg Oral BID Chandrika Moraes, DESIREE   metoprolol tartrate 50 mg Oral Q12H [address] DESIREE Whitt   nicotine 1 patch Transdermal Daily Tori Braden MD   NIFEdipine 60 mg Oral Daily Tori Braden MD   ondansetron 4 mg Oral Q6H PRN Tori Braden MD   polyethylene glycol 17 g Oral Daily PRN Amanuel Rosen MD          Incidental findings:    1) EKG abnormalities (LAD, PACs): OP FU with Dr Tori Wen  2) elevated PA pressure with moderate TR: OP FU with Dr Tori Wen  3) grade 2 DD: OP FU with Dr Tori Wen        DVT ppx: SCDs in the setting of [address]  Code: per acute care notes patient insisted on being DNR/DNI when he was more cognitively intact, d/w patient's brother who also wishes for patient to be DNR/DNI and patient's brother verbalized his understanding of what this means        Objective:    Functional Update:  Mobility: max  Transfers: max   ADLs: max       Physical Exam:  /72 (BP Location: Left arm)   Pulse 102   Temp 98 °F (36 7 °C) (Temporal)   Resp 18   Ht 5' 10" (1 778 m)   Wt 60 5 kg (133 lb 5 oz)   SpO2 97%   BMI 19 13 kg/m²        GEN: NAD, lying comfortably in bed  Head: NCAT, no gross lesions  Eyes: PERRL, EOMI  Throat: clear, no thrush, MMM  Pulm: CTAB, no rales/wheezes  CV: RRR, normal s1/s2  Abd: soft, NTND  Ext: no pedal edema bilaterally, distal extremities warm and well perfused; RLE in hinged knee brace   Psych: appearing confused; no agitation  Skin: no observable rashes  Neuro: alert, oriented to person only; follows commands inconsistently  Right hemiplegia unchanged         Laboratory:   Lab Results   Component Value Date    WBC 8 00 11/23/2019    HGB 12 7 11/23/2019    HCT 38 0 11/23/2019    MCV 98 11/23/2019     11/23/2019     Lab Results   Component Value Date    SODIUM 140 11/23/2019    K 3 6 11/23/2019     11/23/2019    CO2 26 11/23/2019    BUN 27 (H) 11/23/2019    CREATININE 1 06 11/23/2019    GLUC 93 11/23/2019    CALCIUM 8 8 11/23/2019

## 2019-12-09 NOTE — PROGRESS NOTES
12/09/19 1136   Pain Assessment   Pain Assessment No/denies pain   Pain Score No Pain   Restrictions/Precautions   Precautions Aspiration;Cognitive;Bed/chair alarms;Limb alert   Weight Bearing Restrictions Yes   RLE Weight Bearing Per Order NWB   Braces or Orthoses LE Immobilizer  (unlocked )   Cognition   Overall Cognitive Status Impaired   Arousal/Participation Alert; Responsive   Attention Difficulty attending to directions   Orientation Level Disoriented to place; Disoriented to time;Disoriented to situation   Memory Decreased recall of recent events;Decreased recall of precautions;Decreased short term memory   Following Commands Follows one step commands inconsistently   Roll Left and Right   Type of Assistance Needed Physical assistance   Amount of Physical Assistance Provided 50%-74%   Roll Left and Right CARE Score 2   Sit to Lying   Type of Assistance Needed Physical assistance   Amount of Physical Assistance Provided 75% or more   Sit to Lying CARE Score 2   Lying to Sitting on Side of Bed   Type of Assistance Needed Physical assistance   Amount of Physical Assistance Provided 75% or more   Lying to Sitting on Side of Bed CARE Score 2   Sit to Stand   Type of Assistance Needed Physical assistance   Amount of Physical Assistance Provided Total assistance   Comment   (holding onto bed railing, max x 2 with cueing for NWB)   Sit to Stand CARE Score 1   Bed-Chair Transfer   Type of Assistance Needed Physical assistance   Amount of Physical Assistance Provided 75% or more   Chair/Bed-to-Chair Transfer CARE Score 2   Transfer Bed/Chair/Wheelchair   Limitations Noted In Balance; Coordination; Endurance;Problem Solving;Sensation; Sequencing;UE Strength;LE Strength   Adaptive Equipment Transfer Board   Sit Pivot Maximum Assist   Sit to Stand Maximum Assist;Assist x 2   Stand to Sit Maximum Assist;Assist x 2   Supine to Sit Maximum Assist;Assist x 1   Sit to Supine Maximum Assist;Assist x 1   All Transfer Maximum Assist;Assist x 2   Car Transfer   Reason if not Attempted Safety concerns   Car Transfer CARE Score 88   Walk 10 Feet   Reason if not Attempted Safety concerns   Walk 10 Feet CARE Score 88   Walk 50 Feet with Two Turns   Reason if not Attempted Safety concerns   Walk 50 Feet with Two Turns CARE Score 88   Walk 150 Feet   Reason if not Attempted Safety concerns   Walk 150 Feet CARE Score 88   Walking 10 Feet on Uneven Surfaces   Reason if not Attempted Safety concerns   Walking 10 Feet on Uneven Surfaces CARE Score 88   Ambulation   Does the patient walk? 0  No, and walking goal is not clinically indicated  (due to poor ability to maintain NWB R LE )   Primary Mode of Locomotion Prior to Admission Walk   Wheel 50 Feet with Two Turns   Type of Assistance Needed Physical assistance   Amount of Physical Assistance Provided 25%-49%   Comment   (min A )   Wheel 50 Feet with Two Turns CARE Score 3   Wheel 150 Feet   Type of Assistance Needed Physical assistance   Amount of Physical Assistance Provided 25%-49%   Comment   (min A )   Wheel 150 Feet CARE Score 3   Wheelchair mobility   Does the patient use a wheelchair? 1  Yes   Type of Wheelchair Used 1  Manual   Method Left upper extremity; Left lower extremity   Assistance Provided For Locking Brakes   Distance Level Surface (feet)   (152' 55')   Findings   (min A for navigating, level and carpet )   Curb or Single Stair   Reason if not Attempted Safety concerns   1 Step (Curb) CARE Score 88   4 Steps   Reason if not Attempted Safety concerns   4 Steps CARE Score 88   12 Steps   Reason if not Attempted Safety concerns   12 Steps CARE Score 88   Toilet Transfer   Type of Assistance Needed Physical assistance   Amount of Physical Assistance Provided Total assistance   Comment   (A x 2 )   Toilet Transfer CARE Score 1   Toilet Transfer   Surface Assessed Bedside Commode   Limitations Noted In Balance;Problem Solving;ROM;Safety; Sequencing;UE Strength;LE Strength   Findings (max x 2 )   Therapeutic Interventions   Strengthening Seated/supine TE performed to tolerance    Flexibility R knee PROM to tolerance    Other w/c mobility, transfer training   Assessment   Treatment Assessment Pt tolerated therapy session today  Completes functional transfer training, w/c mobility, and supine/seated TE performed to tolerance  At the start of the treatment session assisted nursing with bed side commode transfer with max A x 2 for sit/stand transfer with poor maintenance of NWB R LE  Pt needs max cueing for initiation and sequencing of all transfers and tasks  Completed supine/seated TE to improve general LE strengthening and conditioning, with PROM for the R knee to tolerance  Problem List Decreased strength;Decreased range of motion;Decreased endurance; Impaired balance;Decreased coordination;Decreased mobility; Decreased safety awareness; Impaired judgement;Decreased cognition   PT Barriers   Functional Limitation Standing;Transfers; Walking; Wheelchair management   Plan   Treatment/Interventions Functional transfer training;LE strengthening/ROM; Therapeutic exercise; Endurance training;Cognitive reorientation;Patient/family training;Equipment eval/education; Bed mobility; Compensatory technique education   Progress Slow progress, decreased activity tolerance   Recommendation   PT - OK to Discharge Yes   Discharge Summary Pt has made some progress towards therapy goals, however did not meet LTG due to decreased activity tolerance and impaired cognition limiting safety and functional mobility  Pt is Max A x 2 for sit/stand transfer with poor tolerance of NWB R LE, max A x 1 sit pivot transfer with slide board with good maintenance of NWB precautions, and min A for w/c mobility using L UE and L LE and cues for technique   Pt has met max benefit from inpatient skilled physical therapy and is cleared for discharge to SNF on 12/10/19  for additional therapy to continue working on remaining deficits to maximize safety and LOF  PT Therapy Minutes   PT Time In 1136   PT Time Out 1236   PT Total Time (minutes) 60   PT Mode of treatment - Individual (minutes) 60   PT Mode of treatment - Concurrent (minutes) 0   PT Mode of treatment - Group (minutes) 0   PT Mode of treatment - Co-treat (minutes) 0   PT Mode of Treatment - Total time(minutes) 60 minutes   PT Cumulative Minutes 706   Therapy Time missed   Time missed?  No

## 2019-12-09 NOTE — PROGRESS NOTES
12/08/19 2200   Charting Type   Charting Type Shift assessment   Neurological   Level of Consciousness Confusion   Orientation Level Disoriented to situation;Disoriented to time;Disoriented to place; Disoriented to person   Cognition Poor safety awareness;Poor judgement;Poor attention/concentration; Short term memory loss; Impulsive   R Hand  Absent   L Hand  Strong   RUE Muscle Strength 0- No movement   LUE Muscle Strength 5- Normal strength   RLE Muscle Strength 0- No movement   LLE Muscle Strength 5- Normal strength   [place] Coma Scale   Eye Opening 4   Best Verbal Response 4   Best Motor Response 6   [place] Coma Scale Score 14   HEENT   Teeth Poor dental hygiene;Missing teeth   Respiratory   Respiratory (WDL) WDL   Cardiac   Cardiac (WDL) WDL   Peripheral Vascular   Cyanosis None   Integumentary   Skin Color Appropriate for ethnicity   Сергей Scale   Sensory Perceptions 2   Moisture 2   Activity 2   Mobility 2   Nutrition 2   Friction and Shear 2   Сергей Scale Score 12   Musculoskeletal   Level of Assistance   (bed)   RUE Paralysis   LUE Full movement   RLE Paralysis   LLE Full movement   Gastrointestinal   Gastrointestinal (WDL) WDL   Stool Assessment   Bowel Incontinence No   Urine Assessment   Urinary Incontinence Yes   Psychosocial   Psychosocial (WDL) X   Patient Behaviors/Mood   (yells out loud , foul language )   Ability to Express Feelings Needs assistance   Ability to Express Needs Needs assistance   Ability to Express Thoughts Needs assistance   Ability to Understand Others Sometimes understands

## 2019-12-09 NOTE — CASE MANAGEMENT
DC instructions reviewed with Pt & Pt's family, who expressed an understanding & agreement  Pt being DC'd to [facility] at noon tomorrow, which was the Pt & family preference from list provided  (SNF Auth # [id number])  Pt being transported by litter by [facility], which tx team has determined to be a safe mode of transport  Transport Auth has been obtained with Bernardo[facility]ilin (Auth # [id number])  FU appts indicated on DC instructions  The Pt's current course of Tx & post DC goals of care have been shared with Post-acute care service providers

## 2019-12-10 VITALS
HEIGHT: 70 IN | OXYGEN SATURATION: 98 % | RESPIRATION RATE: 18 BRPM | DIASTOLIC BLOOD PRESSURE: 70 MMHG | BODY MASS INDEX: 19.43 KG/M2 | SYSTOLIC BLOOD PRESSURE: 138 MMHG | TEMPERATURE: 98.2 F | HEART RATE: 71 BPM | WEIGHT: 135.7 LBS

## 2019-12-10 PROCEDURE — 99239 HOSP IP/OBS DSCHRG MGMT >30: CPT | Performed by: PHYSICAL MEDICINE & REHABILITATION

## 2019-12-10 PROCEDURE — NC001 PR NO CHARGE: Performed by: PHYSICAL MEDICINE & REHABILITATION

## 2019-12-10 RX ORDER — LEVOTHYROXINE SODIUM 0.07 MG/1
75 TABLET ORAL
COMMUNITY

## 2019-12-10 RX ORDER — NIFEDIPINE 60 MG/1
60 TABLET, EXTENDED RELEASE ORAL DAILY
Status: ON HOLD | COMMUNITY
End: 2021-10-22 | Stop reason: ALTCHOICE

## 2019-12-10 RX ORDER — METOPROLOL TARTRATE 50 MG/1
50 TABLET, FILM COATED ORAL EVERY 12 HOURS SCHEDULED
COMMUNITY

## 2019-12-10 RX ORDER — ASPIRIN 81 MG/1
81 TABLET ORAL DAILY
COMMUNITY

## 2019-12-10 RX ORDER — CLOPIDOGREL BISULFATE 75 MG/1
75 TABLET ORAL DAILY
Status: ON HOLD | COMMUNITY
End: 2021-10-22 | Stop reason: ALTCHOICE

## 2019-12-10 RX ORDER — ATORVASTATIN CALCIUM 40 MG/1
40 TABLET, FILM COATED ORAL
COMMUNITY

## 2019-12-10 RX ORDER — HYDRALAZINE HYDROCHLORIDE 50 MG/1
50 TABLET, FILM COATED ORAL EVERY 8 HOURS
COMMUNITY

## 2019-12-10 RX ORDER — LISINOPRIL 20 MG/1
20 TABLET ORAL 2 TIMES DAILY
COMMUNITY

## 2019-12-10 RX ADMIN — HYDRALAZINE HYDROCHLORIDE 50 MG: 25 TABLET ORAL at 13:33

## 2019-12-10 RX ADMIN — NICOTINE 1 PATCH: 21 PATCH, EXTENDED RELEASE TRANSDERMAL at 08:31

## 2019-12-10 RX ADMIN — HYDRALAZINE HYDROCHLORIDE 50 MG: 25 TABLET ORAL at 06:04

## 2019-12-10 RX ADMIN — NIFEDIPINE 60 MG: 30 TABLET, FILM COATED, EXTENDED RELEASE ORAL at 08:27

## 2019-12-10 RX ADMIN — METOPROLOL TARTRATE 50 MG: 50 TABLET, FILM COATED ORAL at 08:28

## 2019-12-10 RX ADMIN — DOCUSATE SODIUM 100 MG: 100 CAPSULE, LIQUID FILLED ORAL at 08:28

## 2019-12-10 RX ADMIN — LISINOPRIL 20 MG: 20 TABLET ORAL at 08:29

## 2019-12-10 RX ADMIN — CLOPIDOGREL BISULFATE 75 MG: 75 TABLET ORAL at 08:28

## 2019-12-10 RX ADMIN — ASPIRIN 81 MG: 81 TABLET, COATED ORAL at 08:28

## 2019-12-10 RX ADMIN — LEVOTHYROXINE SODIUM 75 MCG: 75 TABLET ORAL at 06:04

## 2019-12-10 NOTE — CASE MANAGEMENT
DC instructions reviewed with Pt & Pt's brother, who expressed an understanding & agreement  Pt being DC'd to [facility] at noon today, which was the Pt & family preference from list provided  (SNF Auth # [id number])  Pt being transported by litter by [facility], which tx team has determined to be a safe mode of transport  Transport Auth has been obtained with Bernardo[facility]Ailin (Auth # [id number])  FU appts indicated on DC instructions  The Pt's current course of Tx & post DC goals of care have been shared with Post-acute care service providers

## 2019-12-10 NOTE — PROGRESS NOTES
Physical Medicine and Rehabilitation Progress Note  Sherly Ramos 67 y o  male MRN: [id number]  Unit/Bed#: -01 Encounter: [id number]    HPI: Patient is a 66 yo male who presented after a fall leading to rt tibial plateau fx and L frontal SAH both of which were managed non-operatively however course was complicated by ischemic CVA which was likely vessel to vessel in origin    Chief Complaint: CVA     Interval/subjective: patient seen at bedside and is comfortable     ROS: A 10 point ROS was performed; negative except as noted above       Assessment/Plan:      Dysphagia  Assessment & Plan  - modified diet of mechanical soft and thin liquids per speech therapy  - continued speech therapy and modified diet at [facility]       CVA (cerebral vascular accident) [facility])  Assessment & Plan  - L sided cerebral infarcts in the setting of traumatic L frontal SAH  - likely vessel to vessel origin in the setting of occluded L carotid (also with L vertebral artery stenosis)   - neuro recommended to re-starting home ASA 81 mg qd while in acute care which was resumed on 11/17/19 and a FU CTH on 11/25 no longer showed L frontal SAH, d/w Dr Charmaine Michael of neuro who recommended starting DAPT x 3 wks then ASA monotherapy (patient was on ASA 81 mg qd prior to CVA however with questionable compliance with medications and plavix 75 mg qd was added to asa 81 mg qd on 11/28 )     - on home lipitor 40 mg qpm per neuro   - OP FU with neuro     Closed fracture of right tibial plateau with routine healing  Assessment & Plan  - non-op management per ortho  - per d/w ortho-->NWB in unlocked hinged knee brace, PROM only to patient's tolerance    - per ortho FU XR in 6 wks (imaging/injury occurred on 11/14)   - OP FU with Dr Ryan Cooper       Subarachnoid hemorrhage [facility])  Assessment & Plan  - L frontal SAH  - evaluated by neurosx and no surgical intervention was taken  - completed 7 day keppra sx ppx course in acute care as recommended by neurosx   - cleared by neurology in acute care to re-start home ASA 81 mg qd for ischemic CVA (re-started on 11/17) with FU CTH on 11/25 which no longer revealed L frontal SAH;  d/w Dr Savanah Villavicencio of neuro who recommended starting DAPT x 3 wks then ASA monotherapy (patient was on ASA 81 mg qd prior to CVA however with questionable compliance with medications, plavix 75 mg qd added to asa 81 mg qd on 11/28)    - OP FU with neuro    Peripheral vascular disease ([facility] [address] )  Assessment & Plan  - L carotid dz, L vertebral artery dz, B/L LE arterial dz  - cleared by neurology to re-start home ASA 81 mg qd (ischemic CVA)  - per neuro on home lipitor 40 mg qpm  - follows with Dr Naz Restrepo as OP and per Dr Blossom Castleman OP note patient refused to see vascular surgeon     Hypothyroidism  Assessment & Plan  - at home on levothyroxine 50 mcg qd however TSH was elevated therefore IM increased levothyroxine to 75 mcg qd and recommend repeat TSH in 4-6 wks (dose increased to 75 mcg per IM on 11/28)     Essential hypertension  Assessment & Plan  - at home on lopressor 50 mg q12, lisinopril 40 mg qd, and procardia XL (24 hr) 60 mg qd   - d/w Dr Savanah Villavicencio of neuro and cleared for normotensive BP goals   - metanephrines, renin, aldosterone labs ordered by cards --> aldosterone level WNL, free metanephrine WNL, normetanephrine mildly above ULN of 145 at 163, renin level WNL; notified cardiology of results and cards recommend OP FU with [facility]   - IM managing as inpt and recommend patient be dc'd on current inpt regimen of lopressor 50 mg q12, lisinopril 20 mg BID, hydralazine 50 mg q8, and nifedipine XL (24 hr) 60 mg qd with close monitoring of BP by SNF physicians as BP has been elevated at times but improved the last 24-48 hours   - OP FU with [facility]     Hyperlipidemia  Assessment & Plan  - on home lipitor 40 mg qpm per neuro in acute care     * CAD (coronary artery disease)  Assessment & Plan  - h/o CABG  - cleared by neurology to resume home ASA 81 mg qd   - on home on lopressor 50 mg q12 (IM recommends this be continued upon dc to SNF)   - on home lipitor 40 mg qpm   - mildly elevated troponins in acute care which peaked at 0 06  - seen by cards in acute care and did have an echo and no further KEBEDE/intevention recommended by cards  - follows with Dr Angelyn Snellen as OP      Scheduled Meds:    Current Facility-Administered Medications:  acetaminophen 650 mg Oral Q6H PRN Blayne Suarez MD   aspirin 81 mg Oral Daily Dorota Bartlett MD   atorvastatin 40 mg Oral Daily With Praveena Treadwell MD   clopidogrel 75 mg Oral Daily Dorota Bartlett MD   docusate sodium 100 mg Oral BID Blayne Suarez MD   hydrALAZINE 50 mg Oral [facility] [place], CRNP   levothyroxine 75 mcg Oral Early Morning [place], CRNP   lisinopril 20 mg Oral BID [place], CRNP   metoprolol tartrate 50 mg Oral Q12H [address], CRNP   nicotine 1 patch Transdermal Daily Blayne Suarez MD   NIFEdipine 60 mg Oral Daily Blayne Suarez MD   ondansetron 4 mg Oral Q6H PRN Blayne Suarez MD   polyethylene glycol 17 g Oral Daily PRN Dorota Bartlett MD          Incidental findings:    1) EKG abnormalities (LAD, PACs): OP FU with Dr Angelyn Snellen  2) elevated PA pressure with moderate TR: OP FU with Dr Angelyn Snellen  3) grade 2 DD: OP FU with Dr Angelyn Snellen        DVT ppx: SCDs in the setting of [address]  Code: per acute care notes patient insisted on being DNR/DNI when he was more cognitively intact, d/w patient's brother who also wishes for patient to be DNR/DNI and patient's brother verbalized his understanding of what this means        Objective:    Functional Update:  Mobility: max  Transfers: max   ADLs: max       Physical Exam:    T: 98 2  HR: 71  BP: 138/70  RR: 18  POx: 98%    General: no apparent distress and comfortable  CARDIAC:  +S1/2  LUNGS:  respirations unlabored   ABDOMEN:  soft NT   EXTREMITIES:  volume status currently stable   NEURO:   inconsistently follows commands  PSYCH: patient currently calm        Laboratory:         Invalid input(s): PLTCT        Invalid input(s): CA

## 2019-12-10 NOTE — DISCHARGE SUMMARY
Physical Medicine and Rehabilitation Progress Note  Janna Soto 67 y o  male MRN: [id number]  Unit/Bed#: -01 Encounter: [id number]    Discharge Summary - PMR           Admission Date:    11/23/19  Discharge Date:   12/10/19    Diagnosis:   CVA, TBI     Functional Status Upon Discharge: Max mobility/tx/ADLs     Condition at Discharge: stable    Discharge instructions/Information to patient and family:   See after visit summary for information provided to patient and family  Provisions for Follow-Up Care:  See after visit summary for information related to follow-up care and any pertinent home health orders  Disposition: SNF     Planned Readmission: no        Discharge Medications:  See after visit summary for reconciled discharge medications provided to patient and family  Comorbidities:   See below for medical details     Hospital Course:    The patient had an unremarkable rehab course with significant functional gains made, please see below for medical details:         Patient is a 66 yo male who presented after a fall leading to rt tibial plateau fx and L frontal SAH both of which were managed non-operatively however course was complicated by ischemic CVA which was likely vessel to vessel in origin        Dysphagia  Assessment & Plan  - modified diet of mechanical soft and thin liquids per speech therapy  - continued speech therapy and modified diet at SNF       CVA (cerebral vascular accident) [facility])  Assessment & Plan  - L sided cerebral infarcts in the setting of traumatic L frontal SAH  - likely vessel to vessel origin in the setting of occluded L carotid (also with L vertebral artery stenosis)   - neuro recommended to re-starting home ASA 81 mg qd while in acute care which was resumed on 11/17/19 and a FU CTH on 11/25 no longer showed L frontal SAH, d/w Dr Mason Granado of neuro who recommended starting DAPT x 3 wks then ASA monotherapy (patient was on ASA 81 mg qd prior to CVA however with questionable compliance with medications and plavix 75 mg qd was added to asa 81 mg qd on 11/28 )     - on home lipitor 40 mg qpm per neuro   - OP FU with neuro     Closed fracture of right tibial plateau with routine healing  Assessment & Plan  - non-op management per ortho  - per d/w ortho-->NWB in unlocked hinged knee brace, PROM only to patient's tolerance    - per ortho FU XR in 6 wks (imaging/injury occurred on 11/14)   - OP FU with Dr Rip Zamora       Subarachnoid hemorrhage [facility])  Assessment & Plan  - L frontal SAH  - evaluated by neurosx and no surgical intervention was taken  - completed 7 day keppra sx ppx course in acute care as recommended by neurosx   - cleared by neurology in acute care to re-start home ASA 81 mg qd for ischemic CVA (re-started on 11/17) with FU CTH on 11/25 which no longer revealed L frontal SAH;  d/w Dr Denisha Gleason of neuro who recommended starting DAPT x 3 wks then ASA monotherapy (patient was on ASA 81 mg qd prior to CVA however with questionable compliance with medications, plavix 75 mg qd added to asa 81 mg qd on 11/28)    - OP FU with neuro    Peripheral vascular disease ([facility] [address] )  Assessment & Plan  - L carotid dz, L vertebral artery dz, B/L LE arterial dz  - cleared by neurology to re-start home ASA 81 mg qd (ischemic CVA)  - per neuro on home lipitor 40 mg qpm  - follows with Dr Giovanni Joseph as OP and per Dr Gilberto Gaming OP note patient refused to see vascular surgeon     Hypothyroidism  Assessment & Plan  - at home on levothyroxine 50 mcg qd however TSH was elevated therefore IM increased levothyroxine to 75 mcg qd and recommend repeat TSH in 4-6 wks (dose increased to 75 mcg per IM on 11/28)     Essential hypertension  Assessment & Plan  - at home on lopressor 50 mg q12, lisinopril 40 mg qd, and procardia XL (24 hr) 60 mg qd   - d/w Dr Denisha Gleason of neuro and cleared for normotensive BP goals   - metanephrines, renin, aldosterone labs ordered by cards --> aldosterone level WNL, free metanephrine WNL, normetanephrine mildly above ULN of 145 at 163, renin level WNL; notified cardiology of results and cards recommend OP FU with [facility]   - IM managing as inpt and recommend patient be dc'd on current inpt regimen of lopressor 50 mg q12, lisinopril 20 mg BID, hydralazine 50 mg q8, and nifedipine XL (24 hr) 60 mg qd with close monitoring of BP by SNF physicians as BP has been elevated at times but improved the last 24-48 hours   - OP FU with [facility]     Hyperlipidemia  Assessment & Plan  - on home lipitor 40 mg qpm per neuro in acute care     * CAD (coronary artery disease)  Assessment & Plan  - h/o CABG  - cleared by neurology to resume home ASA 81 mg qd   - on home on lopressor 50 mg q12 (IM recommends this be continued upon dc to SNF)   - on home lipitor 40 mg qpm   - mildly elevated troponins in acute care which peaked at 0 06  - seen by cards in acute care and did have an echo and no further KEBEDE/intevention recommended by cards  - follows with Dr Everardo Mcgraw as OP      Scheduled Meds:    Current Facility-Administered Medications:  aspirin 81 mg Oral Daily De Stark MD   atorvastatin 40 mg Oral Daily With Perez Garcia MD   clopidogrel 75 mg Oral Daily De Stark MD   hydrALAZINE 50 mg Oral [facility] DESIREE Tan   levothyroxine 75 mcg Oral Early Morning DESIREE Tan   lisinopril 20 mg Oral BID DESIREE Tan   metoprolol tartrate 50 mg Oral Q12H [facility] assisted DESIREE Whitt   NIFEdipine 60 mg Oral Daily Stephen Cee MD          Incidental findings:    1) EKG abnormalities (LAD, PACs): OP FU with Dr Everardo Mcgraw  2) elevated PA pressure with moderate TR: OP FU with Dr Everardo Mcgraw  3) grade 2 DD: OP FU with Dr Everardo Mcgraw        DVT ppx: SCDs in the setting of [address]  Code: per acute care notes patient insisted on being DNR/DNI when he was more cognitively intact, d/w patient's brother who also wishes for patient to be DNR/DNI and patient's brother verbalized his understanding of what this means        * 33 min was spent in preparation for patient discharge including discussion with patient, patient's nurse, therapy staff, and case management

## 2019-12-10 NOTE — DISCHARGE INSTRUCTIONS
Discharge instructions/summary for SNF physicians:    Patient is a 66 yo male who presented after a fall leading to rt tibial plateau fx and L frontal SAH both of which were managed non-operatively however course was complicated by ischemic CVA which was likely vessel to vessel in origin             Dysphagia  Assessment & Plan  - modified diet of mechanical soft and thin liquids per speech therapy  - continued speech therapy and modified diet at SNF         CVA (cerebral vascular accident) [facility])  Assessment & Plan  - L sided cerebral infarcts in the setting of traumatic L frontal SAH  - likely vessel to vessel origin in the setting of occluded L carotid (also with L vertebral artery stenosis)   - neuro recommended to re-starting home ASA 81 mg qd while in acute care which was resumed on 11/17/19 and a FU CTH on 11/25 no longer showed L frontal SAH, d/w Dr Herrera Roque of neuro who recommended starting DAPT x 3 wks then ASA monotherapy (patient was on ASA 81 mg qd prior to CVA however with questionable compliance with medications and plavix 75 mg qd was added to asa 81 mg qd on 11/28 )     - on home lipitor 40 mg qpm per neuro   - OP FU with neuro      Closed fracture of right tibial plateau with routine healing  Assessment & Plan  - non-op management per ortho  - per d/w ortho-->NWB in unlocked hinged knee brace, PROM only to patient's tolerance    - per ortho FU XR in 6 wks (imaging/injury occurred on 11/14)   - OP FU with Dr Bennie Silvestre         Subarachnoid hemorrhage [facility])  Assessment & Plan  - L frontal SAH  - evaluated by neurosx and no surgical intervention was taken  - completed 7 day keppra sx ppx course in acute care as recommended by neurosx   - cleared by neurology in acute care to re-start home ASA 81 mg qd for ischemic CVA (re-started on 11/17) with FU CTH on 11/25 which no longer revealed L frontal SAH;  d/w Dr Herrera Roque of neuro who recommended starting DAPT x 3 wks then ASA monotherapy (patient was on ASA 81 mg qd prior to CVA however with questionable compliance with medications, plavix 75 mg qd added to asa 81 mg qd on 11/28)    - OP FU with neuro     Peripheral vascular disease (HCC)  Assessment & Plan  - L carotid dz, L vertebral artery dz, B/L LE arterial dz  - cleared by neurology to re-start home ASA 81 mg qd (ischemic CVA)  - per neuro on home lipitor 40 mg qpm  - follows with Dr Eulalia Nelson as OP and per Dr Stephy Rene OP note patient refused to see vascular surgeon      Hypothyroidism  Assessment & Plan  - at home on levothyroxine 50 mcg qd however TSH was elevated therefore IM increased levothyroxine to 75 mcg qd and recommend repeat TSH in 4-6 wks (dose increased to 75 mcg per IM on 11/28)      Essential hypertension  Assessment & Plan  - at home on lopressor 50 mg q12, lisinopril 40 mg qd, and procardia XL (24 hr) 60 mg qd   - d/w Dr Denice Nash of neuro and cleared for normotensive BP goals   - metanephrines, renin, aldosterone labs ordered by cards --> aldosterone level WNL, free metanephrine WNL, normetanephrine mildly above ULN of 145 at 163, renin level WNL; notified cardiology of results and cards recommend OP FU with [facility]   - IM managing as inpt and recommend patient be dc'd on current inpt regimen of lopressor 50 mg q12, lisinopril 20 mg BID, hydralazine 50 mg q8, and nifedipine XL (24 hr) 60 mg qd with close monitoring of BP by SNF physicians as BP has been elevated at times but improved the last 24-48 hours   - OP FU with [facility]      Hyperlipidemia  Assessment & Plan  - on home lipitor 40 mg qpm per neuro in acute care      * CAD (coronary artery disease)  Assessment & Plan  - h/o CABG  - cleared by neurology to resume home ASA 81 mg qd   - on home on lopressor 50 mg q12 (IM recommends this be continued upon dc to SNF)   - on home lipitor 40 mg qpm   - mildly elevated troponins in acute care which peaked at 0 06  - seen by cards in acute care and did have an echo and no further KEBEDE/intevention recommended by cards  - follows with Dr Arnetta Apley as OP        Scheduled Meds:     Current Facility-Administered Medications:  aspirin 81 mg Oral Daily   atorvastatin 40 mg Oral Daily With Dinner   clopidogrel 75 mg Oral Daily   hydrALAZINE 50 mg Oral Q8H [address]   levothyroxine 75 mcg Oral Early Morning   lisinopril 20 mg Oral BID   metoprolol tartrate 50 mg Oral Q12H MANUEL   NIFEdipine 60 mg Oral Daily             Incidental findings:     1) EKG abnormalities (LAD, PACs): OP FU with Dr Arnetta Apley  2) elevated PA pressure with moderate TR: OP FU with Dr Arnetta Apley  3) grade 2 DD: OP FU with Dr Arnetta Apley         DVT ppx: SCDs

## 2019-12-10 NOTE — PROGRESS NOTES
12/09/19 2234   Charting Type   Charting Type Shift assessment   Neurological   Level of Consciousness Confusion   Orientation Level Disoriented to situation;Disoriented to time;Disoriented to place; Disoriented to person   Cognition Poor safety awareness;Poor judgement;Poor attention/concentration; Short term memory loss; Impulsive   Speech Clear   Swallow Able to swallow solids and liquids without difficulty   R Hand  Absent   L Hand  Strong   RUE Muscle Strength 0- No movement   LUE Muscle Strength 5- Normal strength   RLE Muscle Strength 0- No movement   LLE Muscle Strength 5- Normal strength   Reena Coma Scale   Eye Opening 4   Best Verbal Response 4   Best Motor Response 6   Reena Coma Scale Score 14   HEENT   Teeth Poor dental hygiene;Missing teeth   Respiratory   Respiratory (WDL) WDL   Cardiac   Cardiac (WDL) WDL   Peripheral Vascular   Cyanosis None   Integumentary   Skin Color Appropriate for ethnicity   Сергей Scale   Sensory Perceptions 3   Moisture 3   Activity 2   Mobility 2   Nutrition 2   Friction and Shear 2   Сергей Scale Score 14   Musculoskeletal   Level of Assistance Moderate assist, patient does 50-74%   Assistive Device   (bed)   RUE Paralysis   LUE Full movement   RLE Paralysis   LLE Full movement   Gastrointestinal   Gastrointestinal (WDL) WDL   Stool Assessment   Bowel Incontinence No   Urine Assessment   Urinary Incontinence Yes   Psychosocial   Psychosocial (WDL) X   Patient Behaviors/Mood   (yells out loud , foul language )   Ability to Express Feelings Needs assistance   Ability to Express Needs Needs assistance   Ability to Express Thoughts Needs assistance   Ability to Understand Others Sometimes understands

## 2019-12-10 NOTE — TRANSPORTATION MEDICAL NECESSITY
Section I - General Information    Name of Patient: Yuniel Bah                 : 1940    Medicare #: [id number]  Transport Date: 12/10/19 (PCS is valid for round trips on this date and for all repetitive trips in the 60-day range as noted below )  Origin: [address]: [facility]  Is the pt's stay covered under Medicare Part A (PPS/DRG)   [x]     Closest appropriate facility? If no, why is transport to more distant facility required? YesYES  If hospice pt, is this transport related to pt's terminal illness? NA N/A       Section II - Medical Necessity Questionnaire  Ambulance transportation is medically necessary only if other means of transport are contraindicated or would be potentially harmful to the patient  To meet this requirement, the patient must either be "bed confined" or suffer from a condition such that transport by means other than ambulance is contraindicated by the patient's condition  The following questions must be answered by the medical professional signing below for this form to be valid:    1)  Describe the MEDICAL CONDITION (physical and/or mental) of this patient AT [address] [place] [address] that requires the patient to be transported in an ambulance and why transport by other means is contraindicated by the patient's condition:CAD, IMPAIRED MOBILITY, REQUIRES ATTENDANT        2) Is the patient "bed confined" as defined below? No  To be "be confined" the patient must satisfy all three of the following conditions: (1) unable to get up from bed without Assistance; AND (2) unable to ambulate; AND (3) unable to sit in a chair or wheelchair   NO, can sit in a wheelchair but not safely for duration of trip & requires attendant    3) Can this patient safely be transported by car or wheelchair van (i e , seated during transport without a medical attendant or monitoring)? No no, requires attendant    4) In addition to completing questions 1-3 above, please check any of the following conditions that apply*:   *Note: supporting documentation for any boxes checked must be maintained in the patient's medical records  If hosp-hosp transfer, describe services needed at 2nd facility not available at 1st facility? Patient is confused  Moderate/severe pain on movement   Medical attendant required        Section III - Signature of Physician or Healthcare Professional  I certify that the above information is true and correct based on my evaluation of this patient, and represent that the patient requires transport by ambulance and that other forms of transport are contraindicated  I understand that this information will be used by the Centers for Medicare and Medicaid Services (CMS) to support the determination of medical necessity for ambulance services, and I represent that I have personal knowledge of the patient's condition at time of transport  []  If this box is checked, I also certify that the patient is physically or mentally incapable of signing the ambulance service's claim and that the institution with which I am affiliated has furnished care, services, or assistance to the patient  My signature below is made on behalf of the patient pursuant to 42 CFR §424 36(b)(4)  In accordance with 42 CFR §424 37, the specific reason(s) that the patient is physically or mentally incapable of signing the claim form is as follows:    Marinda Mcardle of Physician* or Healthcare Professional_______________________Evelin Blank LCSW_______________________________________  Signature Date 12/10/19 (For scheduled repetitive transports, this form is not valid for transports performed more than 60 days after this date)    Printed Name & Credentials of Physician or Healthcare Professional (MD, DO, RN, etc )________Evelin Blank LCSW, DISCHARGE PLANNER________________________  *Form must be signed by patient's attending physician for scheduled, repetitive transports   For non-repetitive, unscheduled ambulance transports, if unable to obtain the signature of the attending physician, any of the following may sign (choose appropriate option below)  [] Physician Assistant []  Clinical Nurse Specialist []  Registered Nurse  []  Nurse Practitioner  [x] Discharge Planner

## 2019-12-10 NOTE — NURSING NOTE
Report called to SNF  Discharge instructions sent with patient  Interdisciplinary care team determined safest means of transport is via stretcher  Hospitalist and Dr Yoel Torres discussed and reviewed bp/meds prior to transport

## 2019-12-12 ENCOUNTER — TRANSITIONAL CARE MANAGEMENT (OUTPATIENT)
Dept: INTERNAL MEDICINE CLINIC | Facility: CLINIC | Age: 79
End: 2019-12-12

## 2019-12-16 ENCOUNTER — OFFICE VISIT (OUTPATIENT)
Dept: OBGYN CLINIC | Facility: MEDICAL CENTER | Age: 79
End: 2019-12-16

## 2019-12-16 VITALS
BODY MASS INDEX: 19.33 KG/M2 | SYSTOLIC BLOOD PRESSURE: 113 MMHG | WEIGHT: 135 LBS | HEIGHT: 70 IN | HEART RATE: 73 BPM | DIASTOLIC BLOOD PRESSURE: 70 MMHG

## 2019-12-16 DIAGNOSIS — S82.141A CLOSED FRACTURE OF RIGHT TIBIAL PLATEAU, INITIAL ENCOUNTER: Primary | ICD-10-CM

## 2019-12-16 PROCEDURE — 99024 POSTOP FOLLOW-UP VISIT: CPT | Performed by: ORTHOPAEDIC SURGERY

## 2019-12-16 NOTE — PROGRESS NOTES
Ortho Sports Medicine Knee New Patient Visit     Assesment:   67year old male with right non displaced lateral tibial plateau fracture    Plan:    Conservative treatment:    Ice to knee for 20 minutes at least 1-2 times daily  PT for ROM/strengthening to knee, hip and core  Stay in hinged knee brace  Non-weightbearing until follow up in 6 weeks    Imaging: All imaging from today was reviewed by myself and explained to the patient  Injection:    No Injection planned at this time  Surgery:     No surgery is recommended at this point, continue with conservative treatment plan as noted  Follow up:    Return in about 6 weeks (around 1/27/2020)  Chief Complaint   Patient presents with    Right Leg - Fracture       History of Present Illness: The patient is a 67 y o  male whose occupation is retired, referred to me for evaluation of right knee pain     Pain is located anterior, lateral  The patient rates the pain as a 10/10  The pain has been present for 1 month  The patient sustained an injury on 11/14/19  The mechanism of injury was a fall  The pain is characterized as dull  The pain is present at all times  Pain is improved by rest, ice, NSAIDS and bracing  Pain is aggravated by weight bearing  Symptoms include swelling  The patient has tried rest, ice, NSAIDS, physical therapy and bracing  Knee Surgical History:  None    Past Medical, Social and Family History:  Past Medical History:   Diagnosis Date    Coronary artery disease     history of CABG    Disease of thyroid gland     Hemorrhoids     last assessed 7/10/17    History of arterial duplex of LE 01/10/2017    Bilateral occlusion of the superficial femoral arteries, severe diminution of the ankle brachial index bilaterally, disease appears worse on left than right   History of echocardiogram 07/20/2011    hypokinesia of the inferior wall  EF 50%      HL (hearing loss)     Hyperlipidemia     Hypertension     PVD (peripheral vascular disease)      Past Surgical History:   Procedure Laterality Date    COLONOSCOPY      Last assessed 7/10/17    CORONARY ARTERY BYPASS GRAFT  08/23/2010    3V CABG:  LIMA to LAD, VG to RI, VG to OM1   DENTAL SURGERY      Last assessed  7/10/17    KNEE SURGERY Left     Last assessed 7/10/17    TONSILLECTOMY AND ADENOIDECTOMY      Last assessed 7/10/17    TOOTH EXTRACTION       No Known Allergies  Current Outpatient Medications on File Prior to Visit   Medication Sig Dispense Refill    aspirin (ECOTRIN LOW STRENGTH) 81 mg EC tablet Take 81 mg by mouth daily      atorvastatin (LIPITOR) 40 mg tablet Take 40 mg by mouth daily with dinner      clopidogrel (PLAVIX) 75 mg tablet Take 75 mg by mouth daily      hydrALAZINE (APRESOLINE) 50 mg tablet Take 50 mg by mouth every 8 (eight) hours      levothyroxine 75 mcg tablet Take 75 mcg by mouth daily in the early morning      lisinopril (ZESTRIL) 20 mg tablet Take 20 mg by mouth 2 (two) times a day      metoprolol tartrate (LOPRESSOR) 50 mg tablet Take 50 mg by mouth every 12 (twelve) hours      NIFEdipine (PROCARDIA XL) 60 mg 24 hr tablet Take 60 mg by mouth daily       No current facility-administered medications on file prior to visit        Social History     Socioeconomic History    Marital status: Single     Spouse name: Not on file    Number of children: 0    Years of education: Not on file    Highest education level: Not on file   Occupational History    Occupation: retired- factory maintenance   Social Needs    Financial resource strain: Not on file    Food insecurity:     Worry: Not on file     Inability: Not on file   Scholrly needs:     Medical: Not on file     Non-medical: Not on file   Tobacco Use    Smoking status: Current Every Day Smoker     Packs/day: 0 50     Types: Cigarettes    Smokeless tobacco: Never Used   Substance and Sexual Activity    Alcohol use: Not Currently     Alcohol/week: 1 0 standard drinks     Types: 1 Glasses of wine per week     Frequency: Monthly or less     Drinks per session: 1 or 2     Binge frequency: Less than monthly    Drug use: No    Sexual activity: Not Currently   Lifestyle    Physical activity:     Days per week: Not on file     Minutes per session: Not on file    Stress: Not on file   Relationships    Social connections:     Talks on phone: Not on file     Gets together: Not on file     Attends Holiness service: Not on file     Active member of club or organization: Not on file     Attends meetings of clubs or organizations: Not on file     Relationship status: Not on file    Intimate partner violence:     Fear of current or ex partner: Not on file     Emotionally abused: Not on file     Physically abused: Not on file     Forced sexual activity: Not on file   Other Topics Concern    Not on file   Social History Narrative    Has no children    Lives a lone without help available    Previous  service: [place] deployed to [facility]     I have reviewed the past medical, surgical, social and family history, medications and allergies as documented in the EMR  Review of systems: ROS is negative other than that noted in the HPI  Constitutional: Negative for fatigue and fever  HENT: Negative for sore throat  Respiratory: Negative for shortness of breath  Cardiovascular: Negative for chest pain  Gastrointestinal: Negative for abdominal pain  Endocrine: Negative for cold intolerance and heat intolerance  Genitourinary: Negative for flank pain  Musculoskeletal: Negative for back pain  Skin: Negative for rash  Allergic/Immunologic: Negative for immunocompromised state  Neurological: Negative for dizziness  Psychiatric/Behavioral: Negative for agitation  Physical Exam:    Blood pressure 113/70, pulse 73, height 5' 10" (1 778 m), weight 61 2 kg (135 lb)      General/Constitutional: NAD, well developed, well nourished  HENT: Normocephalic, atraumatic  CV: Intact distal pulses, regular rate  Resp: No respiratory distress or labored breathing  Lymphatic: No lymphadenopathy palpated  Neuro: Alert and Oriented x 3, no focal deficits  Psych: Normal mood, normal affect, normal judgement, normal behavior  Skin: Warm, dry, no rashes, no erythema      Knee Exam (focused):                RIGHT LEFT   ROM:   Limited due to fracture 0-130   Palpation: Effusion negative negative     MJL tenderness Negative Negative     LJL tenderness Positive Negative   Meniscus: Nedra Negative Negative    Apley's Compression Negative Negative   Instability: Varus stable stable     Valgus stable stable   Special Tests: Lachman Negative Negative     Posterior drawer Negative Negative     Anterior drawer Negative Negative     Pivot shift not tested not tested     Dial not tested not tested   Patella: Palpation no tenderness no tenderness     Mobility 1/4 1/4     Apprehension Negative Negative   Other: Single leg 1/4 squat not tested not tested      Fracture / Dislocation Treatment  Date/Time: 12/16/2019 12:06 PM  Performed by: Marin Eckert DO  Authorized by: Marin Eckert DO     Patient Location:  Clinic  Verbal consent obtained?: Yes    Written consent obtained?: Yes    Risks and benefits: Risks, benefits and alternatives were discussed    Consent given by:  Patient  Patient states understanding of procedure being performed: Yes    Patient's understanding of procedure matches consent: Yes    Procedure consent matches procedure scheduled: Yes    Relevant documents present and verified: Yes    Test results available and properly labeled: Yes    Site marked: Yes    Radiology Images displayed and confirmed   If images not available, report reviewed: Yes    Patient identity confirmed:  Verbally with patient  Injury location:  Knee  Location details:  Right knee  Injury type:  Fracture  Fracture type: tibial plateau    Distal perfusion: normal    Neurological function: normal    Range of motion: normal    Local anesthesia used?: No    Manipulation performed?: No    Neurovascular status: Neurovascularly intact    Distal perfusion: normal    Neurological function: normal    Range of motion: normal    Patient tolerance:  Patient tolerated the procedure well with no immediate complications          LE NV Exam: +2 DP/PT pulses bilaterally  Sensation intact to light touch L2-S1 bilaterally     Bilateral hip ROM demonstrates no pain actively or passively    No calf tenderness to palpation bilaterally    Knee Imaging    X-rays of the right knee were reviewed, which demonstrate non displaced lateral tibial plateau fracture  I have reviewed the radiology report and agree with their impression        Scribe Attestation    I,:   Gracie Canela am acting as a scribe while in the presence of the attending physician :        I,:   Anam Cadet, DO personally performed the services described in this documentation    as scribed in my presence :

## 2019-12-17 ENCOUNTER — TELEPHONE (OUTPATIENT)
Dept: NEUROLOGY | Facility: CLINIC | Age: 79
End: 2019-12-17

## 2019-12-17 NOTE — TELEPHONE ENCOUNTER
Post hospital discharge follow up call  Hospitalization 11/15-11/23, [address] until 12/10/19  The purpose of this phone call is to assess patient's general wellbeing or for any assistance needed with follow-up care  -    According to chart, patient discharged to [facility], [phone]  Called facility, reached [address] patient not on her unit  Asked to be transferred, was put on hold  Connected me with Honorio Oliver who transferred the call to [facility]  Confirmed since discharge, patient has not experienced any new or worsening stroke-like symptoms  States he is confused, but cooperative, and making his needs know  At times patient will yell out, not thinking it is related to behavior changes  He continues to have right-sided weakness  Right arm sling applied  Knee immobilizer on right leg  He is non ambulatory at this time outside of therapy, mobilizes via wheelchair  Requires assistance pushing  Still on mechanical soft diet with thin liquids  He is on a 1:1 supervision for meals  No changes in medications, other then the addition of a skin protectant screen  Before morning medications average BP 150s/80s  At this time, he is short-term, family talking about long-term care potentially, not official decisions made  Still scheduled 1/22 with Dr Clayton Kim  RN Marisela Cain does not have any questions or concerns at this time

## 2019-12-26 ENCOUNTER — OFFICE VISIT (OUTPATIENT)
Dept: CARDIOLOGY CLINIC | Facility: CLINIC | Age: 79
End: 2019-12-26
Payer: COMMERCIAL

## 2019-12-26 VITALS
DIASTOLIC BLOOD PRESSURE: 62 MMHG | SYSTOLIC BLOOD PRESSURE: 126 MMHG | BODY MASS INDEX: 19.33 KG/M2 | HEIGHT: 70 IN | HEART RATE: 64 BPM | WEIGHT: 135 LBS

## 2019-12-26 DIAGNOSIS — I73.9 PERIPHERAL VASCULAR DISEASE (HCC): ICD-10-CM

## 2019-12-26 DIAGNOSIS — I60.9 SUBARACHNOID HEMORRHAGE (HCC): Chronic | ICD-10-CM

## 2019-12-26 DIAGNOSIS — I25.10 CORONARY ARTERY DISEASE INVOLVING NATIVE CORONARY ARTERY OF NATIVE HEART WITHOUT ANGINA PECTORIS: ICD-10-CM

## 2019-12-26 DIAGNOSIS — E78.2 MIXED HYPERLIPIDEMIA: Primary | ICD-10-CM

## 2019-12-26 DIAGNOSIS — I10 ESSENTIAL HYPERTENSION: Chronic | ICD-10-CM

## 2019-12-26 PROCEDURE — 3078F DIAST BP <80 MM HG: CPT | Performed by: INTERNAL MEDICINE

## 2019-12-26 PROCEDURE — 99214 OFFICE O/P EST MOD 30 MIN: CPT | Performed by: INTERNAL MEDICINE

## 2019-12-26 PROCEDURE — 3074F SYST BP LT 130 MM HG: CPT | Performed by: INTERNAL MEDICINE

## 2019-12-26 RX ORDER — ACETAMINOPHEN 325 MG/1
650 TABLET ORAL EVERY 6 HOURS PRN
COMMUNITY

## 2019-12-26 RX ORDER — FLUOXETINE HYDROCHLORIDE 20 MG/1
40 CAPSULE ORAL DAILY
COMMUNITY

## 2019-12-26 NOTE — PROGRESS NOTES
Patient ID: Hayden Gil is a 67 y o  male  Plan:      CAD (coronary artery disease)  Fortunately no sxs  Now 9 yrs post CABG  He was not interested in further interventions at the time of last visit when he was more cogent  Subarachnoid hemorrhage (HCC)  Getting inpt  [facility] at [facility]  Peripheral vascular disease (HCC)  Severe but again when cogent he was not interested in interventions  Follow up Plan: It is not clear where Vish Guzman will be residing  I will remain available for f/u  HPI: Vish Guzman is seen in f/u regarding CAD and PVD  Since his last visit with me he had a SAH  He is now on rehab  Although I know him for years he does not seem to recognize me  No recent report of chest pain  Most recent or relevant cardiac/vascular testing:    Echocardiogram 11/17/2019: Normal overall EF  Mild pulm HTN  Lower extremity arterial duplex 6/4/2019:  Right leg with Femoral artery occlusion  Ankle brachial index of 0 43  Left leg with ankle brachial index of 0 31 with occlusion of the femoral popliteal and anterior tibial arteries  Past Surgical History:   Procedure Laterality Date    COLONOSCOPY      Last assessed 7/10/17    CORONARY ARTERY BYPASS GRAFT  08/23/2010    3V CABG:  LIMA to LAD, VG to RI, VG to OM1      DENTAL SURGERY      Last assessed  7/10/17    KNEE SURGERY Left     Last assessed 7/10/17    TONSILLECTOMY AND ADENOIDECTOMY      Last assessed 7/10/17    TOOTH EXTRACTION       CMP:   Lab Results   Component Value Date     (H) 03/11/2015    K 3 6 11/23/2019    K 4 1 03/11/2015     11/23/2019     03/11/2015    CO2 26 11/23/2019    CO2 29 2 03/11/2015    BUN 27 (H) 11/23/2019    BUN 12 03/11/2015    CREATININE 1 06 11/23/2019    CREATININE 0 93 03/11/2015    GLUCOSE 82 03/11/2015    EGFR 67 11/23/2019       Lipid Profile:   Lab Results   Component Value Date    CHOL 122 03/11/2015    TRIG 103 11/15/2019    TRIG 80 03/11/2015    HDL 52 11/15/2019    HDL 43 03/11/2015         Review of Systems   10  point ROS  was not reliably obtainable  Gait:Using a wheelchair        Objective:     /62   Pulse 64   Ht 5' 10" (1 778 m)   Wt 61 2 kg (135 lb)   BMI 19 37 kg/m²     PHYSICAL EXAM:    General:  Normal appearance in no distress  Eyes:  Anicteric  Oral mucosa:  Moist   Neck:  No JVD  Carotid upstrokes are brisk without bruits  No masses  Chest:  Clear to auscultation and percussion  Cardiac:  Normal PMI  Normal S1 and S2  No murmur gallop or rub  Well-healed midline sternotomy scar  Abdomen:  Soft and nontender  No palpable organomegaly or aortic enlargement  Extremities:  Right leg in a brace  Musculoskeletal:  Symmetric  Vascular:  Femoral pulses are brisk without bruits  Popliteal and pedal pulses are absent bilaterally  Surprisingly there is some warmth to the feet bilaterally  Neuro:  Grossly symmetric  Psych:  Alert but only oriented to self      Current Outpatient Medications:     acetaminophen (TYLENOL) 325 mg tablet, Take 650 mg by mouth every 6 (six) hours as needed for mild pain, Disp: , Rfl:     aspirin (ECOTRIN LOW STRENGTH) 81 mg EC tablet, Take 81 mg by mouth daily, Disp: , Rfl:     atorvastatin (LIPITOR) 40 mg tablet, Take 40 mg by mouth daily with dinner, Disp: , Rfl:     clopidogrel (PLAVIX) 75 mg tablet, Take 75 mg by mouth daily, Disp: , Rfl:     Fluoxetine HCl, PMDD, 20 MG CAPS, Take 20 mg by mouth daily, Disp: , Rfl:     hydrALAZINE (APRESOLINE) 50 mg tablet, Take 50 mg by mouth every 8 (eight) hours, Disp: , Rfl:     levothyroxine 75 mcg tablet, Take 75 mcg by mouth daily in the early morning, Disp: , Rfl:     lisinopril (ZESTRIL) 20 mg tablet, Take 20 mg by mouth 2 (two) times a day, Disp: , Rfl:     metoprolol tartrate (LOPRESSOR) 50 mg tablet, Take 50 mg by mouth every 12 (twelve) hours, Disp: , Rfl:     NIFEdipine (PROCARDIA XL) 60 mg 24 hr tablet, Take 60 mg by mouth daily, Disp: , Rfl:   No Known Allergies  Past Medical History:   Diagnosis Date    Coronary artery disease     history of CABG    Disease of thyroid gland     Hemorrhoids     last assessed 7/10/17    History of arterial duplex of LE 01/10/2017    Bilateral occlusion of the superficial femoral arteries, severe diminution of the ankle brachial index bilaterally, disease appears worse on left than right   History of echocardiogram 07/20/2011    hypokinesia of the inferior wall  EF 50%      HL (hearing loss)     Hyperlipidemia     Hypertension     PVD (peripheral vascular disease)            Social History     Tobacco Use   Smoking Status Current Every Day Smoker    Packs/day: 0 50    Types: Cigarettes   Smokeless Tobacco Never Used

## 2019-12-26 NOTE — ASSESSMENT & PLAN NOTE
Fortunately no sxs  Now 9 yrs post CABG  He was not interested in further interventions at the time of last visit when he was more cogent

## 2020-01-08 ENCOUNTER — TELEPHONE (OUTPATIENT)
Dept: NEUROLOGY | Facility: CLINIC | Age: 80
End: 2020-01-08

## 2020-01-08 NOTE — TELEPHONE ENCOUNTER
30 day post discharge follow up call       Hospitalization 11/15-11/23, [address] until 12/10/19  Please see my telephone encounter 12/17 for 7 day follow up call  The purpose of this phone call is to assess patient's general wellbeing or for any assistance needed with follow-up care  -    Last at [facility], [phone]  Called facility spoke with Amilcar Gross  Transferred the call to appropriate floor  Kelly Godwin who put the call on hold to [facility]  Since discharge, patient has not experienced any new or worsening stroke-like symptoms  States patient is currently awaiting long-term care placement  Continues to yell out, not behavioral  Continues to require supervision for meals  No change in diet, mechanical soft with thin liquids  Mobilizes via wheelchair, requires some assistance at times for propelling  Staff assists with ADLs  Average /60 in the evenings  2 decreased to 110/60  No reported medication changes, missed doses, medication side effects, signs of bleeding  Scheduled 1/22 with Dr Pearlie Peabody  RN Kendall Lee does not have any questions or concerns at this time

## 2020-01-22 ENCOUNTER — OFFICE VISIT (OUTPATIENT)
Dept: NEUROLOGY | Facility: CLINIC | Age: 80
End: 2020-01-22
Payer: COMMERCIAL

## 2020-01-22 VITALS — HEART RATE: 58 BPM | DIASTOLIC BLOOD PRESSURE: 64 MMHG | SYSTOLIC BLOOD PRESSURE: 152 MMHG | RESPIRATION RATE: 16 BRPM

## 2020-01-22 DIAGNOSIS — I60.9 SUBARACHNOID HEMORRHAGE (HCC): ICD-10-CM

## 2020-01-22 DIAGNOSIS — I69.30 SEQUELAE, POST-STROKE: Primary | ICD-10-CM

## 2020-01-22 DIAGNOSIS — R41.89 COGNITIVE CHANGES: ICD-10-CM

## 2020-01-22 DIAGNOSIS — I65.22 LEFT CAROTID ARTERY OCCLUSION: ICD-10-CM

## 2020-01-22 PROCEDURE — 99214 OFFICE O/P EST MOD 30 MIN: CPT | Performed by: PSYCHIATRY & NEUROLOGY

## 2020-01-22 NOTE — ASSESSMENT & PLAN NOTE
Unfortunately a very limited neurologic examination as result of patient's cooperative capability  Nonetheless does demonstrate the expected right hemiparesis  Unclear if his confusional issues are her as result of an attendant aphasia or if he does, indeed, have an underlying dementia  Appears to have been on either a arterial arterial embolic basis or perhaps through hypoperfusion in the presence of a chronic left common carotid artery occlusion  According to records patient was not taking any platelet antagonist at the time of his stroke  Presently on combined aspirin 81 mg and clopidogrel 75 mg daily  --would continue the combined aspirin and clopidogrel through to the middle of February  If stable at that point in time would then discontinue the clopidogrel and continue the daily 81 mg aspirin (since he was apparently essentially naive to platelet antagonists at the time of his stroke)  --continue statin  --continue to address medically his vascular risk factors

## 2020-01-22 NOTE — PROGRESS NOTES
Patient ID: Spencer White is a 67 y o  male  Assessment/Plan:    Sequelae, post-stroke  Unfortunately a very limited neurologic examination as result of patient's cooperative capability  Nonetheless does demonstrate the expected right hemiparesis  Unclear if his confusional issues are her as result of an attendant aphasia or if he does, indeed, have an underlying dementia  Appears to have been on either a arterial arterial embolic basis or perhaps through hypoperfusion in the presence of a chronic left common carotid artery occlusion  According to records patient was not taking any platelet antagonist at the time of his stroke  Presently on combined aspirin 81 mg and clopidogrel 75 mg daily  --would continue the combined aspirin and clopidogrel through to the middle of February  If stable at that point in time would then discontinue the clopidogrel and continue the daily 81 mg aspirin (since he was apparently essentially naive to platelet antagonists at the time of his stroke)  --continue statin  --continue to address medically his vascular risk factors  Left common carotid artery occlusion  As above  Demonstrated by CTA as a chronic occlusion, but with reconstitution of the supraclinoid portion  Status post small traumatic subarachnoid hemorrhage ([facility] [address] )  On initial presentation on November 14, 2019, after a fall discovered to have a small traumatic left frontal subarachnoid hemorrhage  This was evaluated by Neurosurgery and treated on a conservative basis  Follow-up CT brain in late November demonstrated his known strokes, but by report showed resolution of his subarachnoid hemorrhage  Cognitive changes  Unfortunately his neurologic examination today is limited, especially with regard to more detailed testing    According to discussion with personnel at [facility] he is confused and poorly redirected suggesting an underlying dementia perhaps complicated by some issues with symbolic language given the location of his recent strokes  One would have to suspect a vascular origin but would need more history with regard to his past status, prior to his fall and stroke  --will request B12 and folate levels and TSH   --hopefully can obtain more information with regard to his memory and cognitive status predating November of this past year  He will follow up in 3 months    Subjective:    HPI  A patient, 67years of age and right-handed, is being seen in follow-up from his November hospitalizations  He was accompanied today by an 8 from [place] who was un from a with the patient  As result, additional information was obtained by phone from Sue (nursing), who describes the patient as nonambulatory, confused and poorly redirected  In review of records, patient was admitted on November 14, 2019, after a fall striking his right knee with fracture but also demonstrating by CT brain a small traumatic left frontal subarachnoid hemorrhage  During hospitalization he unfortunately developed right-sided weakness  He was discovered to weakness involving his right arm and right leg  A CTA head neck demonstrated 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 with reconstitution of the supraclinoid portion  MRI brain demonstrated scattered infarcts in the left hemisphere felt to be embolic or perhaps watershed in origin  2D echocardiogram with no obvious source for cardiac emboli with an ejection fraction of 60%  Follow-up CT brain on 11/25/2019 demonstrated his new infarcts, but resolution of his a small subarachnoid  In review was records it would appear that he was not taking any of his prescribed medications prior to his fall incident, including aspirin  Patient confirmed that today  After an interval on rehab, he is now at the [facility] with his status as described above      Past Medical History:   Diagnosis Date    Coronary artery disease     history of CABG    Disease of thyroid gland     Hemorrhoids     last assessed 7/10/17    History of arterial duplex of LE 01/10/2017    Bilateral occlusion of the superficial femoral arteries, severe diminution of the ankle brachial index bilaterally, disease appears worse on left than right   History of echocardiogram 07/20/2011    hypokinesia of the inferior wall  EF 50%   HL (hearing loss)     Hyperlipidemia     Hypertension     PVD (peripheral vascular disease)      Past Surgical History:   Procedure Laterality Date    COLONOSCOPY      Last assessed 7/10/17    CORONARY ARTERY BYPASS GRAFT  08/23/2010    3V CABG:  LIMA to LAD, VG to RI, VG to OM1      DENTAL SURGERY      Last assessed  7/10/17    KNEE SURGERY Left     Last assessed 7/10/17    TONSILLECTOMY AND ADENOIDECTOMY      Last assessed 7/10/17    TOOTH EXTRACTION       Social History     Socioeconomic History    Marital status: Single     Spouse name: None    Number of children: 0    Years of education: None    Highest education level: None   Occupational History    Occupation: retired- factory maintenance   Social Needs    Financial resource strain: None    Food insecurity:     Worry: None     Inability: None    Transportation needs:     Medical: None     Non-medical: None   Tobacco Use    Smoking status: Current Every Day Smoker     Packs/day: 0 50     Types: Cigarettes    Smokeless tobacco: Never Used   Substance and Sexual Activity    Alcohol use: Not Currently     Alcohol/week: 1 0 standard drinks     Types: 1 Glasses of wine per week     Frequency: Monthly or less     Drinks per session: 1 or 2     Binge frequency: Less than monthly    Drug use: No    Sexual activity: Not Currently   Lifestyle    Physical activity:     Days per week: None     Minutes per session: None    Stress: None   Relationships    Social connections:     Talks on phone: None     Gets together: None     Attends Holiness service: None     Active member of club or organization: None Attends meetings of clubs or organizations: None     Relationship status: None    Intimate partner violence:     Fear of current or ex partner: None     Emotionally abused: None     Physically abused: None     Forced sexual activity: None   Other Topics Concern    None   Social History Narrative    Has no children    Lives a lone without help available    Previous  service: Louise deployed to [employer]     Family History   Problem Relation Age of Onset    Alzheimer's disease Mother     Heart disease Father         cardiac disorder    Heart disease Brother         cardiac disorder     No Known Allergies    Current Outpatient Medications:     acetaminophen (TYLENOL) 325 mg tablet, Take 650 mg by mouth every 6 (six) hours as needed for mild pain, Disp: , Rfl:     aspirin (ECOTRIN LOW STRENGTH) 81 mg EC tablet, Take 81 mg by mouth daily, Disp: , Rfl:     atorvastatin (LIPITOR) 40 mg tablet, Take 40 mg by mouth daily with dinner, Disp: , Rfl:     clopidogrel (PLAVIX) 75 mg tablet, Take 75 mg by mouth daily, Disp: , Rfl:     Fluoxetine HCl, PMDD, 20 MG CAPS, Take 20 mg by mouth daily, Disp: , Rfl:     hydrALAZINE (APRESOLINE) 50 mg tablet, Take 50 mg by mouth every 8 (eight) hours, Disp: , Rfl:     levothyroxine 75 mcg tablet, Take 75 mcg by mouth daily in the early morning, Disp: , Rfl:     lisinopril (ZESTRIL) 20 mg tablet, Take 20 mg by mouth 2 (two) times a day, Disp: , Rfl:     metoprolol tartrate (LOPRESSOR) 50 mg tablet, Take 50 mg by mouth every 12 (twelve) hours, Disp: , Rfl:     NIFEdipine (PROCARDIA XL) 60 mg 24 hr tablet, Take 60 mg by mouth daily, Disp: , Rfl:     Objective:    Blood pressure 152/64, pulse 58, resp  rate 16  Physical Exam  Thin in appearance  Head normocephalic  Eyes nonicteric with evidence of past cataract surgeries bilaterally  Unable to appreciate any anterior neck bruits  Lungs clear but with poor inspiratory effort  Rhythm regular    GI (abdomen) soft nontender with bowel sounds present  Bilateral lower extremity chronic changes  Wearing right knee brace  Neurological Exam limited as result of patient's cooperative capability  Answered correctly when asked his name  Could not correctly state his location despite the fact that this apparently is his home town  Unable to state the year although did correctly state the month  No difficulties with naming or repetition and he was able to accurately follow a 2 step request   Gait not assessed as patient is and apparently has been nonambulatory  Cranial nerves:  Pupils with evidence of past cataract surgeries bilaterally; visual fields full by confrontation; no gaze preference or nystagmus; soft right lower facial asymmetry present; unable to appreciate whispered voice bilaterally; tongue and palate midline  With motor testing patient made a very poor effort  However, with encouragement appeared to have fairly decent strength throughout the left upper and left lower extremities  Right hemiparesis present, and difficult to grade as result patient not providing good effort  Inconsistent and unreliable with sensory testing  Muscle stretch reflexes were mildly increased on the right as compared to left  Toe response appear to be upgoing bilaterally  ROS:    Review of Systems   Constitutional: Negative  Negative for appetite change and fever  HENT: Negative  Negative for hearing loss, tinnitus, trouble swallowing and voice change  Eyes: Negative  Negative for photophobia and pain  Respiratory: Negative  Negative for shortness of breath  Cardiovascular: Negative  Negative for palpitations  Gastrointestinal: Negative  Negative for nausea and vomiting  Endocrine: Negative  Negative for cold intolerance and heat intolerance  Genitourinary: Negative  Negative for dysuria, frequency and urgency  Musculoskeletal: Negative  Negative for myalgias and neck pain  Skin: Negative  Negative for rash  Neurological: Negative  Negative for dizziness, tremors, seizures, syncope, facial asymmetry, speech difficulty, weakness, light-headedness, numbness and headaches  Hematological: Bruises/bleeds easily  Psychiatric/Behavioral: Negative  Negative for confusion, hallucinations and sleep disturbance  I personally reviewed the ROS as entered by the medical assistant  *Please note this document was created using voice recognition software and may contain sound-alike word errors  *

## 2020-01-22 NOTE — LETTER
January 22, 2020     Winston Padilla DO  [facility] [address] [facility]    Patient: Mar Israel   YOB: 1940   Date of Visit: 1/22/2020       Dear Dr Rahul Brown: Thank you for referring Fernando Llanes to me for evaluation  Below are my notes for this consultation  If you have questions, please do not hesitate to call me  I look forward to following your patient along with you  Sincerely,        Hang Casillas MD        CC: No Recipients  Hang Casillas MD  1/22/2020  2:19 PM  Sign at close encounter  Patient ID: Mar Israel is a 67 y o  male  Assessment/Plan:    Sequelae, post-stroke  Unfortunately a very limited neurologic examination as result of patient's cooperative capability  Nonetheless does demonstrate the expected right hemiparesis  Unclear if his confusional issues are her as result of an attendant aphasia or if he does, indeed, have an underlying dementia  Appears to have been on either a arterial arterial embolic basis or perhaps through hypoperfusion in the presence of a chronic left common carotid artery occlusion  According to records patient was not taking any platelet antagonist at the time of his stroke  Presently on combined aspirin 81 mg and clopidogrel 75 mg daily  --would continue the combined aspirin and clopidogrel through to the middle of February  If stable at that point in time would then discontinue the clopidogrel and continue the daily 81 mg aspirin (since he was apparently essentially naive to platelet antagonists at the time of his stroke)  --continue statin  --continue to address medically his vascular risk factors  Left common carotid artery occlusion  As above  Demonstrated by CTA as a chronic occlusion, but with reconstitution of the supraclinoid portion      Status post small traumatic subarachnoid hemorrhage ([address] )  On initial presentation on November 14, 2019, after a fall discovered to have a small traumatic left frontal subarachnoid hemorrhage  This was evaluated by Neurosurgery and treated on a conservative basis  Follow-up CT brain in late November demonstrated his known strokes, but by report showed resolution of his subarachnoid hemorrhage  Cognitive changes  Unfortunately his neurologic examination today is limited, especially with regard to more detailed testing  According to discussion with personnel at [facility] he is confused and poorly redirected suggesting an underlying dementia perhaps complicated by some issues with symbolic language given the location of his recent strokes  One would have to suspect a vascular origin but would need more history with regard to his past status, prior to his fall and stroke  --will request B12 and folate levels and TSH   --hopefully can obtain more information with regard to his memory and cognitive status predating November of this past year  He will follow up in 3 months    Subjective:    HPI  A patient, 67years of age and right-handed, is being seen in follow-up from his November hospitalizations  He was accompanied today by an 8 from [facility] who was un from a with the patient  As result, additional information was obtained by phone from Sue (nursing), who describes the patient as nonambulatory, confused and poorly redirected  In review of records, patient was admitted on November 14, 2019, after a fall striking his right knee with fracture but also demonstrating by CT brain a small traumatic left frontal subarachnoid hemorrhage  During hospitalization he unfortunately developed right-sided weakness  He was discovered to weakness involving his right arm and right leg  A CTA head neck demonstrated 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 with reconstitution of the supraclinoid portion  MRI brain demonstrated scattered infarcts in the left hemisphere felt to be embolic or perhaps watershed in origin    2D echocardiogram with no obvious source for cardiac emboli with an ejection fraction of 60%  Follow-up CT brain on 11/25/2019 demonstrated his new infarcts, but resolution of his a small subarachnoid  In review was records it would appear that he was not taking any of his prescribed medications prior to his fall incident, including aspirin  Patient confirmed that today  After an interval on rehab, he is now at the [facility] with his status as described above  Past Medical History:   Diagnosis Date    Coronary artery disease     history of CABG    Disease of thyroid gland     Hemorrhoids     last assessed 7/10/17    History of arterial duplex of LE 01/10/2017    Bilateral occlusion of the superficial femoral arteries, severe diminution of the ankle brachial index bilaterally, disease appears worse on left than right   History of echocardiogram 07/20/2011    hypokinesia of the inferior wall  EF 50%   HL (hearing loss)     Hyperlipidemia     Hypertension     PVD (peripheral vascular disease)      Past Surgical History:   Procedure Laterality Date    COLONOSCOPY      Last assessed 7/10/17    CORONARY ARTERY BYPASS GRAFT  08/23/2010    3V CABG:  LIMA to LAD, VG to RI, VG to OM1      DENTAL SURGERY      Last assessed  7/10/17    KNEE SURGERY Left     Last assessed 7/10/17    TONSILLECTOMY AND ADENOIDECTOMY      Last assessed 7/10/17    TOOTH EXTRACTION       Social History     Socioeconomic History    Marital status: Single     Spouse name: None    Number of children: 0    Years of education: None    Highest education level: None   Occupational History    Occupation: retired- factory maintenance   Social Needs    Financial resource strain: None    Food insecurity:     Worry: None     Inability: None    Transportation needs:     Medical: None     Non-medical: None   Tobacco Use    Smoking status: Current Every Day Smoker     Packs/day: 0 50     Types: Cigarettes    Smokeless tobacco: Never Used   Substance and Sexual Activity    Alcohol use: Not Currently     Alcohol/week: 1 0 standard drinks     Types: 1 Glasses of wine per week     Frequency: Monthly or less     Drinks per session: 1 or 2     Binge frequency: Less than monthly    Drug use: No    Sexual activity: Not Currently   Lifestyle    Physical activity:     Days per week: None     Minutes per session: None    Stress: None   Relationships    Social connections:     Talks on phone: None     Gets together: None     Attends Bahai service: None     Active member of club or organization: None     Attends meetings of clubs or organizations: None     Relationship status: None    Intimate partner violence:     Fear of current or ex partner: None     Emotionally abused: None     Physically abused: None     Forced sexual activity: None   Other Topics Concern    None   Social History Narrative    Has no children    Lives a lone without help available    Previous  service: [employer] deployed to [employer]     Family History   Problem Relation Age of Onset    Alzheimer's disease Mother     Heart disease Father         cardiac disorder    Heart disease Brother         cardiac disorder     No Known Allergies    Current Outpatient Medications:     acetaminophen (TYLENOL) 325 mg tablet, Take 650 mg by mouth every 6 (six) hours as needed for mild pain, Disp: , Rfl:     aspirin (ECOTRIN LOW STRENGTH) 81 mg EC tablet, Take 81 mg by mouth daily, Disp: , Rfl:     atorvastatin (LIPITOR) 40 mg tablet, Take 40 mg by mouth daily with dinner, Disp: , Rfl:     clopidogrel (PLAVIX) 75 mg tablet, Take 75 mg by mouth daily, Disp: , Rfl:     Fluoxetine HCl, PMDD, 20 MG CAPS, Take 20 mg by mouth daily, Disp: , Rfl:     hydrALAZINE (APRESOLINE) 50 mg tablet, Take 50 mg by mouth every 8 (eight) hours, Disp: , Rfl:     levothyroxine 75 mcg tablet, Take 75 mcg by mouth daily in the early morning, Disp: , Rfl:     lisinopril (ZESTRIL) 20 mg tablet, Take 20 mg by mouth 2 (two) times a day, Disp: , Rfl:     metoprolol tartrate (LOPRESSOR) 50 mg tablet, Take 50 mg by mouth every 12 (twelve) hours, Disp: , Rfl:     NIFEdipine (PROCARDIA XL) 60 mg 24 hr tablet, Take 60 mg by mouth daily, Disp: , Rfl:     Objective:    Blood pressure 152/64, pulse 58, resp  rate 16  Physical Exam  Thin in appearance  Head normocephalic  Eyes nonicteric with evidence of past cataract surgeries bilaterally  Unable to appreciate any anterior neck bruits  Lungs clear but with poor inspiratory effort  Rhythm regular  GI (abdomen) soft nontender with bowel sounds present  Bilateral lower extremity chronic changes  Wearing right knee brace  Neurological Exam limited as result of patient's cooperative capability  Answered correctly when asked his name  Could not correctly state his location despite the fact that this apparently is his home town  Unable to state the year although did correctly state the month  No difficulties with naming or repetition and he was able to accurately follow a 2 step request   Gait not assessed as patient is and apparently has been nonambulatory  Cranial nerves:  Pupils with evidence of past cataract surgeries bilaterally; visual fields full by confrontation; no gaze preference or nystagmus; soft right lower facial asymmetry present; unable to appreciate whispered voice bilaterally; tongue and palate midline  With motor testing patient made a very poor effort  However, with encouragement appeared to have fairly decent strength throughout the left upper and left lower extremities  Right hemiparesis present, and difficult to grade as result patient not providing good effort  Inconsistent and unreliable with sensory testing  Muscle stretch reflexes were mildly increased on the right as compared to left  Toe response appear to be upgoing bilaterally  ROS:    Review of Systems   Constitutional: Negative  Negative for appetite change and fever  HENT: Negative  Negative for hearing loss, tinnitus, trouble swallowing and voice change  Eyes: Negative  Negative for photophobia and pain  Respiratory: Negative  Negative for shortness of breath  Cardiovascular: Negative  Negative for palpitations  Gastrointestinal: Negative  Negative for nausea and vomiting  Endocrine: Negative  Negative for cold intolerance and heat intolerance  Genitourinary: Negative  Negative for dysuria, frequency and urgency  Musculoskeletal: Negative  Negative for myalgias and neck pain  Skin: Negative  Negative for rash  Neurological: Negative  Negative for dizziness, tremors, seizures, syncope, facial asymmetry, speech difficulty, weakness, light-headedness, numbness and headaches  Hematological: Bruises/bleeds easily  Psychiatric/Behavioral: Negative  Negative for confusion, hallucinations and sleep disturbance  I personally reviewed the ROS as entered by the medical assistant  *Please note this document was created using voice recognition software and may contain sound-alike word errors  *

## 2020-01-22 NOTE — ASSESSMENT & PLAN NOTE
As above  Demonstrated by CTA as a chronic occlusion, but with reconstitution of the supraclinoid portion

## 2020-01-22 NOTE — ASSESSMENT & PLAN NOTE
On initial presentation on November 14, 2019, after a fall discovered to have a small traumatic left frontal subarachnoid hemorrhage  This was evaluated by Neurosurgery and treated on a conservative basis  Follow-up CT brain in late November demonstrated his known strokes, but by report showed resolution of his subarachnoid hemorrhage

## 2020-01-22 NOTE — ASSESSMENT & PLAN NOTE
Unfortunately his neurologic examination today is limited, especially with regard to more detailed testing  According to discussion with personnel at [facility] he is confused and poorly redirected suggesting an underlying dementia perhaps complicated by some issues with symbolic language given the location of his recent strokes  One would have to suspect a vascular origin but would need more history with regard to his past status, prior to his fall and stroke  --will request B12 and folate levels and TSH   --hopefully can obtain more information with regard to his memory and cognitive status predating November of this past year

## 2020-01-27 ENCOUNTER — OFFICE VISIT (OUTPATIENT)
Dept: OBGYN CLINIC | Facility: MEDICAL CENTER | Age: 80
End: 2020-01-27
Payer: COMMERCIAL

## 2020-01-27 ENCOUNTER — APPOINTMENT (OUTPATIENT)
Dept: RADIOLOGY | Facility: MEDICAL CENTER | Age: 80
End: 2020-01-27
Payer: COMMERCIAL

## 2020-01-27 VITALS
BODY MASS INDEX: 19.33 KG/M2 | HEIGHT: 70 IN | HEART RATE: 66 BPM | SYSTOLIC BLOOD PRESSURE: 117 MMHG | DIASTOLIC BLOOD PRESSURE: 69 MMHG | WEIGHT: 135 LBS

## 2020-01-27 DIAGNOSIS — S82.141A CLOSED FRACTURE OF RIGHT TIBIAL PLATEAU, INITIAL ENCOUNTER: ICD-10-CM

## 2020-01-27 DIAGNOSIS — S82.141A CLOSED FRACTURE OF RIGHT TIBIAL PLATEAU, INITIAL ENCOUNTER: Primary | ICD-10-CM

## 2020-01-27 PROCEDURE — 73564 X-RAY EXAM KNEE 4 OR MORE: CPT

## 2020-01-27 PROCEDURE — 99213 OFFICE O/P EST LOW 20 MIN: CPT | Performed by: ORTHOPAEDIC SURGERY

## 2020-01-27 NOTE — PROGRESS NOTES
Ortho Sports Medicine Knee Follow Up Visit     Assesment:   67year old male with right non displaced lateral tibial plateau fracture with appropriate continued healing    Plan:    Conservative treatment:    Ice to knee for 20 minutes at least 1-2 times daily  May discontinue use of brace at this time  Can WB as tolerated    Imaging: All imaging from today was reviewed by myself and explained to the patient  Injection:    No Injection planned at this time  Surgery:     No surgery is recommended at this point, continue with conservative treatment plan as noted  Follow up:    Return if symptoms worsen or fail to improve  Chief Complaint   Patient presents with    Right Leg - Follow-up, Fracture       History of Present Illness: The patient is returns for follow up of right non displaced lateral tibial plateau fracture  Since the prior visit, He reports minimal improvement  Pain is located anterior, lateral      Pain is improved by rest and bracing  Pain is aggravated by sitting  Symptoms include swelling  The patient has tried rest, ice and bracing  I have reviewed the past medical, surgical, social and family history, medications and allergies as documented in the EMR  Review of systems: ROS is negative other than that noted in the HPI  Constitutional: Negative for fatigue and fever  Physical Exam:    Blood pressure 117/69, pulse 66, height 5' 10" (1 778 m), weight 61 2 kg (135 lb)  General/Constitutional: NAD, well developed, well nourished  HENT: Normocephalic, atraumatic  CV: Intact distal pulses, regular rate  Resp: No respiratory distress or labored breathing  Lymphatic: No lymphadenopathy palpated  Neuro: Alert and Oriented x 3, no focal deficits  Psych: Normal mood, normal affect, normal judgement, normal behavior  Skin: Warm, dry, no rashes, no erythema      Knee Exam (focused):                RIGHT LEFT   ROM:   0-95 0-130   Palpation: Effusion negative negative     MJL tenderness Negative Negative     LJL tenderness Positive Negative   Meniscus: Nedra Negative Negative    Apley's Compression Negative Negative   Instability: Varus stable stable     Valgus stable stable   Special Tests: Lachman Negative Negative     Posterior drawer Negative Negative     Anterior drawer Negative Negative     Pivot shift not tested not tested     Dial not tested not tested   Patella: Palpation no tenderness no tenderness     Mobility 1/4 1/4     Apprehension Negative Negative   Other: Single leg 1/4 squat not tested not tested      LE NV Exam: +2 DP/PT pulses bilaterally  Sensation intact to light touch L2-S1 bilaterally    No calf tenderness to palpation bilaterally      Knee Imaging    New Xrays were performed today of the left knee and demonstrate appropriate continued healing of the lateral tibial plateau fracture  There was no radiology report available for review, but the report will be assessed when it is available        Scribe Attestation    I,:   Refugio Harrison am acting as a scribe while in the presence of the attending physician :        I,:   Marin Eckert DO personally performed the services described in this documentation    as scribed in my presence :

## 2020-04-24 ENCOUNTER — TELEPHONE (OUTPATIENT)
Dept: NEUROLOGY | Facility: CLINIC | Age: 80
End: 2020-04-24

## 2020-05-05 ENCOUNTER — TELEPHONE (OUTPATIENT)
Dept: NEUROLOGY | Facility: CLINIC | Age: 80
End: 2020-05-05

## 2020-05-06 ENCOUNTER — TELEPHONE (OUTPATIENT)
Dept: NEUROLOGY | Facility: CLINIC | Age: 80
End: 2020-05-06

## 2020-05-11 ENCOUNTER — TELEMEDICINE (OUTPATIENT)
Dept: NEUROLOGY | Facility: CLINIC | Age: 80
End: 2020-05-11
Payer: COMMERCIAL

## 2020-05-11 VITALS — WEIGHT: 133 LBS | BODY MASS INDEX: 22.16 KG/M2 | HEIGHT: 65 IN

## 2020-05-11 DIAGNOSIS — I69.30 SEQUELAE, POST-STROKE: Primary | ICD-10-CM

## 2020-05-11 DIAGNOSIS — I60.9 SUBARACHNOID HEMORRHAGE (HCC): ICD-10-CM

## 2020-05-11 DIAGNOSIS — I65.22 LEFT CAROTID ARTERY OCCLUSION: ICD-10-CM

## 2020-05-11 DIAGNOSIS — R41.89 COGNITIVE CHANGES: ICD-10-CM

## 2020-05-11 PROCEDURE — 99214 OFFICE O/P EST MOD 30 MIN: CPT | Performed by: PSYCHIATRY & NEUROLOGY

## 2020-05-11 PROCEDURE — 1160F RVW MEDS BY RX/DR IN RCRD: CPT | Performed by: PSYCHIATRY & NEUROLOGY

## 2020-05-11 RX ORDER — AMLODIPINE BESYLATE 5 MG/1
5 TABLET ORAL DAILY
COMMUNITY
Start: 2020-03-03

## 2020-07-02 ENCOUNTER — TELEMEDICINE (OUTPATIENT)
Dept: CARDIOLOGY CLINIC | Facility: CLINIC | Age: 80
End: 2020-07-02
Payer: COMMERCIAL

## 2020-07-02 VITALS
BODY MASS INDEX: 22.13 KG/M2 | DIASTOLIC BLOOD PRESSURE: 68 MMHG | HEART RATE: 54 BPM | HEIGHT: 65 IN | SYSTOLIC BLOOD PRESSURE: 166 MMHG

## 2020-07-02 DIAGNOSIS — E78.2 MIXED HYPERLIPIDEMIA: ICD-10-CM

## 2020-07-02 DIAGNOSIS — I73.9 PERIPHERAL VASCULAR DISEASE (HCC): ICD-10-CM

## 2020-07-02 DIAGNOSIS — I25.10 CORONARY ARTERY DISEASE INVOLVING NATIVE CORONARY ARTERY OF NATIVE HEART WITHOUT ANGINA PECTORIS: Primary | ICD-10-CM

## 2020-07-02 PROCEDURE — 99214 OFFICE O/P EST MOD 30 MIN: CPT | Performed by: INTERNAL MEDICINE

## 2020-07-02 PROCEDURE — 3078F DIAST BP <80 MM HG: CPT | Performed by: INTERNAL MEDICINE

## 2020-07-02 PROCEDURE — 1036F TOBACCO NON-USER: CPT | Performed by: INTERNAL MEDICINE

## 2020-07-02 PROCEDURE — 3077F SYST BP >= 140 MM HG: CPT | Performed by: INTERNAL MEDICINE

## 2020-07-02 PROCEDURE — 1160F RVW MEDS BY RX/DR IN RCRD: CPT | Performed by: INTERNAL MEDICINE

## 2020-07-02 NOTE — PROGRESS NOTES
Virtual Regular Visit      Assessment/Plan:    Problem List Items Addressed This Visit        Cardiovascular and Mediastinum    CAD (coronary artery disease) - Primary     Fortunately no sxs  Many years post CABG  He was not interested in further interventions at the time of last visit when he was more cogent  Peripheral vascular disease (HCC)     Severe but again when cogent he was not interested in interventions  Other    Hyperlipidemia     Will switch to higher intensity statin therapy  Follow up Plan: Will f/u as needed  He is now residing full time at [facility]  HPI:  Patient is seen today by video visit  He went to [facility] for [facility] but is now there permanently  He certainly recognize me  He had no complaints today  No recent chest pain or leg pain  Ambulation has become very limited and he is in a wheelchair presently  To reiterate, on 08/23/2010 he underwent 3 vessel CABG with left internal mammary to the LAD, vein graft to the ramus intermediate, and vein graft to the obtuse marginal 1  Most recent or relevant cardiac/vascular testing:      Echocardiogram 11/17/2019: Normal overall EF  Mild pulm HTN  Lower extremity arterial duplex 6/4/2019:  Right leg with Femoral artery occlusion  Ankle brachial index of 0 43  Left leg with ankle brachial index of 0 31 with occlusion of the femoral popliteal and anterior tibial arteries  Past Surgical History:   Procedure Laterality Date    COLONOSCOPY      Last assessed 7/10/17    CORONARY ARTERY BYPASS GRAFT  08/23/2010    3V CABG:  LIMA to LAD, VG to RI, VG to OM1     [facility] DENTAL SURGERY      Last assessed  7/10/17    KNEE SURGERY Left     Last assessed 7/10/17    TONSILLECTOMY AND ADENOIDECTOMY      Last assessed 7/10/17    TOOTH EXTRACTION       CMP:   Lab Results   Component Value Date     (H) 03/11/2015    K 3 6 11/23/2019    K 4 1 03/11/2015     11/23/2019     03/11/2015    CO2 26 11/23/2019    CO2 29 2 03/11/2015    BUN 27 (H) 11/23/2019    BUN 12 03/11/2015    CREATININE 1 06 11/23/2019    CREATININE 0 93 03/11/2015    GLUCOSE 82 03/11/2015    EGFR 67 11/23/2019       Lipid Profile:   Lab Results   Component Value Date    CHOL 122 03/11/2015    TRIG 103 11/15/2019    TRIG 80 03/11/2015    HDL 52 11/15/2019    HDL 43 03/11/2015         Review of Systems   Not reliably obtainable  Gait: Wheelchair  According to his health assistant he has had some behavioral issues of late for which he is now on Haldol  Objective:       PHYSICAL EXAM:    This was a virtual visit and no formal exam could be performed  Home vitals were: /68   Pulse (!) 54   Ht 5' 5" (1 651 m)   BMI 22 13 kg/m²     No dyspnea  He is animated  He recognized me  No edema        Current Outpatient Medications:     acetaminophen (TYLENOL) 325 mg tablet, Take 650 mg by mouth every 6 (six) hours as needed for mild pain, Disp: , Rfl:     ALLOPURINOL PO, Take by mouth, Disp: , Rfl:     amLODIPine (NORVASC) 5 mg tablet, , Disp: , Rfl:     aspirin (ECOTRIN LOW STRENGTH) 81 mg EC tablet, Take 81 mg by mouth daily, Disp: , Rfl:     atorvastatin (LIPITOR) 40 mg tablet, Take 40 mg by mouth daily with dinner, Disp: , Rfl:     Fluoxetine HCl, PMDD, 20 MG CAPS, Take 20 mg by mouth daily, Disp: , Rfl:     hydrALAZINE (APRESOLINE) 50 mg tablet, Take 50 mg by mouth every 8 (eight) hours, Disp: , Rfl:     levothyroxine 75 mcg tablet, Take 75 mcg by mouth daily in the early morning, Disp: , Rfl:     lisinopril (ZESTRIL) 20 mg tablet, Take 20 mg by mouth 2 (two) times a day, Disp: , Rfl:     metoprolol tartrate (LOPRESSOR) 50 mg tablet, Take 50 mg by mouth every 12 (twelve) hours, Disp: , Rfl:     silver sulfadiazine (SILVADENE,SSD) 1 % cream, , Disp: , Rfl:     clopidogrel (PLAVIX) 75 mg tablet, Take 75 mg by mouth daily, Disp: , Rfl:     NIFEdipine (PROCARDIA XL) 60 mg 24 hr tablet, Take 60 mg by mouth daily, Disp: , Rfl:   No Known Allergies  Past Medical History:   Diagnosis Date    Coronary artery disease     history of CABG    CVA (cerebral vascular accident)     Disease of thyroid gland     Hemorrhoids     last assessed 7/10/17    History of arterial duplex of LE 01/10/2017    Bilateral occlusion of the superficial femoral arteries, severe diminution of the ankle brachial index bilaterally, disease appears worse on left than right   History of echocardiogram 07/20/2011    hypokinesia of the inferior wall  EF 50%   HL (hearing loss)     Hyperlipidemia     Hypertension     PVD (peripheral vascular disease)            Social History     Tobacco Use   Smoking Status Former Smoker    Packs/day: 0 50    Types: Cigarettes   Smokeless Tobacco Never Used               Reason for visit is   Chief Complaint   Patient presents with    Follow-up    Virtual Regular Visit        Encounter provider Alissa Flynn MD    Provider located at [address] [id number]      Recent Visits  No visits were found meeting these conditions  Showing recent visits within past 7 days and meeting all other requirements     Today's Visits  Date Type Provider Dept   07/02/20 Telemedicine Alissa Flynn MD [facility] today's visits and meeting all other requirements     Future Appointments  No visits were found meeting these conditions  Showing future appointments within next 150 days and meeting all other requirements        The patient was identified by name and date of birth  Jefflphia Krabbe was informed that this is a telemedicine visit and that the visit is being conducted through [employer]  My office door was closed  No one else was in the room  He acknowledged consent and understanding of privacy and security of the video platform   The patient has agreed to participate and understands they can discontinue the visit at any time  Patient is aware this is a billable service  Subjective  Abdon Crane is a 79 y o  male    HPI     Past Medical History:   Diagnosis Date    Coronary artery disease     history of CABG    CVA (cerebral vascular accident)     Disease of thyroid gland     Hemorrhoids     last assessed 7/10/17    History of arterial duplex of LE 01/10/2017    Bilateral occlusion of the superficial femoral arteries, severe diminution of the ankle brachial index bilaterally, disease appears worse on left than right   History of echocardiogram 07/20/2011    hypokinesia of the inferior wall  EF 50%   HL (hearing loss)     Hyperlipidemia     Hypertension     PVD (peripheral vascular disease)        Past Surgical History:   Procedure Laterality Date    COLONOSCOPY      Last assessed 7/10/17    CORONARY ARTERY BYPASS GRAFT  08/23/2010    3V CABG:  LIMA to LAD, VG to RI, VG to OM1      DENTAL SURGERY      Last assessed  7/10/17    KNEE SURGERY Left     Last assessed 7/10/17    TONSILLECTOMY AND ADENOIDECTOMY      Last assessed 7/10/17    TOOTH EXTRACTION         Current Outpatient Medications   Medication Sig Dispense Refill    acetaminophen (TYLENOL) 325 mg tablet Take 650 mg by mouth every 6 (six) hours as needed for mild pain      ALLOPURINOL PO Take by mouth      amLODIPine (NORVASC) 5 mg tablet       aspirin (ECOTRIN LOW STRENGTH) 81 mg EC tablet Take 81 mg by mouth daily      atorvastatin (LIPITOR) 40 mg tablet Take 40 mg by mouth daily with dinner      Fluoxetine HCl, PMDD, 20 MG CAPS Take 20 mg by mouth daily      hydrALAZINE (APRESOLINE) 50 mg tablet Take 50 mg by mouth every 8 (eight) hours      levothyroxine 75 mcg tablet Take 75 mcg by mouth daily in the early morning      lisinopril (ZESTRIL) 20 mg tablet Take 20 mg by mouth 2 (two) times a day      metoprolol tartrate (LOPRESSOR) 50 mg tablet Take 50 mg by mouth every 12 (twelve) hours      silver sulfadiazine (SILVADENE,SSD) 1 % cream       clopidogrel (PLAVIX) 75 mg tablet Take 75 mg by mouth daily      NIFEdipine (PROCARDIA XL) 60 mg 24 hr tablet Take 60 mg by mouth daily       No current facility-administered medications for this visit  No Known Allergies    Review of Systems    Video Exam    Vitals:    07/02/20 1108   BP: 166/68   Pulse: (!) 54   Height: 5' 5" (1 651 m)       Physical Exam     As a result of this visit, I have not referred the patient for further respiratory evaluation  I spent 20 minutes directly with the patient during this visit      VIRTUAL VISIT DISCLAIMER    Ada Cooper acknowledges that he has consented to an online visit or consultation  He understands that the online visit is based solely on information provided by him, and that, in the absence of a face-to-face physical evaluation by the physician, the diagnosis he receives is both limited and provisional in terms of accuracy and completeness  This is not intended to replace a full medical face-to-face evaluation by the physician  Ada Cooper understands and accepts these terms

## 2020-07-02 NOTE — ASSESSMENT & PLAN NOTE
Fortunately no sxs  Many years post CABG  He was not interested in further interventions at the time of last visit when he was more cogent

## 2020-10-15 ENCOUNTER — TELEMEDICINE (OUTPATIENT)
Dept: NEUROLOGY | Facility: CLINIC | Age: 80
End: 2020-10-15
Payer: COMMERCIAL

## 2020-10-15 VITALS
HEIGHT: 68 IN | SYSTOLIC BLOOD PRESSURE: 128 MMHG | WEIGHT: 128 LBS | DIASTOLIC BLOOD PRESSURE: 72 MMHG | BODY MASS INDEX: 19.4 KG/M2 | HEART RATE: 76 BPM | TEMPERATURE: 97.6 F | RESPIRATION RATE: 14 BRPM

## 2020-10-15 DIAGNOSIS — I65.22 LEFT CAROTID ARTERY OCCLUSION: ICD-10-CM

## 2020-10-15 DIAGNOSIS — I69.30 SEQUELAE, POST-STROKE: Primary | ICD-10-CM

## 2020-10-15 DIAGNOSIS — R41.89 COGNITIVE CHANGES: ICD-10-CM

## 2020-10-15 PROCEDURE — 1160F RVW MEDS BY RX/DR IN RCRD: CPT | Performed by: PSYCHIATRY & NEUROLOGY

## 2020-10-15 PROCEDURE — 99213 OFFICE O/P EST LOW 20 MIN: CPT | Performed by: PSYCHIATRY & NEUROLOGY

## 2020-10-15 PROCEDURE — 3078F DIAST BP <80 MM HG: CPT | Performed by: PSYCHIATRY & NEUROLOGY

## 2020-10-15 RX ORDER — OLANZAPINE 2.5 MG/1
TABLET ORAL
Status: ON HOLD | COMMUNITY
Start: 2020-09-15 | End: 2021-10-22 | Stop reason: ALTCHOICE

## 2020-10-15 RX ORDER — RIVASTIGMINE 4.6 MG/24H
PATCH, EXTENDED RELEASE TRANSDERMAL
COMMUNITY
Start: 2020-10-06

## 2020-10-15 RX ORDER — LANOLIN ALCOHOL/MO/W.PET/CERES
1000 CREAM (GRAM) TOPICAL DAILY
COMMUNITY

## 2020-10-15 RX ORDER — MEMANTINE HYDROCHLORIDE 5 MG/1
5 TABLET ORAL 2 TIMES DAILY
COMMUNITY
Start: 2020-09-22

## 2020-10-15 RX ORDER — TRAZODONE HYDROCHLORIDE 50 MG/1
TABLET ORAL
COMMUNITY
Start: 2020-09-15

## 2021-04-18 NOTE — PROGRESS NOTES
Patient ID: Pascual Valdivia is a 79 y o  male  Assessment/Plan:  CVA November 2019  Patient remains clinically stable, there been no interim hospitalizations  --continue on aspirin 81 mg  --continue on statin therapy, patient needs updated lipid panel  --encouraged good blood pressure control, BP mildly elevated at today's visit, to follow up with PCP  --encourage good blood sugar control      Cognitive changes  Patient with underlying vascular dementing illness, complicated by prior stroke  --patient in past noted to have low B12 levels continue on supplement, left level noted at 936     --discussed that the medications available are not typically responsive to vascular dementing illness, an are not neuroprotective and only provided temporary benefit  --continues on Namenda and rivastigmine  ----patient working with Psychiatry regarding behavioral issues have left this to their expertise      No problem-specific Assessment & Plan notes found for this encounter  Diagnoses and all orders for this visit:    CVA (cerebral vascular accident) [facility])    Cognitive changes    Left common carotid artery occlusion             Subjective:    JOS   Maribel Sultana is a 52-year-old gentleman with hypertension, hyperlipidemia, CAD, PVD, who presents today for neurologic follow-up for prior CVA 2019 as well as cognitive issues  Patient resides at nursing facility  Patient seen in the hospital back November 2019, MRI demonstrated scattered infarcts left hemisphere felt to either be embolic or watershed  Patient with residual aphasia and cognitive issues  He is on aspirin and statin  CTA done at the time of his stroke demonstrated chronic occlusion left common carotid artery however with reconstitution of the supraclinoid portion  Patient's cognitive issues felt to be multifocal to include underlying vascular issues, as well as psych  Patient was last seen in October 2020   At that point, there been no further signs and symptoms CVA  His primary complaint was in regards to some behavior issues  He was asked to follow up with Psychiatry  Today, patient accompanied by an aide Kevin from the facility  Patient himself unable to provide any significant information  In the interim, there has been no communication regarding recent hospitalizations, a further new neurologic issues  He continues to have a dense right hemiparesis  According to [facility], patient needs assistance with all his ADLs, is nonambulatory  He does wheel himself around using his right leg  At times, he can get quite agitated however is redirected  No reported problem swallowing or chewing  Patient himself today without any significant complaints  He was cooperative and pleasant  Appears quite hard of hearing  Able to follow commands  When questioned how he was, stated he was all tight up  He continues on aspirin and statin therapy  He continues on a combination of memantine and rivastigmine  No reported side effects        Labs 01/15/2021; calcium = 7 8, GFR = 68, hemoglobin = 12 0, macro = 35 0, platelets = 894          The following portions of the patient's history were reviewed and updated as appropriate: allergies, current medications, past family history, past medical history, past social history, past surgical history and problem list          Objective:  Current Outpatient Medications   Medication Sig Dispense Refill    acetaminophen (TYLENOL) 325 mg tablet Take 650 mg by mouth every 6 (six) hours as needed for mild pain      amLODIPine (NORVASC) 5 mg tablet Take 5 mg by mouth daily       Artificial Tears 1 4 % ophthalmic solution       aspirin (ECOTRIN LOW STRENGTH) 81 mg EC tablet Take 81 mg by mouth daily      atorvastatin (LIPITOR) 40 mg tablet Take 40 mg by mouth daily with dinner      Fluoxetine HCl, PMDD, 20 MG CAPS Take 20 mg by mouth daily      hydrALAZINE (APRESOLINE) 50 mg tablet Take 50 mg by mouth every 8 (eight) hours      levothyroxine 75 mcg tablet Take 75 mcg by mouth daily in the early morning      lisinopril (ZESTRIL) 20 mg tablet Take 20 mg by mouth 2 (two) times a day      memantine (NAMENDA) 5 mg tablet 5 mg 2 (two) times a day       metoprolol tartrate (LOPRESSOR) 50 mg tablet Take 50 mg by mouth every 12 (twelve) hours      OLANZapine (ZyPREXA) 2 5 mg tablet       rivastigmine (EXELON) 4 6 mg/24 hr TD 24 hr patch       silver sulfadiazine (SILVADENE,SSD) 1 % cream       traZODone (DESYREL) 50 mg tablet       vitamin B-12 (VITAMIN B-12) 1,000 mcg tablet Take 1,000 mcg by mouth daily      ALLOPURINOL PO Take by mouth      clopidogrel (PLAVIX) 75 mg tablet Take 75 mg by mouth daily      NIFEdipine (PROCARDIA XL) 60 mg 24 hr tablet Take 60 mg by mouth daily       No current facility-administered medications for this visit  Blood pressure 158/62, pulse (!) 51, resp  rate 16  Physical Exam  Constitutional:       General: He is awake  Appearance: Normal appearance  HENT:      Head: Normocephalic  Eyes:      Pupils: Pupils are equal, round, and reactive to light  Cardiovascular:      Rate and Rhythm: Regular rhythm  Bradycardia present  Pulses: Normal pulses  Pulmonary:      Effort: Pulmonary effort is normal    Musculoskeletal: Normal range of motion  Neurological:      Mental Status: He is alert  Cranial Nerves: Dysarthria present  Psychiatric:         Mood and Affect: Mood normal          Behavior: Behavior normal          Cognition and Memory: Cognition is impaired  Neurological Exam  Mental Status  Awake and alert  Oriented only to person  Mild dysarthria present  Able to name objects  Follows two-step commands  Patient able to tell me his 1st name, could not relay his last name  Followed simple commands  Speech mildly dysarthric  At times noted to have intermittent yelling, however, at times he was able to put full sentences together      Cranial Nerves  CN III, IV, VI: Diminished smooth pursuit  Pupils equal round and reactive to light bilaterally  CN VII:  Right: There is central facial weakness  CN VIII: Appeared hard of hearing  CN XI:  Right: Sternocleidomastoid strength is weak  Trapezius strength is weak  Left: Sternocleidomastoid strength is normal  Trapezius strength is normal   CN XII: Tongue midline without atrophy or fasciculations  Motor  Normal muscle bulk throughout  No abnormal involuntary movements  Right hemiparesis  Left-sided fairly intact, upper extremities at least 5/5, left lower extremity able to hold leg up off the foot rest, mild decreased full knee extension  Coordination  Left: Finger-to-nose normal   Coordination grossly intact on left  Gait  Casual gait:  Patient nonambulatory, brought today in a wheelchair  ROS:  Personally reviewed    Review of Systems  Constitutional: Positive for appetite change (patient eats 50% of his food at meal  time)  Negative for fever  HENT: Negative  Negative for hearing loss, tinnitus, trouble swallowing and voice change  Eyes: Negative  Negative for photophobia and pain  Respiratory: Negative  Negative for shortness of breath  Cardiovascular: Negative  Negative for palpitations  Gastrointestinal: Negative  Negative for nausea and vomiting  Endocrine: Negative  Negative for cold intolerance  Genitourinary: Negative for dysuria, frequency and urgency  Incontinent of bal and urine   Musculoskeletal: Negative  Negative for myalgias and neck pain  Skin: Negative  Negative for rash  Neurological: Negative  Negative for dizziness, tremors, seizures, syncope, facial asymmetry, speech difficulty (non verbal at times), weakness, light-headedness, numbness and headaches  Hematological: Bruises/bleeds easily  Psychiatric/Behavioral: Positive for agitation  Negative for confusion, hallucinations and sleep disturbance   The patient is nervous/anxious

## 2021-04-20 ENCOUNTER — OFFICE VISIT (OUTPATIENT)
Dept: NEUROLOGY | Facility: CLINIC | Age: 81
End: 2021-04-20
Payer: COMMERCIAL

## 2021-04-20 VITALS — RESPIRATION RATE: 16 BRPM | HEART RATE: 51 BPM | SYSTOLIC BLOOD PRESSURE: 158 MMHG | DIASTOLIC BLOOD PRESSURE: 62 MMHG

## 2021-04-20 DIAGNOSIS — I65.22 LEFT CAROTID ARTERY OCCLUSION: ICD-10-CM

## 2021-04-20 DIAGNOSIS — R41.89 COGNITIVE CHANGES: ICD-10-CM

## 2021-04-20 DIAGNOSIS — I63.9 CVA (CEREBRAL VASCULAR ACCIDENT) (HCC): Primary | ICD-10-CM

## 2021-04-20 PROCEDURE — 99214 OFFICE O/P EST MOD 30 MIN: CPT | Performed by: NURSE PRACTITIONER

## 2021-04-20 RX ORDER — POLYVINYL ALCOHOL 14 MG/ML
SOLUTION/ DROPS OPHTHALMIC
COMMUNITY
Start: 2021-02-03

## 2021-04-20 NOTE — PROGRESS NOTES
Review of Systems   Constitutional: Positive for appetite change (patient eats 50% of his food at meal  time)  Negative for fever  HENT: Negative  Negative for hearing loss, tinnitus, trouble swallowing and voice change  Eyes: Negative  Negative for photophobia and pain  Respiratory: Negative  Negative for shortness of breath  Cardiovascular: Negative  Negative for palpitations  Gastrointestinal: Negative  Negative for nausea and vomiting  Endocrine: Negative  Negative for cold intolerance  Genitourinary: Negative for dysuria, frequency and urgency  Incontinent of bal and urine   Musculoskeletal: Negative  Negative for myalgias and neck pain  Skin: Negative  Negative for rash  Neurological: Negative  Negative for dizziness, tremors, seizures, syncope, facial asymmetry, speech difficulty (non verbal at times), weakness, light-headedness, numbness and headaches  Hematological: Bruises/bleeds easily  Psychiatric/Behavioral: Positive for agitation  Negative for confusion, hallucinations and sleep disturbance  The patient is nervous/anxious

## 2021-04-20 NOTE — PATIENT INSTRUCTIONS
Patient clinically stable  Continue aspirin  Continue statin therapy  Continue with memantine and rivastigmine  Patient following with PCP  Patient needs updated lipid panel

## 2021-10-19 ENCOUNTER — APPOINTMENT (EMERGENCY)
Dept: RADIOLOGY | Facility: HOSPITAL | Age: 81
DRG: 871 | End: 2021-10-19
Payer: COMMERCIAL

## 2021-10-19 ENCOUNTER — HOSPITAL ENCOUNTER (INPATIENT)
Facility: HOSPITAL | Age: 81
LOS: 5 days | Discharge: NURSING, REHAB OR LONG-TERM CARE | DRG: 871 | End: 2021-10-25
Attending: EMERGENCY MEDICINE | Admitting: INTERNAL MEDICINE
Payer: COMMERCIAL

## 2021-10-19 DIAGNOSIS — I21.4 NSTEMI (NON-ST ELEVATED MYOCARDIAL INFARCTION) (HCC): ICD-10-CM

## 2021-10-19 DIAGNOSIS — A41.9 SEPTIC SHOCK (HCC): ICD-10-CM

## 2021-10-19 DIAGNOSIS — N17.9 AKI (ACUTE KIDNEY INJURY) (HCC): ICD-10-CM

## 2021-10-19 DIAGNOSIS — J69.0 ASPIRATION PNEUMONIA (HCC): ICD-10-CM

## 2021-10-19 DIAGNOSIS — R65.21 SEPTIC SHOCK (HCC): ICD-10-CM

## 2021-10-19 DIAGNOSIS — J96.01 ACUTE RESPIRATORY FAILURE WITH HYPOXIA (HCC): Primary | ICD-10-CM

## 2021-10-19 LAB
ALBUMIN SERPL BCP-MCNC: 3.4 G/DL (ref 3.5–5)
ALP SERPL-CCNC: 82 U/L (ref 46–116)
ALT SERPL W P-5'-P-CCNC: 21 U/L (ref 12–78)
ANION GAP SERPL CALCULATED.3IONS-SCNC: 11 MMOL/L (ref 4–13)
ANISOCYTOSIS BLD QL SMEAR: PRESENT
APTT PPP: 25 SECONDS (ref 23–37)
AST SERPL W P-5'-P-CCNC: 19 U/L (ref 5–45)
BASOPHILS # BLD MANUAL: 0 THOUSAND/UL (ref 0–0.1)
BASOPHILS NFR MAR MANUAL: 0 % (ref 0–1)
BILIRUB SERPL-MCNC: 1 MG/DL (ref 0.2–1)
BILIRUB UR QL STRIP: ABNORMAL
BUN SERPL-MCNC: 29 MG/DL (ref 5–25)
CALCIUM ALBUM COR SERPL-MCNC: 9.6 MG/DL (ref 8.3–10.1)
CALCIUM SERPL-MCNC: 9.1 MG/DL (ref 8.3–10.1)
CHLORIDE SERPL-SCNC: 109 MMOL/L (ref 100–108)
CLARITY UR: ABNORMAL
CO2 SERPL-SCNC: 27 MMOL/L (ref 21–32)
COLOR UR: YELLOW
CREAT SERPL-MCNC: 1.69 MG/DL (ref 0.6–1.3)
EOSINOPHIL # BLD MANUAL: 0 THOUSAND/UL (ref 0–0.4)
EOSINOPHIL NFR BLD MANUAL: 0 % (ref 0–6)
ERYTHROCYTE [DISTWIDTH] IN BLOOD BY AUTOMATED COUNT: 15.1 % (ref 11.6–15.1)
FLUAV RNA RESP QL NAA+PROBE: NEGATIVE
FLUBV RNA RESP QL NAA+PROBE: NEGATIVE
GFR SERPL CREATININE-BSD FRML MDRD: 38 ML/MIN/1.73SQ M
GLUCOSE SERPL-MCNC: 116 MG/DL (ref 65–140)
GLUCOSE UR STRIP-MCNC: NEGATIVE MG/DL
HCT VFR BLD AUTO: 45.3 % (ref 36.5–49.3)
HGB BLD-MCNC: 14.6 G/DL (ref 12–17)
HGB UR QL STRIP.AUTO: ABNORMAL
INR PPP: 1.15 (ref 0.84–1.19)
KETONES UR STRIP-MCNC: ABNORMAL MG/DL
LACTATE SERPL-SCNC: 4.8 MMOL/L (ref 0.5–2)
LEUKOCYTE ESTERASE UR QL STRIP: NEGATIVE
LYMPHOCYTES # BLD AUTO: 0.59 THOUSAND/UL (ref 0.6–4.47)
LYMPHOCYTES # BLD AUTO: 3 % (ref 14–44)
MCH RBC QN AUTO: 32.9 PG (ref 26.8–34.3)
MCHC RBC AUTO-ENTMCNC: 32.2 G/DL (ref 31.4–37.4)
MCV RBC AUTO: 102 FL (ref 82–98)
MONOCYTES # BLD AUTO: 0.4 THOUSAND/UL (ref 0–1.22)
MONOCYTES NFR BLD: 2 % (ref 4–12)
NEUTROPHILS # BLD MANUAL: 18.78 THOUSAND/UL (ref 1.85–7.62)
NEUTS BAND NFR BLD MANUAL: 12 % (ref 0–8)
NEUTS SEG NFR BLD AUTO: 83 % (ref 43–75)
NITRITE UR QL STRIP: NEGATIVE
NT-PROBNP SERPL-MCNC: ABNORMAL PG/ML
PH UR STRIP.AUTO: 5.5 [PH] (ref 4.5–8)
PLATELET # BLD AUTO: 261 THOUSANDS/UL (ref 149–390)
PLATELET BLD QL SMEAR: ADEQUATE
PMV BLD AUTO: 10.4 FL (ref 8.9–12.7)
POTASSIUM SERPL-SCNC: 4 MMOL/L (ref 3.5–5.3)
PROCALCITONIN SERPL-MCNC: 27.79 NG/ML
PROT SERPL-MCNC: 7.6 G/DL (ref 6.4–8.2)
PROT UR STRIP-MCNC: >=300 MG/DL
PROTHROMBIN TIME: 14.3 SECONDS (ref 11.6–14.5)
RBC # BLD AUTO: 4.44 MILLION/UL (ref 3.88–5.62)
RSV RNA RESP QL NAA+PROBE: NEGATIVE
SARS-COV-2 RNA RESP QL NAA+PROBE: NEGATIVE
SODIUM SERPL-SCNC: 147 MMOL/L (ref 136–145)
SP GR UR STRIP.AUTO: >=1.03 (ref 1–1.03)
TROPONIN I SERPL-MCNC: 0.05 NG/ML
UROBILINOGEN UR QL STRIP.AUTO: 0.2 E.U./DL
WBC # BLD AUTO: 19.77 THOUSAND/UL (ref 4.31–10.16)

## 2021-10-19 PROCEDURE — 96367 TX/PROPH/DG ADDL SEQ IV INF: CPT

## 2021-10-19 PROCEDURE — 85730 THROMBOPLASTIN TIME PARTIAL: CPT | Performed by: EMERGENCY MEDICINE

## 2021-10-19 PROCEDURE — 99292 CRITICAL CARE ADDL 30 MIN: CPT | Performed by: EMERGENCY MEDICINE

## 2021-10-19 PROCEDURE — 85025 COMPLETE CBC W/AUTO DIFF WBC: CPT | Performed by: EMERGENCY MEDICINE

## 2021-10-19 PROCEDURE — 80053 COMPREHEN METABOLIC PANEL: CPT | Performed by: EMERGENCY MEDICINE

## 2021-10-19 PROCEDURE — 0241U HB NFCT DS VIR RESP RNA 4 TRGT: CPT | Performed by: EMERGENCY MEDICINE

## 2021-10-19 PROCEDURE — 99291 CRITICAL CARE FIRST HOUR: CPT

## 2021-10-19 PROCEDURE — 96361 HYDRATE IV INFUSION ADD-ON: CPT

## 2021-10-19 PROCEDURE — 85027 COMPLETE CBC AUTOMATED: CPT | Performed by: EMERGENCY MEDICINE

## 2021-10-19 PROCEDURE — 87040 BLOOD CULTURE FOR BACTERIA: CPT | Performed by: EMERGENCY MEDICINE

## 2021-10-19 PROCEDURE — 96365 THER/PROPH/DIAG IV INF INIT: CPT

## 2021-10-19 PROCEDURE — 83880 ASSAY OF NATRIURETIC PEPTIDE: CPT | Performed by: EMERGENCY MEDICINE

## 2021-10-19 PROCEDURE — 93005 ELECTROCARDIOGRAM TRACING: CPT

## 2021-10-19 PROCEDURE — 94002 VENT MGMT INPAT INIT DAY: CPT

## 2021-10-19 PROCEDURE — 36415 COLL VENOUS BLD VENIPUNCTURE: CPT | Performed by: EMERGENCY MEDICINE

## 2021-10-19 PROCEDURE — 85007 BL SMEAR W/DIFF WBC COUNT: CPT | Performed by: EMERGENCY MEDICINE

## 2021-10-19 PROCEDURE — 83605 ASSAY OF LACTIC ACID: CPT | Performed by: EMERGENCY MEDICINE

## 2021-10-19 PROCEDURE — 84145 PROCALCITONIN (PCT): CPT | Performed by: EMERGENCY MEDICINE

## 2021-10-19 PROCEDURE — 71045 X-RAY EXAM CHEST 1 VIEW: CPT

## 2021-10-19 PROCEDURE — 84484 ASSAY OF TROPONIN QUANT: CPT | Performed by: EMERGENCY MEDICINE

## 2021-10-19 PROCEDURE — 99291 CRITICAL CARE FIRST HOUR: CPT | Performed by: EMERGENCY MEDICINE

## 2021-10-19 PROCEDURE — 85610 PROTHROMBIN TIME: CPT | Performed by: EMERGENCY MEDICINE

## 2021-10-19 PROCEDURE — 81001 URINALYSIS AUTO W/SCOPE: CPT

## 2021-10-19 RX ORDER — LABETALOL 20 MG/4 ML (5 MG/ML) INTRAVENOUS SYRINGE
10 ONCE
Status: COMPLETED | OUTPATIENT
Start: 2021-10-19 | End: 2021-10-20

## 2021-10-19 RX ORDER — ACETAMINOPHEN 650 MG/1
650 SUPPOSITORY RECTAL ONCE
Status: COMPLETED | OUTPATIENT
Start: 2021-10-19 | End: 2021-10-19

## 2021-10-19 RX ORDER — NITROGLYCERIN 20 MG/100ML
5-200 INJECTION INTRAVENOUS
Status: DISCONTINUED | OUTPATIENT
Start: 2021-10-19 | End: 2021-10-19

## 2021-10-19 RX ORDER — ONDANSETRON 2 MG/ML
4 INJECTION INTRAMUSCULAR; INTRAVENOUS ONCE
Status: DISCONTINUED | OUTPATIENT
Start: 2021-10-19 | End: 2021-10-21

## 2021-10-19 RX ORDER — NITROGLYCERIN 0.4 MG/1
0.4 TABLET SUBLINGUAL ONCE
Status: DISCONTINUED | OUTPATIENT
Start: 2021-10-19 | End: 2021-10-19

## 2021-10-19 RX ADMIN — SODIUM CHLORIDE 2000 ML: 0.9 INJECTION, SOLUTION INTRAVENOUS at 21:48

## 2021-10-19 RX ADMIN — SODIUM CHLORIDE 250 ML: 0.9 INJECTION, SOLUTION INTRAVENOUS at 22:03

## 2021-10-19 RX ADMIN — VANCOMYCIN HYDROCHLORIDE 1250 MG: 10 INJECTION, POWDER, LYOPHILIZED, FOR SOLUTION INTRAVENOUS at 23:16

## 2021-10-19 RX ADMIN — CEFEPIME HYDROCHLORIDE 2000 MG: 2 INJECTION, POWDER, FOR SOLUTION INTRAVENOUS at 21:18

## 2021-10-19 RX ADMIN — METRONIDAZOLE 500 MG: 500 INJECTION, SOLUTION INTRAVENOUS at 22:02

## 2021-10-19 RX ADMIN — ACETAMINOPHEN 650 MG: 650 SUPPOSITORY RECTAL at 21:16

## 2021-10-20 PROBLEM — I50.30 DIASTOLIC HEART FAILURE (HCC): Status: ACTIVE | Noted: 2021-10-20

## 2021-10-20 PROBLEM — J96.00 ACUTE RESPIRATORY FAILURE (HCC): Status: ACTIVE | Noted: 2021-10-20

## 2021-10-20 PROBLEM — T17.908A ASPIRATION INTO AIRWAY: Status: ACTIVE | Noted: 2021-10-20

## 2021-10-20 PROBLEM — N17.9 AKI (ACUTE KIDNEY INJURY) (HCC): Status: ACTIVE | Noted: 2021-10-20

## 2021-10-20 LAB
AMORPH PHOS CRY URNS QL MICRO: ABNORMAL /HPF
ANION GAP SERPL CALCULATED.3IONS-SCNC: 14 MMOL/L (ref 4–13)
ATRIAL RATE: 141 BPM
ATRIAL RATE: 227 BPM
BACTERIA UR QL AUTO: ABNORMAL /HPF
BASOPHILS # BLD AUTO: 0.02 THOUSANDS/ΜL (ref 0–0.1)
BASOPHILS NFR BLD AUTO: 0 % (ref 0–1)
BUN SERPL-MCNC: 28 MG/DL (ref 5–25)
CALCIUM SERPL-MCNC: 8.4 MG/DL (ref 8.3–10.1)
CHLORIDE SERPL-SCNC: 109 MMOL/L (ref 100–108)
CO2 SERPL-SCNC: 21 MMOL/L (ref 21–32)
CREAT SERPL-MCNC: 1.59 MG/DL (ref 0.6–1.3)
EOSINOPHIL # BLD AUTO: 0 THOUSAND/ΜL (ref 0–0.61)
EOSINOPHIL NFR BLD AUTO: 0 % (ref 0–6)
ERYTHROCYTE [DISTWIDTH] IN BLOOD BY AUTOMATED COUNT: 15.1 % (ref 11.6–15.1)
GFR SERPL CREATININE-BSD FRML MDRD: 40 ML/MIN/1.73SQ M
GLUCOSE SERPL-MCNC: 127 MG/DL (ref 65–140)
HCT VFR BLD AUTO: 42.8 % (ref 36.5–49.3)
HGB BLD-MCNC: 13.7 G/DL (ref 12–17)
HYALINE CASTS #/AREA URNS LPF: ABNORMAL /LPF
IMM GRANULOCYTES # BLD AUTO: 0.19 THOUSAND/UL (ref 0–0.2)
IMM GRANULOCYTES NFR BLD AUTO: 1 % (ref 0–2)
LACTATE SERPL-SCNC: 3.8 MMOL/L (ref 0.5–2)
LACTATE SERPL-SCNC: 3.9 MMOL/L (ref 0.5–2)
LACTATE SERPL-SCNC: 5 MMOL/L (ref 0.5–2)
LACTATE SERPL-SCNC: 6 MMOL/L (ref 0.5–2)
LYMPHOCYTES # BLD AUTO: 0.4 THOUSANDS/ΜL (ref 0.6–4.47)
LYMPHOCYTES NFR BLD AUTO: 3 % (ref 14–44)
MCH RBC QN AUTO: 32.1 PG (ref 26.8–34.3)
MCHC RBC AUTO-ENTMCNC: 32 G/DL (ref 31.4–37.4)
MCV RBC AUTO: 100 FL (ref 82–98)
MONOCYTES # BLD AUTO: 0.8 THOUSAND/ΜL (ref 0.17–1.22)
MONOCYTES NFR BLD AUTO: 5 % (ref 4–12)
MUCOUS THREADS UR QL AUTO: ABNORMAL
NEUTROPHILS # BLD AUTO: 13.33 THOUSANDS/ΜL (ref 1.85–7.62)
NEUTS SEG NFR BLD AUTO: 91 % (ref 43–75)
NON-SQ EPI CELLS URNS QL MICRO: ABNORMAL /HPF
NRBC BLD AUTO-RTO: 0 /100 WBCS
P AXIS: 104 DEGREES
P AXIS: 85 DEGREES
PLATELET # BLD AUTO: 184 THOUSANDS/UL (ref 149–390)
PMV BLD AUTO: 10 FL (ref 8.9–12.7)
POTASSIUM SERPL-SCNC: 4.1 MMOL/L (ref 3.5–5.3)
PROCALCITONIN SERPL-MCNC: 71.1 NG/ML
QRS AXIS: 104 DEGREES
QRS AXIS: 105 DEGREES
QRSD INTERVAL: 102 MS
QRSD INTERVAL: 104 MS
QT INTERVAL: 366 MS
QT INTERVAL: 444 MS
QTC INTERVAL: 469 MS
QTC INTERVAL: 502 MS
RBC # BLD AUTO: 4.27 MILLION/UL (ref 3.88–5.62)
RBC #/AREA URNS AUTO: ABNORMAL /HPF
SODIUM SERPL-SCNC: 144 MMOL/L (ref 136–145)
T WAVE AXIS: 2 DEGREES
T WAVE AXIS: 31 DEGREES
VENTRICULAR RATE: 77 BPM
VENTRICULAR RATE: 99 BPM
WBC # BLD AUTO: 14.74 THOUSAND/UL (ref 4.31–10.16)
WBC #/AREA URNS AUTO: ABNORMAL /HPF

## 2021-10-20 PROCEDURE — 93010 ELECTROCARDIOGRAM REPORT: CPT | Performed by: INTERNAL MEDICINE

## 2021-10-20 PROCEDURE — 99291 CRITICAL CARE FIRST HOUR: CPT | Performed by: NURSE PRACTITIONER

## 2021-10-20 PROCEDURE — 94002 VENT MGMT INPAT INIT DAY: CPT

## 2021-10-20 PROCEDURE — 87081 CULTURE SCREEN ONLY: CPT | Performed by: NURSE PRACTITIONER

## 2021-10-20 PROCEDURE — 85027 COMPLETE CBC AUTOMATED: CPT | Performed by: NURSE PRACTITIONER

## 2021-10-20 PROCEDURE — 83605 ASSAY OF LACTIC ACID: CPT | Performed by: INTERNAL MEDICINE

## 2021-10-20 PROCEDURE — 83605 ASSAY OF LACTIC ACID: CPT | Performed by: NURSE PRACTITIONER

## 2021-10-20 PROCEDURE — 84145 PROCALCITONIN (PCT): CPT | Performed by: NURSE PRACTITIONER

## 2021-10-20 PROCEDURE — 93005 ELECTROCARDIOGRAM TRACING: CPT

## 2021-10-20 PROCEDURE — 96375 TX/PRO/DX INJ NEW DRUG ADDON: CPT

## 2021-10-20 PROCEDURE — 94660 CPAP INITIATION&MGMT: CPT

## 2021-10-20 PROCEDURE — 80048 BASIC METABOLIC PNL TOTAL CA: CPT | Performed by: NURSE PRACTITIONER

## 2021-10-20 PROCEDURE — 99292 CRITICAL CARE ADDL 30 MIN: CPT | Performed by: INTERNAL MEDICINE

## 2021-10-20 RX ORDER — ATORVASTATIN CALCIUM 40 MG/1
40 TABLET, FILM COATED ORAL
Status: DISCONTINUED | OUTPATIENT
Start: 2021-10-20 | End: 2021-10-25 | Stop reason: HOSPADM

## 2021-10-20 RX ORDER — MORPHINE SULFATE 4 MG/ML
4 INJECTION, SOLUTION INTRAMUSCULAR; INTRAVENOUS
Status: CANCELLED | OUTPATIENT
Start: 2021-10-20

## 2021-10-20 RX ORDER — AMLODIPINE BESYLATE 5 MG/1
5 TABLET ORAL DAILY
Status: DISCONTINUED | OUTPATIENT
Start: 2021-10-20 | End: 2021-10-22

## 2021-10-20 RX ORDER — METOPROLOL TARTRATE 5 MG/5ML
5 INJECTION INTRAVENOUS EVERY 6 HOURS PRN
Status: DISCONTINUED | OUTPATIENT
Start: 2021-10-20 | End: 2021-10-22

## 2021-10-20 RX ORDER — LISINOPRIL 20 MG/1
20 TABLET ORAL 2 TIMES DAILY
Status: DISCONTINUED | OUTPATIENT
Start: 2021-10-20 | End: 2021-10-21

## 2021-10-20 RX ORDER — ONDANSETRON 2 MG/ML
4 INJECTION INTRAMUSCULAR; INTRAVENOUS ONCE AS NEEDED
Status: DISCONTINUED | OUTPATIENT
Start: 2021-10-20 | End: 2021-10-25 | Stop reason: HOSPADM

## 2021-10-20 RX ORDER — FLUOXETINE HYDROCHLORIDE 20 MG/1
20 CAPSULE ORAL DAILY
Status: DISCONTINUED | OUTPATIENT
Start: 2021-10-20 | End: 2021-10-25 | Stop reason: HOSPADM

## 2021-10-20 RX ORDER — METOPROLOL TARTRATE 50 MG/1
50 TABLET, FILM COATED ORAL EVERY 12 HOURS SCHEDULED
Status: DISCONTINUED | OUTPATIENT
Start: 2021-10-20 | End: 2021-10-21

## 2021-10-20 RX ORDER — METOPROLOL TARTRATE 5 MG/5ML
5 INJECTION INTRAVENOUS EVERY 6 HOURS PRN
Status: DISCONTINUED | OUTPATIENT
Start: 2021-10-20 | End: 2021-10-20

## 2021-10-20 RX ORDER — CLOPIDOGREL BISULFATE 75 MG/1
75 TABLET ORAL DAILY
Status: DISCONTINUED | OUTPATIENT
Start: 2021-10-20 | End: 2021-10-22

## 2021-10-20 RX ORDER — LEVOTHYROXINE SODIUM 0.07 MG/1
75 TABLET ORAL
Status: DISCONTINUED | OUTPATIENT
Start: 2021-10-20 | End: 2021-10-25 | Stop reason: HOSPADM

## 2021-10-20 RX ORDER — MEMANTINE HYDROCHLORIDE 5 MG/1
5 TABLET ORAL 2 TIMES DAILY
Status: DISCONTINUED | OUTPATIENT
Start: 2021-10-20 | End: 2021-10-25 | Stop reason: HOSPADM

## 2021-10-20 RX ORDER — HYDRALAZINE HYDROCHLORIDE 25 MG/1
50 TABLET, FILM COATED ORAL EVERY 8 HOURS
Status: DISCONTINUED | OUTPATIENT
Start: 2021-10-20 | End: 2021-10-21

## 2021-10-20 RX ORDER — NIFEDIPINE 60 MG/1
60 TABLET, EXTENDED RELEASE ORAL DAILY
Status: DISCONTINUED | OUTPATIENT
Start: 2021-10-20 | End: 2021-10-20

## 2021-10-20 RX ORDER — HYDRALAZINE HYDROCHLORIDE 20 MG/ML
20 INJECTION INTRAMUSCULAR; INTRAVENOUS EVERY 6 HOURS PRN
Status: DISCONTINUED | OUTPATIENT
Start: 2021-10-20 | End: 2021-10-22

## 2021-10-20 RX ORDER — SODIUM CHLORIDE, SODIUM LACTATE, POTASSIUM CHLORIDE, CALCIUM CHLORIDE 600; 310; 30; 20 MG/100ML; MG/100ML; MG/100ML; MG/100ML
100 INJECTION, SOLUTION INTRAVENOUS CONTINUOUS
Status: DISCONTINUED | OUTPATIENT
Start: 2021-10-20 | End: 2021-10-22

## 2021-10-20 RX ORDER — HYDRALAZINE HYDROCHLORIDE 20 MG/ML
10 INJECTION INTRAMUSCULAR; INTRAVENOUS EVERY 6 HOURS PRN
Status: DISCONTINUED | OUTPATIENT
Start: 2021-10-20 | End: 2021-10-20

## 2021-10-20 RX ORDER — ACETAMINOPHEN 325 MG/1
650 TABLET ORAL EVERY 6 HOURS PRN
Status: DISCONTINUED | OUTPATIENT
Start: 2021-10-20 | End: 2021-10-25 | Stop reason: HOSPADM

## 2021-10-20 RX ORDER — HEPARIN SODIUM 5000 [USP'U]/ML
5000 INJECTION, SOLUTION INTRAVENOUS; SUBCUTANEOUS EVERY 8 HOURS SCHEDULED
Status: DISCONTINUED | OUTPATIENT
Start: 2021-10-20 | End: 2021-10-25 | Stop reason: HOSPADM

## 2021-10-20 RX ORDER — FUROSEMIDE 10 MG/ML
40 INJECTION INTRAMUSCULAR; INTRAVENOUS ONCE
Status: COMPLETED | OUTPATIENT
Start: 2021-10-20 | End: 2021-10-20

## 2021-10-20 RX ORDER — LABETALOL 20 MG/4 ML (5 MG/ML) INTRAVENOUS SYRINGE
10 ONCE
Status: COMPLETED | OUTPATIENT
Start: 2021-10-20 | End: 2021-10-20

## 2021-10-20 RX ADMIN — FUROSEMIDE 40 MG: 10 INJECTION, SOLUTION INTRAVENOUS at 02:02

## 2021-10-20 RX ADMIN — HEPARIN SODIUM 5000 UNITS: 5000 INJECTION INTRAVENOUS; SUBCUTANEOUS at 14:02

## 2021-10-20 RX ADMIN — METOROPROLOL TARTRATE 5 MG: 5 INJECTION, SOLUTION INTRAVENOUS at 16:28

## 2021-10-20 RX ADMIN — HYDRALAZINE HYDROCHLORIDE 10 MG: 20 INJECTION, SOLUTION INTRAMUSCULAR; INTRAVENOUS at 11:58

## 2021-10-20 RX ADMIN — LABETALOL 20 MG/4 ML (5 MG/ML) INTRAVENOUS SYRINGE 10 MG: at 08:39

## 2021-10-20 RX ADMIN — SODIUM CHLORIDE, SODIUM LACTATE, POTASSIUM CHLORIDE, AND CALCIUM CHLORIDE 75 ML/HR: .6; .31; .03; .02 INJECTION, SOLUTION INTRAVENOUS at 18:30

## 2021-10-20 RX ADMIN — SODIUM CHLORIDE, SODIUM LACTATE, POTASSIUM CHLORIDE, AND CALCIUM CHLORIDE 1000 ML: .6; .31; .03; .02 INJECTION, SOLUTION INTRAVENOUS at 17:00

## 2021-10-20 RX ADMIN — HEPARIN SODIUM 5000 UNITS: 5000 INJECTION INTRAVENOUS; SUBCUTANEOUS at 05:00

## 2021-10-20 RX ADMIN — HEPARIN SODIUM 5000 UNITS: 5000 INJECTION INTRAVENOUS; SUBCUTANEOUS at 22:03

## 2021-10-20 RX ADMIN — HEPARIN SODIUM 5000 UNITS: 5000 INJECTION INTRAVENOUS; SUBCUTANEOUS at 02:36

## 2021-10-20 RX ADMIN — CEFEPIME HYDROCHLORIDE 2000 MG: 2 INJECTION, POWDER, FOR SOLUTION INTRAVENOUS at 14:00

## 2021-10-20 RX ADMIN — LABETALOL 20 MG/4 ML (5 MG/ML) INTRAVENOUS SYRINGE 10 MG: at 02:36

## 2021-10-20 RX ADMIN — LABETALOL 20 MG/4 ML (5 MG/ML) INTRAVENOUS SYRINGE 10 MG: at 00:01

## 2021-10-21 LAB
ANION GAP SERPL CALCULATED.3IONS-SCNC: 11 MMOL/L (ref 4–13)
ATRIAL RATE: 152 BPM
BUN SERPL-MCNC: 36 MG/DL (ref 5–25)
CALCIUM SERPL-MCNC: 9.2 MG/DL (ref 8.3–10.1)
CHLORIDE SERPL-SCNC: 111 MMOL/L (ref 100–108)
CO2 SERPL-SCNC: 25 MMOL/L (ref 21–32)
CREAT SERPL-MCNC: 1.55 MG/DL (ref 0.6–1.3)
ERYTHROCYTE [DISTWIDTH] IN BLOOD BY AUTOMATED COUNT: 15.7 % (ref 11.6–15.1)
GFR SERPL CREATININE-BSD FRML MDRD: 42 ML/MIN/1.73SQ M
GLUCOSE SERPL-MCNC: 99 MG/DL (ref 65–140)
HCT VFR BLD AUTO: 33.9 % (ref 36.5–49.3)
HGB BLD-MCNC: 11 G/DL (ref 12–17)
LACTATE SERPL-SCNC: 1.7 MMOL/L (ref 0.5–2)
LACTATE SERPL-SCNC: 5.9 MMOL/L (ref 0.5–2)
MCH RBC QN AUTO: 32.6 PG (ref 26.8–34.3)
MCHC RBC AUTO-ENTMCNC: 32.4 G/DL (ref 31.4–37.4)
MCV RBC AUTO: 101 FL (ref 82–98)
MRSA NOSE QL CULT: NORMAL
PLATELET # BLD AUTO: 169 THOUSANDS/UL (ref 149–390)
PMV BLD AUTO: 10.1 FL (ref 8.9–12.7)
POTASSIUM SERPL-SCNC: 4.5 MMOL/L (ref 3.5–5.3)
PR INTERVAL: 428 MS
QRS AXIS: 88 DEGREES
QRSD INTERVAL: 100 MS
QT INTERVAL: 317 MS
QTC INTERVAL: 505 MS
RBC # BLD AUTO: 3.37 MILLION/UL (ref 3.88–5.62)
SODIUM SERPL-SCNC: 147 MMOL/L (ref 136–145)
T WAVE AXIS: -25 DEGREES
VENTRICULAR RATE: 152 BPM
VIT B12 SERPL-MCNC: 1885 PG/ML (ref 100–900)
WBC # BLD AUTO: 16.72 THOUSAND/UL (ref 4.31–10.16)

## 2021-10-21 PROCEDURE — 99291 CRITICAL CARE FIRST HOUR: CPT | Performed by: INTERNAL MEDICINE

## 2021-10-21 PROCEDURE — 92610 EVALUATE SWALLOWING FUNCTION: CPT

## 2021-10-21 PROCEDURE — 85027 COMPLETE CBC AUTOMATED: CPT | Performed by: NURSE PRACTITIONER

## 2021-10-21 PROCEDURE — 93005 ELECTROCARDIOGRAM TRACING: CPT

## 2021-10-21 PROCEDURE — 83605 ASSAY OF LACTIC ACID: CPT | Performed by: NURSE PRACTITIONER

## 2021-10-21 PROCEDURE — 93010 ELECTROCARDIOGRAM REPORT: CPT | Performed by: INTERNAL MEDICINE

## 2021-10-21 PROCEDURE — 94660 CPAP INITIATION&MGMT: CPT

## 2021-10-21 PROCEDURE — 82607 VITAMIN B-12: CPT | Performed by: NURSE PRACTITIONER

## 2021-10-21 PROCEDURE — 80048 BASIC METABOLIC PNL TOTAL CA: CPT | Performed by: NURSE PRACTITIONER

## 2021-10-21 RX ORDER — METOPROLOL TARTRATE 5 MG/5ML
5 INJECTION INTRAVENOUS ONCE
Status: COMPLETED | OUTPATIENT
Start: 2021-10-21 | End: 2021-10-21

## 2021-10-21 RX ORDER — OLANZAPINE 10 MG/1
2.5 INJECTION, POWDER, LYOPHILIZED, FOR SOLUTION INTRAMUSCULAR ONCE
Status: DISCONTINUED | OUTPATIENT
Start: 2021-10-21 | End: 2021-10-21

## 2021-10-21 RX ORDER — MIDAZOLAM HYDROCHLORIDE 2 MG/2ML
INJECTION, SOLUTION INTRAMUSCULAR; INTRAVENOUS
Status: DISCONTINUED
Start: 2021-10-21 | End: 2021-10-21 | Stop reason: WASHOUT

## 2021-10-21 RX ORDER — LORAZEPAM 2 MG/ML
0.25 INJECTION INTRAMUSCULAR ONCE
Status: COMPLETED | OUTPATIENT
Start: 2021-10-21 | End: 2021-10-21

## 2021-10-21 RX ORDER — METOPROLOL TARTRATE 5 MG/5ML
5 INJECTION INTRAVENOUS EVERY 6 HOURS
Status: DISCONTINUED | OUTPATIENT
Start: 2021-10-21 | End: 2021-10-22

## 2021-10-21 RX ORDER — HYDRALAZINE HYDROCHLORIDE 20 MG/ML
10 INJECTION INTRAMUSCULAR; INTRAVENOUS ONCE
Status: COMPLETED | OUTPATIENT
Start: 2021-10-21 | End: 2021-10-21

## 2021-10-21 RX ORDER — METOPROLOL TARTRATE 50 MG/1
50 TABLET, FILM COATED ORAL EVERY 12 HOURS SCHEDULED
Status: DISCONTINUED | OUTPATIENT
Start: 2021-10-22 | End: 2021-10-25 | Stop reason: HOSPADM

## 2021-10-21 RX ORDER — ADENOSINE 3 MG/ML
INJECTION, SOLUTION INTRAVENOUS
Status: COMPLETED
Start: 2021-10-21 | End: 2021-10-21

## 2021-10-21 RX ORDER — FENTANYL CITRATE 50 UG/ML
INJECTION, SOLUTION INTRAMUSCULAR; INTRAVENOUS
Status: DISCONTINUED
Start: 2021-10-21 | End: 2021-10-21 | Stop reason: WASHOUT

## 2021-10-21 RX ORDER — HYDRALAZINE HYDROCHLORIDE 20 MG/ML
20 INJECTION INTRAMUSCULAR; INTRAVENOUS ONCE
Status: COMPLETED | OUTPATIENT
Start: 2021-10-22 | End: 2021-10-21

## 2021-10-21 RX ORDER — ADENOSINE 3 MG/ML
6 INJECTION INTRAVENOUS ONCE
Status: COMPLETED | OUTPATIENT
Start: 2021-10-21 | End: 2021-10-21

## 2021-10-21 RX ORDER — ADENOSINE 3 MG/ML
12 INJECTION INTRAVENOUS ONCE
Status: COMPLETED | OUTPATIENT
Start: 2021-10-21 | End: 2021-10-21

## 2021-10-21 RX ORDER — DILTIAZEM HYDROCHLORIDE 5 MG/ML
15 INJECTION INTRAVENOUS ONCE
Status: COMPLETED | OUTPATIENT
Start: 2021-10-21 | End: 2021-10-21

## 2021-10-21 RX ORDER — HYDRALAZINE HYDROCHLORIDE 20 MG/ML
10 INJECTION INTRAMUSCULAR; INTRAVENOUS EVERY 6 HOURS SCHEDULED
Status: DISCONTINUED | OUTPATIENT
Start: 2021-10-21 | End: 2021-10-22

## 2021-10-21 RX ADMIN — METOROPROLOL TARTRATE 5 MG: 5 INJECTION, SOLUTION INTRAVENOUS at 10:10

## 2021-10-21 RX ADMIN — DILTIAZEM HYDROCHLORIDE 5 MG/HR: 5 INJECTION INTRAVENOUS at 18:11

## 2021-10-21 RX ADMIN — HYDRALAZINE HYDROCHLORIDE 10 MG: 20 INJECTION INTRAMUSCULAR; INTRAVENOUS at 22:16

## 2021-10-21 RX ADMIN — CEFEPIME HYDROCHLORIDE 2000 MG: 2 INJECTION, POWDER, FOR SOLUTION INTRAVENOUS at 01:56

## 2021-10-21 RX ADMIN — METOROPROLOL TARTRATE 5 MG: 5 INJECTION, SOLUTION INTRAVENOUS at 16:36

## 2021-10-21 RX ADMIN — HEPARIN SODIUM 5000 UNITS: 5000 INJECTION INTRAVENOUS; SUBCUTANEOUS at 13:08

## 2021-10-21 RX ADMIN — METOROPROLOL TARTRATE 5 MG: 5 INJECTION, SOLUTION INTRAVENOUS at 19:56

## 2021-10-21 RX ADMIN — HEPARIN SODIUM 5000 UNITS: 5000 INJECTION INTRAVENOUS; SUBCUTANEOUS at 05:04

## 2021-10-21 RX ADMIN — HEPARIN SODIUM 5000 UNITS: 5000 INJECTION INTRAVENOUS; SUBCUTANEOUS at 21:21

## 2021-10-21 RX ADMIN — METOROPROLOL TARTRATE 5 MG: 5 INJECTION, SOLUTION INTRAVENOUS at 21:12

## 2021-10-21 RX ADMIN — HYDRALAZINE HYDROCHLORIDE 20 MG: 20 INJECTION, SOLUTION INTRAMUSCULAR; INTRAVENOUS at 01:55

## 2021-10-21 RX ADMIN — LORAZEPAM 0.25 MG: 2 INJECTION INTRAMUSCULAR; INTRAVENOUS at 21:12

## 2021-10-21 RX ADMIN — CEFEPIME HYDROCHLORIDE 2000 MG: 2 INJECTION, POWDER, FOR SOLUTION INTRAVENOUS at 13:08

## 2021-10-21 RX ADMIN — SODIUM CHLORIDE, SODIUM LACTATE, POTASSIUM CHLORIDE, AND CALCIUM CHLORIDE 100 ML/HR: .6; .31; .03; .02 INJECTION, SOLUTION INTRAVENOUS at 10:30

## 2021-10-21 RX ADMIN — DILTIAZEM HYDROCHLORIDE 15 MG: 5 INJECTION INTRAVENOUS at 16:42

## 2021-10-21 RX ADMIN — METOPROLOL TARTRATE 50 MG: 50 TABLET, FILM COATED ORAL at 23:57

## 2021-10-21 RX ADMIN — HYDRALAZINE HYDROCHLORIDE 20 MG: 20 INJECTION, SOLUTION INTRAMUSCULAR; INTRAVENOUS at 07:42

## 2021-10-21 RX ADMIN — SODIUM CHLORIDE, SODIUM LACTATE, POTASSIUM CHLORIDE, AND CALCIUM CHLORIDE 100 ML/HR: .6; .31; .03; .02 INJECTION, SOLUTION INTRAVENOUS at 22:15

## 2021-10-21 RX ADMIN — METOROPROLOL TARTRATE 5 MG: 5 INJECTION, SOLUTION INTRAVENOUS at 12:19

## 2021-10-21 RX ADMIN — ADENOSINE 12 MG: 3 INJECTION, SOLUTION INTRAVENOUS at 09:50

## 2021-10-21 RX ADMIN — HYDRALAZINE HYDROCHLORIDE 10 MG: 20 INJECTION INTRAMUSCULAR; INTRAVENOUS at 12:21

## 2021-10-21 RX ADMIN — ADENOSINE 6 MG: 3 INJECTION INTRAVENOUS at 09:45

## 2021-10-21 RX ADMIN — HYDRALAZINE HYDROCHLORIDE 20 MG: 20 INJECTION INTRAMUSCULAR; INTRAVENOUS at 23:57

## 2021-10-21 RX ADMIN — ADENOSINE 6 MG: 3 INJECTION, SOLUTION INTRAVENOUS at 09:45

## 2021-10-21 RX ADMIN — HYDRALAZINE HYDROCHLORIDE 20 MG: 20 INJECTION, SOLUTION INTRAMUSCULAR; INTRAVENOUS at 18:06

## 2021-10-22 PROBLEM — J18.9 CAP (COMMUNITY ACQUIRED PNEUMONIA): Status: ACTIVE | Noted: 2021-10-20

## 2021-10-22 PROBLEM — A41.9 SEVERE SEPSIS (HCC): Status: ACTIVE | Noted: 2021-10-22

## 2021-10-22 PROBLEM — I47.1 SVT (SUPRAVENTRICULAR TACHYCARDIA) (HCC): Status: ACTIVE | Noted: 2021-10-22

## 2021-10-22 PROBLEM — R65.20 SEVERE SEPSIS (HCC): Status: ACTIVE | Noted: 2021-10-22

## 2021-10-22 PROBLEM — F03.91 DEMENTIA WITH BEHAVIORAL PROBLEM (HCC): Status: ACTIVE | Noted: 2021-10-22

## 2021-10-22 LAB
ANION GAP SERPL CALCULATED.3IONS-SCNC: 10 MMOL/L (ref 4–13)
ATRIAL RATE: 64 BPM
ATRIAL RATE: 77 BPM
BASOPHILS # BLD AUTO: 0.03 THOUSANDS/ΜL (ref 0–0.1)
BASOPHILS NFR BLD AUTO: 0 % (ref 0–1)
BUN SERPL-MCNC: 32 MG/DL (ref 5–25)
CALCIUM SERPL-MCNC: 8.4 MG/DL (ref 8.3–10.1)
CHLORIDE SERPL-SCNC: 113 MMOL/L (ref 100–108)
CO2 SERPL-SCNC: 23 MMOL/L (ref 21–32)
CREAT SERPL-MCNC: 0.99 MG/DL (ref 0.6–1.3)
EOSINOPHIL # BLD AUTO: 0 THOUSAND/ΜL (ref 0–0.61)
EOSINOPHIL NFR BLD AUTO: 0 % (ref 0–6)
ERYTHROCYTE [DISTWIDTH] IN BLOOD BY AUTOMATED COUNT: 16.1 % (ref 11.6–15.1)
GFR SERPL CREATININE-BSD FRML MDRD: 72 ML/MIN/1.73SQ M
GLUCOSE SERPL-MCNC: 96 MG/DL (ref 65–140)
HCT VFR BLD AUTO: 30.1 % (ref 36.5–49.3)
HGB BLD-MCNC: 9.8 G/DL (ref 12–17)
IMM GRANULOCYTES # BLD AUTO: 0.36 THOUSAND/UL (ref 0–0.2)
IMM GRANULOCYTES NFR BLD AUTO: 2 % (ref 0–2)
LYMPHOCYTES # BLD AUTO: 0.58 THOUSANDS/ΜL (ref 0.6–4.47)
LYMPHOCYTES NFR BLD AUTO: 4 % (ref 14–44)
MAGNESIUM SERPL-MCNC: 1.9 MG/DL (ref 1.6–2.6)
MCH RBC QN AUTO: 33 PG (ref 26.8–34.3)
MCHC RBC AUTO-ENTMCNC: 32.6 G/DL (ref 31.4–37.4)
MCV RBC AUTO: 101 FL (ref 82–98)
MONOCYTES # BLD AUTO: 1.02 THOUSAND/ΜL (ref 0.17–1.22)
MONOCYTES NFR BLD AUTO: 6 % (ref 4–12)
NEUTROPHILS # BLD AUTO: 13.86 THOUSANDS/ΜL (ref 1.85–7.62)
NEUTS SEG NFR BLD AUTO: 88 % (ref 43–75)
NRBC BLD AUTO-RTO: 0 /100 WBCS
P AXIS: 67 DEGREES
P AXIS: 79 DEGREES
PLATELET # BLD AUTO: 171 THOUSANDS/UL (ref 149–390)
PMV BLD AUTO: 11.6 FL (ref 8.9–12.7)
POTASSIUM SERPL-SCNC: 5.1 MMOL/L (ref 3.5–5.3)
PR INTERVAL: 154 MS
PR INTERVAL: 158 MS
PROCALCITONIN SERPL-MCNC: 48.4 NG/ML
QRS AXIS: 113 DEGREES
QRS AXIS: 88 DEGREES
QRSD INTERVAL: 108 MS
QRSD INTERVAL: 108 MS
QT INTERVAL: 388 MS
QT INTERVAL: 442 MS
QTC INTERVAL: 440 MS
QTC INTERVAL: 456 MS
RBC # BLD AUTO: 2.97 MILLION/UL (ref 3.88–5.62)
SODIUM SERPL-SCNC: 146 MMOL/L (ref 136–145)
T WAVE AXIS: 24 DEGREES
T WAVE AXIS: 47 DEGREES
VENTRICULAR RATE: 64 BPM
VENTRICULAR RATE: 77 BPM
WBC # BLD AUTO: 15.85 THOUSAND/UL (ref 4.31–10.16)

## 2021-10-22 PROCEDURE — 80048 BASIC METABOLIC PNL TOTAL CA: CPT | Performed by: NURSE PRACTITIONER

## 2021-10-22 PROCEDURE — 93010 ELECTROCARDIOGRAM REPORT: CPT | Performed by: INTERNAL MEDICINE

## 2021-10-22 PROCEDURE — 84145 PROCALCITONIN (PCT): CPT | Performed by: NURSE PRACTITIONER

## 2021-10-22 PROCEDURE — 99232 SBSQ HOSP IP/OBS MODERATE 35: CPT | Performed by: INTERNAL MEDICINE

## 2021-10-22 PROCEDURE — 83735 ASSAY OF MAGNESIUM: CPT | Performed by: INTERNAL MEDICINE

## 2021-10-22 PROCEDURE — 85027 COMPLETE CBC AUTOMATED: CPT | Performed by: NURSE PRACTITIONER

## 2021-10-22 PROCEDURE — 92526 ORAL FUNCTION THERAPY: CPT

## 2021-10-22 PROCEDURE — 93005 ELECTROCARDIOGRAM TRACING: CPT

## 2021-10-22 RX ORDER — HYDRALAZINE HYDROCHLORIDE 20 MG/ML
10 INJECTION INTRAMUSCULAR; INTRAVENOUS EVERY 6 HOURS PRN
Status: DISCONTINUED | OUTPATIENT
Start: 2021-10-22 | End: 2021-10-25 | Stop reason: HOSPADM

## 2021-10-22 RX ORDER — RIVASTIGMINE 4.6 MG/24H
1 PATCH, EXTENDED RELEASE TRANSDERMAL DAILY
Status: DISCONTINUED | OUTPATIENT
Start: 2021-10-22 | End: 2021-10-25 | Stop reason: HOSPADM

## 2021-10-22 RX ORDER — HYDRALAZINE HYDROCHLORIDE 25 MG/1
50 TABLET, FILM COATED ORAL EVERY 8 HOURS
Status: DISCONTINUED | OUTPATIENT
Start: 2021-10-22 | End: 2021-10-25 | Stop reason: HOSPADM

## 2021-10-22 RX ORDER — AMLODIPINE BESYLATE 10 MG/1
10 TABLET ORAL DAILY
Status: DISCONTINUED | OUTPATIENT
Start: 2021-10-23 | End: 2021-10-25 | Stop reason: HOSPADM

## 2021-10-22 RX ORDER — NIFEDIPINE 30 MG/1
90 TABLET, EXTENDED RELEASE ORAL DAILY
Status: DISCONTINUED | OUTPATIENT
Start: 2021-10-23 | End: 2021-10-22

## 2021-10-22 RX ORDER — AMLODIPINE BESYLATE 5 MG/1
5 TABLET ORAL DAILY
Status: DISCONTINUED | OUTPATIENT
Start: 2021-10-22 | End: 2021-10-22

## 2021-10-22 RX ORDER — GUAIFENESIN/DEXTROMETHORPHAN 100-10MG/5
10 SYRUP ORAL EVERY 4 HOURS PRN
Status: DISCONTINUED | OUTPATIENT
Start: 2021-10-22 | End: 2021-10-25 | Stop reason: HOSPADM

## 2021-10-22 RX ORDER — METOPROLOL TARTRATE 5 MG/5ML
5 INJECTION INTRAVENOUS EVERY 6 HOURS PRN
Status: DISCONTINUED | OUTPATIENT
Start: 2021-10-22 | End: 2021-10-25

## 2021-10-22 RX ORDER — DIVALPROEX SODIUM 125 MG/1
250 CAPSULE, COATED PELLETS ORAL EVERY 12 HOURS SCHEDULED
Status: DISCONTINUED | OUTPATIENT
Start: 2021-10-22 | End: 2021-10-25 | Stop reason: HOSPADM

## 2021-10-22 RX ORDER — ASPIRIN 81 MG/1
81 TABLET ORAL DAILY
Status: DISCONTINUED | OUTPATIENT
Start: 2021-10-22 | End: 2021-10-22

## 2021-10-22 RX ORDER — ASPIRIN 81 MG/1
81 TABLET, CHEWABLE ORAL DAILY
Status: DISCONTINUED | OUTPATIENT
Start: 2021-10-23 | End: 2021-10-25 | Stop reason: HOSPADM

## 2021-10-22 RX ORDER — NIFEDIPINE 30 MG/1
60 TABLET, EXTENDED RELEASE ORAL DAILY
Status: DISCONTINUED | OUTPATIENT
Start: 2021-10-22 | End: 2021-10-22

## 2021-10-22 RX ORDER — DIVALPROEX SODIUM 125 MG/1
250 CAPSULE, COATED PELLETS ORAL EVERY 12 HOURS SCHEDULED
COMMUNITY

## 2021-10-22 RX ORDER — NIFEDIPINE 30 MG/1
30 TABLET, EXTENDED RELEASE ORAL DAILY
Status: DISCONTINUED | OUTPATIENT
Start: 2021-10-22 | End: 2021-10-22

## 2021-10-22 RX ORDER — TRAZODONE HYDROCHLORIDE 50 MG/1
50 TABLET ORAL
Status: DISCONTINUED | OUTPATIENT
Start: 2021-10-22 | End: 2021-10-25 | Stop reason: HOSPADM

## 2021-10-22 RX ORDER — LISINOPRIL 20 MG/1
20 TABLET ORAL EVERY 12 HOURS
Status: DISCONTINUED | OUTPATIENT
Start: 2021-10-22 | End: 2021-10-25 | Stop reason: HOSPADM

## 2021-10-22 RX ORDER — AMLODIPINE BESYLATE 5 MG/1
5 TABLET ORAL ONCE
Status: COMPLETED | OUTPATIENT
Start: 2021-10-22 | End: 2021-10-22

## 2021-10-22 RX ORDER — DIVALPROEX SODIUM 125 MG/1
125 CAPSULE, COATED PELLETS ORAL EVERY 12 HOURS SCHEDULED
Status: DISCONTINUED | OUTPATIENT
Start: 2021-10-22 | End: 2021-10-22

## 2021-10-22 RX ADMIN — METOPROLOL TARTRATE 50 MG: 50 TABLET, FILM COATED ORAL at 21:33

## 2021-10-22 RX ADMIN — CEFEPIME HYDROCHLORIDE 2000 MG: 2 INJECTION, POWDER, FOR SOLUTION INTRAVENOUS at 02:09

## 2021-10-22 RX ADMIN — LISINOPRIL 20 MG: 20 TABLET ORAL at 21:34

## 2021-10-22 RX ADMIN — DIVALPROEX SODIUM 250 MG: 125 CAPSULE, COATED PELLETS ORAL at 21:34

## 2021-10-22 RX ADMIN — LISINOPRIL 20 MG: 20 TABLET ORAL at 10:34

## 2021-10-22 RX ADMIN — HYDRALAZINE HYDROCHLORIDE 50 MG: 25 TABLET ORAL at 08:30

## 2021-10-22 RX ADMIN — DIVALPROEX SODIUM 250 MG: 125 CAPSULE, COATED PELLETS ORAL at 12:12

## 2021-10-22 RX ADMIN — NIFEDIPINE 30 MG: 30 TABLET, FILM COATED, EXTENDED RELEASE ORAL at 03:30

## 2021-10-22 RX ADMIN — SODIUM CHLORIDE, SODIUM LACTATE, POTASSIUM CHLORIDE, AND CALCIUM CHLORIDE 100 ML/HR: .6; .31; .03; .02 INJECTION, SOLUTION INTRAVENOUS at 08:50

## 2021-10-22 RX ADMIN — RIVASTIGMINE 1 PATCH: 4.6 PATCH TRANSDERMAL at 11:17

## 2021-10-22 RX ADMIN — HYDRALAZINE HYDROCHLORIDE 50 MG: 25 TABLET ORAL at 16:53

## 2021-10-22 RX ADMIN — AMLODIPINE BESYLATE 5 MG: 5 TABLET ORAL at 10:34

## 2021-10-22 RX ADMIN — FLUOXETINE 20 MG: 20 CAPSULE ORAL at 08:29

## 2021-10-22 RX ADMIN — HYDRALAZINE HYDROCHLORIDE 10 MG: 20 INJECTION INTRAMUSCULAR; INTRAVENOUS at 05:00

## 2021-10-22 RX ADMIN — ATORVASTATIN CALCIUM 40 MG: 40 TABLET, FILM COATED ORAL at 16:53

## 2021-10-22 RX ADMIN — MEMANTINE 5 MG: 5 TABLET ORAL at 08:33

## 2021-10-22 RX ADMIN — TRAZODONE HYDROCHLORIDE 50 MG: 50 TABLET ORAL at 21:34

## 2021-10-22 RX ADMIN — HEPARIN SODIUM 5000 UNITS: 5000 INJECTION INTRAVENOUS; SUBCUTANEOUS at 21:34

## 2021-10-22 RX ADMIN — HYDRALAZINE HYDROCHLORIDE 20 MG: 20 INJECTION, SOLUTION INTRAMUSCULAR; INTRAVENOUS at 05:58

## 2021-10-22 RX ADMIN — HEPARIN SODIUM 5000 UNITS: 5000 INJECTION INTRAVENOUS; SUBCUTANEOUS at 14:18

## 2021-10-22 RX ADMIN — HYDRALAZINE HYDROCHLORIDE 50 MG: 25 TABLET ORAL at 01:58

## 2021-10-22 RX ADMIN — AMLODIPINE BESYLATE 5 MG: 5 TABLET ORAL at 05:37

## 2021-10-22 RX ADMIN — ASPIRIN 81 MG: 81 TABLET, COATED ORAL at 08:29

## 2021-10-22 RX ADMIN — METOROPROLOL TARTRATE 5 MG: 5 INJECTION, SOLUTION INTRAVENOUS at 03:57

## 2021-10-22 RX ADMIN — HYDRALAZINE HYDROCHLORIDE 20 MG: 20 INJECTION, SOLUTION INTRAMUSCULAR; INTRAVENOUS at 12:22

## 2021-10-22 RX ADMIN — METOPROLOL TARTRATE 50 MG: 50 TABLET, FILM COATED ORAL at 08:29

## 2021-10-22 RX ADMIN — METOROPROLOL TARTRATE 5 MG: 5 INJECTION, SOLUTION INTRAVENOUS at 06:31

## 2021-10-22 RX ADMIN — MEMANTINE 5 MG: 5 TABLET ORAL at 18:30

## 2021-10-22 RX ADMIN — HEPARIN SODIUM 5000 UNITS: 5000 INJECTION INTRAVENOUS; SUBCUTANEOUS at 05:00

## 2021-10-22 RX ADMIN — LEVOTHYROXINE SODIUM 75 MCG: 75 TABLET ORAL at 05:00

## 2021-10-22 RX ADMIN — CEFTRIAXONE SODIUM 1000 MG: 10 INJECTION, POWDER, FOR SOLUTION INTRAVENOUS at 14:18

## 2021-10-23 LAB
ANION GAP SERPL CALCULATED.3IONS-SCNC: 11 MMOL/L (ref 4–13)
BASOPHILS # BLD MANUAL: 0 THOUSAND/UL (ref 0–0.1)
BASOPHILS NFR MAR MANUAL: 0 % (ref 0–1)
BUN SERPL-MCNC: 28 MG/DL (ref 5–25)
CALCIUM SERPL-MCNC: 8.4 MG/DL (ref 8.3–10.1)
CHLORIDE SERPL-SCNC: 114 MMOL/L (ref 100–108)
CO2 SERPL-SCNC: 27 MMOL/L (ref 21–32)
CREAT SERPL-MCNC: 1.09 MG/DL (ref 0.6–1.3)
EOSINOPHIL # BLD MANUAL: 0 THOUSAND/UL (ref 0–0.4)
EOSINOPHIL NFR BLD MANUAL: 0 % (ref 0–6)
ERYTHROCYTE [DISTWIDTH] IN BLOOD BY AUTOMATED COUNT: 15.7 % (ref 11.6–15.1)
GFR SERPL CREATININE-BSD FRML MDRD: 64 ML/MIN/1.73SQ M
GLUCOSE SERPL-MCNC: 127 MG/DL (ref 65–140)
HCT VFR BLD AUTO: 33 % (ref 36.5–49.3)
HGB BLD-MCNC: 11 G/DL (ref 12–17)
LYMPHOCYTES # BLD AUTO: 0.87 THOUSAND/UL (ref 0.6–4.47)
LYMPHOCYTES # BLD AUTO: 6 % (ref 14–44)
MAGNESIUM SERPL-MCNC: 2.2 MG/DL (ref 1.6–2.6)
MCH RBC QN AUTO: 32.8 PG (ref 26.8–34.3)
MCHC RBC AUTO-ENTMCNC: 33.3 G/DL (ref 31.4–37.4)
MCV RBC AUTO: 99 FL (ref 82–98)
MONOCYTES # BLD AUTO: 0.58 THOUSAND/UL (ref 0–1.22)
MONOCYTES NFR BLD: 4 % (ref 4–12)
NEUTROPHILS # BLD MANUAL: 13.03 THOUSAND/UL (ref 1.85–7.62)
NEUTS SEG NFR BLD AUTO: 90 % (ref 43–75)
PLATELET # BLD AUTO: 178 THOUSANDS/UL (ref 149–390)
PLATELET BLD QL SMEAR: ADEQUATE
PMV BLD AUTO: 10.2 FL (ref 8.9–12.7)
POTASSIUM SERPL-SCNC: 3.1 MMOL/L (ref 3.5–5.3)
PROCALCITONIN SERPL-MCNC: 28.34 NG/ML
RBC # BLD AUTO: 3.35 MILLION/UL (ref 3.88–5.62)
RBC MORPH BLD: NORMAL
SODIUM SERPL-SCNC: 152 MMOL/L (ref 136–145)
WBC # BLD AUTO: 14.48 THOUSAND/UL (ref 4.31–10.16)

## 2021-10-23 PROCEDURE — 85007 BL SMEAR W/DIFF WBC COUNT: CPT | Performed by: INTERNAL MEDICINE

## 2021-10-23 PROCEDURE — 84145 PROCALCITONIN (PCT): CPT | Performed by: NURSE PRACTITIONER

## 2021-10-23 PROCEDURE — 83735 ASSAY OF MAGNESIUM: CPT | Performed by: INTERNAL MEDICINE

## 2021-10-23 PROCEDURE — 80048 BASIC METABOLIC PNL TOTAL CA: CPT | Performed by: INTERNAL MEDICINE

## 2021-10-23 PROCEDURE — 99232 SBSQ HOSP IP/OBS MODERATE 35: CPT | Performed by: INTERNAL MEDICINE

## 2021-10-23 PROCEDURE — 85027 COMPLETE CBC AUTOMATED: CPT | Performed by: INTERNAL MEDICINE

## 2021-10-23 RX ORDER — POTASSIUM CHLORIDE 14.9 MG/ML
20 INJECTION INTRAVENOUS
Status: COMPLETED | OUTPATIENT
Start: 2021-10-23 | End: 2021-10-23

## 2021-10-23 RX ORDER — POTASSIUM CHLORIDE 14.9 MG/ML
20 INJECTION INTRAVENOUS ONCE
Status: DISCONTINUED | OUTPATIENT
Start: 2021-10-23 | End: 2021-10-23

## 2021-10-23 RX ORDER — DEXTROSE MONOHYDRATE 50 MG/ML
75 INJECTION, SOLUTION INTRAVENOUS CONTINUOUS
Status: DISCONTINUED | OUTPATIENT
Start: 2021-10-23 | End: 2021-10-25

## 2021-10-23 RX ORDER — POTASSIUM CHLORIDE 14.9 MG/ML
20 INJECTION INTRAVENOUS 4 TIMES DAILY
Status: DISCONTINUED | OUTPATIENT
Start: 2021-10-23 | End: 2021-10-23

## 2021-10-23 RX ORDER — POTASSIUM CHLORIDE 20MEQ/15ML
20 LIQUID (ML) ORAL ONCE
Status: COMPLETED | OUTPATIENT
Start: 2021-10-23 | End: 2021-10-23

## 2021-10-23 RX ADMIN — LEVOTHYROXINE SODIUM 75 MCG: 75 TABLET ORAL at 05:12

## 2021-10-23 RX ADMIN — DEXTROSE 75 ML/HR: 5 SOLUTION INTRAVENOUS at 10:09

## 2021-10-23 RX ADMIN — DIVALPROEX SODIUM 250 MG: 125 CAPSULE, COATED PELLETS ORAL at 08:32

## 2021-10-23 RX ADMIN — METOPROLOL TARTRATE 50 MG: 50 TABLET, FILM COATED ORAL at 20:10

## 2021-10-23 RX ADMIN — LISINOPRIL 20 MG: 20 TABLET ORAL at 21:21

## 2021-10-23 RX ADMIN — TRAZODONE HYDROCHLORIDE 50 MG: 50 TABLET ORAL at 21:22

## 2021-10-23 RX ADMIN — HYDRALAZINE HYDROCHLORIDE 50 MG: 25 TABLET ORAL at 01:18

## 2021-10-23 RX ADMIN — ATORVASTATIN CALCIUM 40 MG: 40 TABLET, FILM COATED ORAL at 16:46

## 2021-10-23 RX ADMIN — METOPROLOL TARTRATE 50 MG: 50 TABLET, FILM COATED ORAL at 08:31

## 2021-10-23 RX ADMIN — FLUOXETINE 20 MG: 20 CAPSULE ORAL at 08:31

## 2021-10-23 RX ADMIN — RIVASTIGMINE 1 PATCH: 4.6 PATCH TRANSDERMAL at 08:32

## 2021-10-23 RX ADMIN — MEMANTINE 5 MG: 5 TABLET ORAL at 08:31

## 2021-10-23 RX ADMIN — POTASSIUM CHLORIDE 20 MEQ: 14.9 INJECTION, SOLUTION INTRAVENOUS at 10:21

## 2021-10-23 RX ADMIN — HYDRALAZINE HYDROCHLORIDE 50 MG: 25 TABLET ORAL at 16:46

## 2021-10-23 RX ADMIN — HEPARIN SODIUM 5000 UNITS: 5000 INJECTION INTRAVENOUS; SUBCUTANEOUS at 05:12

## 2021-10-23 RX ADMIN — ASPIRIN 81 MG CHEWABLE TABLET 81 MG: 81 TABLET CHEWABLE at 08:30

## 2021-10-23 RX ADMIN — AMLODIPINE BESYLATE 10 MG: 10 TABLET ORAL at 08:30

## 2021-10-23 RX ADMIN — DIVALPROEX SODIUM 250 MG: 125 CAPSULE, COATED PELLETS ORAL at 20:10

## 2021-10-23 RX ADMIN — HYDRALAZINE HYDROCHLORIDE 50 MG: 25 TABLET ORAL at 08:30

## 2021-10-23 RX ADMIN — HEPARIN SODIUM 5000 UNITS: 5000 INJECTION INTRAVENOUS; SUBCUTANEOUS at 21:21

## 2021-10-23 RX ADMIN — CEFTRIAXONE SODIUM 1000 MG: 10 INJECTION, POWDER, FOR SOLUTION INTRAVENOUS at 14:23

## 2021-10-23 RX ADMIN — MEMANTINE 5 MG: 5 TABLET ORAL at 17:03

## 2021-10-23 RX ADMIN — HEPARIN SODIUM 5000 UNITS: 5000 INJECTION INTRAVENOUS; SUBCUTANEOUS at 13:59

## 2021-10-23 RX ADMIN — POTASSIUM CHLORIDE 20 MEQ: 20 SOLUTION ORAL at 10:21

## 2021-10-23 RX ADMIN — LISINOPRIL 20 MG: 20 TABLET ORAL at 10:20

## 2021-10-23 RX ADMIN — POTASSIUM CHLORIDE 20 MEQ: 20 SOLUTION ORAL at 10:20

## 2021-10-23 RX ADMIN — POTASSIUM CHLORIDE 20 MEQ: 14.9 INJECTION, SOLUTION INTRAVENOUS at 12:06

## 2021-10-24 PROBLEM — J96.01 ACUTE RESPIRATORY FAILURE WITH HYPOXIA (HCC): Status: ACTIVE | Noted: 2021-10-20

## 2021-10-24 PROBLEM — J18.9 SEPSIS DUE TO PNEUMONIA (HCC): Status: ACTIVE | Noted: 2021-10-22

## 2021-10-24 PROBLEM — E87.0 HYPERNATREMIA: Status: ACTIVE | Noted: 2021-10-24

## 2021-10-24 LAB
ANION GAP SERPL CALCULATED.3IONS-SCNC: 7 MMOL/L (ref 4–13)
BUN SERPL-MCNC: 15 MG/DL (ref 5–25)
CALCIUM SERPL-MCNC: 8.3 MG/DL (ref 8.3–10.1)
CHLORIDE SERPL-SCNC: 106 MMOL/L (ref 100–108)
CO2 SERPL-SCNC: 26 MMOL/L (ref 21–32)
CREAT SERPL-MCNC: 1.06 MG/DL (ref 0.6–1.3)
GFR SERPL CREATININE-BSD FRML MDRD: 66 ML/MIN/1.73SQ M
GLUCOSE SERPL-MCNC: 127 MG/DL (ref 65–140)
POTASSIUM SERPL-SCNC: 3.6 MMOL/L (ref 3.5–5.3)
SODIUM SERPL-SCNC: 139 MMOL/L (ref 136–145)

## 2021-10-24 PROCEDURE — 99232 SBSQ HOSP IP/OBS MODERATE 35: CPT | Performed by: STUDENT IN AN ORGANIZED HEALTH CARE EDUCATION/TRAINING PROGRAM

## 2021-10-24 PROCEDURE — 80048 BASIC METABOLIC PNL TOTAL CA: CPT | Performed by: STUDENT IN AN ORGANIZED HEALTH CARE EDUCATION/TRAINING PROGRAM

## 2021-10-24 RX ORDER — DIVALPROEX SODIUM 125 MG/1
250 CAPSULE, COATED PELLETS ORAL EVERY 12 HOURS SCHEDULED
Status: DISCONTINUED | OUTPATIENT
Start: 2021-10-24 | End: 2021-10-25

## 2021-10-24 RX ORDER — LABETALOL 20 MG/4 ML (5 MG/ML) INTRAVENOUS SYRINGE
20 ONCE
Status: COMPLETED | OUTPATIENT
Start: 2021-10-24 | End: 2021-10-24

## 2021-10-24 RX ADMIN — ATORVASTATIN CALCIUM 40 MG: 40 TABLET, FILM COATED ORAL at 17:14

## 2021-10-24 RX ADMIN — LABETALOL 20 MG/4 ML (5 MG/ML) INTRAVENOUS SYRINGE 20 MG: at 06:19

## 2021-10-24 RX ADMIN — DEXTROSE 75 ML/HR: 5 SOLUTION INTRAVENOUS at 00:34

## 2021-10-24 RX ADMIN — MEMANTINE 5 MG: 5 TABLET ORAL at 17:14

## 2021-10-24 RX ADMIN — HYDRALAZINE HYDROCHLORIDE 50 MG: 25 TABLET ORAL at 17:14

## 2021-10-24 RX ADMIN — AMLODIPINE BESYLATE 10 MG: 10 TABLET ORAL at 08:54

## 2021-10-24 RX ADMIN — HEPARIN SODIUM 5000 UNITS: 5000 INJECTION INTRAVENOUS; SUBCUTANEOUS at 13:24

## 2021-10-24 RX ADMIN — DEXTROSE 75 ML/HR: 5 SOLUTION INTRAVENOUS at 15:31

## 2021-10-24 RX ADMIN — DIVALPROEX SODIUM 250 MG: 125 CAPSULE, COATED PELLETS ORAL at 08:54

## 2021-10-24 RX ADMIN — DIVALPROEX SODIUM 250 MG: 125 CAPSULE, COATED PELLETS ORAL at 21:08

## 2021-10-24 RX ADMIN — RIVASTIGMINE 1 PATCH: 4.6 PATCH TRANSDERMAL at 09:15

## 2021-10-24 RX ADMIN — ASPIRIN 81 MG CHEWABLE TABLET 81 MG: 81 TABLET CHEWABLE at 08:54

## 2021-10-24 RX ADMIN — HEPARIN SODIUM 5000 UNITS: 5000 INJECTION INTRAVENOUS; SUBCUTANEOUS at 06:18

## 2021-10-24 RX ADMIN — LEVOTHYROXINE SODIUM 75 MCG: 75 TABLET ORAL at 06:18

## 2021-10-24 RX ADMIN — DEXTROSE 75 ML/HR: 5 SOLUTION INTRAVENOUS at 13:24

## 2021-10-24 RX ADMIN — HYDRALAZINE HYDROCHLORIDE 50 MG: 25 TABLET ORAL at 08:55

## 2021-10-24 RX ADMIN — LISINOPRIL 20 MG: 20 TABLET ORAL at 10:51

## 2021-10-24 RX ADMIN — METOROPROLOL TARTRATE 5 MG: 5 INJECTION, SOLUTION INTRAVENOUS at 03:22

## 2021-10-24 RX ADMIN — CEFTRIAXONE SODIUM 1000 MG: 10 INJECTION, POWDER, FOR SOLUTION INTRAVENOUS at 13:24

## 2021-10-24 RX ADMIN — MEMANTINE 5 MG: 5 TABLET ORAL at 08:55

## 2021-10-24 RX ADMIN — HYDRALAZINE HYDROCHLORIDE 10 MG: 20 INJECTION INTRAMUSCULAR; INTRAVENOUS at 01:43

## 2021-10-24 RX ADMIN — HYDRALAZINE HYDROCHLORIDE 50 MG: 25 TABLET ORAL at 00:52

## 2021-10-24 RX ADMIN — TRAZODONE HYDROCHLORIDE 50 MG: 50 TABLET ORAL at 21:08

## 2021-10-24 RX ADMIN — LISINOPRIL 20 MG: 20 TABLET ORAL at 21:15

## 2021-10-24 RX ADMIN — METOPROLOL TARTRATE 50 MG: 50 TABLET, FILM COATED ORAL at 21:08

## 2021-10-24 RX ADMIN — HEPARIN SODIUM 5000 UNITS: 5000 INJECTION INTRAVENOUS; SUBCUTANEOUS at 21:08

## 2021-10-24 RX ADMIN — FLUOXETINE 20 MG: 20 CAPSULE ORAL at 08:54

## 2021-10-24 RX ADMIN — METOPROLOL TARTRATE 50 MG: 50 TABLET, FILM COATED ORAL at 08:54

## 2021-10-25 VITALS
BODY MASS INDEX: 21.05 KG/M2 | OXYGEN SATURATION: 93 % | WEIGHT: 138.89 LBS | HEIGHT: 68 IN | RESPIRATION RATE: 18 BRPM | HEART RATE: 63 BPM | TEMPERATURE: 98 F | DIASTOLIC BLOOD PRESSURE: 67 MMHG | SYSTOLIC BLOOD PRESSURE: 146 MMHG

## 2021-10-25 LAB
ALBUMIN SERPL BCP-MCNC: 2.2 G/DL (ref 3.5–5)
ALP SERPL-CCNC: 55 U/L (ref 46–116)
ALT SERPL W P-5'-P-CCNC: 19 U/L (ref 12–78)
ANION GAP SERPL CALCULATED.3IONS-SCNC: 10 MMOL/L (ref 4–13)
AST SERPL W P-5'-P-CCNC: 25 U/L (ref 5–45)
BACTERIA BLD CULT: NORMAL
BACTERIA BLD CULT: NORMAL
BILIRUB SERPL-MCNC: 0.47 MG/DL (ref 0.2–1)
BUN SERPL-MCNC: 16 MG/DL (ref 5–25)
CALCIUM ALBUM COR SERPL-MCNC: 9.4 MG/DL (ref 8.3–10.1)
CALCIUM SERPL-MCNC: 8 MG/DL (ref 8.3–10.1)
CHLORIDE SERPL-SCNC: 105 MMOL/L (ref 100–108)
CO2 SERPL-SCNC: 25 MMOL/L (ref 21–32)
CREAT SERPL-MCNC: 1.05 MG/DL (ref 0.6–1.3)
ERYTHROCYTE [DISTWIDTH] IN BLOOD BY AUTOMATED COUNT: 15.5 % (ref 11.6–15.1)
GFR SERPL CREATININE-BSD FRML MDRD: 67 ML/MIN/1.73SQ M
GLUCOSE SERPL-MCNC: 91 MG/DL (ref 65–140)
HCT VFR BLD AUTO: 33.1 % (ref 36.5–49.3)
HGB BLD-MCNC: 10.9 G/DL (ref 12–17)
MAGNESIUM SERPL-MCNC: 2 MG/DL (ref 1.6–2.6)
MCH RBC QN AUTO: 31.7 PG (ref 26.8–34.3)
MCHC RBC AUTO-ENTMCNC: 32.9 G/DL (ref 31.4–37.4)
MCV RBC AUTO: 96 FL (ref 82–98)
NRBC BLD AUTO-RTO: 0 /100 WBCS
PHOSPHATE SERPL-MCNC: 3.2 MG/DL (ref 2.3–4.1)
PLATELET # BLD AUTO: 173 THOUSANDS/UL (ref 149–390)
PMV BLD AUTO: 9.8 FL (ref 8.9–12.7)
POTASSIUM SERPL-SCNC: 3.4 MMOL/L (ref 3.5–5.3)
PROCALCITONIN SERPL-MCNC: 7.65 NG/ML
PROT SERPL-MCNC: 5.7 G/DL (ref 6.4–8.2)
RBC # BLD AUTO: 3.44 MILLION/UL (ref 3.88–5.62)
SODIUM SERPL-SCNC: 140 MMOL/L (ref 136–145)
WBC # BLD AUTO: 9.5 THOUSAND/UL (ref 4.31–10.16)

## 2021-10-25 PROCEDURE — 84145 PROCALCITONIN (PCT): CPT | Performed by: STUDENT IN AN ORGANIZED HEALTH CARE EDUCATION/TRAINING PROGRAM

## 2021-10-25 PROCEDURE — 84100 ASSAY OF PHOSPHORUS: CPT | Performed by: STUDENT IN AN ORGANIZED HEALTH CARE EDUCATION/TRAINING PROGRAM

## 2021-10-25 PROCEDURE — 83735 ASSAY OF MAGNESIUM: CPT | Performed by: STUDENT IN AN ORGANIZED HEALTH CARE EDUCATION/TRAINING PROGRAM

## 2021-10-25 PROCEDURE — 85027 COMPLETE CBC AUTOMATED: CPT | Performed by: STUDENT IN AN ORGANIZED HEALTH CARE EDUCATION/TRAINING PROGRAM

## 2021-10-25 PROCEDURE — 80053 COMPREHEN METABOLIC PANEL: CPT | Performed by: STUDENT IN AN ORGANIZED HEALTH CARE EDUCATION/TRAINING PROGRAM

## 2021-10-25 PROCEDURE — 99239 HOSP IP/OBS DSCHRG MGMT >30: CPT | Performed by: INTERNAL MEDICINE

## 2021-10-25 RX ADMIN — LISINOPRIL 20 MG: 20 TABLET ORAL at 09:56

## 2021-10-25 RX ADMIN — ASPIRIN 81 MG CHEWABLE TABLET 81 MG: 81 TABLET CHEWABLE at 09:20

## 2021-10-25 RX ADMIN — FLUOXETINE 20 MG: 20 CAPSULE ORAL at 09:20

## 2021-10-25 RX ADMIN — DEXTROSE 75 ML/HR: 5 SOLUTION INTRAVENOUS at 03:32

## 2021-10-25 RX ADMIN — DIVALPROEX SODIUM 250 MG: 125 CAPSULE, COATED PELLETS ORAL at 09:16

## 2021-10-25 RX ADMIN — AMLODIPINE BESYLATE 10 MG: 10 TABLET ORAL at 09:16

## 2021-10-25 RX ADMIN — CEFTRIAXONE 1000 MG: 1 INJECTION, POWDER, FOR SOLUTION INTRAMUSCULAR; INTRAVENOUS at 11:20

## 2021-10-25 RX ADMIN — LEVOTHYROXINE SODIUM 75 MCG: 75 TABLET ORAL at 05:01

## 2021-10-25 RX ADMIN — HYDRALAZINE HYDROCHLORIDE 50 MG: 25 TABLET ORAL at 00:54

## 2021-10-25 RX ADMIN — MEMANTINE 5 MG: 5 TABLET ORAL at 09:20

## 2021-10-25 RX ADMIN — HYDRALAZINE HYDROCHLORIDE 50 MG: 25 TABLET ORAL at 09:16

## 2021-10-25 RX ADMIN — METOPROLOL TARTRATE 50 MG: 50 TABLET, FILM COATED ORAL at 09:16

## 2021-10-25 RX ADMIN — RIVASTIGMINE 1 PATCH: 4.6 PATCH TRANSDERMAL at 09:24
